# Patient Record
Sex: MALE | Race: WHITE | NOT HISPANIC OR LATINO | Employment: OTHER | URBAN - NONMETROPOLITAN AREA
[De-identification: names, ages, dates, MRNs, and addresses within clinical notes are randomized per-mention and may not be internally consistent; named-entity substitution may affect disease eponyms.]

---

## 2017-09-01 ENCOUNTER — HOSPITAL ENCOUNTER (INPATIENT)
Facility: HOSPITAL | Age: 77
LOS: 4 days | Discharge: NURSING, REHAB OR LONG-TERM CARE | End: 2017-09-06
Attending: EMERGENCY MEDICINE | Admitting: FAMILY MEDICINE

## 2017-09-01 ENCOUNTER — APPOINTMENT (OUTPATIENT)
Dept: CT IMAGING | Facility: HOSPITAL | Age: 77
End: 2017-09-01

## 2017-09-01 ENCOUNTER — APPOINTMENT (OUTPATIENT)
Dept: GENERAL RADIOLOGY | Facility: HOSPITAL | Age: 77
End: 2017-09-01

## 2017-09-01 DIAGNOSIS — Z78.9 DECREASED ACTIVITIES OF DAILY LIVING (ADL): ICD-10-CM

## 2017-09-01 DIAGNOSIS — R13.12 OROPHARYNGEAL DYSPHAGIA: ICD-10-CM

## 2017-09-01 DIAGNOSIS — R41.82 ALTERED MENTAL STATUS, UNSPECIFIED ALTERED MENTAL STATUS TYPE: Primary | ICD-10-CM

## 2017-09-01 DIAGNOSIS — N39.0 ACUTE UTI: ICD-10-CM

## 2017-09-01 DIAGNOSIS — Z74.09 IMPAIRED FUNCTIONAL MOBILITY, BALANCE, GAIT, AND ENDURANCE: ICD-10-CM

## 2017-09-01 DIAGNOSIS — R41.89 IMPAIRED COGNITION: ICD-10-CM

## 2017-09-01 LAB
ALBUMIN SERPL-MCNC: 4 G/DL (ref 3.5–5)
ALBUMIN/GLOB SERPL: 1.4 G/DL (ref 1.1–2.5)
ALP SERPL-CCNC: 95 U/L (ref 24–120)
ALT SERPL W P-5'-P-CCNC: 29 U/L (ref 0–54)
ANION GAP SERPL CALCULATED.3IONS-SCNC: 9 MMOL/L (ref 4–13)
APTT PPP: 31.2 SECONDS (ref 24.1–34.8)
AST SERPL-CCNC: 21 U/L (ref 7–45)
BACTERIA UR QL AUTO: ABNORMAL /HPF
BASOPHILS # BLD AUTO: 0.05 10*3/MM3 (ref 0–0.2)
BASOPHILS NFR BLD AUTO: 0.5 % (ref 0–2)
BILIRUB SERPL-MCNC: 0.5 MG/DL (ref 0.1–1)
BILIRUB UR QL STRIP: NEGATIVE
BUN BLD-MCNC: 21 MG/DL (ref 5–21)
BUN/CREAT SERPL: 18.3 (ref 7–25)
CALCIUM SPEC-SCNC: 9.2 MG/DL (ref 8.4–10.4)
CHLORIDE SERPL-SCNC: 100 MMOL/L (ref 98–110)
CLARITY UR: CLEAR
CO2 SERPL-SCNC: 25 MMOL/L (ref 24–31)
COLOR UR: YELLOW
CREAT BLD-MCNC: 1.15 MG/DL (ref 0.5–1.4)
D-LACTATE SERPL-SCNC: 1.1 MMOL/L (ref 0.5–2)
DEPRECATED RDW RBC AUTO: 45.6 FL (ref 40–54)
EOSINOPHIL # BLD AUTO: 0.15 10*3/MM3 (ref 0–0.7)
EOSINOPHIL NFR BLD AUTO: 1.4 % (ref 0–4)
ERYTHROCYTE [DISTWIDTH] IN BLOOD BY AUTOMATED COUNT: 13.9 % (ref 12–15)
GFR SERPL CREATININE-BSD FRML MDRD: 62 ML/MIN/1.73
GLOBULIN UR ELPH-MCNC: 2.9 GM/DL
GLUCOSE BLD-MCNC: 347 MG/DL (ref 70–100)
GLUCOSE BLDC GLUCOMTR-MCNC: 372 MG/DL (ref 70–130)
GLUCOSE UR STRIP-MCNC: ABNORMAL MG/DL
HCT VFR BLD AUTO: 43.8 % (ref 40–52)
HGB BLD-MCNC: 14.8 G/DL (ref 14–18)
HGB UR QL STRIP.AUTO: NEGATIVE
HYALINE CASTS UR QL AUTO: ABNORMAL /LPF
IMM GRANULOCYTES # BLD: 0.04 10*3/MM3 (ref 0–0.03)
IMM GRANULOCYTES NFR BLD: 0.4 % (ref 0–5)
INR PPP: 0.89 (ref 0.91–1.09)
KETONES UR QL STRIP: NEGATIVE
LEUKOCYTE ESTERASE UR QL STRIP.AUTO: ABNORMAL
LYMPHOCYTES # BLD AUTO: 1.85 10*3/MM3 (ref 0.72–4.86)
LYMPHOCYTES NFR BLD AUTO: 17.5 % (ref 15–45)
MCH RBC QN AUTO: 30.2 PG (ref 28–32)
MCHC RBC AUTO-ENTMCNC: 33.8 G/DL (ref 33–36)
MCV RBC AUTO: 89.4 FL (ref 82–95)
MONOCYTES # BLD AUTO: 1.13 10*3/MM3 (ref 0.19–1.3)
MONOCYTES NFR BLD AUTO: 10.7 % (ref 4–12)
NEUTROPHILS # BLD AUTO: 7.35 10*3/MM3 (ref 1.87–8.4)
NEUTROPHILS NFR BLD AUTO: 69.5 % (ref 39–78)
NITRITE UR QL STRIP: NEGATIVE
PH UR STRIP.AUTO: 6.5 [PH] (ref 5–8)
PLATELET # BLD AUTO: 248 10*3/MM3 (ref 130–400)
PMV BLD AUTO: 11.8 FL (ref 6–12)
POTASSIUM BLD-SCNC: 4.4 MMOL/L (ref 3.5–5.3)
PROT SERPL-MCNC: 6.9 G/DL (ref 6.3–8.7)
PROT UR QL STRIP: ABNORMAL
PROTHROMBIN TIME: 12.3 SECONDS (ref 11.9–14.6)
RBC # BLD AUTO: 4.9 10*6/MM3 (ref 4.8–5.9)
RBC # UR: ABNORMAL /HPF
REF LAB TEST METHOD: ABNORMAL
SODIUM BLD-SCNC: 134 MMOL/L (ref 135–145)
SP GR UR STRIP: 1.02 (ref 1–1.03)
SQUAMOUS #/AREA URNS HPF: ABNORMAL /HPF
UROBILINOGEN UR QL STRIP: ABNORMAL
WBC NRBC COR # BLD: 10.57 10*3/MM3 (ref 4.8–10.8)
WBC UR QL AUTO: ABNORMAL /HPF

## 2017-09-01 PROCEDURE — 63710000001 INSULIN LISPRO (HUMAN) PER 5 UNITS: Performed by: INTERNAL MEDICINE

## 2017-09-01 PROCEDURE — 82962 GLUCOSE BLOOD TEST: CPT

## 2017-09-01 PROCEDURE — 87086 URINE CULTURE/COLONY COUNT: CPT | Performed by: EMERGENCY MEDICINE

## 2017-09-01 PROCEDURE — 85730 THROMBOPLASTIN TIME PARTIAL: CPT | Performed by: EMERGENCY MEDICINE

## 2017-09-01 PROCEDURE — 83036 HEMOGLOBIN GLYCOSYLATED A1C: CPT | Performed by: INTERNAL MEDICINE

## 2017-09-01 PROCEDURE — 87077 CULTURE AEROBIC IDENTIFY: CPT | Performed by: EMERGENCY MEDICINE

## 2017-09-01 PROCEDURE — 71010 HC CHEST PA OR AP: CPT

## 2017-09-01 PROCEDURE — 99285 EMERGENCY DEPT VISIT HI MDM: CPT

## 2017-09-01 PROCEDURE — 25010000002 CEFTRIAXONE: Performed by: INTERNAL MEDICINE

## 2017-09-01 PROCEDURE — 81001 URINALYSIS AUTO W/SCOPE: CPT | Performed by: EMERGENCY MEDICINE

## 2017-09-01 PROCEDURE — 87040 BLOOD CULTURE FOR BACTERIA: CPT | Performed by: EMERGENCY MEDICINE

## 2017-09-01 PROCEDURE — 85610 PROTHROMBIN TIME: CPT | Performed by: EMERGENCY MEDICINE

## 2017-09-01 PROCEDURE — 93005 ELECTROCARDIOGRAM TRACING: CPT | Performed by: EMERGENCY MEDICINE

## 2017-09-01 PROCEDURE — G0378 HOSPITAL OBSERVATION PER HR: HCPCS

## 2017-09-01 PROCEDURE — 83605 ASSAY OF LACTIC ACID: CPT | Performed by: EMERGENCY MEDICINE

## 2017-09-01 PROCEDURE — 87186 SC STD MICRODIL/AGAR DIL: CPT | Performed by: EMERGENCY MEDICINE

## 2017-09-01 PROCEDURE — 85025 COMPLETE CBC W/AUTO DIFF WBC: CPT | Performed by: EMERGENCY MEDICINE

## 2017-09-01 PROCEDURE — 93010 ELECTROCARDIOGRAM REPORT: CPT | Performed by: INTERNAL MEDICINE

## 2017-09-01 PROCEDURE — 70450 CT HEAD/BRAIN W/O DYE: CPT

## 2017-09-01 PROCEDURE — 80053 COMPREHEN METABOLIC PANEL: CPT | Performed by: EMERGENCY MEDICINE

## 2017-09-01 RX ORDER — ONDANSETRON 2 MG/ML
4 INJECTION INTRAMUSCULAR; INTRAVENOUS EVERY 6 HOURS PRN
Status: DISCONTINUED | OUTPATIENT
Start: 2017-09-01 | End: 2017-09-06 | Stop reason: HOSPADM

## 2017-09-01 RX ORDER — SODIUM CHLORIDE 0.9 % (FLUSH) 0.9 %
1-10 SYRINGE (ML) INJECTION AS NEEDED
Status: DISCONTINUED | OUTPATIENT
Start: 2017-09-01 | End: 2017-09-06 | Stop reason: HOSPADM

## 2017-09-01 RX ORDER — OXYCODONE AND ACETAMINOPHEN 7.5; 325 MG/1; MG/1
1 TABLET ORAL EVERY 8 HOURS PRN
Status: DISCONTINUED | OUTPATIENT
Start: 2017-09-01 | End: 2017-09-05

## 2017-09-01 RX ORDER — ZINC GLUCONATE 50 MG
1 TABLET ORAL DAILY
COMMUNITY
End: 2018-01-01 | Stop reason: HOSPADM

## 2017-09-01 RX ORDER — LISINOPRIL 2.5 MG/1
2.5 TABLET ORAL DAILY PRN
Status: DISCONTINUED | OUTPATIENT
Start: 2017-09-01 | End: 2017-09-06 | Stop reason: HOSPADM

## 2017-09-01 RX ORDER — OXYCODONE AND ACETAMINOPHEN 7.5; 325 MG/1; MG/1
1 TABLET ORAL EVERY 8 HOURS PRN
COMMUNITY
End: 2017-09-06 | Stop reason: HOSPADM

## 2017-09-01 RX ORDER — DEXTROSE MONOHYDRATE 25 G/50ML
25 INJECTION, SOLUTION INTRAVENOUS
Status: DISCONTINUED | OUTPATIENT
Start: 2017-09-01 | End: 2017-09-06 | Stop reason: HOSPADM

## 2017-09-01 RX ORDER — FAMOTIDINE 20 MG/1
40 TABLET, FILM COATED ORAL DAILY
Status: DISCONTINUED | OUTPATIENT
Start: 2017-09-02 | End: 2017-09-06 | Stop reason: HOSPADM

## 2017-09-01 RX ORDER — INSULIN GLARGINE 100 [IU]/ML
25 INJECTION, SOLUTION SUBCUTANEOUS DAILY
COMMUNITY
End: 2017-09-06 | Stop reason: HOSPADM

## 2017-09-01 RX ORDER — ACETAMINOPHEN 325 MG/1
650 TABLET ORAL EVERY 4 HOURS PRN
Status: DISCONTINUED | OUTPATIENT
Start: 2017-09-01 | End: 2017-09-06 | Stop reason: HOSPADM

## 2017-09-01 RX ORDER — SODIUM CHLORIDE 0.9 % (FLUSH) 0.9 %
10 SYRINGE (ML) INJECTION AS NEEDED
Status: DISCONTINUED | OUTPATIENT
Start: 2017-09-01 | End: 2017-09-06 | Stop reason: HOSPADM

## 2017-09-01 RX ORDER — ASPIRIN 81 MG/1
81 TABLET ORAL EVERY 12 HOURS
COMMUNITY
End: 2017-09-06 | Stop reason: HOSPADM

## 2017-09-01 RX ORDER — LORAZEPAM 1 MG/1
1 TABLET ORAL EVERY 6 HOURS PRN
Status: DISCONTINUED | OUTPATIENT
Start: 2017-09-01 | End: 2017-09-05

## 2017-09-01 RX ORDER — TEMAZEPAM 30 MG/1
30 CAPSULE ORAL NIGHTLY
COMMUNITY
End: 2018-01-01 | Stop reason: HOSPADM

## 2017-09-01 RX ORDER — LISINOPRIL 2.5 MG/1
1.25 TABLET ORAL DAILY
COMMUNITY
End: 2018-01-01 | Stop reason: HOSPADM

## 2017-09-01 RX ORDER — ASPIRIN 81 MG/1
81 TABLET ORAL EVERY 12 HOURS SCHEDULED
Status: DISCONTINUED | OUTPATIENT
Start: 2017-09-01 | End: 2017-09-02

## 2017-09-01 RX ORDER — SODIUM CHLORIDE 9 MG/ML
100 INJECTION, SOLUTION INTRAVENOUS CONTINUOUS
Status: DISCONTINUED | OUTPATIENT
Start: 2017-09-01 | End: 2017-09-03

## 2017-09-01 RX ORDER — ZINC GLUCONATE 50 MG
50 TABLET ORAL DAILY
Status: DISCONTINUED | OUTPATIENT
Start: 2017-09-02 | End: 2017-09-06 | Stop reason: HOSPADM

## 2017-09-01 RX ORDER — TEMAZEPAM 15 MG/1
30 CAPSULE ORAL NIGHTLY
Status: DISCONTINUED | OUTPATIENT
Start: 2017-09-01 | End: 2017-09-06 | Stop reason: HOSPADM

## 2017-09-01 RX ORDER — NICOTINE POLACRILEX 4 MG
15 LOZENGE BUCCAL
Status: DISCONTINUED | OUTPATIENT
Start: 2017-09-01 | End: 2017-09-06 | Stop reason: HOSPADM

## 2017-09-01 RX ADMIN — INSULIN LISPRO 6 UNITS: 100 INJECTION, SOLUTION INTRAVENOUS; SUBCUTANEOUS at 22:49

## 2017-09-01 RX ADMIN — SODIUM CHLORIDE 100 ML/HR: 9 INJECTION, SOLUTION INTRAVENOUS at 23:28

## 2017-09-01 RX ADMIN — ASPIRIN 81 MG: 81 TABLET ORAL at 22:49

## 2017-09-01 RX ADMIN — TEMAZEPAM 30 MG: 15 CAPSULE ORAL at 22:49

## 2017-09-01 RX ADMIN — CEFTRIAXONE 1 G: 1 INJECTION, POWDER, FOR SOLUTION INTRAMUSCULAR; INTRAVENOUS at 21:48

## 2017-09-02 ENCOUNTER — APPOINTMENT (OUTPATIENT)
Dept: CARDIOLOGY | Facility: HOSPITAL | Age: 77
End: 2017-09-02
Attending: INTERNAL MEDICINE

## 2017-09-02 ENCOUNTER — APPOINTMENT (OUTPATIENT)
Dept: MRI IMAGING | Facility: HOSPITAL | Age: 77
End: 2017-09-02

## 2017-09-02 ENCOUNTER — APPOINTMENT (OUTPATIENT)
Dept: ULTRASOUND IMAGING | Facility: HOSPITAL | Age: 77
End: 2017-09-02

## 2017-09-02 PROBLEM — R41.82 ALTERED MENTAL STATUS: Status: RESOLVED | Noted: 2017-09-01 | Resolved: 2017-09-02

## 2017-09-02 PROBLEM — N30.90 CYSTITIS: Status: ACTIVE | Noted: 2017-09-02

## 2017-09-02 PROBLEM — E11.9 TYPE 2 DIABETES MELLITUS (HCC): Status: ACTIVE | Noted: 2017-09-02

## 2017-09-02 PROBLEM — I10 HYPERTENSION: Status: ACTIVE | Noted: 2017-09-02

## 2017-09-02 PROBLEM — I63.9 STROKE (HCC): Status: ACTIVE | Noted: 2017-09-02

## 2017-09-02 PROBLEM — F41.9 ANXIETY: Status: ACTIVE | Noted: 2017-09-02

## 2017-09-02 LAB
ARTICHOKE IGE QN: 63 MG/DL (ref 0–99)
BH CV ECHO MEAS - AO MAX PG (FULL): 2.7 MMHG
BH CV ECHO MEAS - AO MAX PG: 5.1 MMHG
BH CV ECHO MEAS - AO MEAN PG (FULL): 2 MMHG
BH CV ECHO MEAS - AO MEAN PG: 3 MMHG
BH CV ECHO MEAS - AO ROOT AREA: 10.8 CM^2
BH CV ECHO MEAS - AO ROOT DIAM: 3.7 CM
BH CV ECHO MEAS - AO V2 MAX: 113 CM/SEC
BH CV ECHO MEAS - AO V2 MEAN: 74.6 CM/SEC
BH CV ECHO MEAS - AO V2 VTI: 25.4 CM
BH CV ECHO MEAS - AVA(I,A): 2 CM^2
BH CV ECHO MEAS - AVA(I,D): 2 CM^2
BH CV ECHO MEAS - AVA(V,A): 1.9 CM^2
BH CV ECHO MEAS - AVA(V,D): 1.9 CM^2
BH CV ECHO MEAS - CONTRAST EF 4CH: 59.7 ML/M^2
BH CV ECHO MEAS - EDV(CUBED): 74.1 ML
BH CV ECHO MEAS - EDV(MOD-SP4): 88.3 ML
BH CV ECHO MEAS - EDV(TEICH): 78.6 ML
BH CV ECHO MEAS - EF(CUBED): 55.8 %
BH CV ECHO MEAS - EF(MOD-SP4): 59.7 %
BH CV ECHO MEAS - EF(TEICH): 47.9 %
BH CV ECHO MEAS - ESV(CUBED): 32.8 ML
BH CV ECHO MEAS - ESV(MOD-SP4): 35.6 ML
BH CV ECHO MEAS - ESV(TEICH): 41 ML
BH CV ECHO MEAS - FS: 23.8 %
BH CV ECHO MEAS - IVS/LVPW: 1.2
BH CV ECHO MEAS - IVSD: 1.1 CM
BH CV ECHO MEAS - LA DIMENSION: 3.4 CM
BH CV ECHO MEAS - LA/AO: 0.92
BH CV ECHO MEAS - LAT PEAK E' VEL: 7 CM/SEC
BH CV ECHO MEAS - LV MASS(C)D: 137.2 GRAMS
BH CV ECHO MEAS - LV MAX PG: 2.4 MMHG
BH CV ECHO MEAS - LV MEAN PG: 1 MMHG
BH CV ECHO MEAS - LV V1 MAX: 77.6 CM/SEC
BH CV ECHO MEAS - LV V1 MEAN: 48.4 CM/SEC
BH CV ECHO MEAS - LV V1 VTI: 17.7 CM
BH CV ECHO MEAS - LVIDD: 4.2 CM
BH CV ECHO MEAS - LVIDS: 3.2 CM
BH CV ECHO MEAS - LVLD AP4: 8.2 CM
BH CV ECHO MEAS - LVLS AP4: 7 CM
BH CV ECHO MEAS - LVOT AREA (M): 2.8 CM^2
BH CV ECHO MEAS - LVOT AREA: 2.8 CM^2
BH CV ECHO MEAS - LVOT DIAM: 1.9 CM
BH CV ECHO MEAS - LVPWD: 0.9 CM
BH CV ECHO MEAS - MV A MAX VEL: 63.5 CM/SEC
BH CV ECHO MEAS - MV DEC TIME: 0.14 SEC
BH CV ECHO MEAS - MV E MAX VEL: 52.4 CM/SEC
BH CV ECHO MEAS - MV E/A: 0.82
BH CV ECHO MEAS - SV(AO): 273.1 ML
BH CV ECHO MEAS - SV(CUBED): 41.3 ML
BH CV ECHO MEAS - SV(LVOT): 50.2 ML
BH CV ECHO MEAS - SV(MOD-SP4): 52.7 ML
BH CV ECHO MEAS - SV(TEICH): 37.6 ML
CHOLEST SERPL-MCNC: 112 MG/DL (ref 130–200)
E/E' RATIO: 8.6
GLUCOSE BLDC GLUCOMTR-MCNC: 138 MG/DL (ref 70–130)
GLUCOSE BLDC GLUCOMTR-MCNC: 164 MG/DL (ref 70–130)
GLUCOSE BLDC GLUCOMTR-MCNC: 204 MG/DL (ref 70–130)
GLUCOSE BLDC GLUCOMTR-MCNC: 264 MG/DL (ref 70–130)
HBA1C MFR BLD: 10.1 %
HDLC SERPL-MCNC: 38 MG/DL
LDLC/HDLC SERPL: 1.54 {RATIO}
LEFT ATRIUM VOLUME: 45 CM3
TRIGL SERPL-MCNC: 78 MG/DL (ref 0–149)

## 2017-09-02 PROCEDURE — 93880 EXTRACRANIAL BILAT STUDY: CPT

## 2017-09-02 PROCEDURE — 93306 TTE W/DOPPLER COMPLETE: CPT

## 2017-09-02 PROCEDURE — G8979 MOBILITY GOAL STATUS: HCPCS

## 2017-09-02 PROCEDURE — G8987 SELF CARE CURRENT STATUS: HCPCS

## 2017-09-02 PROCEDURE — 25010000002 ENOXAPARIN PER 10 MG: Performed by: INTERNAL MEDICINE

## 2017-09-02 PROCEDURE — 70551 MRI BRAIN STEM W/O DYE: CPT

## 2017-09-02 PROCEDURE — G8997 SWALLOW GOAL STATUS: HCPCS

## 2017-09-02 PROCEDURE — 97166 OT EVAL MOD COMPLEX 45 MIN: CPT

## 2017-09-02 PROCEDURE — G8996 SWALLOW CURRENT STATUS: HCPCS

## 2017-09-02 PROCEDURE — 25010000002 CEFTRIAXONE: Performed by: INTERNAL MEDICINE

## 2017-09-02 PROCEDURE — 80061 LIPID PANEL: CPT | Performed by: INTERNAL MEDICINE

## 2017-09-02 PROCEDURE — 93306 TTE W/DOPPLER COMPLETE: CPT | Performed by: INTERNAL MEDICINE

## 2017-09-02 PROCEDURE — 93880 EXTRACRANIAL BILAT STUDY: CPT | Performed by: SURGERY

## 2017-09-02 PROCEDURE — 92610 EVALUATE SWALLOWING FUNCTION: CPT

## 2017-09-02 PROCEDURE — G8988 SELF CARE GOAL STATUS: HCPCS

## 2017-09-02 PROCEDURE — G8978 MOBILITY CURRENT STATUS: HCPCS

## 2017-09-02 PROCEDURE — 63710000001 INSULIN LISPRO (HUMAN) PER 5 UNITS: Performed by: INTERNAL MEDICINE

## 2017-09-02 PROCEDURE — 99223 1ST HOSP IP/OBS HIGH 75: CPT | Performed by: PSYCHIATRY & NEUROLOGY

## 2017-09-02 PROCEDURE — 63710000001 INSULIN DETEMIR PER 5 UNITS: Performed by: INTERNAL MEDICINE

## 2017-09-02 PROCEDURE — 82962 GLUCOSE BLOOD TEST: CPT

## 2017-09-02 PROCEDURE — 97162 PT EVAL MOD COMPLEX 30 MIN: CPT

## 2017-09-02 RX ORDER — NYSTATIN 100000 [USP'U]/G
POWDER TOPICAL EVERY 12 HOURS SCHEDULED
Status: DISCONTINUED | OUTPATIENT
Start: 2017-09-02 | End: 2017-09-06 | Stop reason: HOSPADM

## 2017-09-02 RX ORDER — CLOPIDOGREL BISULFATE 75 MG/1
75 TABLET ORAL DAILY
Status: DISCONTINUED | OUTPATIENT
Start: 2017-09-02 | End: 2017-09-06 | Stop reason: HOSPADM

## 2017-09-02 RX ORDER — ASPIRIN 81 MG/1
324 TABLET, CHEWABLE ORAL DAILY
Status: DISCONTINUED | OUTPATIENT
Start: 2017-09-03 | End: 2017-09-02

## 2017-09-02 RX ORDER — ATORVASTATIN CALCIUM 40 MG/1
80 TABLET, FILM COATED ORAL NIGHTLY
Status: DISCONTINUED | OUTPATIENT
Start: 2017-09-02 | End: 2017-09-02

## 2017-09-02 RX ADMIN — INSULIN LISPRO 2 UNITS: 100 INJECTION, SOLUTION INTRAVENOUS; SUBCUTANEOUS at 08:53

## 2017-09-02 RX ADMIN — Medication 50 MG: at 08:55

## 2017-09-02 RX ADMIN — INSULIN LISPRO 4 UNITS: 100 INJECTION, SOLUTION INTRAVENOUS; SUBCUTANEOUS at 21:20

## 2017-09-02 RX ADMIN — CLOPIDOGREL 75 MG: 75 TABLET, FILM COATED ORAL at 13:46

## 2017-09-02 RX ADMIN — CEFTRIAXONE 1 G: 1 INJECTION, POWDER, FOR SOLUTION INTRAMUSCULAR; INTRAVENOUS at 21:17

## 2017-09-02 RX ADMIN — INSULIN DETEMIR 25 UNITS: 100 INJECTION, SOLUTION SUBCUTANEOUS at 09:01

## 2017-09-02 RX ADMIN — ENOXAPARIN SODIUM 40 MG: 40 INJECTION SUBCUTANEOUS at 08:53

## 2017-09-02 RX ADMIN — FAMOTIDINE 40 MG: 20 TABLET, FILM COATED ORAL at 08:54

## 2017-09-02 RX ADMIN — ASPIRIN 81 MG: 81 TABLET ORAL at 08:54

## 2017-09-02 RX ADMIN — NYSTATIN: 100000 POWDER TOPICAL at 21:15

## 2017-09-02 RX ADMIN — INSULIN LISPRO 3 UNITS: 100 INJECTION, SOLUTION INTRAVENOUS; SUBCUTANEOUS at 12:08

## 2017-09-02 RX ADMIN — SODIUM CHLORIDE 100 ML/HR: 9 INJECTION, SOLUTION INTRAVENOUS at 21:21

## 2017-09-02 RX ADMIN — TEMAZEPAM 30 MG: 15 CAPSULE ORAL at 21:15

## 2017-09-02 NOTE — THERAPY EVALUATION
Acute Care - Occupational Therapy Initial Evaluation  [facility]     Patient Name: Casey Berger  : 1940  MRN: [id number]  Today's Date: 2017  Onset of Illness/Injury or Date of Surgery Date: (P) 17  Date of Referral to OT: 17  Referring Physician: (P) Dr. Butt    Admit Date: 2017       ICD-10-CM ICD-9-CM   1. Altered mental status, unspecified altered mental status type R41.82 780.97   2. Acute UTI N39.0 599.0   3. Oropharyngeal dysphagia R13.12 787.22   4. Decreased activities of daily living (ADL) Z78.9 V49.89     Patient Active Problem List   Diagnosis   • Stroke   • Type 2 diabetes mellitus   • Hypertension   • Anxiety   • Cystitis     Past Medical History:   Diagnosis Date   • Hypertension    • Stroke      Past Surgical History:   Procedure Laterality Date   • VENA CAVA FILTER INSERTION            OT ASSESSMENT FLOWSHEET (last 72 hours)      OT Evaluation       17 1301 17 1300 17 1255 17 1051 17 1003    Rehab Evaluation    Document Type (P)  evaluation   see MAR  -MS evaluation   See MAR  -ND  evaluation  -MB --   see MAR  -PB    Subjective Information (P)  agree to therapy;complains of;numbness   N/T in legs d/t neuropathy  -MS complains of;agree to therapy;numbness   NUMBNESS in BLE  -ND  no complaints;agree to therapy  -MB     Evaluation Not Performed     patient unavailable for evaluation  -PB    Evaluation Not Performed, Comment     going for echo  -PB    General Information    Patient Profile Review (P)  yes  -MS yes  -ND   yes  -PB    Onset of Illness/Injury or Date of Surgery Date (P)  17  -MS 17  -ND   17  -PB    Referring Physician (P)  Dr. Butt  -MS Dr. 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  -ND   increased confusion, HA, fall, decreased coordination, vision changes; UTI, CVA; MRI brain: acute R occipital lacunar infarct, 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  -PB    Precautions/Limitations (P)  fall precautions  -MS fall precautions   shoe braces  -ND       Prior Level of Function (P)  independent:;all household mobility;ADL's;home management;dependent:;driving  -MS independent:;all household mobility;community mobility;ADL's  -ND       Equipment Currently Used at Home (P)  cane, straight;walker, rolling;grab bar;commode;shower chair;raised toilet  -MS cane, straight;grab bar;commode;raised toilet  -ND bath bench;cane, straight;walker, standard  -LN      Plans/Goals Discussed With  patient;agreed upon  -ND       Risks Reviewed  patient:;dizziness;increased discomfort;nausea/vomiting;LOB;change in vital signs;increased drainage;lines disloged  -ND       Benefits Reviewed  patient:;improve function;increase independence;increase strength;increase balance;decrease pain;decrease risk of DVT;improve skin integrity;increase knowledge  -ND       Barriers to Rehab  medically complex;previous functional deficit;visual deficit;physical barrier  -ND       Living Environment    Lives With (P)  spouse  -MS spouse  -ND spouse  -LN      Living Arrangements (P)  house  -MS house  -ND       Home Accessibility (P)  stairs to enter home  -MS stairs to enter home  -ND stairs (1 railing present)  -LN      Number of Stairs to Enter Home (P)  1  -MS 1  -ND       Stair Railings at Home (P)  inside, present on right side  -MS inside, present on right side  -ND none  -LN      Type of Financial/Environmental Concern    none  -LN      Transportation Available   car;family or friend will provide  -LN      Clinical Impression    Date of Referral to OT  09/02/17  -ND       OT Diagnosis  decreased adl  -ND       Prognosis  good  -ND       Impairments Found (describe specific impairments)  ergonomics and body mechanics;gait, locomotion, and balance;posture  -ND       Patient/Family Goals Statement  go home  -ND       Criteria for Skilled Therapeutic Interventions Met  yes;treatment indicated  -ND       Rehab Potential  good, to achieve stated therapy goals  -ND       Therapy Frequency  3-5 times/wk  -ND       Predicted Duration of Therapy Intervention (days/wks)  10 days  -ND       Anticipated Discharge Disposition  home with assist;home with home health  -ND       Pain Assessment    Pain Assessment  No/denies pain  -ND       Vision Assessment/Intervention    Visual Impairment  inattention to the left  -ND       Visual Impairment Comment  Pt. ran r/w into wall on several occasions and needed vc to pay attention to surroundings  -ND       Cognitive Assessment/Intervention    Current Cognitive/Communication Assessment  functional  -ND  functional  -MB     Orientation Status  oriented x 4  -ND       Follows Commands/Answers Questions  100% of the time;able to follow multi-step instructions  -ND  100% of the time  -MB     Personal Safety  decreased awareness, need for safety;decreased awareness, need for assist;decreased insight to deficits  -ND       Personal Safety Interventions  gait belt;fall prevention program maintained;nonskid shoes/slippers when out of bed;supervised activity  -ND       ROM (Range of Motion)    General ROM  no range of motion deficits identified  -ND       MMT (Manual Muscle Testing)    General MMT Assessment Detail  Functionally 4+/5, L <R  -ND       Bed Mobility, Assessment/Treatment    Bed Mobility, Assistive Device  head of bed elevated  -ND       Bed Mobility, Roll Right, [place]  supervision required  -ND        Bed Mobility, Scoot/Bridge, [place]  supervision required  -ND       Bed Mob, Supine to Sit, [place]  supervision required  -ND       Bed Mob, Sit to Supine, [place]  supervision required  -ND       Transfer Assessment/Treatment    Transfers, Sit-Stand [place]  verbal cues required;nonverbal cues required (demo/gesture);contact guard assist  -ND       Transfers, Stand-Sit [place]  verbal cues required;nonverbal cues required (demo/gesture);contact guard assist  -ND       Transfers, Sit-Stand-Sit, Assist Device  rolling walker  -ND       Transfer, Impairments  strength decreased;impaired balance;sensation decreased;postural control impaired  -ND       Functional Mobility    Functional Mobility- Ind. Level  verbal cues required;nonverbal cues required (demo/gesture);contact guard assist  -ND       Functional Mobility- Device  rolling walker  -ND       Functional Mobility-Distance (Feet)  --   IN ROOM IN [place]  -ND       Functional Mobility- Safety Issues  sequencing ability decreased;balance decreased during turns;step length decreased  -ND       Lower Body Dressing Assessment/Training    LB Dressing Assess/Train, Clothing Type  doffing:;donning:;socks   simulated  -ND       LB Dressing Assess/Train, Position  edge of bed  -ND       LB Dressing Assess/Train, [place]  supervision required  -ND       Motor Skills/Interventions    Additional Documentation  Balance Skills Training (Group);Fine Motor Coordination Training (Group);Gross Motor Coordination Training (Group)  -ND       Balance Skills Training    Sitting-Level of Assistance  Independent  -ND       Sitting-Balance Support  Right upper extremity supported;Left upper extremity supported;Feet supported  -ND       Standing-Level of Assistance  Contact guard  -ND       Static Standing Balance Support  assistive device  -ND       Gait Balance-Level of Assistance  Contact guard  -ND       Gait Balance Support  assistive device  -ND        Gross Motor Coordination Training    Gross Motor Skill, Impairments Detail  BUE FTN intact  -ND       Fine Motor Coordination Training    Opposition  Left:;Right:;intact  -ND       Orthotics Prosthetics    Additional Documentation  Orthosis Location (Group);Orthosis Management/Training (Group)  -ND       Orthosis Location    Orthosis Location/Type  lower extremity  -ND       Orthosis, Lower Extremity  Left:;Right:   AFO [place]  -ND       Orthosis Management/Training    Orthosis Indications  increase function;mobilize, increase limited range of motion  -ND       Sensory Assessment/Intervention    Sensory Impairment  --   numbness in BLE  -ND       General Therapy Interventions    Planned Therapy Interventions  activity intolerance;ADL retraining;balance training;bed mobility training;energy conservation;home exercise program;strengthening;transfer training  -ND       Positioning and Restraints    Pre-Treatment Position  in bed  -ND       Post Treatment Position  bed  -ND       In Bed  fowlers;call light within reach;encouraged to call for assist;with family/caregiver;side rails up x2  -ND         09/01/17 9197                General Information    Equipment Currently Used at Home walker, standard;commode;bath bench  -TR        Living Environment    Lives With spouse  -TR        Living Arrangements house  -TR        Home Accessibility stairs (1 railing present);no concerns  -TR        Stair Railings at Home inside, present on right side  -TR        Type of Financial/Environmental Concern none  -TR        Transportation Available family or friend will provide  -TR        Functional Level Prior    Ambulation 1-->assistive equipment  -TR        Transferring 1-->assistive equipment  -TR        Toileting 1-->assistive equipment  -TR        Bathing 1-->assistive equipment  -TR        Dressing 0-->independent  -TR        Eating 0-->independent  -TR        Communication 0-->understands/communicates without difficulty   -TR        Swallowing 0-->swallows foods/liquids without difficulty  -TR        Prior Functional Level Comment none  -TR          User Key  (r) = Recorded By, (t) = Taken By, (c) = Cosigned By    Initials Name Effective Dates    HOMER Haskins, [facility]-SLP 08/02/16 -     TR Beth Livingston, RN 08/02/16 -     PB Oleksandr Fountain, PT DPT 08/02/16 -     LN Mili Guajardo, BSW 09/15/16 -     ND Pao Nguyen, OTR/L 10/21/16 -     MS Yulissa Simms, PT Student 07/19/17 -            Occupational Therapy Education     Title: PT OT SLP Therapies (Done)     Topic: Occupational Therapy (Done)     Point: ADL training (Done)    Description: Instruct learner(s) on proper safety adaptation and remediation techniques during self care or transfers.   Instruct in proper use of assistive devices.    Learning Progress Summary    Learner Readiness Method Response Comment Documented by Status   Patient Acceptance E VU,NR Pt. educated on role of OT, safe t/f, benefit of activity, r/w safety, progression with poc ND 09/02/17 1355 Done               Point: Home exercise program (Done)    Description: Instruct learner(s) on appropriate technique for monitoring, assisting and/or progressing therapeutic exercises/activities.    Learning Progress Summary    Learner Readiness Method Response Comment Documented by Status   Patient Acceptance E VU,NR Pt. educated on role of OT, safe t/f, benefit of activity, r/w safety, progression with poc ND 09/02/17 1355 Done               Point: Body mechanics (Done)    Description: Instruct learner(s) on proper positioning and spine alignment during self-care, functional mobility activities and/or exercises.    Learning Progress Summary    Learner Readiness Method Response Comment Documented by Status   Patient Acceptance E VU,NR Pt. educated on role of OT, safe t/f, benefit of activity, r/w safety, progression with poc ND 09/02/17 1355 Done                      User Key     Initials Effective Dates  Name Provider Type Discipline    ND 10/21/16 -  Pao Nguyen, OTR/L Occupational Therapist OT                  OT Recommendation and Plan  Anticipated Discharge Disposition: home with assist, home with home health  Planned Therapy Interventions: activity intolerance, ADL retraining, balance training, bed mobility training, energy conservation, home exercise program, strengthening, transfer training  Therapy Frequency: 3-5 times/wk  Plan of Care Review  Plan Of Care Reviewed With: patient  Progress: progress toward functional goals as expected  Outcome Summary/Follow up Plan: OT keshia completed. Pt. sup for bed mobility. Pt. CGA with r/w to complete sit to stand t/f and fx mobility. Pt. had episodes of LOB and decreased spatial awareness for L side while ambulating. Pt. sup to simulate donning and doffing socks. Pt. cont to benefit from skilled OT d/t decreased balance, decreased strength, decreased safety awareness, decreased ind in ADLs. Anticipated dc isTCU vs home with assist and HH. 9/2/17 1345          OT Goals       09/02/17 1356          Transfer Training OT LTG    Transfer Training OT LTG, Date Established 09/02/17  -ND      Transfer Training OT LTG, Time to Achieve by discharge  -ND      Transfer Training OT LTG, Activity Type all transfers  -ND      Transfer Training OT LTG, [place] Level conditional independence  -ND      Strength OT LTG    Strength Goal OT LTG, Date Established 09/02/17  -ND      Strength Goal OT LTG, Time to Achieve by discharge  -ND      Strength Goal OT LTG, Measure to Achieve Pt. will complete BUE strengthening exercises to increase BUE strength to 5/5 for ADLs.   -ND      Bathing OT LTG    Bathing Goal OT LTG, Date Established 09/02/17  -ND      Bathing Goal OT LTG, Time to Achieve by discharge  -ND      Bathing Goal OT LTG, Activity Type upper body bathing;lower body bathing  -ND      Bathing Goal OT LTG, [place] Level independent  -ND      LB Dressing OT LTG    LB  Dressing Goal OT LTG, Date Established 09/02/17  -ND      LB Dressing Goal OT LTG, Time to Achieve by discharge  -ND      LB Dressing Goal OT LTG, [place] Level independent  -ND        User Key  (r) = Recorded By, (t) = Taken By, (c) = Cosigned By    Initials Name Provider Type    RYLEE Carter/L Occupational Therapist                Outcome Measures       09/02/17 1400          How much help from another is currently needed...    Putting on and taking off regular lower body clothing? 3  -ND      Bathing (including washing, rinsing, and drying) 3  -ND      Toileting (which includes using toilet bed pan or urinal) 3  -ND      Putting on and taking off regular upper body clothing 4  -ND      Taking care of personal grooming (such as brushing teeth) 4  -ND      Eating meals 4  -ND      Score 21  -ND      Functional Assessment    Outcome Measure Options AM-PAC 6 Clicks Daily Activity (OT)  -ND        User Key  (r) = Recorded By, (t) = Taken By, (c) = Cosigned By    Initials Name Provider Type    RYLEE Carter/L Occupational Therapist          Time Calculation:   OT Start Time: 1300  OT Stop Time: 1345  OT Time Calculation (min): 45 min    Therapy Charges for Today     Code Description Service Date Service Provider Modifiers Qty    74999512958 HC OT EVAL MOD COMPLEXITY 3 9/2/2017 Pao Nguyen OTR/L GO 1    90753331921 HC OT SELFCARE CURRENT 9/2/2017 Pao Nguyen OTR/L GO, CJ 1    31713695716 HC OT SELFCARE PROJECTED 9/2/2017 Pao Nguyen OTR/L GO, CI 1          OT G-codes  OT Functional Scales Options: AM-PAC 6 Clicks Daily Activity (OT)  Functional Limitation: Self care  Self Care Current Status (): At least 20 percent but less than 40 percent impaired, limited or restricted  Self Care Goal Status (): At least 1 percent but less than 20 percent impaired, limited or restricted    RYLEE Lee/ALEXANDRA  9/2/2017

## 2017-09-02 NOTE — PROGRESS NOTES
Discharge Planning Assessment  [facility]     Patient Name: Casey Berger  MRN: [id number]  Today's Date: 9/2/2017    Admit Date: 9/1/2017          Discharge Needs Assessment       09/02/17 1255    Living Environment    Lives With spouse    Home Accessibility stairs (1 railing present)    Stair Railings at Home none    Type of Financial/Environmental Concern none    Transportation Available car;family or friend will provide    Living Environment    Provides Primary Care For no one, unable/limited ability to care for self    Primary Care Provided By spouse/significant other    Quality Of Family Relationships supportive;helpful;involved    Able to Return to Prior Living Arrangements yes    Discharge Needs Assessment    Concerns To Be Addressed no discharge needs identified    Readmission Within The Last 30 Days no previous admission in last 30 days    Equipment Currently Used at Home bath bench;cane, straight;walker, standard    Equipment Needed After Discharge none    Discharge Facility/Level Of Care Needs home with home health   Pt would benefit from .            Discharge Plan     None        Discharge Placement     No information found                Demographic Summary     None            Functional Status     None            Psychosocial     None            Abuse/Neglect     None            Legal     None            Substance Abuse     None            Patient Forms     None          RONEL Leyva

## 2017-09-02 NOTE — THERAPY EVALUATION
Acute Care - Physical Therapy Initial Evaluation  [facility]     Patient Name: Casey Berger  : 1940  MRN: [id number]  Today's Date: 2017   Onset of Illness/Injury or Date of Surgery Date: 17  Date of Referral to PT: 17  Referring Physician: Dr. Butt      Admit Date: 2017     Visit Dx:    ICD-10-CM ICD-9-CM   1. Altered mental status, unspecified altered mental status type R41.82 780.97   2. Acute UTI N39.0 599.0   3. Oropharyngeal dysphagia R13.12 787.22   4. Decreased activities of daily living (ADL) Z78.9 V49.89   5. Impaired functional mobility, balance, gait, and endurance Z74.09 V49.89     Patient Active Problem List   Diagnosis   • Stroke   • Type 2 diabetes mellitus   • Hypertension   • Anxiety   • Cystitis     Past Medical History:   Diagnosis Date   • Hypertension    • Stroke      Past Surgical History:   Procedure Laterality Date   • VENA CAVA FILTER INSERTION            PT ASSESSMENT (last 72 hours)      PT Evaluation       17 1301 17 1300    Rehab Evaluation    Document Type evaluation   see MAR  -PB (r) MS (t) PB (c) evaluation   See MAR  -ND    Subjective Information agree to therapy;complains of;numbness   N/T in legs d/t neuropathy  -PB (r) MS (t) PB (c) complains of;agree to therapy;numbness   NUMBNESS in BLE  -ND    General Information    Patient Profile Review yes  -PB (r) MS (t) PB (c) yes  -ND    Onset of Illness/Injury or Date of Surgery Date 17  -PB (r) MS (t) PB (c) 17  -ND    Referring Physician Dr. Butt  -PB (r) MS (t) PB (c) Dr. 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  -PB (r) MS (t) PB (c) increased confusion, HA, fall,  decreased coordination, vision changes; UTI, CVA; MRI brain: acute R occipital lacunar infarct, 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  -ND    Precautions/Limitations fall precautions  -PB (r) MS (t) PB (c) fall precautions   shoe braces  -ND    Prior Level of Function independent:;all household mobility;ADL's;home management;dependent:;driving  -PB (r) MS (t) PB (c) independent:;all household mobility;community mobility;ADL's  -ND    Equipment Currently Used at Home cane, straight;walker, rolling;grab bar;commode;shower chair;raised toilet  -PB (r) MS (t) PB (c) cane, straight;grab bar;commode;raised toilet  -ND    Plans/Goals Discussed With patient and family;agreed upon  -PB (r) MS (t) PB (c) patient;agreed upon  -ND    Risks Reviewed patient and family:;LOB;dizziness;increased discomfort;lines disloged  -PB (r) MS (t) PB (c) patient:;dizziness;increased discomfort;nausea/vomiting;LOB;change in vital signs;increased drainage;lines disloged  -ND    Benefits Reviewed patient and family:;improve function;increase strength;increase balance;increase knowledge  -PB (r) MS (t) PB (c) patient:;improve function;increase independence;increase strength;increase balance;decrease pain;decrease risk of DVT;improve skin integrity;increase knowledge  -ND    Barriers to Rehab medically complex;previous functional deficit  -PB (r) MS (t) PB (c) medically complex;previous functional deficit;visual deficit;physical barrier  -ND    Living Environment    Lives With spouse  -PB (r) MS (t) PB (c) spouse  -ND    Living Arrangements house  -PB (r) MS (t) PB (c) house  -ND    Home Accessibility stairs to enter home  -PB (r) MS (t) PB (c) stairs to enter home  -ND    Number of Stairs to Enter Home 1  -PB (r) MS (t) PB (c) 1  -ND    Stair Railings at Home inside, present on right side  -PB (r) MS (t) PB (c) inside, present on right side  -ND    Living Environment Comment Pt wears B AFOs with ambulation  -PB (r) MS (t)  PB (c)     Clinical Impression    Date of Referral to PT 09/01/17  -PB (r) MS (t) PB (c)     Patient/Family Goals Statement Return home  -PB (r) MS (t) PB (c)     Criteria for Skilled Therapeutic Interventions Met yes;treatment indicated  -PB (r) MS (t) PB (c)     Rehab Potential good, to achieve stated therapy goals  -PB (r) MS (t) PB (c)     Predicted Duration of Therapy Intervention (days/wks) Until D/C  -PB (r) MS (t) PB (c)     Pain Assessment    Pain Assessment No/denies pain  -PB (r) MS (t) PB (c) No/denies pain  -ND    Vision Assessment/Intervention    Visual Impairment  inattention to the left  -ND    Visual Impairment Comment  Pt. ran r/w into wall on several occasions and needed vc to pay attention to surroundings  -ND    Cognitive Assessment/Intervention    Current Cognitive/Communication Assessment functional  -PB (r) MS (t) PB (c) functional  -ND    Orientation Status oriented x 4  -PB (r) MS (t) PB (c) oriented x 4  -ND    Follows Commands/Answers Questions 100% of the time;able to follow multi-step instructions  -PB (r) MS (t) PB (c) 100% of the time;able to follow multi-step instructions  -ND    Personal Safety decreased awareness, need for assist;decreased awareness, need for safety;decreased insight to deficits;impulsive  -PB (r) MS (t) PB (c) decreased awareness, need for safety;decreased awareness, need for assist;decreased insight to deficits  -ND    Personal Safety Interventions fall prevention program maintained;gait belt;nonskid shoes/slippers when out of bed;supervised activity  -PB (r) MS (t) PB (c) gait belt;fall prevention program maintained;nonskid shoes/slippers when out of bed;supervised activity  -ND    ROM (Range of Motion)    General ROM  no range of motion deficits identified  -ND    General ROM Detail Pt unable to PF/DF with AFOs donned, otherwise BLE ROM WFL  -PB (r) MS (t) PB (c)     MMT (Manual Muscle Testing)    General MMT Assessment Detail BLEs functionally 4/5  -PB (r) MS  (t) PB (c) Functionally 4+/5, L <R  -ND    Bed Mobility, Assessment/Treatment    Bed Mobility, Assistive Device bed rails;head of bed elevated  -PB (r) MS (t) PB (c) head of bed elevated  -ND    Bed Mobility, Roll Left, [place] conditional independence  -PB (r) MS (t) PB (c)     Bed Mobility, Roll Right, [place]  supervision required  -ND    Bed Mobility, Scoot/Bridge, [place]  supervision required  -ND    Bed Mob, Supine to Sit, [place]  supervision required  -ND    Bed Mob, Sit to Supine, [place]  supervision required  -ND    Bed Mob, Sidelying to Sit, [place] conditional independence  -PB (r) MS (t) PB (c)     Bed Mob, Sit to Sidelying, [place] --   Left pt sitting EOB  -PB (r) MS (t) PB (c)     Bed Mobility, Safety Issues decreased use of arms for pushing/pulling;decreased use of legs for bridging/pushing  -PB (r) MS (t) PB (c)     Bed Mobility, Impairments sensation decreased;strength decreased  -PB (r) MS (t) PB (c)     Transfer Assessment/Treatment    Transfers, Sit-Stand [place] contact guard assist;verbal cues required;nonverbal cues required (demo/gesture)  -PB (r) MS (t) PB (c) verbal cues required;nonverbal cues required (demo/gesture);contact guard assist  -ND    Transfers, Stand-Sit [place] contact guard assist;verbal cues required;nonverbal cues required (demo/gesture)  -PB (r) MS (t) PB (c) verbal cues required;nonverbal cues required (demo/gesture);contact guard assist  -ND    Transfers, Sit-Stand-Sit, Assist Device rolling walker  -PB (r) MS (t) PB (c) rolling walker  -ND    Transfer, Safety Issues sequencing ability decreased;step length decreased;impulsivity  -PB (r) MS (t) PB (c)     Transfer, Impairments sensation decreased;strength decreased;impaired balance  -PB (r) MS (t) PB (c) strength decreased;impaired balance;sensation decreased;postural control impaired  -ND    Transfer, Comment Pt demonstrated decreased safety and impulsivity with mobility.  Required multiple cues for safety.  -PB (r) MS (t) PB (c)     Gait Assessment/Treatment    Gait, [place] Level contact guard assist;verbal cues required;nonverbal cues required (demo/gesture)  -PB (r) MS (t) PB (c)     Gait, Assistive Device rolling walker  -PB (r) MS (t) PB (c)     Gait, Distance (Feet) 350  -PB (r) MS (t) PB (c)     Gait, Gait Deviations andrea decreased;decreased heel strike;forward flexed posture;limb motion velocity decreased;narrow base;step length decreased;stride length decreased;toe-to-floor clearance decreased  -PB (r) MS (t) PB (c)     Gait, Safety Issues sequencing ability decreased;step length decreased  -PB (r) MS (t) PB (c)     Gait, Impairments strength decreased;impaired balance;coordination impaired  -PB (r) MS (t) PB (c)     Gait, Comment Pt required multiple cues to keep feet further apart due to pt almost tripping over his feet. Pt wears glasses full time but would run into obstacles in hallway with walker. Required cues to stay in middle of the luna.  -PB (r) MS (t) PB (c)     Motor Skills/Interventions    Additional Documentation Balance Skills Training (Group)  -PB (r) MS (t) PB (c) Balance Skills Training (Group);Fine Motor Coordination Training (Group);Gross Motor Coordination Training (Group)  -ND    Balance Skills Training    Sitting-Level of Assistance Independent  -PB (r) MS (t) PB (c) Independent  -ND    Sitting-Balance Support Right upper extremity supported;Left upper extremity supported;Feet supported  -PB (r) MS (t) PB (c) Right upper extremity supported;Left upper extremity supported;Feet supported  -ND    Standing-Level of Assistance Contact guard  -PB (r) MS (t) PB (c) Contact guard  -ND    Static Standing Balance Support assistive device  -PB (r) MS (t) PB (c) assistive device  -ND    Gait Balance-Level of Assistance Contact guard  -PB (r) MS (t) PB (c) Contact guard  -ND    Gait Balance Support assistive device  -PB (r) MS (t) PB (c) assistive device   -ND    Fine Motor Coordination Training    Opposition  Left:;Right:;intact  -ND    Gross Motor Coordination Training    Gross Motor Skill, Impairments Detail  BUE FTN intact  -ND    Orthotics Prosthetics    Additional Documentation  Orthosis Location (Group);Orthosis Management/Training (Group)  -ND    Orthosis Location    Orthosis Location/Type  lower extremity  -ND    Orthosis, Lower Extremity  Left:;Right:   AFO [place]  -ND    Orthosis Management/Training    Orthosis Indications  increase function;mobilize, increase limited range of motion  -ND    Sensory Assessment/Intervention    Sensory Impairment --   severe neuropathy BLEs per pt  -PB (r) MS (t) PB (c) --   numbness in BLE  -ND    Positioning and Restraints    Pre-Treatment Position in bed  -PB (r) MS (t) PB (c) in bed  -ND    Post Treatment Position bed  -PB (r) MS (t) PB (c) bed  -ND    In Bed sitting EOB;call light within reach;encouraged to call for assist;with family/caregiver   B AFOs on  -PB (r) MS (t) PB (c) fowlers;call light within reach;encouraged to call for assist;with family/caregiver;side rails up x2  -ND      09/02/17 1255 09/02/17 1051    Rehab Evaluation    Document Type  evaluation  -MB    Subjective Information  no complaints;agree to therapy  -MB    General Information    Equipment Currently Used at Home bath bench;cane, straight;walker, standard  -LN     Living Environment    Lives With spouse  -LN     Home Accessibility stairs (1 railing present)  -LN     Stair Railings at Home none  -LN     Type of Financial/Environmental Concern none  -LN     Transportation Available car;family or friend will provide  -LN     Cognitive Assessment/Intervention    Current Cognitive/Communication Assessment  functional  -MB    Follows Commands/Answers Questions  100% of the time  -MB      09/02/17 1003 09/01/17 5354    Rehab Evaluation    Document Type --   see MAR  -PB     Evaluation Not Performed patient unavailable for evaluation  -PB      Evaluation Not Performed, Comment going for echo  -PB     General Information    Patient Profile Review yes  -PB     Onset of Illness/Injury or Date of Surgery Date 09/01/17  -PB     Referring Physician Dr Butt  -PB     Pertinent History Of Current Problem increased confusion, HA, fall, decreased coordination, vision changes; UTI, CVA; MRI brain: acute R occipital lacunar infarct, 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  -PB     Equipment Currently Used at Home  walker, standard;commode;bath bench  -TR    Living Environment    Lives With  spouse  -TR    Living Arrangements  house  -TR    Home Accessibility  stairs (1 railing present);no concerns  -TR    Stair Railings at Home  inside, present on right side  -TR    Type of Financial/Environmental Concern  none  -TR    Transportation Available  family or friend will provide  -TR    Clinical Impression    Date of Referral to PT 09/01/17  -PB       User Key  (r) = Recorded By, (t) = Taken By, (c) = Cosigned By    Initials Name Provider Type    HOMER Haskins, CCC-SLP Speech and Language Pathologist    TR Beth Livingston, RN Registered Nurse    PB Oleksandr Fountain, PT DPT Physical Therapist    LN Mili Guajardo, BSW     ND Pao Nguyen, OTR/L Occupational Therapist    MS Yulissa Simms, PT Student PT Student          Physical Therapy Education     Title: PT OT SLP Therapies (Active)     Topic: Physical Therapy (Active)     Point: Mobility training (Done)    Learning Progress Summary    Learner Readiness Method Response Comment Documented by Status   Patient Acceptance E VU,NR Proper use of AD, safety with t/fs and ambulation, benefits of increased activity MS 09/02/17 1411 Done               Point: Precautions (Done)    Learning Progress Summary    Learner Readiness Method Response Comment Documented by Status   Patient Acceptance E VU,NR Proper use of AD, safety with t/fs and ambulation, benefits of increased activity MS  09/02/17 1411 Done                      User Key     Initials Effective Dates Name Provider Type Discipline    MS 07/19/17 -  Yulissa Simms, PT Student PT Student PT                PT Recommendation and Plan  Anticipated Discharge Disposition: home with assist, home with home health  Planned Therapy Interventions: balance training, bed mobility training, gait training, home exercise program, patient/family education, stair training, strengthening, transfer training  PT Frequency: daily, 2 times/day, per priority policy  Plan of Care Review  Outcome Summary/Follow up Plan: PT eval complete. Pt cond I for bed mobility. Pt CGA with cues for safety for sit to stand t/fs with RW. Pt ambulated up to 300' with RW and CGA. Pt required multiple cues for safety with ambulation. Pt demonstrated decreased LUCINA with scissoring gait. Pt would benefit from therapy to address functional mobility, safety with ambulation, balance, strength, and education. Anticipate D/C home with assist/HH.           IP PT Goals       09/02/17 1406          Bed Mobility PT LTG    Bed Mobility PT LTG, Date Established 09/02/17  -PB (r) MS (t) PB (c)      Bed Mobility PT LTG, Time to Achieve by discharge  -PB (r) MS (t) PB (c)      Bed Mobility PT LTG, Activity Type all bed mobility  -PB (r) MS (t) PB (c)      Bed Mobility PT LTG, [place] Level independent  -PB (r) MS (t) PB (c)      Transfer Training PT LTG    Transfer Training PT LTG, Date Established 09/02/17  -PB (r) MS (t) PB (c)      Transfer Training PT LTG, Time to Achieve by discharge  -PB (r) MS (t) PB (c)      Transfer Training PT LTG, Activity Type bed to chair /chair to bed;sit to stand/stand to sit  -PB (r) MS (t) PB (c)      Transfer Training PT LTG, [place] Level conditional independence  -PB (r) MS (t) PB (c)      Transfer Training PT LTG, Assist Device walker, rolling  -PB (r) MS (t) PB (c)      Gait Training PT LTG    Gait Training Goal PT LTG, Date Established 09/02/17   -PB (r) MS (t) PB (c)      Gait Training Goal PT LTG, Time to Achieve by discharge  -PB (r) MS (t) PB (c)      Gait Training Goal PT LTG, [place] Level conditional independence  -PB (r) MS (t) PB (c)      Gait Training Goal PT LTG, Assist Device walker, rolling  -PB (r) MS (t) PB (c)      Gait Training Goal PT LTG, Distance to Achieve 300'  -PB (r) MS (t) PB (c)      Gait Training Goal PT LTG, Additional Goal without tripping over feet/keeping clear of obstacles  -PB (r) MS (t) PB (c)      Stair Training PT LTG    Stair Training Goal PT LTG, Date Established 09/02/17  -PB (r) MS (t) PB (c)      Stair Training Goal PT LTG, Time to Achieve by discharge  -PB (r) MS (t) PB (c)      Stair Training Goal PT LTG, Number of Steps 1  -PB (r) MS (t) PB (c)      Stair Training Goal PT LTG, [place] Level conditional independence  -PB (r) MS (t) PB (c)      Stair Training Goal PT LTG, Assist Device 1 handrail  -PB (r) MS (t) PB (c)        User Key  (r) = Recorded By, (t) = Taken By, (c) = Cosigned By    Initials Name Provider Type    REJI Fountain, PT DPT Physical Therapist    MS Yulissa Simms, PT Student PT Student                Outcome Measures       09/02/17 1400 09/02/17 1301       How much help from another person do you currently need...    Turning from your back to your side while in flat bed without using bedrails?  4  -PB (r) MS (t) PB (c)     Moving from lying on back to sitting on the side of a flat bed without bedrails?  3  -PB (r) MS (t) PB (c)     Moving to and from a bed to a chair (including a wheelchair)?  3  -PB (r) MS (t) PB (c)     Standing up from a chair using your arms (e.g., wheelchair, bedside chair)?  3  -PB (r) MS (t) PB (c)     Climbing 3-5 steps with a railing?  2  -PB (r) MS (t) PB (c)     To walk in hospital room?  3  -PB (r) MS (t) PB (c)     AM-PAC 6 Clicks Score  18  -PB (r) MS (t)     How much help from another is currently needed...    Putting on and taking off regular lower  body clothing? 3  -ND      Bathing (including washing, rinsing, and drying) 3  -ND      Toileting (which includes using toilet bed pan or urinal) 3  -ND      Putting on and taking off regular upper body clothing 4  -ND      Taking care of personal grooming (such as brushing teeth) 4  -ND      Eating meals 4  -ND      Score 21  -ND      Functional Assessment    Outcome Measure Options AM-PAC 6 Clicks Daily Activity (OT)  -ND AM-PAC 6 Clicks Basic Mobility (PT)  -PB (r) MS (t) PB (c)       User Key  (r) = Recorded By, (t) = Taken By, (c) = Cosigned By    Initials Name Provider Type    REJI Fountain, PT DPT Physical Therapist    NISHA Nguyen, OTR/L Occupational Therapist    MS Yulissa Simms, PT Student PT Student           Time Calculation:         PT Charges       09/02/17 1412          Time Calculation    Start Time 1315   Additional time from 5699-5978 and 3038-4091  -PB (r) MS (t) PB (c)      Stop Time 1343  -PB (r) MS (t) PB (c)      Time Calculation (min) 28 min  -PB (r) MS (t)      PT Received On 09/02/17  -PB (r) MS (t) PB (c)      PT Goal Re-Cert Due Date 09/12/17  -PB (r) MS (t) PB (c)        User Key  (r) = Recorded By, (t) = Taken By, (c) = Cosigned By    Initials Name Provider Type    REJI Fountain, PT DPT Physical Therapist    MS Yulissa Simms, PT Student PT Student          Therapy Charges for Today     Code Description Service Date Service Provider Modifiers Qty    [id number]  PT EVAL MOD COMPLEXITY 3 9/2/2017 Yulissa Simms, PT Student GP, KX 1          PT G-Codes  Outcome Measure Options: AM-PAC 6 Clicks Daily Activity (OT)  Score: 18  Functional Limitation: Mobility: Walking and moving around  Mobility: Walking and Moving Around Current Status (): At least 40 percent but less than 60 percent impaired, limited or restricted  Mobility: Walking and Moving Around Goal Status (): At least 20 percent but less than 40 percent impaired, limited or  restricted      Yulissa Simms, PT Student  9/2/2017

## 2017-09-02 NOTE — PLAN OF CARE
Problem: Confusion, Acute (Adult)  Goal: Identify Related Risk Factors and Signs and Symptoms  Outcome: Outcome(s) achieved Date Met:  09/02/17 09/02/17 0353   Confusion, Acute   Related Risk Factors (Acute Confusion) mobility decreased;cognitive impairment   Signs and Symptoms (Acute Confusion) thought process diminished/disorganized       Goal: Cognitive/Functional Impairments Minimized  Outcome: Ongoing (interventions implemented as appropriate)  Goal: Safety  Outcome: Ongoing (interventions implemented as appropriate)    Problem: Fall Risk (Adult)  Goal: Identify Related Risk Factors and Signs and Symptoms  Outcome: Outcome(s) achieved Date Met:  09/02/17  Goal: Absence of Falls  Outcome: Ongoing (interventions implemented as appropriate)

## 2017-09-02 NOTE — H&P
[facility] Medicine Services  HISTORY AND PHYSICAL    Date of Admission: 9/1/2017  Primary Care Physician: Meggan Trivedi DO    Subjective     Chief Complaint: Increasing confusion    History of Present Illness  Patient is a 77-year-old male past medical history of type II diabetes as well as history of CVA.  Apparently over the last 24 hours he has had a change in status occluding increased confusion.  He apparently awoke this morning with a severe headache and fell.  The wife stated he done that before falling.  But apparently he was less coordinated and had problems with vision as well he initially refused to come to the hospital finally decided that he would.  Patient this time states that he feels okay.  Headache is currently resolved.  He also has note of problems with significant bacteria in his urine and probable UTI. In addition I know this patient from a previous episode of treatment a few years ago where he had a very complicated hospitalization after a hip replacement which included bacteremia DVT retroperitoneal hemorrhage and multiple other complications including bacteremia.  He also had a CVA during that episode I believe.  This is not available in the current chart records      Review of Systems   14 point review of systems are negative except for as per HPI  Otherwise complete ROS reviewed and negative except as mentioned in the HPI.      Past Medical History:   Past Medical History:   Diagnosis Date   • Hypertension    • Stroke    Type II diabetes    Past Surgical History:  Past Surgical History:   Procedure Laterality Date   • VENA CAVA FILTER INSERTION         Social History:  reports that he has never smoked. He does not have any smokeless tobacco history on file. He reports that he does not drink alcohol or use illicit drugs.    Family History: family history is not on file.   Positive for diabetes    Allergies:  Allergies   Allergen Reactions   •  "Penicillins      Unknown - childhood allergy       Medications:  Prior to Admission medications    Medication Sig Start Date End Date Taking? Authorizing Provider   aspirin 81 MG EC tablet Take 81 mg by mouth Every 12 (Twelve) Hours.   Yes Historical Provider, MD   insulin glargine (LANTUS) 100 UNIT/ML injection Inject 25 Units under the skin Daily.   Yes Historical Provider, MD   insulin lispro (humaLOG) 100 UNIT/ML injection Inject  under the skin 3 (Three) Times a Day Before Meals. Sliding Scale:  0-149 = 0 units.  150-200 = 5 units.  201-250 = 7 units.  251-300 = 9 units.  301-350 = 11 units.  351-400 = 13 units.  >400 = 15 units, call MD.   Yes Historical Provider, MD   lisinopril (PRINIVIL,ZESTRIL) 2.5 MG tablet Take 2.5 mg by mouth Daily As Needed (SBP >170).   Yes Historical Provider, MD   oxyCODONE-acetaminophen (PERCOCET) 7.5-325 MG per tablet Take 1 tablet by mouth Every 8 (Eight) Hours As Needed for Moderate Pain .   Yes Historical Provider, MD hardwick palmetto 160 MG capsule Take 160 mg by mouth Daily.   Yes Historical Provider, MD WISEMAN KIMBLE WORT PO Take 900 mg by mouth Daily.   Yes Historical Provider, MD   temazepam (RESTORIL) 30 MG capsule Take 30 mg by mouth Every Night.   Yes Historical Provider, MD   Zinc 50 MG tablet Take 1 tablet by mouth Daily.   Yes Historical Provider, MD       Objective     Vital Signs: /70  Pulse 72  Temp 97.5 °F (36.4 °C)  Resp 16  Ht 71\" (180.3 cm)  Wt 156 lb (70.8 kg)  SpO2 96%  BMI 21.76 kg/m2  Physical Exam  Constitutional: He is oriented to person, place, and time. He appears well-developed and well-nourished.   HENT:   Head: Normocephalic and atraumatic.   Eyes: EOM are normal. Pupils are equal, round, and reactive to light.   Neck: Normal range of motion. Neck supple.   Cardiovascular: Normal rate and regular rhythm.    Pulmonary/Chest: Effort normal and breath sounds normal.   Abdominal: Soft. Bowel sounds are normal.   Musculoskeletal: Normal range of " motion.   Neurological: He is alert and oriented to person, place, and time.   Legs weak and in braces chronically.   Skin: Skin is warm and dry.   Psychiatric: He has a normal mood and affect. His behavior is normal.   Nursing note and vitals reviewed.      Results Reviewed:  Lab Results (last 24 hours)     Procedure Component Value Units Date/Time    Blood Culture [[id number]] Collected:  09/01/17 1949    Specimen:  Blood from Arm, Right Updated:  09/01/17 1959    Blood Culture [[id number]] Collected:  09/01/17 1947    Specimen:  Blood from Arm, Left Updated:  09/01/17 1959    CBC & Differential [[id number]] Collected:  09/01/17 1946    Specimen:  Blood Updated:  09/01/17 2001    Narrative:       The following orders were created for panel order CBC & Differential.  Procedure                               Abnormality         Status                     ---------                               -----------         ------                     CBC Auto Differential[[id number]]         Abnormal            Final result                 Please view results for these tests on the individual orders.    CBC Auto Differential [[id number]]  (Abnormal) Collected:  09/01/17 1946    Specimen:  Blood Updated:  09/01/17 2001     WBC 10.57 10*3/mm3      RBC 4.90 10*6/mm3      Hemoglobin 14.8 g/dL      Hematocrit 43.8 %      MCV 89.4 fL      MCH 30.2 pg      MCHC 33.8 g/dL      RDW 13.9 %      RDW-SD 45.6 fl      MPV 11.8 fL      Platelets 248 10*3/mm3      Neutrophil % 69.5 %      Lymphocyte % 17.5 %      Monocyte % 10.7 %      Eosinophil % 1.4 %      Basophil % 0.5 %      Immature Grans % 0.4 %      Neutrophils, Absolute 7.35 10*3/mm3      Lymphocytes, Absolute 1.85 10*3/mm3      Monocytes, Absolute 1.13 10*3/mm3      Eosinophils, Absolute 0.15 10*3/mm3      Basophils, Absolute 0.05 10*3/mm3      Immature Grans, Absolute 0.04 (H) 10*3/mm3     Urine Culture [[id number]] Collected:  09/01/17 1945    Specimen:  Urine from Urine, Clean Catch  Updated:  09/01/17 2002    Comprehensive Metabolic Panel [[id number]]  (Abnormal) Collected:  09/01/17 1946    Specimen:  Blood Updated:  09/01/17 2013     Glucose 347 (H) mg/dL      BUN 21 mg/dL      Creatinine 1.15 mg/dL      Sodium 134 (L) mmol/L      Potassium 4.4 mmol/L      Chloride 100 mmol/L      CO2 25.0 mmol/L      Calcium 9.2 mg/dL      Total Protein 6.9 g/dL      Albumin 4.00 g/dL      ALT (SGPT) 29 U/L      AST (SGOT) 21 U/L      Alkaline Phosphatase 95 U/L      Total Bilirubin 0.5 mg/dL      eGFR Non African Amer 62 mL/min/1.73      Globulin 2.9 gm/dL      A/G Ratio 1.4 g/dL      BUN/Creatinine Ratio 18.3     Anion Gap 9.0 mmol/L     Narrative:       The MDRD GFR formula is only valid for adults with stable renal function between ages 18 and 70.    Lactic Acid, Plasma [[id number]]  (Normal) Collected:  09/01/17 1946    Specimen:  Blood Updated:  09/01/17 2015     Lactate 1.1 mmol/L     Protime-INR [[id number]]  (Abnormal) Collected:  09/01/17 1946    Specimen:  Blood Updated:  09/01/17 2016     Protime 12.3 Seconds      INR 0.89 (L)    aPTT [[id number]]  (Normal) Collected:  09/01/17 1946    Specimen:  Blood Updated:  09/01/17 2016     PTT 31.2 seconds     Urinalysis With / Culture If Indicated [[id number]]  (Abnormal) Collected:  09/01/17 1945    Specimen:  Urine from Urine, Clean Catch Updated:  09/01/17 2025     Color, UA Yellow     Appearance, UA Clear     pH, UA 6.5     Specific Gravity, UA 1.025     Glucose, UA >=1000 mg/dL (3+) (A)     Ketones, UA Negative     Bilirubin, UA Negative     Blood, UA Negative     Protein, UA 30 mg/dL (1+) (A)     Leuk Esterase, UA Small (1+) (A)     Nitrite, UA Negative     Urobilinogen, UA 1.0 E.U./dL    Urinalysis, Microscopic Only [[id number]]  (Abnormal) Collected:  09/01/17 1945    Specimen:  Urine from Urine, Clean Catch Updated:  09/01/17 2025     RBC, UA 6-12 (A) /HPF      WBC, UA 31-50 (A) /HPF      Bacteria, UA 2+ (A) /HPF      Squamous Epithelial Cells, UA 3-6 (A)  /HPF      Hyaline Casts, UA 0-2 /LPF      Methodology Automated Microscopy        Imaging Results (last 24 hours)     Procedure Component Value Units Date/Time    XR Chest 1 View [[id number]] Collected:  09/01/17 1927     Updated:  09/01/17 1932    Narrative:       EXAMINATION: XR CHEST 1 VW- 9/1/2017 7:27 PM CDT     HISTORY: Weakness     COMPARISON: 12/30/2015     FINDINGS:  The lungs appear hyperinflated, with mild interstitial prominence at the  lung bases, which appears chronic. The heart appears normal in size.  Atherosclerotic calcifications are seen within the aorta. The pulmonary  vasculature appears normal. There is no focal consolidation or effusion.  No pneumothorax. No acute bony abnormality is appreciated.       Impression:       Chronic lung changes without appreciable acute abnormality.  This report was finalized on 09/01/2017 19:29 by Dr. Catracho Valladares MD.    CT Head Without Contrast [[id number]] Collected:  09/01/17 1937     Updated:  09/01/17 1944    Narrative:       EXAMINATION: CT HEAD WO CONTRAST- 9/1/2017 7:37 PM CDT     HISTORY: Altered mental status     COMPARISON: CT head without contrast 12/31/2015      DOSE LENGTH PRODUCT: 750 mGy cm     TECHNIQUE: Serial axial tomographic images of the brain were obtained  without the use of intravenous contrast. Sagittal and coronal  reformations were made from the original source data and reviewed.     FINDINGS:   There is diffuse cerebral and cerebellar atrophy. There is no evidence  of acute intracranial hemorrhage, mass, or mass effect. There are  periventricular and subcortical white matter changes, a nonspecific  finding most often seen with chronic microvascular ischemia. This is  more advanced than on the previous exam. There is a focal hypodense area  within the anterior limb of the left internal capsule, likely reflecting  an age-indeterminate lacunar infarct but new when compared to the  previous exam. A similar tiny hypodense areas seen in the  right corona  radiata. There is no evidence of acute territorial infarct. The  posterior fossa appears grossly normal. The ventricles are mildly  enlarged, likely secondary to periventricular volume loss. The basilar  cisterns appear patent. The calvarium is intact. Paranasal sinuses and  mastoid air cells appear clear. Calcifications are seen in the cavernous  carotid arteries.       Impression:       1. Age indeterminate lacunar infarct within the anterior limb of the  left internal capsule as well as in the region of the right corona  radiata.  2. Sequela of chronic microvascular ischemia.  This report was finalized on 09/01/2017 19:41 by Dr. Catracho Valladares MD.          I have personally reviewed and interpreted the radiology studies and ECG obtained at time of admission.     Assessment / Plan     Assessment & Plan  Hospital Problem List     Altered mental status        1.  Altered mental status possibly due to UTI versus new CVA.  Plan is to admit patient to observation will treat empirically with IV antibiotics as well as give IV fluids.  I am going to get a carotid ultrasound and 2-D echo as well as an MRI of his brain to rule out any new acute CVA.  Will monitor neuro checks and go from there as far as further workup goes.  Consider switching to Plavix depending on outcome of workup he is on aspirin daily.  Also will check lipid panel he is not on a statin I believe he probably needs to be.  2.  Type II diabetes questionable control will check hemoglobin A1c and monitor Accu-Cheks continue long-acting insulin and cover with sliding scale insulin as needed.    3.  DVT prophylaxis with Lovenox has had previous DVT and has filter has very complicated PMH.      Code Status: Full     I discussed the patients findings and my recommendations with patient and wife who is his healthcare power surrogate    Estimated length of stay 1-2 days    Micheal Butt MD   09/01/17   9:36 PM

## 2017-09-02 NOTE — ED PROVIDER NOTES
"Subjective   HPI Comments: Patient woke up with severe headache this AM that was bitemporal.  His wife says he fell after getting up but he has done that before.  To her he has been less coordinated than usual and vision worse than usual.  He did vomit once this afternoon and felt chilled but did not take temp.  He refused to come earlier but finally agreed to get checked tonight.  Has h/o CVA and TIA in past.  Patient says he feels fine now \"like a new man\".    Patient is a 77 y.o. male presenting with neurologic complaint.   History provided by:  Patient and spouse   used: No    Neurologic Problem   The patient's primary symptoms include clumsiness and a visual change. This is a new problem. The current episode started today. The neurological problem developed gradually. The last time the patient was known to be well was 8/31/2017 10:00 PM.  The problem has been resolved since onset. There was no focality noted. Associated symptoms include headaches, nausea and vomiting. Pertinent negatives include no abdominal pain, auditory change, aura, back pain, bladder incontinence, bowel incontinence, chest pain, confusion, diaphoresis, dizziness, fever, light-headedness, neck pain, palpitations, shortness of breath or vertigo. Past treatments include nothing. His past medical history is significant for a CVA. There is no history of a bleeding disorder, a clotting disorder, dementia, head trauma, liver disease, mood changes or seizures.       Review of Systems   Constitutional: Negative.  Negative for diaphoresis and fever.   Respiratory: Negative.  Negative for shortness of breath.    Cardiovascular: Negative.  Negative for chest pain and palpitations.   Gastrointestinal: Positive for nausea and vomiting. Negative for abdominal pain and bowel incontinence.   Genitourinary: Negative.  Negative for bladder incontinence.   Musculoskeletal: Negative.  Negative for back pain and neck pain.   Skin: Negative.  "   Neurological: Positive for headaches. Negative for dizziness, vertigo and light-headedness.   Hematological: Negative.    Psychiatric/Behavioral: Negative.  Negative for confusion.   All other systems reviewed and are negative.      Past Medical History:   Diagnosis Date   • Hypertension    • Stroke        Allergies   Allergen Reactions   • Penicillins      Unknown - childhood allergy       Past Surgical History:   Procedure Laterality Date   • VENA CAVA FILTER INSERTION         History reviewed. No pertinent family history.    Social History     Social History   • Marital status:      Spouse name: N/A   • Number of children: N/A   • Years of education: N/A     Social History Main Topics   • Smoking status: Never Smoker   • Smokeless tobacco: None   • Alcohol use No   • Drug use: No   • Sexual activity: Defer     Other Topics Concern   • None     Social History Narrative   • None       Prior to Admission medications    Medication Sig Start Date End Date Taking? Authorizing Provider   insulin glargine (LANTUS) 100 UNIT/ML injection Inject  under the skin Daily.   Yes Historical Provider, MD   insulin lispro (humaLOG) 100 UNIT/ML injection Inject  under the skin 3 (Three) Times a Day Before Meals.   Yes Historical Provider, MD   lisinopril (PRINIVIL,ZESTRIL) 2.5 MG tablet Take 2.5 mg by mouth Daily.   Yes Historical Provider, MD   temazepam (RESTORIL) 30 MG capsule Take 30 mg by mouth At Night As Needed for Sleep.   Yes Historical Provider, MD       Medications   sodium chloride 0.9 % flush 10 mL (not administered)   cefTRIAXone (ROCEPHIN) 1 g/100 mL 0.9% NS (MBP) (not administered)       Vitals:    09/01/17 2031   BP: 145/70   Pulse: 72   Resp: 16   Temp:    SpO2: 96%         Objective   Physical Exam   Constitutional: He is oriented to person, place, and time. He appears well-developed and well-nourished.   HENT:   Head: Normocephalic and atraumatic.   Eyes: EOM are normal. Pupils are equal, round, and  reactive to light.   Neck: Normal range of motion. Neck supple.   Cardiovascular: Normal rate and regular rhythm.    Pulmonary/Chest: Effort normal and breath sounds normal.   Abdominal: Soft. Bowel sounds are normal.   Musculoskeletal: Normal range of motion.   Neurological: He is alert and oriented to person, place, and time.   Legs weak and in braces chronically.   Skin: Skin is warm and dry.   Psychiatric: He has a normal mood and affect. His behavior is normal.   Nursing note and vitals reviewed.      Procedures         Lab Results (last 24 hours)     Procedure Component Value Units Date/Time    Urinalysis With / Culture If Indicated [[id number]]  (Abnormal) Collected:  09/01/17 1945    Specimen:  Urine from Urine, Clean Catch Updated:  09/01/17 2025     Color, UA Yellow     Appearance, UA Clear     pH, UA 6.5     Specific Gravity, UA 1.025     Glucose, UA >=1000 mg/dL (3+) (A)     Ketones, UA Negative     Bilirubin, UA Negative     Blood, UA Negative     Protein, UA 30 mg/dL (1+) (A)     Leuk Esterase, UA Small (1+) (A)     Nitrite, UA Negative     Urobilinogen, UA 1.0 E.U./dL    Urine Culture [[id number]] Collected:  09/01/17 1945    Specimen:  Urine from Urine, Clean Catch Updated:  09/01/17 2002    Urinalysis, Microscopic Only [[id number]]  (Abnormal) Collected:  09/01/17 1945    Specimen:  Urine from Urine, Clean Catch Updated:  09/01/17 2025     RBC, UA 6-12 (A) /HPF      WBC, UA 31-50 (A) /HPF      Bacteria, UA 2+ (A) /HPF      Squamous Epithelial Cells, UA 3-6 (A) /HPF      Hyaline Casts, UA 0-2 /LPF      Methodology Automated Microscopy    CBC & Differential [[id number]] Collected:  09/01/17 1946    Specimen:  Blood Updated:  09/01/17 2001    Narrative:       The following orders were created for panel order CBC & Differential.  Procedure                               Abnormality         Status                     ---------                               -----------         ------                     CBC Auto  Differential[[id number]]         Abnormal            Final result                 Please view results for these tests on the individual orders.    Comprehensive Metabolic Panel [[id number]]  (Abnormal) Collected:  09/01/17 1946    Specimen:  Blood Updated:  09/01/17 2013     Glucose 347 (H) mg/dL      BUN 21 mg/dL      Creatinine 1.15 mg/dL      Sodium 134 (L) mmol/L      Potassium 4.4 mmol/L      Chloride 100 mmol/L      CO2 25.0 mmol/L      Calcium 9.2 mg/dL      Total Protein 6.9 g/dL      Albumin 4.00 g/dL      ALT (SGPT) 29 U/L      AST (SGOT) 21 U/L      Alkaline Phosphatase 95 U/L      Total Bilirubin 0.5 mg/dL      eGFR Non African Amer 62 mL/min/1.73      Globulin 2.9 gm/dL      A/G Ratio 1.4 g/dL      BUN/Creatinine Ratio 18.3     Anion Gap 9.0 mmol/L     Narrative:       The MDRD GFR formula is only valid for adults with stable renal function between ages 18 and 70.    Lactic Acid, Plasma [[id number]]  (Normal) Collected:  09/01/17 1946    Specimen:  Blood Updated:  09/01/17 2015     Lactate 1.1 mmol/L     Protime-INR [[id number]]  (Abnormal) Collected:  09/01/17 1946    Specimen:  Blood Updated:  09/01/17 2016     Protime 12.3 Seconds      INR 0.89 (L)    aPTT [[id number]]  (Normal) Collected:  09/01/17 1946    Specimen:  Blood Updated:  09/01/17 2016     PTT 31.2 seconds     CBC Auto Differential [[id number]]  (Abnormal) Collected:  09/01/17 1946    Specimen:  Blood Updated:  09/01/17 2001     WBC 10.57 10*3/mm3      RBC 4.90 10*6/mm3      Hemoglobin 14.8 g/dL      Hematocrit 43.8 %      MCV 89.4 fL      MCH 30.2 pg      MCHC 33.8 g/dL      RDW 13.9 %      RDW-SD 45.6 fl      MPV 11.8 fL      Platelets 248 10*3/mm3      Neutrophil % 69.5 %      Lymphocyte % 17.5 %      Monocyte % 10.7 %      Eosinophil % 1.4 %      Basophil % 0.5 %      Immature Grans % 0.4 %      Neutrophils, Absolute 7.35 10*3/mm3      Lymphocytes, Absolute 1.85 10*3/mm3      Monocytes, Absolute 1.13 10*3/mm3      Eosinophils, Absolute 0.15  10*3/mm3      Basophils, Absolute 0.05 10*3/mm3      Immature Grans, Absolute 0.04 (H) 10*3/mm3     Blood Culture [[id number]] Collected:  09/01/17 1947    Specimen:  Blood from Arm, Left Updated:  09/01/17 1959    Blood Culture [[id number]] Collected:  09/01/17 1949    Specimen:  Blood from Arm, Right Updated:  09/01/17 1959          CT Head Without Contrast   Final Result   1. Age indeterminate lacunar infarct within the anterior limb of the   left internal capsule as well as in the region of the right corona   radiata.   2. Sequela of chronic microvascular ischemia.   This report was finalized on 09/01/2017 19:41 by Dr. Catracho Valladares MD.      XR Chest 1 View   Final Result   Chronic lung changes without appreciable acute abnormality.   This report was finalized on 09/01/2017 19:29 by Dr. Catracho Valladares MD.          ED Course  ED Course   Comment By Time   Family describes extensive confusion at home today upon further questioning and patient now more confused so will admit. Solo Lynch Jr., MD 09/01 2123          [facility]    Final diagnoses:   Altered mental status, unspecified altered mental status type   Acute UTI          Solo Lynch Jr., MD  09/01/17 2125

## 2017-09-02 NOTE — CONSULTS
Neurology Consult Note    Referring Provider: Dr. Celestine Clemons  Reason for Consultation: stroke      History of present illness:      77 year old male with history of diabetes mellitus and severe peripheral neuropathy.  Yesterday he woke up with a headache and was somewhat confused.   His headache was in the bitemporal distribution and was severe in nature.  His wife noted his confusion.  He had no speech changes, no weakness, and no numbness.  He normally does not have headaches.  He presented to the ER overnight and was found to have a urinary tract infection.  tPA was not given as last seen normal time was not known as the patient woke up that morning with symptoms.     While this is not part of the reason I am being consult that I feel obligated to mention that the patient has diffuse muscle atrophy.  Specifically his first dorsal interossei are completely atrophied and his abductor pollicis brevis is affected greatly as well bilaterally.  In addition to this in his lower extremities he has bilateral foot drop.  While I find this extremely atypical for a diabetic neuropathy his wife endorses this as been present for a decade.  She reports that she noticed his hands getting weak almost 10 years ago.  He had a hip surgery 2 years ago that resulted in a bilateral retroperitoneal hematoma.  Reportedly this was secondary to high-dose Lovenox associated with his hip surgery.  It was at that time that his legs became weak and he had bilateral foot drop.  Since that time he has required bilateral AFOs.  He denies any swallowing difficulties.  He denies any fasciculations.  Him and his wife both seem unconcerned over his global atrophy.  He does admit to some numbness but says that it is just below his knees bilaterally and his wrists and distally.    Past Medical History:   Diagnosis Date   • Hypertension    • Stroke        Allergies   Allergen Reactions   • Penicillins      Unknown - childhood allergy     No current  facility-administered medications on file prior to encounter.      No current outpatient prescriptions on file prior to encounter.       Social History     Social History   • Marital status:      Spouse name: N/A   • Number of children: N/A   • Years of education: N/A     Occupational History   • Not on file.     Social History Main Topics   • Smoking status: Never Smoker   • Smokeless tobacco: Not on file   • Alcohol use No   • Drug use: No   • Sexual activity: Defer     Other Topics Concern   • Not on file     Social History Narrative     History reviewed. No pertinent family history.    Review of Systems  A 14 point review of systems was reviewed and was negative except for Headache and confusion    Vital Signs   Temp:  [97.5 °F (36.4 °C)-98.2 °F (36.8 °C)] 97.5 °F (36.4 °C)  Heart Rate:  [59-85] 59  Resp:  [16-18] 18  BP: (113-156)/(47-88) 113/61    General Exam:  Head:  Normal cephalic, atraumatic  HEENT:  Neck supple  Fundoscopic Exam:  No signs of disc edema  CVS:  Regular rate and rhythm.  No murmurs  Carotid Examination:  No bruits  Lungs:  Clear to auscultation  Abdomen:  Non-tender, Non-distended  Extremities:  No signs of peripheral edema  Skin:  No rashes    Neurologic Exam:    Mental Status:    -Awake, Alert, Oriented X 3  -No word finding difficulties  -No aphasia  -No dysarthria  -Follows simple and complex commands    CN II:  Visual fields full.  Pupils equally reactive to light  CN III, IV, VI:  Extraocular Muscles full with no signs of nystagmus  CN V:  Facial sensory is symmetric with no asymetries.  CN VII:  Facial motor symmetric  CN VIII:  Gross hearing intact bilaterally  CN IX:  Palate elevates symmetrically  CN X:  Palate elevates symmetrically  CN XI:  Shoulder shrug symmetric  CN XII:  Tongue is midline on protrusion    Motor:     Proximally in the upper extremities he has 5 out of 5 strength.  Specifically biceps triceps and brachial radialis are full strength.   strength I  would rate as 3-5.  First dorsal interossei's 2+.  In his lower extremities he has 3-4+ proximally.  Distally dorsiflexion and plantar flexion he has 0 out of 5 strength    DTR:  --Hyporeflexic throughout.  Sensory:  -Gradient sensory loss distal to the knees and wrists bilaterally    Coordination:  -Finger to nose intact  -Heel to shin intact  -No ataxia    Gait  -Ambulates with AFOs bilaterally      Results Review:  Lab Results (last 24 hours)     Procedure Component Value Units Date/Time    CBC & Differential [[id number]] Collected:  09/01/17 1946    Specimen:  Blood Updated:  09/01/17 2001    Narrative:       The following orders were created for panel order CBC & Differential.  Procedure                               Abnormality         Status                     ---------                               -----------         ------                     CBC Auto Differential[[id number]]         Abnormal            Final result                 Please view results for these tests on the individual orders.    CBC Auto Differential [[id number]]  (Abnormal) Collected:  09/01/17 1946    Specimen:  Blood Updated:  09/01/17 2001     WBC 10.57 10*3/mm3      RBC 4.90 10*6/mm3      Hemoglobin 14.8 g/dL      Hematocrit 43.8 %      MCV 89.4 fL      MCH 30.2 pg      MCHC 33.8 g/dL      RDW 13.9 %      RDW-SD 45.6 fl      MPV 11.8 fL      Platelets 248 10*3/mm3      Neutrophil % 69.5 %      Lymphocyte % 17.5 %      Monocyte % 10.7 %      Eosinophil % 1.4 %      Basophil % 0.5 %      Immature Grans % 0.4 %      Neutrophils, Absolute 7.35 10*3/mm3      Lymphocytes, Absolute 1.85 10*3/mm3      Monocytes, Absolute 1.13 10*3/mm3      Eosinophils, Absolute 0.15 10*3/mm3      Basophils, Absolute 0.05 10*3/mm3      Immature Grans, Absolute 0.04 (H) 10*3/mm3     Comprehensive Metabolic Panel [[id number]]  (Abnormal) Collected:  09/01/17 1946    Specimen:  Blood Updated:  09/01/17 2013     Glucose 347 (H) mg/dL      BUN 21 mg/dL      Creatinine  1.15 mg/dL      Sodium 134 (L) mmol/L      Potassium 4.4 mmol/L      Chloride 100 mmol/L      CO2 25.0 mmol/L      Calcium 9.2 mg/dL      Total Protein 6.9 g/dL      Albumin 4.00 g/dL      ALT (SGPT) 29 U/L      AST (SGOT) 21 U/L      Alkaline Phosphatase 95 U/L      Total Bilirubin 0.5 mg/dL      eGFR Non African Amer 62 mL/min/1.73      Globulin 2.9 gm/dL      A/G Ratio 1.4 g/dL      BUN/Creatinine Ratio 18.3     Anion Gap 9.0 mmol/L     Narrative:       The MDRD GFR formula is only valid for adults with stable renal function between ages 18 and 70.    Lactic Acid, Plasma [[id number]]  (Normal) Collected:  09/01/17 1946    Specimen:  Blood Updated:  09/01/17 2015     Lactate 1.1 mmol/L     Protime-INR [[id number]]  (Abnormal) Collected:  09/01/17 1946    Specimen:  Blood Updated:  09/01/17 2016     Protime 12.3 Seconds      INR 0.89 (L)    aPTT [[id number]]  (Normal) Collected:  09/01/17 1946    Specimen:  Blood Updated:  09/01/17 2016     PTT 31.2 seconds     Urinalysis With / Culture If Indicated [[id number]]  (Abnormal) Collected:  09/01/17 1945    Specimen:  Urine from Urine, Clean Catch Updated:  09/01/17 2025     Color, UA Yellow     Appearance, UA Clear     pH, UA 6.5     Specific Gravity, UA 1.025     Glucose, UA >=1000 mg/dL (3+) (A)     Ketones, UA Negative     Bilirubin, UA Negative     Blood, UA Negative     Protein, UA 30 mg/dL (1+) (A)     Leuk Esterase, UA Small (1+) (A)     Nitrite, UA Negative     Urobilinogen, UA 1.0 E.U./dL    Urinalysis, Microscopic Only [[id number]]  (Abnormal) Collected:  09/01/17 1945    Specimen:  Urine from Urine, Clean Catch Updated:  09/01/17 2025     RBC, UA 6-12 (A) /HPF      WBC, UA 31-50 (A) /HPF      Bacteria, UA 2+ (A) /HPF      Squamous Epithelial Cells, UA 3-6 (A) /HPF      Hyaline Casts, UA 0-2 /LPF      Methodology Automated Microscopy    POC Glucose Fingerstick [[id number]]  (Abnormal) Collected:  09/01/17 2244    Specimen:  Blood Updated:  09/01/17 2258     Glucose 372  (H) mg/dL       : [id number] Miki Ribera ID: [id number]       Hemoglobin A1c [[id number]] Collected:  09/01/17 1946    Specimen:  Blood Updated:  09/02/17 0533     Hemoglobin A1C 10.1 %     Narrative:       Less than 6.0           Non-Diabetic Range  6.0-7.0                 ADA Therapeutic Target  Greater than 7.0        Action Suggested    Lipid Panel [[id number]]  (Abnormal) Collected:  09/02/17 0456    Specimen:  Blood Updated:  09/02/17 0541     Total Cholesterol 112 (L) mg/dL      Triglycerides 78 mg/dL      HDL Cholesterol 38 (L) mg/dL      LDL Cholesterol  63 mg/dL      LDL/HDL Ratio 1.54    Urine Culture [[id number]]  (Normal) Collected:  09/01/17 1945    Specimen:  Urine from Urine, Clean Catch Updated:  09/02/17 0603     Urine Culture Growth present, too young to evaluate    Blood Culture [[id number]]  (Normal) Collected:  09/01/17 1947    Specimen:  Blood from Arm, Left Updated:  09/02/17 0801     Blood Culture No growth at less than 24 hours    Blood Culture [[id number]]  (Normal) Collected:  09/01/17 1949    Specimen:  Blood from Arm, Right Updated:  09/02/17 0801     Blood Culture No growth at less than 24 hours    POC Glucose Fingerstick [[id number]]  (Abnormal) Collected:  09/02/17 0847    Specimen:  Blood Updated:  09/02/17 0858     Glucose 164 (H) mg/dL       : [id number] Abhishek SantanaMeter ID: [id number]             .  Imaging Results (last 24 hours)     Procedure Component Value Units Date/Time    XR Chest 1 View [[id number]] Collected:  09/01/17 1927     Updated:  09/01/17 1932    Narrative:       EXAMINATION: XR CHEST 1 VW- 9/1/2017 7:27 PM CDT     HISTORY: Weakness     COMPARISON: 12/30/2015     FINDINGS:  The lungs appear hyperinflated, with mild interstitial prominence at the  lung bases, which appears chronic. The heart appears normal in size.  Atherosclerotic calcifications are seen within the aorta. The pulmonary  vasculature appears normal. There is no focal consolidation or  effusion.  No pneumothorax. No acute bony abnormality is appreciated.       Impression:       Chronic lung changes without appreciable acute abnormality.  This report was finalized on 09/01/2017 19:29 by Dr. Catracho Valladares MD.    CT Head Without Contrast [[id number]] Collected:  09/01/17 1937     Updated:  09/01/17 1944    Narrative:       EXAMINATION: CT HEAD WO CONTRAST- 9/1/2017 7:37 PM CDT     HISTORY: Altered mental status     COMPARISON: CT head without contrast 12/31/2015      DOSE LENGTH PRODUCT: 750 mGy cm     TECHNIQUE: Serial axial tomographic images of the brain were obtained  without the use of intravenous contrast. Sagittal and coronal  reformations were made from the original source data and reviewed.     FINDINGS:   There is diffuse cerebral and cerebellar atrophy. There is no evidence  of acute intracranial hemorrhage, mass, or mass effect. There are  periventricular and subcortical white matter changes, a nonspecific  finding most often seen with chronic microvascular ischemia. This is  more advanced than on the previous exam. There is a focal hypodense area  within the anterior limb of the left internal capsule, likely reflecting  an age-indeterminate lacunar infarct but new when compared to the  previous exam. A similar tiny hypodense areas seen in the right corona  radiata. There is no evidence of acute territorial infarct. The  posterior fossa appears grossly normal. The ventricles are mildly  enlarged, likely secondary to periventricular volume loss. The basilar  cisterns appear patent. The calvarium is intact. Paranasal sinuses and  mastoid air cells appear clear. Calcifications are seen in the cavernous  carotid arteries.       Impression:       1. Age indeterminate lacunar infarct within the anterior limb of the  left internal capsule as well as in the region of the right corona  radiata.  2. Sequela of chronic microvascular ischemia.  This report was finalized on 09/01/2017 19:41 by   Catracho Valladares MD.     Carotid Bilateral [[id number]] Updated:  09/02/17 0838    MRI Brain Without Contrast [[id number]] Collected:  09/02/17 0856     Updated:  09/02/17 0908    Narrative:       EXAM: MR BRAIN WITHOUT IV CONTRAST 09/02/2017.     COMPARISON: MRI brain dated 01/01/2016, head CT dated 09/01/2017.      HISTORY: 77 years-old Male. Altered mental status.      TECHNIQUE:   Routine pulse sequences of the brain were obtained without IV contrast.      REPORT:      There is diffusion restriction within the right cuneus (medial occipital  lobe) with associated T2/FLAIR abnormal signal, consistent with acute  infarct. There is also intermediate signal within the body of the corpus  callosum on the right (image 152, series 204) with corresponding T1  hypointense signal (image 31, series 302), consistent with a subacute  infarct. Moderate chronic microvascular changes in the bilateral  supratentorial white matter. Mild to moderate generalized cerebral  volume loss. No acute hydrocephalus. No suspicious extra-axial fluid  collection. No midline shift. No acute hemorrhage.     Flow-voids of the [place] of [place] are maintained centrally.     Mucus suspicious in the left maxillary sinus.       Impression:       1.  Acute right occipital lacunar infarct with no evidence of  hemorrhagic conversion.  2.  Evolving subacute lacunar infarct in the body of the corpus  callosum.  This report was finalized on 09/02/2017 09:05 by Dr. Yanet Vazquez MD.      MR Brain reviewed by me:  Right occipital acute infarction and second subacute infarct in right corpus callosum  Carotid ultrasound:  No significant stenosis  Cardiac echo pending  Total Cholesterol 130 - 200 mg/dL 112 (L)   Triglycerides 0 - 149 mg/dL 78   HDL Cholesterol >=40 mg/dL 38 (L)   LDL Cholesterol  0 - 99 mg/dL 63   LDL/HDL Ratio  1.54     Hemoglobin A1C % 10.1       Impression    1.  2 acute ischemic strokes likely small vessel in distraction.  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  2.  Diabetes mellitus currently in controlled  3.  Peripheral neuropathy suspected be secondary to diabetic neuropathy however it is unclear whether a neurologist has worked this up in the past  4.  Bilateral foot drop may be secondary to previous retroperitoneal hematomas causing a bilateral lumbar plexopathy although also could be part of his peripheral neuropathy    Discussion: The patient does present with acute ischemic strokes.  His carotid ultrasound shows no signs of significant stenoses in his echo is pending.  He will need an escalation from low-dose aspirin to Plavix every 5 mg daily.  While inpatient the patient does need SCDs or Lovenox for DVT prophylaxis.  His wife expresses extreme concern over this given his previous retroperitoneal hemorrhage during what was likely high dose Lovenox.  Secondary to her concern I did offer her SCDs instead.  She is more worried about this saying that he often gets agitated when things are attached him.  Given his history of an IVC filter I explained that he is at higher risk of DVT secondary to the back pressure from the inferior vena cava filter.  I will mandate that he will have some sort of DVT prophylaxis during this admission.  After weighing the risks and benefits of both of these she and the patient would like to pursue DVT prophylaxis via low-dose Lovenox.  I explained the risks and benefits of this and expressive standing.  We will also follow with the echocardiogram.  Physical, occupational, and speech therapy have been consult.  If they deem him reasonable to go home over the course of week and I think that is reasonable from a neurology standpoint; however, if they would like to follow-up with my office to further investigate the neuropathy I would love to see them in an outpatient setting.    Plan    --Change low-dose aspirin to Plavix monotherapy  --LDL at goal  --increase glycemic control with  HbA1c goal of <7  --Lovenox 40 mg subcutaneous for DVT prophylaxis  --Therapy consultations  --No tPA secondary to presenting outside the three-hour window  --Will consider acute rehabilitation if recommended by therapy team.  --Outpatient neurology follow-up the patient desires.    I discussed the patients findings and my recommendations with patient and family    Cornell Lynne MD  09/02/17  10:40 AM

## 2017-09-02 NOTE — PLAN OF CARE
Problem: Patient Care Overview (Adult)  Goal: Plan of Care Review  Outcome: Ongoing (interventions implemented as appropriate)    09/02/17 1406   Outcome Evaluation   Outcome Summary/Follow up Plan PT eval complete. Pt cond I for bed mobility. Pt CGA with cues for safety for sit to stand t/fs with RW. Pt ambulated up to 300' with RW and CGA. Pt required multiple cues for safety with ambulation. Pt demonstrated decreased LUCINA with scissoring gait. Pt would benefit from therapy to address functional mobility, safety with ambulation, balance, strength, and education. Anticipate D/C home with assist/HH.          Problem: Inpatient Physical Therapy  Goal: Bed Mobility Goal LTG- PT  Outcome: Ongoing (interventions implemented as appropriate)    09/02/17 1406   Bed Mobility PT LTG   Bed Mobility PT LTG, Date Established 09/02/17   Bed Mobility PT LTG, Time to Achieve by discharge   Bed Mobility PT LTG, Activity Type all bed mobility   Bed Mobility PT LTG, [place] Level independent       Goal: Transfer Training Goal 1 LTG- PT  Outcome: Ongoing (interventions implemented as appropriate)    09/02/17 1406   Transfer Training PT LTG   Transfer Training PT LTG, Date Established 09/02/17   Transfer Training PT LTG, Time to Achieve by discharge   Transfer Training PT LTG, Activity Type bed to chair /chair to bed;sit to stand/stand to sit   Transfer Training PT LTG, [place] Level conditional independence   Transfer Training PT LTG, Assist Device walker, rolling       Goal: Gait Training Goal LTG- PT  Outcome: Ongoing (interventions implemented as appropriate)    09/02/17 1406   Gait Training PT LTG   Gait Training Goal PT LTG, Date Established 09/02/17   Gait Training Goal PT LTG, Time to Achieve by discharge   Gait Training Goal PT LTG, [place] Level conditional independence   Gait Training Goal PT LTG, Assist Device walker, rolling   Gait Training Goal PT LTG, Distance to Achieve 300'   Gait Training Goal PT LTG,  Additional Goal without tripping over feet/keeping clear of obstacles       Goal: Stair Training Goal LTG- PT  Outcome: Ongoing (interventions implemented as appropriate)    09/02/17 1406   Stair Training PT LTG   Stair Training Goal PT LTG, Date Established 09/02/17   Stair Training Goal PT LTG, Time to Achieve by discharge   Stair Training Goal PT LTG, Number of Steps 1   Stair Training Goal PT LTG, [place] Level conditional independence   Stair Training Goal PT LTG, Assist Device 1 handrail

## 2017-09-02 NOTE — PLAN OF CARE
Problem: Patient Care Overview (Adult)  Goal: Plan of Care Review  Outcome: Ongoing (interventions implemented as appropriate)    09/02/17 1309   Coping/Psychosocial Response Interventions   Plan Of Care Reviewed With patient;family   Outcome Evaluation   Outcome Summary/Follow up Plan CBSE completed. Full range of consistencies except mech soft solids presented. Pt had questionable vocal quality change 1x after nectar and thin, however was felt to have began voicing before completely swallowing. He had moderate oral residue on the left after the regular solid trial, but was able to independently use a lingual sweep and liquid wash to clear it. Recommend regular solids and thin liquids. Meds with thin liquids. SLP will follow up for diet tolerance.         Problem: Inpatient SLP  Goal: Dysphagia- Patient will safely consume diet as per recommendation with no signs/symptoms of aspiration  Outcome: Ongoing (interventions implemented as appropriate)    09/02/17 1309   Safely Consume Diet   Safely Consume Diet- SLP, Date Established 09/02/17   Safely Consume Diet- SLP, Time to Achieve by discharge   Safely Consume Diet- SLP, Additional Goal Pt will tolerate trials of current diet with no overt s/s of aspiration.   Safely Consume Diet- SLP, Outcome goal ongoing

## 2017-09-02 NOTE — PLAN OF CARE
Problem: Confusion, Acute (Adult)  Goal: Cognitive/Functional Impairments Minimized  Outcome: Ongoing (interventions implemented as appropriate)  Goal: Safety  Outcome: Ongoing (interventions implemented as appropriate)    Problem: Fall Risk (Adult)  Goal: Absence of Falls  Outcome: Ongoing (interventions implemented as appropriate)    Problem: Patient Care Overview (Adult)  Goal: Plan of Care Review  Outcome: Ongoing (interventions implemented as appropriate)    09/02/17 1649   Coping/Psychosocial Response Interventions   Plan Of Care Reviewed With patient;spouse   Patient Care Overview   Progress no change   Outcome Evaluation   Outcome Summary/Follow up Plan apt more alert this afternoon. Pt walked in [place] with PT. Fall protocol in place. Family at bedside..

## 2017-09-02 NOTE — ED NOTES
PT BROUGHT IN VIA EMS. PT'S STATES HE'S FINE AND DOESN'T UNDERSTAND WHY HE WAS BROUGHT HERE TO BEGIN WITH. FAMILY STATES PT IS CONFUSED, WEAK, AND HAS BEEN CO A HEADACHE SINCE 0830AM.      Grisel Osborne RN  09/01/17 3014

## 2017-09-02 NOTE — PLAN OF CARE
Problem: Patient Care Overview (Adult)  Goal: Plan of Care Review  Outcome: Ongoing (interventions implemented as appropriate)    09/02/17 0357   Coping/Psychosocial Response Interventions   Plan Of Care Reviewed With patient   Patient Care Overview   Progress no change   Outcome Evaluation   Outcome Summary/Follow up Plan Patient rested well through the night. no c/o pain. confused at times. VSS, Safety maintained.

## 2017-09-02 NOTE — PROGRESS NOTES
[facility]  INPATIENT PROGRESS NOTE    Length of Stay: 0  Date of Admission: 9/1/2017  Primary Care Physician: Meggan Trivedi DO    Subjective   Chief Complaint: Confusion/balance problems  HPI   Patient has a history of prior stroke, reportedly was here when he had the last stroke.    Wife is concerned about Lovenox because had a bleed into one of his back muscles years ago.    He is reportedly back to baseline at this time.  Does have left lower face drooping, but he and the wife agree that is chronic and related to Bell's Palsy.  No focal weakness.  MRI does show an acute right occipital lacunar infarct as well as an evolving subacute lacunar infarct in the corpus callosum.  Patient already ate breakfast without difficulty this AM.    Doesn't take Statin as outpatient.  Has poorly controlled DM with an A1c of 10.1           Review of Systems   Constitutional: Negative for fatigue and fever.   HENT: Negative for congestion and ear pain.    Eyes: Negative for redness and visual disturbance.   Respiratory: Negative for cough, shortness of breath and wheezing.    Cardiovascular: Negative for chest pain and palpitations.   Gastrointestinal: Negative for abdominal pain, diarrhea, nausea and vomiting.   Endocrine: Negative for cold intolerance and heat intolerance.   Genitourinary: Negative for dysuria and frequency.   Musculoskeletal: Negative for arthralgias and back pain.   Skin: Negative for rash and wound.   Neurological: Positive for dizziness. Negative for headaches.   Psychiatric/Behavioral: Positive for confusion. The patient is not nervous/anxious.       All pertinent negatives and positives are as above. All other systems have been reviewed and are negative unless otherwise stated.     Objective    Temp:  [97.4 °F (36.3 °C)-98.2 °F (36.8 °C)] 97.4 °F (36.3 °C)  Heart Rate:  [59-85] 67  Resp:  [16-18] 18  BP: (113-161)/(47-88) 161/73  Physical Exam    Constitutional: He is oriented to person, place, and time. He appears well-developed and well-nourished.   HENT:   Head: Normocephalic and atraumatic.   Right Ear: External ear normal.   Left Ear: External ear normal.   Nose: Nose normal.   Mouth/Throat: Oropharynx is clear and moist.   Eyes: Conjunctivae and EOM are normal. Pupils are equal, round, and reactive to light. Right eye exhibits no discharge. Left eye exhibits no discharge. No scleral icterus.   Neck: Normal range of motion. Neck supple. No tracheal deviation present. No thyromegaly present.   Cardiovascular: Normal rate, regular rhythm, normal heart sounds and intact distal pulses.  Exam reveals no gallop and no friction rub.    No murmur heard.  Pulmonary/Chest: Effort normal and breath sounds normal. No stridor. No respiratory distress. He has no wheezes. He has no rales. He exhibits no tenderness.   Abdominal: Soft. Bowel sounds are normal. He exhibits no distension and no mass. There is no tenderness. There is no rebound and no guarding. No hernia.   Musculoskeletal: Normal range of motion. He exhibits no edema or deformity.   Lymphadenopathy:     He has no cervical adenopathy.   Neurological: He is alert and oriented to person, place, and time. He has normal reflexes. He displays normal reflexes. A cranial nerve deficit is present. He exhibits normal muscle tone. Coordination normal.   L lower facial droop   Skin: Skin is warm and dry. No rash noted. No erythema. No pallor.   Psychiatric: He has a normal mood and affect. His behavior is normal. Judgment and thought content normal.   Vitals reviewed.          Results Review:  I have reviewed the labs, radiology results, and diagnostic studies.    Laboratory Data:     Results from last 7 days  Lab Units 09/01/17 1946   WBC 10*3/mm3 10.57   HEMOGLOBIN g/dL 14.8   HEMATOCRIT % 43.8   PLATELETS 10*3/mm3 248          Results from last 7 days  Lab Units 09/01/17 1946   SODIUM mmol/L 134*   POTASSIUM  mmol/L 4.4   CHLORIDE mmol/L 100   CO2 mmol/L 25.0   BUN mg/dL 21   CREATININE mg/dL 1.15   CALCIUM mg/dL 9.2   BILIRUBIN mg/dL 0.5   ALK PHOS U/L 95   ALT (SGPT) U/L 29   AST (SGOT) U/L 21   GLUCOSE mg/dL 347*       Culture Data:   Blood Culture   Date Value Ref Range Status   09/01/2017 No growth at less than 24 hours  Preliminary   09/01/2017 No growth at less than 24 hours  Preliminary     Urine Culture   Date Value Ref Range Status   09/01/2017 Growth present, too young to evaluate  Preliminary       Radiology Data:   Imaging Results (last 24 hours)     Procedure Component Value Units Date/Time    XR Chest 1 View [[id number]] Collected:  09/01/17 1927     Updated:  09/01/17 1932    Narrative:       EXAMINATION: XR CHEST 1 VW- 9/1/2017 7:27 PM CDT     HISTORY: Weakness     COMPARISON: 12/30/2015     FINDINGS:  The lungs appear hyperinflated, with mild interstitial prominence at the  lung bases, which appears chronic. The heart appears normal in size.  Atherosclerotic calcifications are seen within the aorta. The pulmonary  vasculature appears normal. There is no focal consolidation or effusion.  No pneumothorax. No acute bony abnormality is appreciated.       Impression:       Chronic lung changes without appreciable acute abnormality.  This report was finalized on 09/01/2017 19:29 by Dr. Catracho Valladares MD.    CT Head Without Contrast [[id number]] Collected:  09/01/17 1937     Updated:  09/01/17 1944    Narrative:       EXAMINATION: CT HEAD WO CONTRAST- 9/1/2017 7:37 PM CDT     HISTORY: Altered mental status     COMPARISON: CT head without contrast 12/31/2015      DOSE LENGTH PRODUCT: 750 mGy cm     TECHNIQUE: Serial axial tomographic images of the brain were obtained  without the use of intravenous contrast. Sagittal and coronal  reformations were made from the original source data and reviewed.     FINDINGS:   There is diffuse cerebral and cerebellar atrophy. There is no evidence  of acute intracranial hemorrhage,  mass, or mass effect. There are  periventricular and subcortical white matter changes, a nonspecific  finding most often seen with chronic microvascular ischemia. This is  more advanced than on the previous exam. There is a focal hypodense area  within the anterior limb of the left internal capsule, likely reflecting  an age-indeterminate lacunar infarct but new when compared to the  previous exam. A similar tiny hypodense areas seen in the right corona  radiata. There is no evidence of acute territorial infarct. The  posterior fossa appears grossly normal. The ventricles are mildly  enlarged, likely secondary to periventricular volume loss. The basilar  cisterns appear patent. The calvarium is intact. Paranasal sinuses and  mastoid air cells appear clear. Calcifications are seen in the cavernous  carotid arteries.       Impression:       1. Age indeterminate lacunar infarct within the anterior limb of the  left internal capsule as well as in the region of the right corona  radiata.  2. Sequela of chronic microvascular ischemia.  This report was finalized on 09/01/2017 19:41 by Dr. Catracho Valladares MD.    US Carotid Bilateral [[id number]] Updated:  09/02/17 0838    MRI Brain Without Contrast [[id number]] Collected:  09/02/17 0856     Updated:  09/02/17 0908    Narrative:       EXAM: MR BRAIN WITHOUT IV CONTRAST 09/02/2017.     COMPARISON: MRI brain dated 01/01/2016, head CT dated 09/01/2017.      HISTORY: 77 years-old Male. Altered mental status.      TECHNIQUE:   Routine pulse sequences of the brain were obtained without IV contrast.      REPORT:      There is diffusion restriction within the right cuneus (medial occipital  lobe) with associated T2/FLAIR abnormal signal, consistent with acute  infarct. There is also intermediate signal within the body of the corpus  callosum on the right (image 152, series 204) with corresponding T1  hypointense signal (image 31, series 302), consistent with a subacute  infarct.  Moderate chronic microvascular changes in the bilateral  supratentorial white matter. Mild to moderate generalized cerebral  volume loss. No acute hydrocephalus. No suspicious extra-axial fluid  collection. No midline shift. No acute hemorrhage.     Flow-voids of the [place] are maintained centrally.     Mucus suspicious in the left maxillary sinus.       Impression:       1.  Acute right occipital lacunar infarct with no evidence of  hemorrhagic conversion.  2.  Evolving subacute lacunar infarct in the body of the corpus  callosum.  This report was finalized on 09/02/2017 09:05 by Dr. Yanet Vazquez MD.          I have reviewed the patient current medications.     Assessment/Plan     Patient Active Problem List    Diagnosis   • Stroke [I63.9]     Overview Note:     ASA  Statin  Neurology consulted  PT/OT/SLP eval  Echo/Carotid scan p     • Type 2 diabetes mellitus [E11.9]     Overview Note:     Home insulin, SSI     • Hypertension [I10]     Overview Note:     Lisinopril     • Anxiety [F41.9]     Overview Note:     Ativan     • Cystitis [N30.90]     Overview Note:     Rocephin               DVT PPX:  Lovenox        Discharge Planning: I expect the patient to be discharged to home in 2-3 days    Paul Clemons MD   09/02/17   11:25 AM

## 2017-09-02 NOTE — PLAN OF CARE
Problem: Patient Care Overview (Adult)  Goal: Plan of Care Review  Outcome: Ongoing (interventions implemented as appropriate)    09/02/17 1356   Coping/Psychosocial Response Interventions   Plan Of Care Reviewed With patient   Patient Care Overview   Progress progress toward functional goals as expected   Outcome Evaluation   Outcome Summary/Follow up Plan OT eval completed. Pt. sup for bed mobility. Pt. CGA with r/w to complete sit to stand t/f and fx mobility. Pt. had episodes of LOB and decreased spatial awareness for L side while ambulating. Pt. sup to simulate donning and doffing socks. Pt. cont to benefit from skilled OT d/t decreased balance, decreased strength, decreased safety awareness, decreased ind in ADLs. Anticipated dc isTCU vs home with assist and HH. 9/2/17 1345         Problem: Inpatient Occupational Therapy  Goal: Transfer Training Goal 1 LTG- OT  Outcome: Ongoing (interventions implemented as appropriate)    09/02/17 1356   Transfer Training OT LTG   Transfer Training OT LTG, Date Established 09/02/17   Transfer Training OT LTG, Time to Achieve by discharge   Transfer Training OT LTG, Activity Type all transfers   Transfer Training OT LTG, [place] Level conditional independence       Goal: Strength Goal LTG- OT  Outcome: Ongoing (interventions implemented as appropriate)    09/02/17 1356   Strength OT LTG   Strength Goal OT LTG, Date Established 09/02/17   Strength Goal OT LTG, Time to Achieve by discharge   Strength Goal OT LTG, Measure to Achieve Pt. will complete BUE strengthening exercises to increase BUE strength to 5/5 for ADLs.        Goal: Bathing Goal LTG- OT  Outcome: Ongoing (interventions implemented as appropriate)    09/02/17 1356   Bathing OT LTG   Bathing Goal OT LTG, Date Established 09/02/17   Bathing Goal OT LTG, Time to Achieve by discharge   Bathing Goal OT LTG, Activity Type upper body bathing;lower body bathing   Bathing Goal OT LTG, [place] Level independent        Goal: LB Dressing Goal LTG- OT  Outcome: Ongoing (interventions implemented as appropriate)    09/02/17 1356   LB Dressing OT LTG   LB Dressing Goal OT LTG, Date Established 09/02/17   LB Dressing Goal OT LTG, Time to Achieve by discharge   LB Dressing Goal OT LTG, [place] Level independent

## 2017-09-02 NOTE — THERAPY EVALUATION
Acute Care - Speech Language Pathology   Swallow Initial Evaluation [facility]     Patient Name: Casey Berger  : 1940  MRN: [id number]  Today's Date: 2017  Onset of Illness/Injury or Date of Surgery Date: (P) 17            Admit Date: 2017    SPEECH-LANGUAGE PATHOLOGY EVALUATION - SWALLOW  Subjective: The patient was seen on this date for a Clinical Swallow evaluation.  Patient was alert and cooperative.    The patient's history is significant for acute right occipital infarct and subacute lacunar infarct in the body of the corpus callosum.   Objective: Textures given included thin liquid, nectar thick liquid, honey thick liquid, puree consistency and regular consistency.  Assessment: Difficulties were noted with thin liquid, nectar thick liquid and regular consistency, characterized by questionable vocal quality change 1x after nectar and thin, however was felt to have began voicing before completely swallowing. He had moderate oral residue on the left after the regular solid trial, but was able to independently use a lingual sweep and liquid wash to clear it.   SLP Findings:  Patient presents with functional swallow, without esophageal component.   Recommendations: Diet Textures: thin liquid, regular consistency food.  Medications should be taken whole with thin liquids.   Recommended Strategies: Upright for PO and small bites and sips. Oral care before breakfast, after all meals and PRN.  Dysphagia therapy is recommended to follow up for diet tolerance.  Zandra Haskins, CCC-SLP 2017 1:13 PM    Visit Dx:     ICD-10-CM ICD-9-CM   1. Altered mental status, unspecified altered mental status type R41.82 780.97   2. Acute UTI N39.0 599.0   3. Oropharyngeal dysphagia R13.12 787.22     Patient Active Problem List   Diagnosis   • Stroke   • Type 2 diabetes mellitus   • Hypertension   • Anxiety   • Cystitis     Past Medical History:   Diagnosis Date   • Hypertension    • Stroke      Past  Surgical History:   Procedure Laterality Date   • VENA CAVA FILTER INSERTION            SWALLOW EVALUATION (last 72 hours)      Swallow Evaluation       09/02/17 1051                Rehab Evaluation    Document Type evaluation  -MB        Subjective Information no complaints;agree to therapy  -MB        General Information    Patient Profile Review yes  -MB        Onset of Illness/Injury 09/01/17  -MB        Pertinent History Of Current Problem Acute right occipital infarct and subacute lacunar infarct in the body of the corpus callosum. Hx of CVA, TIA, HTN, UTI, DM.  -MB        Current Diet Limitations regular solid;thin liquids  -MB        Precautions/Limitations, Vision WFL with corrective lenses  -MB        Precautions/Limitations, Hearing hearing impairment, bilaterally  -MB        Prior Level of Function- Communication functional in all spheres  -MB        Prior Level of Function- Swallowing no diet consistency restrictions  -MB        Plans/Goals Discussed With patient and family  -MB        Barriers to Rehab none identified  -MB        Clinical Impression    Patient's Goals For Discharge patient did not state  -MB        Family Goals For Discharge family did not state  -MB        SLP Swallowing Diagnosis other (see comments)   r/o pharyngeal dysphagia  -MB        Rehab Potential/Prognosis, Swallowing good, to achieve stated therapy goals  -MB        Criteria for Skilled Therapeutic Interventions Met demonstrates skilled criteria for intervention  -MB        Therapy Frequency PRN  -MB        Predicted Duration Therapy Interv (days) 1-2 days  -MB        Expected Duration Therapy Session (min) 15-30 minutes  -MB        SLP Diet Recommendation regular textures;thin liquids  -MB        Recommended Feeding/Eating Techniques maintain upright posture during/after eating for 30 mins  -MB        SLP Rec. for Method of Medication Administration meds whole with thin liquid  -MB        Monitor For Signs Of Aspiration  cough;gurgly voice;throat clearing  -MB        Anticipated Discharge Disposition home  -MB        Cognitive Assessment/Intervention    Current Cognitive/Communication Assessment functional  -MB        Follows Commands/Answers Questions 100% of the time  -MB        Oral Motor Structure and Function    Dentition Assessment upper dentures/partial in place;lower dentures/partial in place  -MB        Secretion Management WNL/WFL  -MB        Mucosal Quality moist, healthy  -MB        Velar Elevation right:;diminished velar elevation  -MB        Volitional Swallow no difficulties initiating volitional swallow  -MB        Volitional Cough no difficulties initiating volitional cough  -MB        Oral Musculature General Assessment oral labial impairment;lingual impairment  -MB        Oral Labial Strength and Mobility left labial droop  -MB        Lingual Strength and Mobility reduced strength left  -MB        General Feeding/Swallowing Observations    Current Feeding Method oral feeding methods  -MB        Clinical Swallow Exam    Mode of Presentation fed by clinician;spoon;straw;self fed  -MB        Oral Residue cohesive solid:;tongue residue;sulci residue  -MB        Oral Phase Results impaired oral phase, signs of dysfunction present  -MB        Pharyngeal Phase Results other (see comments)   questionable vocal change  -MB        Summary of Clinical Exam Full range of consistencies except mech soft solids presented. Pt had questionable vocal quality change 1x after nectar and thin, however was felt to have began voicing before completely swallowing. He had moderate oral residue on the left after the regular solid trial, but was able to independently use a lingual sweep and liquid wash to clear it.   -MB        Swallow Recommendations    Eating Assistance no assistance needed with self eating  -MB        Oral Care oral care with toothbrush and dentifrice BID and PRN  -MB        Modified Eating Strategies upright positioning  90 degrees;alternate food and liquid swallows  -MB        Recommended Diet regular textures;thin liquids  -MB          User Key  (r) = Recorded By, (t) = Taken By, (c) = Cosigned By    Initials Name Effective Dates    HOMER Haskins, [facility]-SLP 08/02/16 -         EDUCATION  The patient has been educated in the following areas:   Dysphagia (Swallowing Impairment).    SLP Recommendation and Plan  SLP Swallowing Diagnosis: other (see comments) (r/o pharyngeal dysphagia)  SLP Diet Recommendation: regular textures, thin liquids  Recommended Feeding/Eating Techniques: maintain upright posture during/after eating for 30 mins  SLP Rec. for Method of Medication Administration: meds whole with thin liquid  Monitor For Signs Of Aspiration: cough, gurgly voice, throat clearing     Criteria for Skilled Therapeutic Interventions Met: demonstrates skilled criteria for intervention  Anticipated Discharge Disposition: home  Rehab Potential/Prognosis, Swallowing: good, to achieve stated therapy goals  Therapy Frequency: PRN             Plan of Care Review  Plan Of Care Reviewed With: patient, family  Outcome Summary/Follow up Plan: CBSE completed. Full range of consistencies except mech soft solids presented. Pt had questionable vocal quality change 1x after nectar and thin, however was felt to have began voicing before completely swallowing. He had moderate oral residue on the left after the regular solid trial, but was able to independently use a lingual sweep and liquid wash to clear it. Recommend regular solids and thin liquids. Meds with thin liquids. SLP will follow up for diet tolerance.          IP SLP Goals       09/02/17 1309          Safely Consume Diet    Safely Consume Diet- SLP, Date Established 09/02/17  -MB      Safely Consume Diet- SLP, Time to Achieve by discharge  -MB      Safely Consume Diet- SLP, Additional Goal Pt will tolerate trials of current diet with no overt s/s of aspiration.  -MB      Safely Consume  Diet- SLP, Outcome goal ongoing  -MB        User Key  (r) = Recorded By, (t) = Taken By, (c) = Cosigned By    Initials Name Provider Type    MURIEL Liu Speech and Language Pathologist             SLP Outcome Measures (last 72 hours)      SLP Outcome Measures       09/02/17 1300          SLP Outcome Measures    Outcome Measure Used? Adult NOMS  -MB      FCM Scores    FCM Chosen Swallowing  -MB      Swallowing FCM Score 6  -MB        User Key  (r) = Recorded By, (t) = Taken By, (c) = Cosigned By    Initials Name Effective Dates    MURIEL Liu 08/02/16 -            Time Calculation:         Time Calculation- SLP       09/02/17 1312          Time Calculation- SLP    SLP Start Time 1051  -MB      SLP Stop Time 1130  -MB      SLP Time Calculation (min) 39 min  -MB      SLP Received On 09/02/17  -MB      SLP Goal Re-Cert Due Date 09/12/17  -MB        User Key  (r) = Recorded By, (t) = Taken By, (c) = Cosigned By    Initials Name Provider Type    MURIEL Liu Speech and Language Pathologist          Therapy Charges for Today     Code Description Service Date Service Provider Modifiers Qty    21376853509 HC ST SWALLOWING CURRENT STATUS 9/2/2017 MURIEL Valladares GN, CI 1    55853165977 HC ST SWALLOWING PROJECTED 9/2/2017 MURIEL Valladares GN, CH 1    55352521802 HC ST EVAL ORAL PHARYNG SWALLOW 3 9/2/2017 MURIEL Valladares GN 1          SLP G-Codes  SLP NOMS Used?: Yes  Functional Limitations: Swallowing  Swallow Current Status (): At least 1 percent but less than 20 percent impaired, limited or restricted  Swallow Goal Status (): 0 percent impaired, limited or restricted    MURIEL Bolaños  9/2/2017

## 2017-09-03 LAB
BASOPHILS # BLD AUTO: 0.07 10*3/MM3 (ref 0–0.2)
BASOPHILS NFR BLD AUTO: 0.8 % (ref 0–2)
DEPRECATED RDW RBC AUTO: 45.7 FL (ref 40–54)
EOSINOPHIL # BLD AUTO: 0.15 10*3/MM3 (ref 0–0.7)
EOSINOPHIL NFR BLD AUTO: 1.7 % (ref 0–4)
ERYTHROCYTE [DISTWIDTH] IN BLOOD BY AUTOMATED COUNT: 14 % (ref 12–15)
GLUCOSE BLDC GLUCOMTR-MCNC: 257 MG/DL (ref 70–130)
GLUCOSE BLDC GLUCOMTR-MCNC: 325 MG/DL (ref 70–130)
GLUCOSE BLDC GLUCOMTR-MCNC: 327 MG/DL (ref 70–130)
GLUCOSE BLDC GLUCOMTR-MCNC: 49 MG/DL (ref 70–130)
GLUCOSE BLDC GLUCOMTR-MCNC: 50 MG/DL (ref 70–130)
GLUCOSE BLDC GLUCOMTR-MCNC: 96 MG/DL (ref 70–130)
HCT VFR BLD AUTO: 42.3 % (ref 40–52)
HGB BLD-MCNC: 14.2 G/DL (ref 14–18)
IMM GRANULOCYTES # BLD: 0.05 10*3/MM3 (ref 0–0.03)
IMM GRANULOCYTES NFR BLD: 0.6 % (ref 0–5)
LYMPHOCYTES # BLD AUTO: 1.45 10*3/MM3 (ref 0.72–4.86)
LYMPHOCYTES NFR BLD AUTO: 16.1 % (ref 15–45)
MCH RBC QN AUTO: 29.9 PG (ref 28–32)
MCHC RBC AUTO-ENTMCNC: 33.6 G/DL (ref 33–36)
MCV RBC AUTO: 89.1 FL (ref 82–95)
MONOCYTES # BLD AUTO: 0.99 10*3/MM3 (ref 0.19–1.3)
MONOCYTES NFR BLD AUTO: 11 % (ref 4–12)
NEUTROPHILS # BLD AUTO: 6.32 10*3/MM3 (ref 1.87–8.4)
NEUTROPHILS NFR BLD AUTO: 69.8 % (ref 39–78)
PLATELET # BLD AUTO: 245 10*3/MM3 (ref 130–400)
PMV BLD AUTO: 11.7 FL (ref 6–12)
RBC # BLD AUTO: 4.75 10*6/MM3 (ref 4.8–5.9)
WBC NRBC COR # BLD: 9.03 10*3/MM3 (ref 4.8–10.8)

## 2017-09-03 PROCEDURE — 92526 ORAL FUNCTION THERAPY: CPT

## 2017-09-03 PROCEDURE — 63710000001 INSULIN DETEMIR PER 5 UNITS: Performed by: NURSE PRACTITIONER

## 2017-09-03 PROCEDURE — 63710000001 INSULIN LISPRO (HUMAN) PER 5 UNITS: Performed by: INTERNAL MEDICINE

## 2017-09-03 PROCEDURE — 82962 GLUCOSE BLOOD TEST: CPT

## 2017-09-03 PROCEDURE — 99232 SBSQ HOSP IP/OBS MODERATE 35: CPT | Performed by: PSYCHIATRY & NEUROLOGY

## 2017-09-03 PROCEDURE — 85025 COMPLETE CBC W/AUTO DIFF WBC: CPT | Performed by: FAMILY MEDICINE

## 2017-09-03 PROCEDURE — 97110 THERAPEUTIC EXERCISES: CPT

## 2017-09-03 PROCEDURE — 97530 THERAPEUTIC ACTIVITIES: CPT

## 2017-09-03 PROCEDURE — 97116 GAIT TRAINING THERAPY: CPT

## 2017-09-03 PROCEDURE — 25010000002 CEFTRIAXONE: Performed by: INTERNAL MEDICINE

## 2017-09-03 RX ADMIN — CLOPIDOGREL 75 MG: 75 TABLET, FILM COATED ORAL at 08:34

## 2017-09-03 RX ADMIN — INSULIN LISPRO 5 UNITS: 100 INJECTION, SOLUTION INTRAVENOUS; SUBCUTANEOUS at 17:01

## 2017-09-03 RX ADMIN — INSULIN LISPRO 1 UNITS: 100 INJECTION, SOLUTION INTRAVENOUS; SUBCUTANEOUS at 21:05

## 2017-09-03 RX ADMIN — INSULIN LISPRO 5 UNITS: 100 INJECTION, SOLUTION INTRAVENOUS; SUBCUTANEOUS at 12:03

## 2017-09-03 RX ADMIN — Medication 50 MG: at 08:35

## 2017-09-03 RX ADMIN — TEMAZEPAM 30 MG: 15 CAPSULE ORAL at 20:59

## 2017-09-03 RX ADMIN — FAMOTIDINE 40 MG: 20 TABLET, FILM COATED ORAL at 08:34

## 2017-09-03 RX ADMIN — NYSTATIN: 100000 POWDER TOPICAL at 20:59

## 2017-09-03 RX ADMIN — NYSTATIN: 100000 POWDER TOPICAL at 08:34

## 2017-09-03 RX ADMIN — INSULIN DETEMIR 20 UNITS: 100 INJECTION, SOLUTION SUBCUTANEOUS at 12:03

## 2017-09-03 RX ADMIN — CEFTRIAXONE 1 G: 1 INJECTION, POWDER, FOR SOLUTION INTRAMUSCULAR; INTRAVENOUS at 20:59

## 2017-09-03 RX ADMIN — SODIUM CHLORIDE 100 ML/HR: 9 INJECTION, SOLUTION INTRAVENOUS at 09:44

## 2017-09-03 NOTE — THERAPY TREATMENT NOTE
Acute Care - Physical Therapy Treatment Note  [facility]     Patient Name: Casey Berger  : 1940  MRN: [id number]  Today's Date: 9/3/2017  Onset of Illness/Injury or Date of Surgery Date: 17  Date of Referral to PT: 17  Referring Physician: Dr. Butt    Admit Date: 2017    Visit Dx:    ICD-10-CM ICD-9-CM   1. Altered mental status, unspecified altered mental status type R41.82 780.97   2. Acute UTI N39.0 599.0   3. Oropharyngeal dysphagia R13.12 787.22   4. Decreased activities of daily living (ADL) Z78.9 V49.89   5. Impaired functional mobility, balance, gait, and endurance Z74.09 V49.89     Patient Active Problem List   Diagnosis   • Stroke   • Type 2 diabetes mellitus   • Hypertension   • Anxiety   • Cystitis               Adult Rehabilitation Note       17 0830 17 1051       Rehab Assessment/Intervention    Discipline physical therapy assistant  -CW      Document Type therapy note (daily note)  -CW      Subjective Information agree to therapy;no complaints  -CW      Patient Effort, Rehab Treatment good  -CW      Precautions/Limitations fall precautions  -CW      Specific Treatment Considerations B AFOs; neuropathy B knees down  -CW      Recorded by [CW] Silvana Garcia PTA      Pain Assessment    Pain Assessment No/denies pain  -CW      Recorded by [CW] Silvana Garcia PTA      Vision Assessment/Intervention    Visual Impairment --   Improved attention to left side - did not run into objects   -CW      Recorded by [CW] Silvana Garcia PTA      Cognitive Assessment/Intervention    Personal Safety Interventions fall prevention program maintained;gait belt;nonskid shoes/slippers when out of bed  -CW      Recorded by [CW] Silvana Garcia PTA      Swallow Assessment/Intervention    Additional Documentation  Dysphagia/Swallow Intervention (Group)  -MB     Recorded by  [MB] Zandra Haskins CCC-SLP     Dysphagia/Swallow Intervention    Dysphagia/Swallow  Therapeutic Feed  Pt will tolerate trials of current diet and upgraded consistencies with no overt s/s of aspiration.  -MB     Recorded by  [MB] Zandra Haskins CCC-SLP     Bed Mobility, Assessment/Treatment    Bed Mob, Supine to Sit, [place] conditional independence  -CW      Recorded by [CW] Silvana Garcia PTA      Transfer Assessment/Treatment    Transfers, Sit-Stand [place] stand by assist;verbal cues required  -CW      Transfers, Stand-Sit [place] stand by assist;verbal cues required  -CW      Transfers, Sit-Stand-Sit, Assist Device rolling walker  -CW      Toilet Transfer, [place] contact guard assist  -CW      Toilet Transfer, Assistive Device rolling walker  -CW      Transfer, Safety Issues impulsivity;step length decreased  -CW      Transfer, Impairments strength decreased;sensation decreased  -CW      Transfer, Comment Cues for hand placement during transfers  -CW      Recorded by [CW] Silvana Garcia PTA      Gait Assessment/Treatment    Gait, [place] Level contact guard assist;verbal cues required  -CW      Gait, Assistive Device rolling walker  -CW      Gait, Distance (Feet) 350  -CW      Gait, Gait Pattern Analysis swing-through gait  -CW      Gait, Gait Deviations andrea decreased;forward flexed posture;narrow base;step length decreased  -CW      Gait, Safety Issues step length decreased  -CW      Gait, Impairments strength decreased;sensation decreased  -CW      Gait, Comment Cues to look up, broaden LUCINA, and stay inside walker.  -CW      Recorded by [CW] Silvana Garcia PTA      Therapy Exercises    Bilateral Lower Extremities AROM:;20 reps;sitting  -CW      Recorded by [ELVIRA] Silvana Garcia PTA      Orthosis Location    Orthosis Location/Type lower extremity  -CW      Orthosis, Lower Extremity Left:;Right:;AFO (ankle foot orthosis)  -CW      Recorded by [ELVIRA] Silvana Garcia PTA      Positioning and Restraints    Pre-Treatment Position in bed  -CW       Post Treatment Position chair  -CW      In Chair sitting;call light within reach;with family/caregiver  -CW      Recorded by [CW] Silvana Garcia, SYLVESTER        User Key  (r) = Recorded By, (t) = Taken By, (c) = Cosigned By    Initials Name Effective Dates    HOMER Haskins, [facility]-SLP 08/02/16 -     CW Silvana Garcia, PTA 06/22/15 -                 IP PT Goals       09/02/17 1406          Bed Mobility PT LTG    Bed Mobility PT LTG, Date Established 09/02/17  -PB (r) MS (t) PB (c)      Bed Mobility PT LTG, Time to Achieve by discharge  -PB (r) MS (t) PB (c)      Bed Mobility PT LTG, Activity Type all bed mobility  -PB (r) MS (t) PB (c)      Bed Mobility PT LTG, [place] Level independent  -PB (r) MS (t) PB (c)      Transfer Training PT LTG    Transfer Training PT LTG, Date Established 09/02/17  -PB (r) MS (t) PB (c)      Transfer Training PT LTG, Time to Achieve by discharge  -PB (r) MS (t) PB (c)      Transfer Training PT LTG, Activity Type bed to chair /chair to bed;sit to stand/stand to sit  -PB (r) MS (t) PB (c)      Transfer Training PT LTG, [place] Level conditional independence  -PB (r) MS (t) PB (c)      Transfer Training PT LTG, Assist Device walker, rolling  -PB (r) MS (t) PB (c)      Gait Training PT LTG    Gait Training Goal PT LTG, Date Established 09/02/17  -PB (r) MS (t) PB (c)      Gait Training Goal PT LTG, Time to Achieve by discharge  -PB (r) MS (t) PB (c)      Gait Training Goal PT LTG, [place] Level conditional independence  -PB (r) MS (t) PB (c)      Gait Training Goal PT LTG, Assist Device walker, rolling  -PB (r) MS (t) PB (c)      Gait Training Goal PT LTG, Distance to Achieve 300'  -PB (r) MS (t) PB (c)      Gait Training Goal PT LTG, Additional Goal without tripping over feet/keeping clear of obstacles  -PB (r) MS (t) PB (c)      Stair Training PT LTG    Stair Training Goal PT LTG, Date Established 09/02/17  -PB (r) MS (t) PB (c)      Stair Training Goal PT LTG,  Time to Achieve by discharge  -PB (r) MS (t) PB (c)      Stair Training Goal PT LTG, Number of Steps 1  -PB (r) MS (t) PB (c)      Stair Training Goal PT LTG, [place] Level conditional independence  -PB (r) MS (t) PB (c)      Stair Training Goal PT LTG, Assist Device 1 handrail  -PB (r) MS (t) PB (c)        User Key  (r) = Recorded By, (t) = Taken By, (c) = Cosigned By    Initials Name Provider Type    REJI Fountain, PT DPT Physical Therapist    MS Yulissa Simms, PT Student PT Student          Physical Therapy Education     Title: PT OT SLP Therapies (Done)     Topic: Physical Therapy (Done)     Point: Mobility training (Done)    Learning Progress Summary    Learner Readiness Method Response Comment Documented by Status   Patient Acceptance E,D VU,NR Transferss, posture, body mechanics, gait, plan of care  09/03/17 0948 Done    Acceptance E VU,NR Proper use of AD, safety with t/fs and ambulation, benefits of increased activity MS 09/02/17 1411 Done               Point: Home exercise program (Done)    Learning Progress Summary    Learner Readiness Method Response Comment Documented by Status   Patient Acceptance E,D VU,NR Transferss, posture, body mechanics, gait, plan of care  09/03/17 0948 Done               Point: Body mechanics (Done)    Learning Progress Summary    Learner Readiness Method Response Comment Documented by Status   Patient Acceptance E,D VU,NR Transferss, posture, body mechanics, gait, plan of care  09/03/17 0948 Done               Point: Precautions (Done)    Learning Progress Summary    Learner Readiness Method Response Comment Documented by Status   Patient Acceptance E,D VU,NR Transferss, posture, body mechanics, gait, plan of care CW 09/03/17 0948 Done    Acceptance E VU,NR Proper use of AD, safety with t/fs and ambulation, benefits of increased activity MS 09/02/17 1411 Done                      User Key     Initials Effective Dates Name Provider Type [facility]  06/22/15 -  Silvana Garcia, PTA Physical Therapy Assistant PT    MS 07/19/17 -  Yulissa Simms, PT Student PT Student PT                    PT Recommendation and Plan  Anticipated Discharge Disposition: home with assist, home with home health  Planned Therapy Interventions: balance training, bed mobility training, gait training, home exercise program, patient/family education, stair training, strengthening, transfer training  PT Frequency: daily, 2 times/day, per priority policy  Plan of Care Review  Plan Of Care Reviewed With: patient  Progress: progress toward functional goals as expected  Outcome Summary/Follow up Plan: Pt is independent with bed mobility, SBA for sit<>stand transfers.  Pt ambulated 350' CGA with rolling walker.  Pt requires frequent verbal cues to look ahead, keep feet apart, and stay closer to walker.  Pt will coontinue to benefit from therapy to increase strength and improve safeety with gait.          Outcome Measures       09/03/17 0900 09/02/17 1400 09/02/17 1301    How much help from another person do you currently need...    Turning from your back to your side while in flat bed without using bedrails? 4  -CW  4  -PB (r) MS (t) PB (c)    Moving from lying on back to sitting on the side of a flat bed without bedrails? 4  -CW  3  -PB (r) MS (t) PB (c)    Moving to and from a bed to a chair (including a wheelchair)? 3  -CW  3  -PB (r) MS (t) PB (c)    Standing up from a chair using your arms (e.g., wheelchair, bedside chair)? 3  -CW  3  -PB (r) MS (t) PB (c)    Climbing 3-5 steps with a railing? 3  -CW  2  -PB (r) MS (t) PB (c)    To walk in hospital room? 3  -CW  3  -PB (r) MS (t) PB (c)    AM-PAC 6 Clicks Score 20  -CW  18  -PB (r) MS (t)    How much help from another is currently needed...    Putting on and taking off regular lower body clothing?  3  -ND     Bathing (including washing, rinsing, and drying)  3  -ND     Toileting (which includes using toilet bed pan or urinal)  3  -ND      Putting on and taking off regular upper body clothing  4  -ND     Taking care of personal grooming (such as brushing teeth)  4  -ND     Eating meals  4  -ND     Score  21  -ND     Functional Assessment    Outcome Measure Options AM-PAC 6 Clicks Basic Mobility (PT)  -CW AM-PAC 6 Clicks Daily Activity (OT)  -ND AM-PAC 6 Clicks Basic Mobility (PT)  -PB (r) MS (t) PB (c)      User Key  (r) = Recorded By, (t) = Taken By, (c) = Cosigned By    Initials Name Provider Type    ELVIRA Garcia PTA Physical Therapy Assistant    PB Oleksandr Fountain, PT DPT Physical Therapist    ND Pao Nguyen, OTR/L Occupational Therapist    MS Yulissa Simms, PT Student PT Student           Time Calculation:         PT Charges       09/03/17 0951          Time Calculation    Start Time 0830  -CW      Stop Time 0912  -CW      Time Calculation (min) 42 min  -CW      PT Received On 09/03/17  -CW      PT Goal Re-Cert Due Date 09/12/17  -CW      Time Calculation- PT    Total Timed Code Minutes- PT 42 minute(s)  -CW        User Key  (r) = Recorded By, (t) = Taken By, (c) = Cosigned By    Initials Name Provider Type    ELVIRA Garcia PTA Physical Therapy Assistant          Therapy Charges for Today     Code Description Service Date Service Provider Modifiers Qty    52798900014 HC PT THERAPEUTIC ACT EA 15 MIN 9/3/2017 Silvana Garcia PTA GP, KX 1    96170691145 HC PT THER PROC EA 15 MIN 9/3/2017 Silvana Garcia PTA GP, KX 1    45072135338 HC GAIT TRAINING EA 15 MIN 9/3/2017 Silvana Garcia PTA GP, KX 1          PT G-Codes  Outcome Measure Options: AM-PAC 6 Clicks Basic Mobility (PT)  Score: 18  Functional Limitation: Mobility: Walking and moving around  Mobility: Walking and Moving Around Current Status (): At least 40 percent but less than 60 percent impaired, limited or restricted  Mobility: Walking and Moving Around Goal Status (): At least 20 percent but less than 40 percent impaired, limited or  restricted    Silvana Garcia, PTA  9/3/2017

## 2017-09-03 NOTE — PLAN OF CARE
Problem: Confusion, Acute (Adult)  Goal: Cognitive/Functional Impairments Minimized  Outcome: Ongoing (interventions implemented as appropriate)    Problem: Fall Risk (Adult)  Goal: Absence of Falls  Outcome: Ongoing (interventions implemented as appropriate)    Problem: Patient Care Overview (Adult)  Goal: Plan of Care Review  Outcome: Ongoing (interventions implemented as appropriate)    09/03/17 1722   Coping/Psychosocial Response Interventions   Plan Of Care Reviewed With patient;family   Patient Care Overview   Progress improving   Outcome Evaluation   Outcome Summary/Follow up Plan Pt denies pain,more alert, walked in room and sat up in chair. Fall protocol in place and family at bedside.

## 2017-09-03 NOTE — PROGRESS NOTES
Neurology Progress Note      Chief Complaint:  stroke    Subjective     Subjective:    No issues overnight.  He is sitting up in the chair this morning.  While he is oriented towards me, his wife tells me several times last night he attempted to get up out of bed without walker and go to the restroom without assistance because he was confused.  She is concerned about him being a fall risk.  Medications:  Current Facility-Administered Medications   Medication Dose Route Frequency Provider Last Rate Last Dose   • acetaminophen (TYLENOL) tablet 650 mg  650 mg Oral Q4H PRN Micheal Butt MD       • cefTRIAXone (ROCEPHIN) 1 g/100 mL 0.9% NS (MBP)  1 g Intravenous Q24H Micheal Butt MD   Stopped at 09/02/17 2230   • clopidogrel (PLAVIX) tablet 75 mg  75 mg Oral Daily Cornell Lynne MD   75 mg at 09/03/17 0834   • dextrose (D50W) solution 25 g  25 g Intravenous Q15 Min PRN Micheal Butt MD       • dextrose (GLUTOSE) oral gel 15 g  15 g Oral Q15 Min PRN Micheal Butt MD       • famotidine (PEPCID) tablet 40 mg  40 mg Oral Daily Micheal Butt MD   40 mg at 09/03/17 0834   • glucagon (human recombinant) (GLUCAGEN DIAGNOSTIC) injection 1 mg  1 mg Subcutaneous Q15 Min PRN Micheal Butt MD       • insulin detemir (LEVEMIR) injection 25 Units  25 Units Subcutaneous Daily Micheal Butt MD   25 Units at 09/02/17 0901   • insulin lispro (humaLOG) injection 2-7 Units  2-7 Units Subcutaneous 4x Daily With Meals & Nightly Micheal Butt MD   4 Units at 09/02/17 2120   • lisinopril (PRINIVIL,ZESTRIL) tablet 2.5 mg  2.5 mg Oral Daily PRN Micheal Butt MD       • LORazepam (ATIVAN) tablet 1 mg  1 mg Oral Q6H PRN Micheal Butt MD       • nystatin (MYCOSTATIN) powder   Topical Q12H Paul Clemons MD       • ondansetron (ZOFRAN) injection 4 mg  4 mg Intravenous Q6H PRN Micheal Butt MD       • oxyCODONE-acetaminophen (PERCOCET) 7.5-325 MG per tablet 1 tablet  1 tablet Oral Q8H PRN Micheal  MIQUEL Butt MD       • sodium chloride 0.9 % flush 1-10 mL  1-10 mL Intravenous PRN Micheal Butt MD       • sodium chloride 0.9 % flush 10 mL  10 mL Intravenous PRN Solo Lynch Jr., MD       • sodium chloride 0.9 % infusion  100 mL/hr Intravenous Continuous Micheal Butt  mL/hr at 09/03/17 0944 100 mL/hr at 09/03/17 0944   • temazepam (RESTORIL) capsule 30 mg  30 mg Oral Nightly Micheal Butt MD   30 mg at 09/02/17 2115   • zinc gluconate tablet 50 mg  50 mg Oral Daily Micheal Butt MD   50 mg at 09/03/17 0835       Review of Systems:   -A 14 point review of systems is completed and is negative except for previous confusion and headache      Objective      Vital Signs  Temp:  [97.3 °F (36.3 °C)-97.9 °F (36.6 °C)] 97.9 °F (36.6 °C)  Heart Rate:  [58-67] 59  Resp:  [18] 18  BP: (110-161)/(54-76) 156/68    Physical Exam:    General Exam:  Head:  Normal cephalic, atraumatic  HEENT:  Neck supple  Fundoscopic Exam:  No signs of disc edema  CVS:  Regular rate and rhythm.  No murmurs  Carotid Examination:  No bruits  Lungs:  Clear to auscultation  Abdomen:  Non-tender, Non-distended  Extremities:  No signs of peripheral edema  Skin:  No rashes     Neurologic Exam:     Mental Status:    -Awake, Alert, Oriented X 3  -No word finding difficulties  -No aphasia  -No dysarthria  -Follows simple and complex commands     CN II:  Visual fields full.  Pupils equally reactive to light  CN III, IV, VI:  Extraocular Muscles full with no signs of nystagmus  CN V:  Facial sensory is symmetric with no asymetries.  CN VII:  Facial motor symmetric  CN VIII:  Gross hearing intact bilaterally  CN IX:  Palate elevates symmetrically  CN X:  Palate elevates symmetrically  CN XI:  Shoulder shrug symmetric  CN XII:  Tongue is midline on protrusion     Motor:      Proximally in the upper extremities he has 5 out of 5 strength.  Specifically biceps triceps and brachial radialis are full strength.   strength I would rate  as 3-5.  First dorsal interossei's 2+.  In his lower extremities he has 3-4+ proximally.  Distally dorsiflexion and plantar flexion he has 0 out of 5 strength     DTR:  --Hyporeflexic throughout.  Sensory:  -Gradient sensory loss distal to the knees and wrists bilaterally     Coordination:  -Finger to nose intact  -Heel to shin intact  -No ataxia     Gait  -Ambulates with AFOs bilaterally     Results Review:    I reviewed the patient's new clinical results.      Results from last 7 days  Lab Units 09/01/17 1946   WBC 10*3/mm3 10.57   HEMOGLOBIN g/dL 14.8   HEMATOCRIT % 43.8   PLATELETS 10*3/mm3 248          Results from last 7 days  Lab Units 09/01/17 1946   SODIUM mmol/L 134*   POTASSIUM mmol/L 4.4   CHLORIDE mmol/L 100   CO2 mmol/L 25.0   BUN mg/dL 21   CREATININE mg/dL 1.15   CALCIUM mg/dL 9.2   BILIRUBIN mg/dL 0.5   ALK PHOS U/L 95   ALT (SGPT) U/L 29   AST (SGOT) U/L 21   GLUCOSE mg/dL 347*        Lab Results   Component Value Date    PROTIME 12.3 09/01/2017    INR 0.89 (L) 09/01/2017     No components found for: POCGLUC  No components found for: A1C  Lab Results   Component Value Date    HDL 38 (L) 09/02/2017     No components found for: B12  No results found for: TSH  MRI Brain Without Contrast [[id number]] Collected:  09/02/17 0856        Updated:  09/02/17 0908     Narrative:        EXAM: MR BRAIN WITHOUT IV CONTRAST 09/02/2017.      COMPARISON: MRI brain dated 01/01/2016, head CT dated 09/01/2017.       HISTORY: 77 years-old Male. Altered mental status.       TECHNIQUE:   Routine pulse sequences of the brain were obtained without IV contrast.       REPORT:       There is diffusion restriction within the right cuneus (medial occipital  lobe) with associated T2/FLAIR abnormal signal, consistent with acute  infarct. There is also intermediate signal within the body of the corpus  callosum on the right (image 152, series 204) with corresponding T1  hypointense signal (image 31, series 302), consistent with a  subacute  infarct. Moderate chronic microvascular changes in the bilateral  supratentorial white matter. Mild to moderate generalized cerebral  volume loss. No acute hydrocephalus. No suspicious extra-axial fluid  collection. No midline shift. No acute hemorrhage.      Flow-voids of the [place] are maintained centrally.      Mucus suspicious in the left maxillary sinus.         Impression:        1.  Acute right occipital lacunar infarct with no evidence of  hemorrhagic conversion.  2.  Evolving subacute lacunar infarct in the body of the corpus  callosum.  This report was finalized on 09/02/2017 09:05 by Dr. Yanet Vazquez MD.      MR Brain reviewed by me:  Right occipital acute infarction and second subacute infarct in right corpus callosum  Carotid ultrasound:  No significant stenosis  Cardiac echo:  · Left ventricular systolic function is normal. Estimated ejection fractio nis 56-60%.  · Normal right ventricular cavity size and systolic function noted.  · There is no evidence of intracardiac mass or thrombus.  · There is no evidence of right to left shunt on bubble study.  Total Cholesterol 130 - 200 mg/dL 112 (L)   Triglycerides 0 - 149 mg/dL 78   HDL Cholesterol >=40 mg/dL 38 (L)   LDL Cholesterol  0 - 99 mg/dL 63   LDL/HDL Ratio   1.54      Hemoglobin A1C % 10.1       Assessment/Plan     Hospital Problem List    Principal Problem:    Stroke  Active Problems:    Type 2 diabetes mellitus    Hypertension    Anxiety    Cystitis    Impression:  1.  2 acute ischemic strokes likely small vessel in distraction.  Risk factors include hyperglycemia from uncontrolled IVs mellitus.  2.  Diabetes mellitus currently in controlled  3.  Peripheral neuropathy suspected be secondary to diabetic neuropathy however it is unclear whether a neurologist has worked this up in the past  4.  Bilateral foot drop may be secondary to previous retroperitoneal hematomas causing a bilateral lumbar plexopathy although also could be  part of his peripheral neuropathy    Plan:  --Change low-dose aspirin to Plavix monotherapy  --LDL at goal  --increase glycemic control with HbA1c goal of <7  --Lovenox 40 mg subcutaneous for DVT prophylaxis  --continue with fall precautions  --D/C planning is TCU vs. Home with home health per therapy notes.     Spoke with family.  They would like SNF with rehab.  I agree as he previously had a fall leading to a hip fracture with multiple complications and they would like to do everything to avoid this happening again.        Cornell Lynne MD  09/03/17  10:05 AM

## 2017-09-03 NOTE — PLAN OF CARE
Problem: Confusion, Acute (Adult)  Goal: Cognitive/Functional Impairments Minimized  Outcome: Ongoing (interventions implemented as appropriate)  Goal: Safety  Outcome: Ongoing (interventions implemented as appropriate)    Problem: Fall Risk (Adult)  Goal: Absence of Falls  Outcome: Ongoing (interventions implemented as appropriate)    Problem: Patient Care Overview (Adult)  Goal: Plan of Care Review  Outcome: Ongoing (interventions implemented as appropriate)    09/03/17 0440   Coping/Psychosocial Response Interventions   Plan Of Care Reviewed With patient   Patient Care Overview   Progress no change   Outcome Evaluation   Outcome Summary/Follow up Plan Patient rested well through the night. IV ABX continues. VSS, Safety maintained.

## 2017-09-03 NOTE — PLAN OF CARE
Problem: Patient Care Overview (Adult)  Goal: Plan of Care Review  Outcome: Ongoing (interventions implemented as appropriate)    09/03/17 0948   Coping/Psychosocial Response Interventions   Plan Of Care Reviewed With patient   Patient Care Overview   Progress progress toward functional goals as expected   Outcome Evaluation   Outcome Summary/Follow up Plan Pt is independent with bed mobility, SBA for sit<>stand transfers. Pt ambulated 350' CGA with rolling walker. Pt requires frequent verbal cues to look ahead, keep feet apart, and stay closer to walker. Pt will continue to benefit from therapy to increase strength and improve safety with mobility.

## 2017-09-03 NOTE — PLAN OF CARE
Problem: Patient Care Overview (Adult)  Goal: Plan of Care Review  Outcome: Ongoing (interventions implemented as appropriate)    09/03/17 1100   Coping/Psychosocial Response Interventions   Plan Of Care Reviewed With patient;family   Outcome Evaluation   Outcome Summary/Follow up Plan Pt completed trials of a regular solid and thin liquids with no overt s/s of aspiration. He had adequate mastication and clearance of the solid. SLP will sign off.         Problem: Inpatient SLP  Goal: Dysphagia- Patient will safely consume diet as per recommendation with no signs/symptoms of aspiration  Outcome: Ongoing (interventions implemented as appropriate)    09/02/17 1309 09/03/17 1100   Safely Consume Diet   Safely Consume Diet- SLP, Date Established 09/02/17 --    Safely Consume Diet- SLP, Time to Achieve by discharge --    Safely Consume Diet- SLP, Additional Goal Pt will tolerate trials of current diet with no overt s/s of aspiration. --    Safely Consume Diet- SLP, Date Goal Reviewed --  09/03/17   Safely Consume Diet- SLP, Outcome --  goal met

## 2017-09-03 NOTE — PLAN OF CARE
Problem: Inpatient SLP  Goal: Dysphagia- Patient will safely consume diet as per recommendation with no signs/symptoms of aspiration  Outcome: Outcome(s) achieved Date Met:  09/03/17 09/02/17 1309 09/03/17 1100   Safely Consume Diet   Safely Consume Diet- SLP, Date Established 09/02/17 --    Safely Consume Diet- SLP, Time to Achieve by discharge --    Safely Consume Diet- SLP, Additional Goal Pt will tolerate trials of current diet with no overt s/s of aspiration. --    Safely Consume Diet- SLP, Date Goal Reviewed --  09/03/17   Safely Consume Diet- SLP, Outcome --  goal met

## 2017-09-03 NOTE — PROGRESS NOTES
[facility] Medicine Services  INPATIENT PROGRESS NOTE    Length of Stay: 1  Date of Admission: 9/1/2017  Primary Care Physician: Meggan Trivedi DO    Subjective   Chief Complaint: follow up stroke  HPI   Multiple visitors at bedside. He states he feels good. Is having a dull frontal headache. States overall, he feels good. Family states he is supposed to go to [facility] for rehab. Bowels are moving. Braces to bilateral lower extremities.     Review of Systems   All pertinent negatives and positives are as above. All other systems have been reviewed and are negative unless otherwise stated.     Objective    Temp:  [97.3 °F (36.3 °C)-97.9 °F (36.6 °C)] 97.9 °F (36.6 °C)  Heart Rate:  [58-62] 59  Resp:  [18] 18  BP: (110-158)/(54-76) 156/68  Physical Exam   Constitutional: He is oriented to person, place, and time. He appears well-developed and well-nourished.   HENT:   Head: Normocephalic and atraumatic.   Eyes: EOM are normal. Pupils are equal, round, and reactive to light. No scleral icterus.   Neck: Normal range of motion. Neck supple. No JVD present. Carotid bruit is not present. No tracheal deviation present. No thyromegaly present.   Cardiovascular: Normal rate and regular rhythm.  Exam reveals no gallop and no friction rub.    No murmur heard.  Pulmonary/Chest: Effort normal and breath sounds normal. No respiratory distress. He has no wheezes. He has no rales. He exhibits no tenderness.   Abdominal: Soft. Bowel sounds are normal. He exhibits no distension. There is no tenderness.   Musculoskeletal: Normal range of motion. He exhibits no edema.   Bilateral foot drop.    Neurological: He is alert and oriented to person, place, and time. No cranial nerve deficit.   Skin: Skin is warm and dry. No rash noted.   Psychiatric: He has a normal mood and affect.     Results Review:  I have reviewed the labs, radiology results, and diagnostic  studies.    Laboratory Data:     Results from last 7 days  Lab Units 09/01/17 1946   WBC 10*3/mm3 10.57   HEMOGLOBIN g/dL 14.8   HEMATOCRIT % 43.8   PLATELETS 10*3/mm3 248       Results from last 7 days  Lab Units 09/01/17 1946   SODIUM mmol/L 134*   POTASSIUM mmol/L 4.4   CHLORIDE mmol/L 100   CO2 mmol/L 25.0   BUN mg/dL 21   CREATININE mg/dL 1.15   CALCIUM mg/dL 9.2   BILIRUBIN mg/dL 0.5   ALK PHOS U/L 95   ALT (SGPT) U/L 29   AST (SGOT) U/L 21   GLUCOSE mg/dL 347*       Culture Data:   Blood Culture   Date Value Ref Range Status   09/01/2017 No growth at 24 hours  Preliminary   09/01/2017 No growth at 24 hours  Preliminary     Urine Culture   Date Value Ref Range Status   09/01/2017 >100,000 CFU/mL Lactose  (A)  Preliminary     Comment:     Identification and susceptibility to follow.   09/01/2017 >100,000 CFU/mL Mixed Gram Positive Paula (A)  Preliminary     Comment:     Probable contaminant       Radiology Data:   Imaging Results (last 24 hours)     Procedure Component Value Units Date/Time    US Carotid Bilateral [[id number]] Collected:  09/02/17 1143     Updated:  09/02/17 1147    Narrative:       History: Carotid occlusive disease       Impression:       Impression:  1. There is less than 50% stenosis of the right internal carotid artery.  2. There is less than 50% stenosis of the left internal carotid artery.  3. Antegrade flow is demonstrated in bilateral vertebral arteries.     Comments: Bilateral carotid vertebral arterial duplex scan was  performed.     Grayscale imaging shows intimal thickening and calcified elements at the  carotid bifurcation. The right internal carotid artery peak systolic  velocity is 78 cm/sec. The end-diastolic velocity is 18.2 cm/sec. The  right ICA/CCA ratio is approximately 0.98 . These findings correlate  with less than 50% stenosis of the right internal carotid artery.     Grayscale imaging shows intimal thickening and calcified elements at the  carotid  bifurcation. The left internal carotid artery peak systolic  velocity is 68.6 cm/sec. The end-diastolic velocity is 13.5 cm/sec. The  left ICA/CCA ratio is approximately 0.99 . These findings correlate with  less than 50% stenosis of the left internal carotid artery.     Antegrade flow is demonstrated in bilateral vertebral arteries.       This report was finalized on 09/02/2017 11:44 by Dr. Navarro Rivas MD.        I have reviewed the patient current medications.     Assessment/Plan     Hospital Problem List     * (Principal)Stroke    Overview Addendum 9/2/2017 11:20 AM by Paul Clemons MD     ASA  Statin  Neurology consulted  PT/OT/SLP eval  Echo/Carotid scan p         Type 2 diabetes mellitus    Overview Addendum 9/3/2017 11:14 AM by CHELSIE Ignacio     Home insulin, SSI-poor control A1C 10.1         Hypertension    Overview Signed 9/2/2017 11:16 AM by Paul Clemons MD     Lisinopril         Anxiety    Overview Signed 9/2/2017 11:17 AM by Paul Clemons MD     Ativan         Cystitis    Overview Signed 9/2/2017 11:25 AM by Paul Clemons MD     Rocephin             Plan:  1. Speech therapy has signed off.   2. Continue with occupational and physical therapy  3. Plavix monotherapy per Dr. Lynne   4. Needs improved blood glucose control. A1C 10.1   5. Continue to follow urine culture.   6. Will decrease insulin to 20 units in the am to hopefully reduce am hypoglycemia.     Discharge Planning: I expect the patient to be discharged to ?in ?days.    CHELSIE Ignacio   09/03/17   11:14 AM   I personally evaluated and examined the patient in conjunction with CHELSIE Hill and agree with the assessment, treatment plan, and disposition of the patient as recorded by her. My history, exam, and further recommendations are:       Continue as above  Paul Clemons MD  09/06/17  4:13 PM

## 2017-09-03 NOTE — THERAPY DISCHARGE NOTE
Acute Care - Speech Language Pathology   Swallow Treatment Note/Discharge   [facility]     Patient Name: Casey Berger  : 1940  MRN: [id number]  Today's Date: 9/3/2017  Onset of Illness/Injury or Date of Surgery Date: 17            Admit Date: 2017  Pt completed trials of a regular solid and thin liquids with no overt s/s of aspiration. He had adequate mastication and clearance of the solid. SLP will sign off.  Zandra Haskins CCC-SLP 9/3/2017 11:03 AM    Visit Dx:      ICD-10-CM ICD-9-CM   1. Altered mental status, unspecified altered mental status type R41.82 780.97   2. Acute UTI N39.0 599.0   3. Oropharyngeal dysphagia R13.12 787.22   4. Decreased activities of daily living (ADL) Z78.9 V49.89   5. Impaired functional mobility, balance, gait, and endurance Z74.09 V49.89     Patient Active Problem List   Diagnosis   • Stroke   • Type 2 diabetes mellitus   • Hypertension   • Anxiety   • Cystitis             Adult Rehabilitation Note       17 1031 17 0830 17 1051    Rehab Assessment/Intervention    Discipline speech language pathologist  -MB physical therapy assistant  -CW     Document Type therapy note (daily note)  -MB therapy note (daily note)  -CW     Subjective Information no complaints;agree to therapy  -MB agree to therapy;no complaints  -CW     Patient Effort, Rehab Treatment good  -MB good  -CW     Precautions/Limitations  fall precautions  -CW     Specific Treatment Considerations  B AFOs; neuropathy B knees down  -CW     Recorded by [MB] Zandra Haskins CCC-SLP [CW] Silvana Garcia PTA     Pain Assessment    Pain Assessment  No/denies pain  -CW     Recorded by  [CW] Silvana Garcia PTA     Vision Assessment/Intervention    Visual Impairment  --   Improved attention to left side - did not run into objects   -CW     Recorded by  [CW] Silvana Garcia PTA     Cognitive Assessment/Intervention    Personal Safety Interventions  fall prevention program  maintained;gait belt;nonskid shoes/slippers when out of bed  -CW     Recorded by  [CW] Silvana Garcia PTA     Swallow Assessment/Intervention    Additional Documentation   Dysphagia/Swallow Intervention (Group)  -MB    Recorded by   [MB] Zandra Haskins CCC-SLP    Dysphagia/Swallow Intervention    Dysphagia/Swallow Therapeutic Feed Pt completed trials of a regular solid and thin liquids with no overt s/s of aspiration. He had adequate mastication and clearance of the solid. Discontinue - goal met  -MB  Pt will tolerate trials of current diet and upgraded consistencies with no overt s/s of aspiration.  -MB    Recorded by [MB] Zandra Haskins CCC-SLP  [MB] Zandra Haskins CCC-SLP    Bed Mobility, Assessment/Treatment    Bed Mob, Supine to Sit, [place]  conditional independence  -CW     Recorded by  [CW] Silvana Garcia PTA     Transfer Assessment/Treatment    Transfers, Sit-Stand [place]  stand by assist;verbal cues required  -CW     Transfers, Stand-Sit [place]  stand by assist;verbal cues required  -CW     Transfers, Sit-Stand-Sit, Assist Device  rolling walker  -CW     Toilet Transfer, [place]  contact guard assist  -CW     Toilet Transfer, Assistive Device  rolling walker  -CW     Transfer, Safety Issues  impulsivity;step length decreased  -CW     Transfer, Impairments  strength decreased;sensation decreased  -CW     Transfer, Comment  Cues for hand placement during transfers  -CW     Recorded by  [CW] Silvana Garcia PTA     Gait Assessment/Treatment    Gait, [place] Level  contact guard assist;verbal cues required  -CW     Gait, Assistive Device  rolling walker  -CW     Gait, Distance (Feet)  350  -CW     Gait, Gait Pattern Analysis  swing-through gait  -CW     Gait, Gait Deviations  andrea decreased;forward flexed posture;narrow base;step length decreased  -CW     Gait, Safety Issues  step length decreased  -CW     Gait, Impairments  strength decreased;sensation  decreased  -CW     Gait, Comment  Cues to look up, broaden LUCINA, and stay inside walker.  -CW     Recorded by  [CW] Silvana Garcia PTA     Therapy Exercises    Bilateral Lower Extremities  AROM:;20 reps;sitting  -CW     Recorded by  [CW] Silvana Garcia PTA     Orthosis Location    Orthosis Location/Type  lower extremity  -CW     Orthosis, Lower Extremity  Left:;Right:;AFO (ankle foot orthosis)  -CW     Recorded by  [CW] Silvana Garcia PTA     Positioning and Restraints    Pre-Treatment Position  in bed  -CW     Post Treatment Position  chair  -CW     In Chair  sitting;call light within reach;with family/caregiver  -CW     Recorded by  [CW] Silvana Garcia PTA       User Key  (r) = Recorded By, (t) = Taken By, (c) = Cosigned By    Initials Name Effective Dates    HOMER Haskins CCC-SLP 08/02/16 -     CW Silvana Garcia PTA 06/22/15 -               IP SLP Goals       09/03/17 1100 09/02/17 1309       Safely Consume Diet    Safely Consume Diet- SLP, Date Established  09/02/17  -MB     Safely Consume Diet- SLP, Time to Achieve  by discharge  -MB     Safely Consume Diet- SLP, Additional Goal  Pt will tolerate trials of current diet with no overt s/s of aspiration.  -MB     Safely Consume Diet- SLP, Date Goal Reviewed 09/03/17  -MB      Safely Consume Diet- SLP, Outcome goal met  -MB goal ongoing  -MB       User Key  (r) = Recorded By, (t) = Taken By, (c) = Cosigned By    Initials Name Provider Type    HOMER Haskins CCC-SLP Speech and Language Pathologist          EDUCATION  The patient has been educated in the following areas:   Dysphagia (Swallowing Impairment).    SLP Recommendation and Plan                       Anticipated Discharge Disposition: home with home health                   Plan of Care Review  Plan Of Care Reviewed With: patient, family  Outcome Summary/Follow up Plan: Pt completed trials of a regular solid and thin liquids with no overt s/s of aspiration. He had  adequate mastication and clearance of the solid. SLP will sign off.       SLP Outcome Measures (last 72 hours)      SLP Outcome Measures       09/03/17 1100 09/02/17 1300       SLP Outcome Measures    Outcome Measure Used? Adult NOMS  -MB Adult NOMS  -MB     FCM Scores    FCM Chosen Swallowing  -MB Swallowing  -MB     Swallowing FCM Score 7  -MB 6  -MB       User Key  (r) = Recorded By, (t) = Taken By, (c) = Cosigned By    Initials Name Effective Dates    MURIEL Liu 08/02/16 -              Time Calculation:         Time Calculation- SLP       09/03/17 1102          Time Calculation- SLP    SLP Start Time 1031  -MB      SLP Stop Time 1040  -MB      SLP Time Calculation (min) 9 min  -MB      SLP Received On 09/03/17  -MB        User Key  (r) = Recorded By, (t) = Taken By, (c) = Cosigned By    Initials Name Provider Type    MURIEL Liu Speech and Language Pathologist          Therapy Charges for Today     Code Description Service Date Service Provider Modifiers Qty    88277171484 HC ST SWALLOWING CURRENT STATUS 9/2/2017 MURIEL Valladares GN, CI 1    30521213857 HC ST SWALLOWING PROJECTED 9/2/2017 MURIEL Valladares GN, CH 1    68148252568 HC ST EVAL ORAL PHARYNG SWALLOW 3 9/2/2017 MURIEL Valladares GN 1    12981476116 HC ST TREATMENT SWALLOW 1 9/3/2017 MURIEL Valladares GN, KX 1          SLP G-Codes  SLP NOMS Used?: Yes  Functional Limitations: Swallowing  Swallow Current Status (): At least 1 percent but less than 20 percent impaired, limited or restricted  Swallow Goal Status (): 0 percent impaired, limited or restricted    SLP Discharge Summary  Anticipated Discharge Disposition: home with home health  Reason for Discharge: All goals achieved  Outcomes Achieved: Able to achieve all goals within established timeline    MURIEL Bolaños  9/3/2017

## 2017-09-04 LAB
BACTERIA SPEC AEROBE CULT: ABNORMAL
BACTERIA SPEC AEROBE CULT: ABNORMAL
GLUCOSE BLDC GLUCOMTR-MCNC: 152 MG/DL (ref 70–130)
GLUCOSE BLDC GLUCOMTR-MCNC: 202 MG/DL (ref 70–130)
GLUCOSE BLDC GLUCOMTR-MCNC: 208 MG/DL (ref 70–130)
GLUCOSE BLDC GLUCOMTR-MCNC: 290 MG/DL (ref 70–130)

## 2017-09-04 PROCEDURE — 63710000001 INSULIN LISPRO (HUMAN) PER 5 UNITS: Performed by: NURSE PRACTITIONER

## 2017-09-04 PROCEDURE — 25010000002 CEFTRIAXONE: Performed by: INTERNAL MEDICINE

## 2017-09-04 PROCEDURE — 63710000001 INSULIN LISPRO (HUMAN) PER 5 UNITS: Performed by: INTERNAL MEDICINE

## 2017-09-04 PROCEDURE — 92523 SPEECH SOUND LANG COMPREHEN: CPT

## 2017-09-04 PROCEDURE — 82962 GLUCOSE BLOOD TEST: CPT

## 2017-09-04 PROCEDURE — G9175 SPEECH LANG GOAL STATUS: HCPCS

## 2017-09-04 PROCEDURE — 99232 SBSQ HOSP IP/OBS MODERATE 35: CPT | Performed by: PSYCHIATRY & NEUROLOGY

## 2017-09-04 PROCEDURE — G9174 SPEECH LANG CURRENT STATUS: HCPCS

## 2017-09-04 PROCEDURE — 63710000001 INSULIN DETEMIR PER 5 UNITS: Performed by: NURSE PRACTITIONER

## 2017-09-04 PROCEDURE — 97116 GAIT TRAINING THERAPY: CPT

## 2017-09-04 PROCEDURE — 97110 THERAPEUTIC EXERCISES: CPT

## 2017-09-04 RX ADMIN — INSULIN LISPRO 4 UNITS: 100 INJECTION, SOLUTION INTRAVENOUS; SUBCUTANEOUS at 17:03

## 2017-09-04 RX ADMIN — CEFTRIAXONE 1 G: 1 INJECTION, POWDER, FOR SOLUTION INTRAMUSCULAR; INTRAVENOUS at 21:18

## 2017-09-04 RX ADMIN — NYSTATIN 1 APPLICATION: 100000 POWDER TOPICAL at 08:42

## 2017-09-04 RX ADMIN — Medication 50 MG: at 08:41

## 2017-09-04 RX ADMIN — FAMOTIDINE 40 MG: 20 TABLET, FILM COATED ORAL at 08:41

## 2017-09-04 RX ADMIN — INSULIN LISPRO 4 UNITS: 100 INJECTION, SOLUTION INTRAVENOUS; SUBCUTANEOUS at 11:22

## 2017-09-04 RX ADMIN — OXYCODONE HYDROCHLORIDE AND ACETAMINOPHEN 1 TABLET: 7.5; 325 TABLET ORAL at 18:31

## 2017-09-04 RX ADMIN — INSULIN LISPRO 3 UNITS: 100 INJECTION, SOLUTION INTRAVENOUS; SUBCUTANEOUS at 08:39

## 2017-09-04 RX ADMIN — NYSTATIN: 100000 POWDER TOPICAL at 21:18

## 2017-09-04 RX ADMIN — INSULIN DETEMIR 20 UNITS: 100 INJECTION, SOLUTION SUBCUTANEOUS at 08:39

## 2017-09-04 RX ADMIN — CLOPIDOGREL 75 MG: 75 TABLET, FILM COATED ORAL at 08:41

## 2017-09-04 RX ADMIN — TEMAZEPAM 30 MG: 15 CAPSULE ORAL at 21:18

## 2017-09-04 NOTE — PLAN OF CARE
Problem: Confusion, Acute (Adult)  Goal: Cognitive/Functional Impairments Minimized  Outcome: Ongoing (interventions implemented as appropriate)  Goal: Safety  Outcome: Ongoing (interventions implemented as appropriate)    Problem: Fall Risk (Adult)  Goal: Absence of Falls  Outcome: Ongoing (interventions implemented as appropriate)    Problem: Patient Care Overview (Adult)  Goal: Plan of Care Review  Outcome: Ongoing (interventions implemented as appropriate)    09/04/17 1540   Coping/Psychosocial Response Interventions   Plan Of Care Reviewed With patient   Patient Care Overview   Progress improving   Outcome Evaluation   Outcome Summary/Follow up Plan Pt. denies pain. Up x 1 with walker to bathroom. Wife stays at bedside and is involved in care. NIH 3. Wears braces for footdrop. Accu checks ACHS as ordered with insulin coverage. Can be forgetful at times. Safety maintained.        Goal: Adult Individualization and Mutuality  Outcome: Ongoing (interventions implemented as appropriate)  Goal: Discharge Needs Assessment  Outcome: Ongoing (interventions implemented as appropriate)    Problem: Stroke (Ischemic) (Adult)  Goal: Signs and Symptoms of Listed Potential Problems Will be Absent or Manageable (Stroke)  Outcome: Ongoing (interventions implemented as appropriate)

## 2017-09-04 NOTE — PROGRESS NOTES
[facility] Medicine Services  INPATIENT PROGRESS NOTE    Length of Stay: 2  Date of Admission: 9/1/2017  Primary Care Physician: Meggan Trivedi DO    Subjective   Chief Complaint: follow up stroke  HPI   Pt sitting up in chair. States he feels good. Walked 400 feet with therapy this am. Wife tells me no bowel movement for a week, but staff states she told them he had a bowel movement yesterday. Pt lifted up his water pitcher and was going to drink from it until I suggested her pour the water into the cup. He accomplished this tasks with verbal cues. Cognitive evaluation is noted and pt would benefit from speech therapy regarding this. Blood sugars continue to be elevated.     Review of Systems   All pertinent negatives and positives are as above. All other systems have been reviewed and are negative unless otherwise stated.     Objective    Temp:  [97.6 °F (36.4 °C)-98 °F (36.7 °C)] 97.6 °F (36.4 °C)  Heart Rate:  [57-62] 57  Resp:  [16-18] 18  BP: (131-172)/(64-72) 144/67  Physical Exam   Constitutional: He is oriented to person, place, and time. He appears well-developed and well-nourished.   HENT:   Head: Normocephalic and atraumatic.   Eyes: EOM are normal. Pupils are equal, round, and reactive to light. No scleral icterus.   Neck: Normal range of motion. Neck supple. No JVD present. Carotid bruit is not present. No tracheal deviation present. No thyromegaly present.   Cardiovascular: Normal rate and regular rhythm.  Exam reveals no gallop and no friction rub.    No murmur heard.  Pulmonary/Chest: Effort normal and breath sounds normal. No respiratory distress. He has no wheezes. He has no rales. He exhibits no tenderness.   Diminished bilaterally   Abdominal: Soft. Bowel sounds are normal. He exhibits no distension. There is no tenderness.   Musculoskeletal: Normal range of motion. He exhibits no edema.   Neurological: He is alert and oriented to person, place, and  time. No cranial nerve deficit.   Skin: Skin is warm and dry. No rash noted.   Psychiatric: He has a normal mood and affect.     Results Review:  I have reviewed the labs, radiology results, and diagnostic studies.    Laboratory Data:     Results from last 7 days  Lab Units 09/03/17  1549 09/01/17  1946   WBC 10*3/mm3 9.03 10.57   HEMOGLOBIN g/dL 14.2 14.8   HEMATOCRIT % 42.3 43.8   PLATELETS 10*3/mm3 245 248       Results from last 7 days  Lab Units 09/01/17  1946   SODIUM mmol/L 134*   POTASSIUM mmol/L 4.4   CHLORIDE mmol/L 100   CO2 mmol/L 25.0   BUN mg/dL 21   CREATININE mg/dL 1.15   CALCIUM mg/dL 9.2   BILIRUBIN mg/dL 0.5   ALK PHOS U/L 95   ALT (SGPT) U/L 29   AST (SGOT) U/L 21   GLUCOSE mg/dL 347*     Culture Data:   Blood Culture   Date Value Ref Range Status   09/01/2017 No growth at 2 days  Preliminary   09/01/2017 No growth at 2 days  Preliminary     Urine Culture   Date Value Ref Range Status   09/01/2017 >100,000 CFU/mL Lactose  (A)  Preliminary     Comment:     Identification and susceptibility to follow.   09/01/2017 >100,000 CFU/mL Mixed Gram Positive Paula (A)  Preliminary     Comment:     Probable contaminant       Radiology Data:   Imaging Results (last 24 hours)     ** No results found for the last 24 hours. **        I have reviewed the patient current medications.     Assessment/Plan     Hospital Problem List     * (Principal)Stroke    Overview Addendum 9/2/2017 11:20 AM by Paul Clemons MD     ASA  Statin  Neurology consulted  PT/OT/SLP eval  Echo/Carotid scan p         Type 2 diabetes mellitus    Overview Addendum 9/3/2017 11:14 AM by CHELSIE Ignacio     Home insulin, SSI-poor control A1C 10.1         Hypertension    Overview Signed 9/2/2017 11:16 AM by Paul Clemons MD     Lisinopril         Anxiety    Overview Signed 9/2/2017 11:17 AM by Paul Clemons MD     Ativan         Cystitis    Overview Signed 9/2/2017 11:25 AM by Paul Clemons MD      Rocephin             Plan:  1. Has received 11 units lispro in the past 24 hours.   2. Will increase insulin back to 25 units levemir in the am.   3. [facility] re[facility] consult vs SNF.   4. Discussed with speech therapy, he has cognitive defects that would benefit from ongoing speech therapy.   5. Rocephin day 4  6. Will increase his sliding scale insulin.     Discharge Planning: I expect the patient to be discharged to  in 1-2 days.    CHELSIE Ignacio   09/04/17   6:08 AM     I personally evaluated and examined the patient in conjunction with CHELSIE Hill and agree with the assessment, treatment plan, and disposition of the patient as recorded by her. My history, exam, and further recommendations are: I have reviewed and agree with the plans. Francine Staton MD  09/04/17  3:55 PM

## 2017-09-04 NOTE — THERAPY TREATMENT NOTE
Acute Care - Physical Therapy Treatment Note  [facility]     Patient Name: Casey Berger  : 1940  MRN: [id number]  Today's Date: 2017  Onset of Illness/Injury or Date of Surgery Date: 17  Date of Referral to PT: 17  Referring Physician: Dr. Butt    Admit Date: 2017    Visit Dx:    ICD-10-CM ICD-9-CM   1. Altered mental status, unspecified altered mental status type R41.82 780.97   2. Acute UTI N39.0 599.0   3. Oropharyngeal dysphagia R13.12 787.22   4. Decreased activities of daily living (ADL) Z78.9 V49.89   5. Impaired functional mobility, balance, gait, and endurance Z74.09 V49.89     Patient Active Problem List   Diagnosis   • Stroke   • Type 2 diabetes mellitus   • Hypertension   • Anxiety   • Cystitis               Adult Rehabilitation Note       17 0745 17 1031 17 0830    Rehab Assessment/Intervention    Discipline physical therapy assistant  -CW speech language pathologist  -MB physical therapy assistant  -    Document Type therapy note (daily note)  -CW therapy note (daily note)  -MB therapy note (daily note)  -CW    Subjective Information no complaints;agree to therapy  -CW no complaints;agree to therapy  -MB agree to therapy;no complaints  -CW    Patient Effort, Rehab Treatment good  -CW good  -MB good  -CW    Precautions/Limitations fall precautions  -CW  fall precautions  -CW    Specific Treatment Considerations B AFOS; Neuropathy B LE knees down  -CW  B AFOs; neuropathy B knees down  -CW    Recorded by [CW] Silvana Garcia PTA [MB] Zandra Haskins CCC-SLP [CW] Silvana Garcia PTA    Pain Assessment    Pain Assessment No/denies pain  -CW  No/denies pain  -CW    Recorded by [CW] Silvana Garcia PTA  [CW] Silvana Garcia PTA    Vision Assessment/Intervention    Visual Impairment   --   Improved attention to left side - did not run into objects   -CW    Recorded by   [CW] Silvana Garcia PTA    Cognitive Assessment/Intervention     Personal Safety Interventions fall prevention program maintained;gait belt;nonskid shoes/slippers when out of bed  -CW  fall prevention program maintained;gait belt;nonskid shoes/slippers when out of bed  -CW    Recorded by [CW] Silvana Garcia PTA  [CW] Silvana Garcia PTA    Dysphagia/Swallow Intervention    Dysphagia/Swallow Therapeutic Feed  Pt completed trials of a regular solid and thin liquids with no overt s/s of aspiration. He had adequate mastication and clearance of the solid. Discontinue - goal met  -MB     Recorded by  [MB] Zandra Haskins CCC-SLP     Bed Mobility, Assessment/Treatment    Bed Mob, Supine to Sit, [place]   conditional independence  -CW    Bed Mobility, Comment up in chair  -CW      Recorded by [CW] Silvana Garcia PTA  [CW] Silvana Garcia PTA    Transfer Assessment/Treatment    Transfers, Sit-Stand [place] stand by assist;verbal cues required  -CW  stand by assist;verbal cues required  -CW    Transfers, Stand-Sit [place] stand by assist;verbal cues required  -CW  stand by assist;verbal cues required  -CW    Transfers, Sit-Stand-Sit, Assist Device rolling walker  -CW  rolling walker  -CW    Toilet Transfer, [place]   contact guard assist  -CW    Toilet Transfer, Assistive Device   rolling walker  -CW    Transfer, Safety Issues impulsivity;step length decreased  -CW  impulsivity;step length decreased  -CW    Transfer, Impairments strength decreased;sensation decreased  -CW  strength decreased;sensation decreased  -CW    Transfer, Comment Cues for hand placement during transfers  -CW  Cues for hand placement during transfers  -CW    Recorded by [CW] Silvana Garcia PTA  [CW] Silvana Garcia PTA    Gait Assessment/Treatment    Gait, [place] Level contact guard assist;verbal cues required  -CW  contact guard assist;verbal cues required  -CW    Gait, Assistive Device rolling walker  -CW  rolling walker  -CW    Gait, Distance (Feet) 100   x 4  with standing rests to correct mechanics  -CW  350  -CW    Gait, Gait Pattern Analysis swing-through gait  -CW  swing-through gait  -CW    Gait, Gait Deviations andrea decreased;forward flexed posture;narrow base;step length decreased  -CW  andrea decreased;forward flexed posture;narrow base;step length decreased  -CW    Gait, Safety Issues step length decreased  -CW  step length decreased  -CW    Gait, Impairments strength decreased;sensation decreased  -CW  strength decreased;sensation decreased  -CW    Gait, Comment Cues to look up, broaden LUCINA and stay inside wallker  -CW  Cues to look up, broaden LUCINA, and stay inside walker.  -CW    Recorded by [CW] Silvana Garcia PTA  [CW] Silvana Garcia PTA    Therapy Exercises    Bilateral Lower Extremities   AROM:;20 reps;sitting  -CW    Recorded by   [CW] Silvana Garcia PTA    Orthosis Location    Orthosis Location/Type lower extremity  -CW  lower extremity  -CW    Orthosis, Lower Extremity Left:;Right:;AFO (ankle foot orthosis)  -CW  Left:;Right:;AFO (ankle foot orthosis)  -CW    Recorded by [CW] Silvana Garcia PTA  [CW] Silvana Garcia PTA    Positioning and Restraints    Pre-Treatment Position sitting in chair/recliner  -CW  in bed  -CW    Post Treatment Position chair  -CW  chair  -CW    In Chair sitting;call light within reach  -CW  sitting;call light within reach;with family/caregiver  -CW    Recorded by [CW] Silvana Garcia PTA  [CW] Silvana Garcia PTA      09/02/17 1051          Swallow Assessment/Intervention    Additional Documentation Dysphagia/Swallow Intervention (Group)  -MB      Recorded by [MB] Zandra Haskins CCC-HARI      Dysphagia/Swallow Intervention    Dysphagia/Swallow Therapeutic Feed Pt will tolerate trials of current diet and upgraded consistencies with no overt s/s of aspiration.  -MB      Recorded by [MB] Zandra Haskins CCC-SLP        User Key  (r) = Recorded By, (t) = Taken By, (c) = Cosigned By     Initials Name Effective Dates    HOMER Haskins, [facility]-SLP 08/02/16 -     CW Silvana Garcia, PTA 06/22/15 -                 IP PT Goals       09/02/17 1406          Bed Mobility PT LTG    Bed Mobility PT LTG, Date Established 09/02/17  -PB (r) MS (t) PB (c)      Bed Mobility PT LTG, Time to Achieve by discharge  -PB (r) MS (t) PB (c)      Bed Mobility PT LTG, Activity Type all bed mobility  -PB (r) MS (t) PB (c)      Bed Mobility PT LTG, [place] Level independent  -PB (r) MS (t) PB (c)      Transfer Training PT LTG    Transfer Training PT LTG, Date Established 09/02/17  -PB (r) MS (t) PB (c)      Transfer Training PT LTG, Time to Achieve by discharge  -PB (r) MS (t) PB (c)      Transfer Training PT LTG, Activity Type bed to chair /chair to bed;sit to stand/stand to sit  -PB (r) MS (t) PB (c)      Transfer Training PT LTG, [place] Level conditional independence  -PB (r) MS (t) PB (c)      Transfer Training PT LTG, Assist Device walker, rolling  -PB (r) MS (t) PB (c)      Gait Training PT LTG    Gait Training Goal PT LTG, Date Established 09/02/17  -PB (r) MS (t) PB (c)      Gait Training Goal PT LTG, Time to Achieve by discharge  -PB (r) MS (t) PB (c)      Gait Training Goal PT LTG, [place] Level conditional independence  -PB (r) MS (t) PB (c)      Gait Training Goal PT LTG, Assist Device walker, rolling  -PB (r) MS (t) PB (c)      Gait Training Goal PT LTG, Distance to Achieve 300'  -PB (r) MS (t) PB (c)      Gait Training Goal PT LTG, Additional Goal without tripping over feet/keeping clear of obstacles  -PB (r) MS (t) PB (c)      Stair Training PT LTG    Stair Training Goal PT LTG, Date Established 09/02/17  -PB (r) MS (t) PB (c)      Stair Training Goal PT LTG, Time to Achieve by discharge  -PB (r) MS (t) PB (c)      Stair Training Goal PT LTG, Number of Steps 1  -PB (r) MS (t) PB (c)      Stair Training Goal PT LTG, [place] Level conditional independence  -PB (r) MS (t) PB (c)       Stair Training Goal PT LTG, Assist Device 1 handrail  -PB (r) MS (t) PB (c)        User Key  (r) = Recorded By, (t) = Taken By, (c) = Cosigned By    Initials Name Provider Type    PB Oleksandr Fountain, PT DPT Physical Therapist    MS Yulissa Simms, PT Student PT Student          Physical Therapy Education     Title: PT OT SLP Therapies (Done)     Topic: Physical Therapy (Done)     Point: Mobility training (Done)    Learning Progress Summary    Learner Readiness Method Response Comment Documented by Status   Patient Acceptance E,D VU,NR Gait, body mechanics  09/04/17 0820 Done    Acceptance E,D VU,NR Transferss, posture, body mechanics, gait, plan of care  09/03/17 0948 Done    Acceptance E VU,NR Proper use of AD, safety with t/fs and ambulation, benefits of increased activity MS 09/02/17 1411 Done               Point: Home exercise program (Done)    Learning Progress Summary    Learner Readiness Method Response Comment Documented by Status   Patient Acceptance E,D VU,NR Transferss, posture, body mechanics, gait, plan of care  09/03/17 0948 Done               Point: Body mechanics (Done)    Learning Progress Summary    Learner Readiness Method Response Comment Documented by Status   Patient Acceptance E,D VU,NR Gait, body mechanics  09/04/17 0820 Done    Acceptance E,D VU,NR Transferss, posture, body mechanics, gait, plan of care  09/03/17 0948 Done               Point: Precautions (Done)    Learning Progress Summary    Learner Readiness Method Response Comment Documented by Status   Patient Acceptance E,D VU,NR Gait, body mechanics  09/04/17 0820 Done    Acceptance E,D VU,NR Transferss, posture, body mechanics, gait, plan of care  09/03/17 0948 Done    Acceptance E VU,NR Proper use of AD, safety with t/fs and ambulation, benefits of increased activity MS 09/02/17 1411 Done                      User Key     Initials Effective Dates Name Provider Type Discipline     06/22/15 -  Silvana Garcia,  PTA Physical Therapy Assistant PT    MS 07/19/17 -  Yulissa Simms, PT Student PT Student PT                    PT Recommendation and Plan  Anticipated Discharge Disposition: home with assist, home with home health  Planned Therapy Interventions: balance training, bed mobility training, gait training, home exercise program, patient/family education, stair training, strengthening, transfer training  PT Frequency: daily, 2 times/day, per priority policy  Plan of Care Review  Plan Of Care Reviewed With: patient  Progress: progress toward functional goals as expected  Outcome Summary/Follow up Plan: Pt sitting in chair upon room.  Requires cues for sit to stand transfers for safety.  Pt ambulated 100' x 4 CGA using rolling walker with standing rests to correct body mechanics for safety during gait.  Pt requires frequent verbal cues to broaden LUCINA.  RLE tends to drift over to LLE.  Pt will continue to benefit from therapy to increase strrength and improve gait.          Outcome Measures       09/04/17 0800 09/03/17 0900 09/02/17 1400    How much help from another person do you currently need...    Turning from your back to your side while in flat bed without using bedrails? 4  -CW 4  -CW     Moving from lying on back to sitting on the side of a flat bed without bedrails? 4  -CW 4  -CW     Moving to and from a bed to a chair (including a wheelchair)? 3  -CW 3  -CW     Standing up from a chair using your arms (e.g., wheelchair, bedside chair)? 3  -CW 3  -CW     Climbing 3-5 steps with a railing? 3  -CW 3  -CW     To walk in hospital room? 3  -CW 3  -CW     AM-PAC 6 Clicks Score 20  -CW 20  -CW     How much help from another is currently needed...    Putting on and taking off regular lower body clothing?   3  -ND    Bathing (including washing, rinsing, and drying)   3  -ND    Toileting (which includes using toilet bed pan or urinal)   3  -ND    Putting on and taking off regular upper body clothing   4  -ND    Taking care  of personal grooming (such as brushing teeth)   4  -ND    Eating meals   4  -ND    Score   21  -ND    Functional Assessment    Outcome Measure Options AM-PAC 6 Clicks Basic Mobility (PT)  -CW AM-PAC 6 Clicks Basic Mobility (PT)  -CW AM-PAC 6 Clicks Daily Activity (OT)  -ND      09/02/17 1301          How much help from another person do you currently need...    Turning from your back to your side while in flat bed without using bedrails? 4  -PB (r) MS (t) PB (c)      Moving from lying on back to sitting on the side of a flat bed without bedrails? 3  -PB (r) MS (t) PB (c)      Moving to and from a bed to a chair (including a wheelchair)? 3  -PB (r) MS (t) PB (c)      Standing up from a chair using your arms (e.g., wheelchair, bedside chair)? 3  -PB (r) MS (t) PB (c)      Climbing 3-5 steps with a railing? 2  -PB (r) MS (t) PB (c)      To walk in hospital room? 3  -PB (r) MS (t) PB (c)      AM-PAC 6 Clicks Score 18  -PB (r) MS (t)      Functional Assessment    Outcome Measure Options AM-PAC 6 Clicks Basic Mobility (PT)  -PB (r) MS (t) PB (c)        User Key  (r) = Recorded By, (t) = Taken By, (c) = Cosigned By    Initials Name Provider Type    ELVIRA Garcia PTA Physical Therapy Assistant    PB Oleksandr Fountain, PT DPT Physical Therapist    NISHA Nguyen, OTR/L Occupational Therapist    MS Yulissa Simms, PT Student PT Student           Time Calculation:         PT Charges       09/04/17 0830          Time Calculation    Start Time 0745  -CW      Stop Time 0812  -CW      Time Calculation (min) 27 min  -CW      PT Received On 09/04/17  -CW      PT Goal Re-Cert Due Date 09/12/17  -CW      Time Calculation- PT    Total Timed Code Minutes- PT 27 minute(s)  -CW        User Key  (r) = Recorded By, (t) = Taken By, (c) = Cosigned By    Initials Name Provider Type    ELVIRA Garcia PTA Physical Therapy Assistant          Therapy Charges for Today     Code Description Service Date Service Provider  Modifiers Qty    66896223358  PT THERAPEUTIC ACT EA 15 MIN 9/3/2017 Silvana Garcia, PTA GP, KX 1    42548704495 HC PT THER PROC EA 15 MIN 9/3/2017 Silvana Garcia, PTA GP, KX 1    00294904402 HC GAIT TRAINING EA 15 MIN 9/3/2017 Silvana Garcia PTA GP, KX 1    [id number] HC GAIT TRAINING EA 15 MIN 9/4/2017 Silvana Garcia, PTA GP, KX 2          PT G-Codes  Outcome Measure Options: AM-PAC 6 Clicks Basic Mobility (PT)  Score: 18  Functional Limitation: Mobility: Walking and moving around  Mobility: Walking and Moving Around Current Status (): At least 40 percent but less than 60 percent impaired, limited or restricted  Mobility: Walking and Moving Around Goal Status (): At least 20 percent but less than 40 percent impaired, limited or restricted    Silvana Garcia PTA  9/4/2017

## 2017-09-04 NOTE — THERAPY EVALUATION
Acute Care - Speech Language Pathology Initial Evaluation  Psychiatric     Patient Name: Casey Berger  : 1940  MRN: [id number]  Today's Date: 2017  Onset of Illness/Injury or Date of Surgery Date: 17  Date of Referral to SLP: 17         Admit Date: 2017    Speech-Language/Cognitive-Linguistic Evaluation  Subjective: The patient was seen on this date for a Cognitive-Linguistic Evaluation.  Patient was alert and cooperative.    The patient's history is significant for DM, CVA, fall, HTN.   Objective: The patient was assessed utilizing informal assessment comprising portions of the [place] Test for Differential Diagnosis of Aphasia, the [place] Naming Test, the Mini Inventory of Right Brain Function, and informal measures due to inability to fully participate in a standardized assessment Findings were as follows:  Assessment: The patient demonstrated functional speech-language skills and impaired cognition. Deficits were noted in cognition.    Recommendations: Cognitive-Linguistic Therapy.  Speech Therapy is recommended.   It is anticipated patient will need continued speech-language pathology services at the next level of care.   Zandra Haskins, CCC-SLP 2017 9:59 AM     Visit Dx:    ICD-10-CM ICD-9-CM   1. Altered mental status, unspecified altered mental status type R41.82 780.97   2. Acute UTI N39.0 599.0   3. Oropharyngeal dysphagia R13.12 787.22   4. Decreased activities of daily living (ADL) Z78.9 V49.89   5. Impaired functional mobility, balance, gait, and endurance Z74.09 V49.89   6. Impaired cognition R41.89 294.9     Patient Active Problem List   Diagnosis   • Stroke   • Type 2 diabetes mellitus   • Hypertension   • Anxiety   • Cystitis     Past Medical History:   Diagnosis Date   • Hypertension    • Stroke      Past Surgical History:   Procedure Laterality Date   • VENA CAVA FILTER INSERTION            SLP EVALUATION (last 72 hours)      SLP Evaluation       17  0901                Rehab Evaluation    Document Type evaluation  -MB        Subjective Information no complaints;agree to therapy  -MB        General Information    Patient Profile Review yes  -MB        Onset of Illness/Injury 09/01/17  -MB        Pertinent History Of Current Problem See first filed value  -MB        Current Diet Limitations regular solid;thin liquids  -MB        Precautions/Limitations, Vision WFL with corrective lenses  -MB        Precautions/Limitations, Hearing hearing impairment, bilaterally  -MB        Prior Level of Function- Communication functional in all spheres  -MB        Prior Level of Function- Swallowing no diet consistency restrictions  -MB        Plans/Goals Discussed With patient and family  -MB        Barriers to Rehab none identified  -MB        Living Environment    Lives With spouse  -MB        Clinical Impression    Date of Referral to SLP 09/03/17  -MB        SLP Diagnosis Impaired cognition  -MB        Patient's Goals For Discharge patient did not state  -MB        Family Goals For Discharge family did not state  -MB        Criteria for Skilled Therapeutic Interventions Met skilled criteria for cognitive linguistic intervention met  -MB        Rehab Potential good, to achieve stated therapy goals  -MB        Therapy Frequency 3-5 times/wk  -MB        Predicted Duration of Therapy Intervention (days/wks) until discharge  -MB        Expected Duration of Therapy Session (min) 30-45 minutes  -MB        Anticipated Discharge Disposition skilled nursing facility  -MB        Cognitive Assessment/Intervention    Current Cognitive/Communication Assessment impaired  -MB        Orientation Status oriented x 4  -MB        Follows Commands/Answers Questions 100% of the time  -MB        Short/Long Term Memory mild impairment, short term memory  -MB        Additional Documentation Cognitive Assessment Intervention (Group)  -MB        Cognitive Assessment Intervention    Behavior/Mood  Observations behavior appropriate to situation, WNL/WFL  -MB        Attention WNL/WFL  -MB        Pragmatics WNL/WFL  -MB        Problem Solving moderate impairment  -MB        Problem Solving Interventions Pt required mod cues 2x to elaborate on answers for daily problems. He had significant difficulty 1x with a word problem involving time and required max cues to answer appropriately.  -MB        Executive Function Skills mild impairment  -MB        Reasoning WNL/WFL  -MB        Organization WNL/WFL  -MB        Communication Treatment Objective and Progress    Cognitive Linguistic Treatment Objectives Improve memory skills;Improve functional problem solving;Improve executive function skills  -MB        Improve memory skills    Improve memory skills through: recalling related word lists with an imposed delay;recalling unrelated word lists with an imposed delay;repeat list in sequential order;use memory strategies;90%;without cues  -MB        Status: Improve memory skills New  -MB        Memory Skills Progress continue to address  -MB        Improve functional problem solving    Improve functional problem solving through: determine solutions to simple ADL/safety problems;Complete word froblems involving time or money;90%;without cues  -MB        Status: Improve functional problem solving New  -MB        Functional Problem Solving Progress continue to address  -MB        Improve executive function skills    Improve executive function skills: exhibit cognitive flexibility;perform self-correction;perform self-evaluation;90%;without cues  -MB        Status: Improve executive function skills New  -MB        Executive Function Skills Progress continue to address  -MB          User Key  (r) = Recorded By, (t) = Taken By, (c) = Cosigned By    Initials Name Effective Dates    HOMER Haskins CCC-SLP 08/02/16 -            EDUCATION  The patient has been educated in the following areas:   Cognitive Impairment.    SLP  Recommendation and Plan  SLP Diagnosis: Impaired cognition     Rehab Potential: good, to achieve stated therapy goals  Criteria for Skilled Therapeutic Interventions Met: skilled criteria for cognitive linguistic intervention met  Anticipated Discharge Disposition: skilled nursing facility     Therapy Frequency: 3-5 times/wk  Predicted Duration of Therapy Intervention (days/wks): until discharge  Expected Duration of Therapy Session (min): 30-45 minutes    Plan of Care Review  Plan Of Care Reviewed With: patient, spouse  Outcome Summary/Follow up Plan: Cognitive-linguistic evaluation completed. Pt required mod cues 2x to elaborate on answers for daily problems. He had significant difficulty 1x with a word problem involving time and required max cues to answer appropriately. He had difficulty with delayed recall of unrelated words.           SLP Goals       09/04/17 0954 09/03/17 1100 09/02/17 1309    Safely Consume Diet    Safely Consume Diet- SLP, Date Established   09/02/17  -MB    Safely Consume Diet- SLP, Time to Achieve   by discharge  -MB    Safely Consume Diet- SLP, Additional Goal   Pt will tolerate trials of current diet with no overt s/s of aspiration.  -MB    Safely Consume Diet- SLP, Date Goal Reviewed  09/03/17  -MB     Safely Consume Diet- SLP, Outcome  goal met  -MB goal ongoing  -MB    Cognitive Linguistic- Optimal Participation in Care    Cognitive Linguistic Optimal Participation in Care- SLP, Date Established 09/04/17  -MB      Cognitive Linguistic Optimal Participation in Care- SLP, Time to Achieve by discharge  -MB      Cognitive Linguistic Optimal Participation in Care- SLP, Activity Level Patient will improve memory skills for increased safety in environment;Patient will improve functional problem solving skills for increased safety in environment;Patient will improve Executive functioning skills for increased safety in environment  -MB      Cognitive Linguistic Optimal Participation in  Care- SLP, Outcome goal ongoing  -MB        User Key  (r) = Recorded By, (t) = Taken By, (c) = Cosigned By    Initials Name Provider Type    HOMER Haskins CCC-SLP Speech and Language Pathologist             SLP Outcome Measures (last 72 hours)      SLP Outcome Measures       09/04/17 0900 09/03/17 1100 09/02/17 1300    SLP Outcome Measures    Outcome Measure Used? Adult NOMS  -MB Adult NOMS  -MB Adult NOMS  -MB    FCM Scores    FCM Chosen Problem Solving  -MB Swallowing  -MB Swallowing  -MB    Swallowing FCM Score  7  -MB 6  -MB    Problem Solving FCM Score 4  -MB        User Key  (r) = Recorded By, (t) = Taken By, (c) = Cosigned By    Initials Name Effective Dates    MURIEL Liu 08/02/16 -           Time Calculation:         Time Calculation- SLP       09/04/17 0958          Time Calculation- SLP    SLP Start Time 0901  -MB      SLP Stop Time 0958  -MB      SLP Time Calculation (min) 57 min  -MB      SLP Received On 09/04/17  -MB      SLP Goal Re-Cert Due Date 09/14/17  -MB        User Key  (r) = Recorded By, (t) = Taken By, (c) = Cosigned By    Initials Name Provider Type    HOMER Haskins CCC-SLP Speech and Language Pathologist          Therapy Charges for Today     Code Description Service Date Service Provider Modifiers Qty    17337508781 HC ST TREATMENT SWALLOW 1 9/3/2017 MURIEL Valladares GN, KX 1    60232491968 HC ST OTHER FUNCT LIMIT CURRENT 9/4/2017 MURIEL Valladares GN, CK 1    45523907543 HC ST OTHER FUNCT LIMIT PROJECTED 9/4/2017 MURIEL Valladares, CJ 1    91035421577 HC ST EVAL SPEECH AND PROD W LANG  4 9/4/2017 MURIEL Valladares GN, KX 1             ADULT NOMS (last 72 hours)      Adult NOMS       09/04/17 0900 09/03/17 1100 09/02/17 1300          FCM Scores    FCM Chosen Problem Solving  -MB Swallowing  -MB Swallowing  -MB      Swallowing FCM Score  7  -MB 6  -MB      Problem Solving FCM Score 4  -MB          User Key  (r) =  Recorded By, (t) = Taken By, (c) = Cosigned By    Initials Name Effective Dates    MURIEL Liu 08/02/16 -         SLP G-Codes  SLP NOMS Used?: Yes  Functional Limitations: Other Speech Language Pathology (Problem solving)  Swallow Current Status (): At least 1 percent but less than 20 percent impaired, limited or restricted  Swallow Goal Status (): 0 percent impaired, limited or restricted  Other Speech-Language Pathology Functional Limitation Current Status (): At least 40 percent but less than 60 percent impaired, limited or restricted  Other Speech-Language Pathology Functional Limitation Goal Status (): At least 20 percent but less than 40 percent impaired, limited or restricted      MURIEL Bolaños  9/4/2017

## 2017-09-04 NOTE — PLAN OF CARE
Problem: Confusion, Acute (Adult)  Goal: Cognitive/Functional Impairments Minimized  Outcome: Ongoing (interventions implemented as appropriate)  Goal: Safety  Outcome: Ongoing (interventions implemented as appropriate)    Problem: Fall Risk (Adult)  Goal: Absence of Falls  Outcome: Ongoing (interventions implemented as appropriate)    Problem: Patient Care Overview (Adult)  Goal: Plan of Care Review  Outcome: Ongoing (interventions implemented as appropriate)    09/04/17 0350   Coping/Psychosocial Response Interventions   Plan Of Care Reviewed With patient   Patient Care Overview   Progress no change   Outcome Evaluation   Outcome Summary/Follow up Plan Patient rested well through the night. No c/o pain, No neuro changes, confused at times. VSS, safety maintained.         Problem: Stroke (Ischemic) (Adult)  Goal: Signs and Symptoms of Listed Potential Problems Will be Absent or Manageable (Stroke)  Outcome: Ongoing (interventions implemented as appropriate)

## 2017-09-04 NOTE — PROGRESS NOTES
Neurology Progress Note      Chief Complaint:  stroke    Subjective     Subjective:    No issues overnight.  Slept well.    Medications:  Current Facility-Administered Medications   Medication Dose Route Frequency Provider Last Rate Last Dose   • acetaminophen (TYLENOL) tablet 650 mg  650 mg Oral Q4H PRN Micheal Butt MD       • cefTRIAXone (ROCEPHIN) 1 g/100 mL 0.9% NS (MBP)  1 g Intravenous Q24H Micheal Butt MD   Stopped at 09/03/17 2158   • clopidogrel (PLAVIX) tablet 75 mg  75 mg Oral Daily Cornell Lynne MD   75 mg at 09/04/17 0841   • dextrose (D50W) solution 25 g  25 g Intravenous Q15 Min PRN Micheal Butt MD       • dextrose (GLUTOSE) oral gel 15 g  15 g Oral Q15 Min PRN Micheal Butt MD       • famotidine (PEPCID) tablet 40 mg  40 mg Oral Daily Micheal Butt MD   40 mg at 09/04/17 0841   • glucagon (human recombinant) (GLUCAGEN DIAGNOSTIC) injection 1 mg  1 mg Subcutaneous Q15 Min PRN Micheal Butt MD       • insulin detemir (LEVEMIR) injection 20 Units  20 Units Subcutaneous Daily CHELSIE Ignacio   20 Units at 09/04/17 0839   • insulin lispro (humaLOG) injection 2-7 Units  2-7 Units Subcutaneous 4x Daily With Meals & Nightly Micheal Butt MD   3 Units at 09/04/17 0839   • lisinopril (PRINIVIL,ZESTRIL) tablet 2.5 mg  2.5 mg Oral Daily PRN Micheal Butt MD       • LORazepam (ATIVAN) tablet 1 mg  1 mg Oral Q6H PRN Micheal Butt MD       • nystatin (MYCOSTATIN) powder   Topical Q12H Paul Clemons MD   1 application at 09/04/17 0842   • ondansetron (ZOFRAN) injection 4 mg  4 mg Intravenous Q6H PRN Micheal Butt MD       • oxyCODONE-acetaminophen (PERCOCET) 7.5-325 MG per tablet 1 tablet  1 tablet Oral Q8H PRN Micheal Butt MD       • sodium chloride 0.9 % flush 1-10 mL  1-10 mL Intravenous PRN Micheal Butt MD       • sodium chloride 0.9 % flush 10 mL  10 mL Intravenous PRN Solo Lynch Jr., MD       • temazepam (RESTORIL) capsule 30  mg  30 mg Oral Nightly Micheal Butt MD   30 mg at 09/03/17 2059   • zinc gluconate tablet 50 mg  50 mg Oral Daily Micheal Butt MD   50 mg at 09/04/17 0841       Review of Systems:   -A 14 point review of systems is completed and is negative except for previous confusion and headache      Objective      Vital Signs  Temp:  [97.4 °F (36.3 °C)-98 °F (36.7 °C)] 97.4 °F (36.3 °C)  Heart Rate:  [57-67] 67  Resp:  [16-20] 20  BP: (131-172)/(64-72) 132/67    Physical Exam:    General Exam:  Head:  Normal cephalic, atraumatic  HEENT:  Neck supple  Fundoscopic Exam:  No signs of disc edema  CVS:  Regular rate and rhythm.  No murmurs  Carotid Examination:  No bruits  Lungs:  Clear to auscultation  Abdomen:  Non-tender, Non-distended  Extremities:  No signs of peripheral edema  Skin:  No rashes     Neurologic Exam:     Mental Status:    -Awake, Alert, Oriented X 3  -No word finding difficulties  -No aphasia  -No dysarthria  -Follows simple and complex commands     CN II:  Visual fields full.  Pupils equally reactive to light  CN III, IV, VI:  Extraocular Muscles full with no signs of nystagmus  CN V:  Facial sensory is symmetric with no asymetries.  CN VII:  Facial motor symmetric  CN VIII:  Gross hearing intact bilaterally  CN IX:  Palate elevates symmetrically  CN X:  Palate elevates symmetrically  CN XI:  Shoulder shrug symmetric  CN XII:  Tongue is midline on protrusion     Motor:      Proximally in the upper extremities he has 5 out of 5 strength.  Specifically biceps triceps and brachial radialis are full strength.   strength I would rate as 3-5.  First dorsal interossei's 2+.  In his lower extremities he has 3-4+ proximally.  Distally dorsiflexion and plantar flexion he has 0 out of 5 strength     DTR:  --Hyporeflexic throughout.  Sensory:  -Gradient sensory loss distal to the knees and wrists bilaterally     Coordination:  -Finger to nose intact  -Heel to shin intact  -No ataxia     Gait  -Ambulates with  AFOs bilaterally     Results Review:    I reviewed the patient's new clinical results.      Results from last 7 days  Lab Units 09/03/17  1549 09/01/17  1946   WBC 10*3/mm3 9.03 10.57   HEMOGLOBIN g/dL 14.2 14.8   HEMATOCRIT % 42.3 43.8   PLATELETS 10*3/mm3 245 248          Results from last 7 days  Lab Units 09/01/17  1946   SODIUM mmol/L 134*   POTASSIUM mmol/L 4.4   CHLORIDE mmol/L 100   CO2 mmol/L 25.0   BUN mg/dL 21   CREATININE mg/dL 1.15   CALCIUM mg/dL 9.2   BILIRUBIN mg/dL 0.5   ALK PHOS U/L 95   ALT (SGPT) U/L 29   AST (SGOT) U/L 21   GLUCOSE mg/dL 347*        Lab Results   Component Value Date    PROTIME 12.3 09/01/2017    INR 0.89 (L) 09/01/2017     No components found for: POCGLUC  No components found for: A1C  Lab Results   Component Value Date    HDL 38 (L) 09/02/2017     No components found for: B12  No results found for: TSH  MRI Brain Without Contrast [[id number]] Collected:  09/02/17 0856        Updated:  09/02/17 0908     Narrative:        EXAM: MR BRAIN WITHOUT IV CONTRAST 09/02/2017.      COMPARISON: MRI brain dated 01/01/2016, head CT dated 09/01/2017.       HISTORY: 77 years-old Male. Altered mental status.       TECHNIQUE:   Routine pulse sequences of the brain were obtained without IV contrast.       REPORT:       There is diffusion restriction within the right cuneus (medial occipital  lobe) with associated T2/FLAIR abnormal signal, consistent with acute  infarct. There is also intermediate signal within the body of the corpus  callosum on the right (image 152, series 204) with corresponding T1  hypointense signal (image 31, series 302), consistent with a subacute  infarct. Moderate chronic microvascular changes in the bilateral  supratentorial white matter. Mild to moderate generalized cerebral  volume loss. No acute hydrocephalus. No suspicious extra-axial fluid  collection. No midline shift. No acute hemorrhage.      Flow-voids of the [place] are maintained centrally.      Mucus  suspicious in the left maxillary sinus.         Impression:        1.  Acute right occipital lacunar infarct with no evidence of  hemorrhagic conversion.  2.  Evolving subacute lacunar infarct in the body of the corpus  callosum.  This report was finalized on 09/02/2017 09:05 by Dr. Yanet Vazquez MD.      MR Brain reviewed by me:  Right occipital acute infarction and second subacute infarct in right corpus callosum  Carotid ultrasound:  No significant stenosis  Cardiac echo:  · Left ventricular systolic function is normal. Estimated ejection fractio nis 56-60%.  · Normal right ventricular cavity size and systolic function noted.  · There is no evidence of intracardiac mass or thrombus.  · There is no evidence of right to left shunt on bubble study.  Total Cholesterol 130 - 200 mg/dL 112 (L)   Triglycerides 0 - 149 mg/dL 78   HDL Cholesterol >=40 mg/dL 38 (L)   LDL Cholesterol  0 - 99 mg/dL 63   LDL/HDL Ratio   1.54      Hemoglobin A1C % 10.1       Assessment/Plan     Hospital Problem List    Principal Problem:    Stroke  Active Problems:    Type 2 diabetes mellitus    Hypertension    Anxiety    Cystitis    Impression:  1.  2 acute ischemic strokes likely small vessel in distraction.  Risk factors include hyperglycemia from uncontrolled IVs mellitus.  2.  Diabetes mellitus currently in controlled  3.  Peripheral neuropathy suspected be secondary to diabetic neuropathy however it is unclear whether a neurologist has worked this up in the past  4.  Bilateral foot drop may be secondary to previous retroperitoneal hematomas causing a bilateral lumbar plexopathy although also could be part of his peripheral neuropathy    Plan:  --Plavix monotherapy  --LDL at goal  --increase glycemic control with HbA1c goal of <7  --Lovenox 40 mg subcutaneous for DVT prophylaxis  --continue with fall precautions  --D/C planning is TCU vs. Home with home health per therapy notes.     Spoke with family.  They would like SNF with rehab.  I  agree as he previously had a fall leading to a hip fracture with multiple complications and they would like to do everything to avoid this happening again.        Cornell Lynne MD  09/04/17  10:57 AM

## 2017-09-04 NOTE — THERAPY TREATMENT NOTE
Acute Care - Physical Therapy Treatment Note  [facility]     Patient Name: Casey Berger  : 1940  MRN: [id number]  Today's Date: 2017  Onset of Illness/Injury or Date of Surgery Date: 17  Date of Referral to PT: 17  Referring Physician: Dr. Butt    Admit Date: 2017    Visit Dx:    ICD-10-CM ICD-9-CM   1. Altered mental status, unspecified altered mental status type R41.82 780.97   2. Acute UTI N39.0 599.0   3. Oropharyngeal dysphagia R13.12 787.22   4. Decreased activities of daily living (ADL) Z78.9 V49.89   5. Impaired functional mobility, balance, gait, and endurance Z74.09 V49.89   6. Impaired cognition R41.89 294.9     Patient Active Problem List   Diagnosis   • Stroke   • Type 2 diabetes mellitus   • Hypertension   • Anxiety   • Cystitis               Adult Rehabilitation Note       17 1351 17 0745 17 1031    Rehab Assessment/Intervention    Discipline physical therapy assistant  -CW physical therapy assistant  -CW speech language pathologist  -MB    Document Type therapy note (daily note)  -CW therapy note (daily note)  -CW therapy note (daily note)  -MB    Subjective Information no complaints;agree to therapy  -CW no complaints;agree to therapy  -CW no complaints;agree to therapy  -MB    Patient Effort, Rehab Treatment good  -CW good  -CW good  -MB    Precautions/Limitations fall precautions  -CW fall precautions  -CW     Specific Treatment Considerations B AFOs; Neuropathy B knees down  -CW B AFOS; Neuropathy B LE knees down  -CW     Recorded by [CW] Silvana Garcia PTA [CW] Silvana Garcia PTA [MB] Zandra Haskins CCC-SLP    Pain Assessment    Pain Assessment No/denies pain  -CW No/denies pain  -CW     Recorded by [CW] Silvana Garcia PTA [CW] Silvana Garcia PTA     Vision Assessment/Intervention    Visual Impairment blurred  -CW      Recorded by [CW] Silvana Garcia PTA      Cognitive Assessment/Intervention    Current  Cognitive/Communication Assessment impaired  -CW      Personal Safety Interventions fall prevention program maintained;gait belt;nonskid shoes/slippers when out of bed  -CW fall prevention program maintained;gait belt;nonskid shoes/slippers when out of bed  -CW     Recorded by [CW] Silvana Garcia PTA [CW] Silvana Garcia PTA     Dysphagia/Swallow Intervention    Dysphagia/Swallow Therapeutic Feed   Pt completed trials of a regular solid and thin liquids with no overt s/s of aspiration. He had adequate mastication and clearance of the solid. Discontinue - goal met  -MB    Recorded by   [MB] Zandra Haskins CCC-SLP    Bed Mobility, Assessment/Treatment    Bed Mobility, Comment up in hair  -CW up in chair  -CW     Recorded by [ELVIRA] Silvana Garcia PTA [CW] Silvana Garcia PTA     Transfer Assessment/Treatment    Transfers, Sit-Stand [place] stand by assist;verbal cues required  -CW stand by assist;verbal cues required  -CW     Transfers, Stand-Sit [place] stand by assist;supervision required  -CW stand by assist;verbal cues required  -CW     Transfers, Sit-Stand-Sit, Assist Device rolling walker  -CW rolling walker  -CW     Transfer, Safety Issues impulsivity;step length decreased  -CW impulsivity;step length decreased  -CW     Transfer, Impairments strength decreased;sensation decreased  -CW strength decreased;sensation decreased  -CW     Transfer, Comment Cues for hand placement  -CW Cues for hand placement during transfers  -CW     Recorded by [CW] Silvana Garcia PTA [CW] Silvana Garcia PTA     Gait Assessment/Treatment    Gait, [place] Level contact guard assist;verbal cues required  -CW contact guard assist;verbal cues required  -CW     Gait, Assistive Device rolling walker  -CW rolling walker  -CW     Gait, Distance (Feet) 125  -   x 4 with standing rests to correct mechanics  -CW     Gait, Gait Pattern Analysis swing-through gait  -CW swing-through gait  -CW      Gait, Gait Deviations andrea decreased;forward flexed posture;narrow base;step length decreased  -CW andrea decreased;forward flexed posture;narrow base;step length decreased  -CW     Gait, Safety Issues step length decreased  -CW step length decreased  -CW     Gait, Impairments sensation decreased;strength decreased;impaired balance;coordination impaired  -CW strength decreased;sensation decreased  -CW     Gait, Comment Cues to look up, broaden LUCINA and stay inside walker  -CW Cues to look up, broaden LUCINA and stay inside wallker  -CW     Recorded by [CW] Silvana Garcia PTA [CW] Silvana Garcia PTA     Motor Skills/Interventions    Additional Documentation Balance Skills Training (Group)  -CW      Recorded by [CW] Silvana Garcia PTA      Balance Skills Training    Gait Balance-Level of Assistance Contact guard  -CW      Gait Balance Support Right upper extremity supported;Left upper extremity supported  -CW      Gait Balance Activities backwards;side-stepping;scanning environment R/L  -CW      Recorded by [CW] Silvana Garcia PTA      Therapy Exercises    Bilateral Lower Extremities AROM:;15 reps;standing;hip abduction/adduction;hip flexion;mini squats  -CW      Recorded by [CW] Silvana Garcia PTA      Orthosis Location    Orthosis Location/Type lower extremity  -CW lower extremity  -CW     Orthosis, Lower Extremity Left:;Right:;AFO (ankle foot orthosis)  -CW Left:;Right:;AFO (ankle foot orthosis)  -CW     Recorded by [CW] Silvana Garcia PTA [CW] Silvana Garcia PTA     Positioning and Restraints    Pre-Treatment Position sitting in chair/recliner  -CW sitting in chair/recliner  -CW     Post Treatment Position chair  -CW chair  -CW     In Chair sitting;call light within reach;with family/caregiver  -CW sitting;call light within reach  -CW     Recorded by [CW] Silvana Garcia PTA [CW] Silvana Garcia PTA       09/03/17 0830 09/02/17 1051       Rehab Assessment/Intervention     Discipline physical therapy assistant  -CW      Document Type therapy note (daily note)  -CW      Subjective Information agree to therapy;no complaints  -CW      Patient Effort, Rehab Treatment good  -CW      Precautions/Limitations fall precautions  -CW      Specific Treatment Considerations B AFOs; neuropathy B knees down  -CW      Recorded by [CW] Silvana Garcia PTA      Pain Assessment    Pain Assessment No/denies pain  -CW      Recorded by [CW] Silvana Garcia PTA      Vision Assessment/Intervention    Visual Impairment --   Improved attention to left side - did not run into objects   -CW      Recorded by [CW] Silvana Garcia PTA      Cognitive Assessment/Intervention    Personal Safety Interventions fall prevention program maintained;gait belt;nonskid shoes/slippers when out of bed  -CW      Recorded by [CW] Silvana Garcia PTA      Swallow Assessment/Intervention    Additional Documentation  Dysphagia/Swallow Intervention (Group)  -MB     Recorded by  [MB] Zandra Haskins CCC-SLP     Dysphagia/Swallow Intervention    Dysphagia/Swallow Therapeutic Feed  Pt will tolerate trials of current diet and upgraded consistencies with no overt s/s of aspiration.  -MB     Recorded by  [MB] Zandra Haskins CCC-SLP     Bed Mobility, Assessment/Treatment    Bed Mob, Supine to Sit, [place] conditional independence  -CW      Recorded by [CW] Silvana Garcia PTA      Transfer Assessment/Treatment    Transfers, Sit-Stand [place] stand by assist;verbal cues required  -CW      Transfers, Stand-Sit [place] stand by assist;verbal cues required  -CW      Transfers, Sit-Stand-Sit, Assist Device rolling walker  -CW      Toilet Transfer, [place] contact guard assist  -CW      Toilet Transfer, Assistive Device rolling walker  -CW      Transfer, Safety Issues impulsivity;step length decreased  -CW      Transfer, Impairments strength decreased;sensation decreased  -CW      Transfer, Comment  Cues for hand placement during transfers  -CW      Recorded by [CW] Silvana Garcia PTA      Gait Assessment/Treatment    Gait, [place] Level contact guard assist;verbal cues required  -CW      Gait, Assistive Device rolling walker  -CW      Gait, Distance (Feet) 350  -CW      Gait, Gait Pattern Analysis swing-through gait  -CW      Gait, Gait Deviations andrea decreased;forward flexed posture;narrow base;step length decreased  -CW      Gait, Safety Issues step length decreased  -CW      Gait, Impairments strength decreased;sensation decreased  -CW      Gait, Comment Cues to look up, broaden LUCINA, and stay inside walker.  -CW      Recorded by [CW] Silvana Garcia PTA      Therapy Exercises    Bilateral Lower Extremities AROM:;20 reps;sitting  -CW      Recorded by [ELVIRA] Silvana Garcia PTA      Orthosis Location    Orthosis Location/Type lower extremity  -CW      Orthosis, Lower Extremity Left:;Right:;AFO (ankle foot orthosis)  -CW      Recorded by [ELVIRA] Silvana Garcia PTA      Positioning and Restraints    Pre-Treatment Position in bed  -CW      Post Treatment Position chair  -CW      In Chair sitting;call light within reach;with family/caregiver  -CW      Recorded by [CW] Silvana Garcia PTA        User Key  (r) = Recorded By, (t) = Taken By, (c) = Cosigned By    Initials Name Effective Dates    HOMER Haskins CCC-SLP 08/02/16 -     CW Silvana Garcia PTA 06/22/15 -                 IP PT Goals       09/02/17 1406          Bed Mobility PT LTG    Bed Mobility PT LTG, Date Established 09/02/17  -PB (r) MS (t) PB (c)      Bed Mobility PT LTG, Time to Achieve by discharge  -PB (r) MS (t) PB (c)      Bed Mobility PT LTG, Activity Type all bed mobility  -PB (r) MS (t) PB (c)      Bed Mobility PT LTG, [place] Level independent  -PB (r) MS (t) PB (c)      Transfer Training PT LTG    Transfer Training PT LTG, Date Established 09/02/17  -PB (r) MS (t) PB (c)      Transfer Training PT  LTG, Time to Achieve by discharge  -PB (r) MS (t) PB (c)      Transfer Training PT LTG, Activity Type bed to chair /chair to bed;sit to stand/stand to sit  -PB (r) MS (t) PB (c)      Transfer Training PT LTG, [place] Level conditional independence  -PB (r) MS (t) PB (c)      Transfer Training PT LTG, Assist Device walker, rolling  -PB (r) MS (t) PB (c)      Gait Training PT LTG    Gait Training Goal PT LTG, Date Established 09/02/17  -PB (r) MS (t) PB (c)      Gait Training Goal PT LTG, Time to Achieve by discharge  -PB (r) MS (t) PB (c)      Gait Training Goal PT LTG, [place] Level conditional independence  -PB (r) MS (t) PB (c)      Gait Training Goal PT LTG, Assist Device walker, rolling  -PB (r) MS (t) PB (c)      Gait Training Goal PT LTG, Distance to Achieve 300'  -PB (r) MS (t) PB (c)      Gait Training Goal PT LTG, Additional Goal without tripping over feet/keeping clear of obstacles  -PB (r) MS (t) PB (c)      Stair Training PT LTG    Stair Training Goal PT LTG, Date Established 09/02/17  -PB (r) MS (t) PB (c)      Stair Training Goal PT LTG, Time to Achieve by discharge  -PB (r) MS (t) PB (c)      Stair Training Goal PT LTG, Number of Steps 1  -PB (r) MS (t) PB (c)      Stair Training Goal PT LTG, [place] Level conditional independence  -PB (r) MS (t) PB (c)      Stair Training Goal PT LTG, Assist Device 1 handrail  -PB (r) MS (t) PB (c)        User Key  (r) = Recorded By, (t) = Taken By, (c) = Cosigned By    Initials Name Provider Type    REJI Fountain, PT DPT Physical Therapist    MS Yulissa Simms, PT Student PT Student          Physical Therapy Education     Title: PT OT SLP Therapies (Done)     Topic: Physical Therapy (Done)     Point: Mobility training (Done)    Learning Progress Summary    Learner Readiness Method Response Comment Documented by Status   Patient Acceptance E,CHER PENA,NR Gait, body mechanics CW 09/04/17 0820 Done    Acceptance E,D BECKY,NR Transferss, posture, body  mechanics, gait, plan of care CW 09/03/17 0948 Done    Acceptance E VU,NR Proper use of AD, safety with t/fs and ambulation, benefits of increased activity MS 09/02/17 1411 Done               Point: Home exercise program (Done)    Learning Progress Summary    Learner Readiness Method Response Comment Documented by Status   Patient Acceptance E,D VU,NR Transferss, posture, body mechanics, gait, plan of care CW 09/03/17 0948 Done               Point: Body mechanics (Done)    Learning Progress Summary    Learner Readiness Method Response Comment Documented by Status   Patient Acceptance E,D VU,NR Gait, body mechanics CW 09/04/17 0820 Done    Acceptance E,D VU,NR Transferss, posture, body mechanics, gait, plan of care CW 09/03/17 0948 Done               Point: Precautions (Done)    Learning Progress Summary    Learner Readiness Method Response Comment Documented by Status   Patient Acceptance E,D VU,NR Gait, body mechanics CW 09/04/17 0820 Done    Acceptance E,D VU,NR Transferss, posture, body mechanics, gait, plan of care CW 09/03/17 0948 Done    Acceptance E VU,NR Proper use of AD, safety with t/fs and ambulation, benefits of increased activity MS 09/02/17 1411 Done                      User Key     Initials Effective Dates Name Provider Type Discipline    CW 06/22/15 -  Silvana Garcia PTA Physical Therapy Assistant PT    MS 07/19/17 -  Yulissa Simms, PT Student PT Student PT                    PT Recommendation and Plan  Anticipated Discharge Disposition: home with assist, home with home health  Planned Therapy Interventions: balance training, bed mobility training, gait training, home exercise program, patient/family education, stair training, strengthening, transfer training  PT Frequency: daily, 2 times/day, per priority policy  Plan of Care Review  Plan Of Care Reviewed With: patient  Progress: progress toward functional goals as expected  Outcome Summary/Follow up Plan: Pt sitting in chair upon room.   Requires cues for sit to stand transfers for safety.  Pt ambulated 100' x 4 CGA using rolling walker with standing rests to correct body mechanics for safety during gait.  Pt requires frequent verbal cues to broaden LUCINA.  RLE tends to drift over to LLE.  Pt will continue to benefit from therapy to increase strrength and improve gait.          Outcome Measures       09/04/17 0800 09/03/17 0900 09/02/17 1400    How much help from another person do you currently need...    Turning from your back to your side while in flat bed without using bedrails? 4  -CW 4  -CW     Moving from lying on back to sitting on the side of a flat bed without bedrails? 4  -CW 4  -CW     Moving to and from a bed to a chair (including a wheelchair)? 3  -CW 3  -CW     Standing up from a chair using your arms (e.g., wheelchair, bedside chair)? 3  -CW 3  -CW     Climbing 3-5 steps with a railing? 3  -CW 3  -CW     To walk in hospital room? 3  -CW 3  -CW     AM-PAC 6 Clicks Score 20  -CW 20  -CW     How much help from another is currently needed...    Putting on and taking off regular lower body clothing?   3  -ND    Bathing (including washing, rinsing, and drying)   3  -ND    Toileting (which includes using toilet bed pan or urinal)   3  -ND    Putting on and taking off regular upper body clothing   4  -ND    Taking care of personal grooming (such as brushing teeth)   4  -ND    Eating meals   4  -ND    Score   21  -ND    Functional Assessment    Outcome Measure Options AM-PAC 6 Clicks Basic Mobility (PT)  -CW AM-PAC 6 Clicks Basic Mobility (PT)  -CW AM-PAC 6 Clicks Daily Activity (OT)  -ND      09/02/17 1301          How much help from another person do you currently need...    Turning from your back to your side while in flat bed without using bedrails? 4  -PB (r) MS (t) PB (c)      Moving from lying on back to sitting on the side of a flat bed without bedrails? 3  -PB (r) MS (t) PB (c)      Moving to and from a bed to a chair (including a  wheelchair)? 3  -PB (r) MS (t) PB (c)      Standing up from a chair using your arms (e.g., wheelchair, bedside chair)? 3  -PB (r) MS (t) PB (c)      Climbing 3-5 steps with a railing? 2  -PB (r) MS (t) PB (c)      To walk in hospital room? 3  -PB (r) MS (t) PB (c)      AM-PAC 6 Clicks Score 18  -PB (r) MS (t)      Functional Assessment    Outcome Measure Options AM-PAC 6 Clicks Basic Mobility (PT)  -PB (r) MS (t) PB (c)        User Key  (r) = Recorded By, (t) = Taken By, (c) = Cosigned By    Initials Name Provider Type    ELVIRA Garcia PTA Physical Therapy Assistant    PB Oleksandr Fountain, PT DPT Physical Therapist    NISHA Nguyen, OTR/L Occupational Therapist    MS Yulissa Simms, PT Student PT Student           Time Calculation:         PT Charges       09/04/17 1413 09/04/17 0830       Time Calculation    Start Time 1351  -CW 0745  -CW     Stop Time 1414  -CW 0812  -CW     Time Calculation (min) 23 min  -CW 27 min  -CW     PT Received On 09/04/17  -CW 09/04/17  -CW     PT Goal Re-Cert Due Date 09/12/17  -CW 09/12/17  -CW     Time Calculation- PT    Total Timed Code Minutes- PT 23 minute(s)  -CW 27 minute(s)  -CW       User Key  (r) = Recorded By, (t) = Taken By, (c) = Cosigned By    Initials Name Provider Type    ELVIRA Garcia PTA Physical Therapy Assistant          Therapy Charges for Today     Code Description Service Date Service Provider Modifiers Qty    67783190236 HC PT THERAPEUTIC ACT EA 15 MIN 9/3/2017 Silvana Garcia PTA GP, KX 1    47122500771 HC PT THER PROC EA 15 MIN 9/3/2017 SYLVESTER Brooks, KX 1    32855697565 HC GAIT TRAINING EA 15 MIN 9/3/2017 Silvana Garcia PTA GP, KX 1    91798815230 HC GAIT TRAINING EA 15 MIN 9/4/2017 Silvana Garcia PTA GP, KX 2    55251605669 HC PT THER PROC EA 15 MIN 9/4/2017 Silvana Garcia PTA GP, KX 1    14769254596 HC GAIT TRAINING EA 15 MIN 9/4/2017 Silvana Garcia PTA GP, KX 1          PT G-Codes  Outcome  Measure Options: AM-PAC 6 Clicks Basic Mobility (PT)  Score: 18  Functional Limitation: Mobility: Walking and moving around  Mobility: Walking and Moving Around Current Status (): At least 40 percent but less than 60 percent impaired, limited or restricted  Mobility: Walking and Moving Around Goal Status (): At least 20 percent but less than 40 percent impaired, limited or restricted    Silvana Garcia, PTA  9/4/2017

## 2017-09-04 NOTE — PLAN OF CARE
Problem: Patient Care Overview (Adult)  Goal: Plan of Care Review  Outcome: Ongoing (interventions implemented as appropriate)    09/04/17 0954   Coping/Psychosocial Response Interventions   Plan Of Care Reviewed With patient;spouse   Outcome Evaluation   Outcome Summary/Follow up Plan Cognitive-linguistic evaluation completed. Pt required mod cues 2x to elaborate on answers for daily problems. He had significant difficulty 1x with a word problem involving time and required max cues to answer appropriately. He had difficulty with delayed recall of unrelated words.         Problem: Inpatient SLP  Goal: Cognitive-linguistic-Patient will improve Cognitive-linguistic skills to allow optimal participation in care  Outcome: Ongoing (interventions implemented as appropriate)    09/04/17 0954   Cognitive Linguistic- Optimal Participation in Care   Cognitive Linguistic Optimal Participation in Care- SLP, Date Established 09/04/17   Cognitive Linguistic Optimal Participation in Care- SLP, Time to Achieve by discharge   Cognitive Linguistic Optimal Participation in Care- SLP, Activity Level Patient will improve memory skills for increased safety in environment;Patient will improve functional problem solving skills for increased safety in environment;Patient will improve Executive functioning skills for increased safety in environment   Cognitive Linguistic Optimal Participation in Care- SLP, Outcome goal ongoing

## 2017-09-04 NOTE — PLAN OF CARE
Problem: Patient Care Overview (Adult)  Goal: Plan of Care Review  Outcome: Ongoing (interventions implemented as appropriate)    09/04/17 0822   Coping/Psychosocial Response Interventions   Plan Of Care Reviewed With patient   Patient Care Overview   Progress progress toward functional goals as expected   Outcome Evaluation   Outcome Summary/Follow up Plan Pt sitting in chair upon room. Requires cues for sit to stand transfers for safety. Pt ambulated 100' x 4 CGA using rolling walker with standing rests to correct body mechanics for safety during gait. Pt requires frequent verbal cues to broaden LUCINA. RLE tends to drift over to LLE. Pt will continue to benefit from therapy to increase strength and improve gait.

## 2017-09-05 LAB
ANION GAP SERPL CALCULATED.3IONS-SCNC: 8 MMOL/L (ref 4–13)
BACTERIA UR QL AUTO: ABNORMAL /HPF
BILIRUB UR QL STRIP: NEGATIVE
BUN BLD-MCNC: 19 MG/DL (ref 5–21)
BUN/CREAT SERPL: 18.8 (ref 7–25)
CALCIUM SPEC-SCNC: 9 MG/DL (ref 8.4–10.4)
CHLORIDE SERPL-SCNC: 106 MMOL/L (ref 98–110)
CLARITY UR: CLEAR
CO2 SERPL-SCNC: 25 MMOL/L (ref 24–31)
COLOR UR: YELLOW
CREAT BLD-MCNC: 1.01 MG/DL (ref 0.5–1.4)
DEPRECATED RDW RBC AUTO: 44.9 FL (ref 40–54)
ERYTHROCYTE [DISTWIDTH] IN BLOOD BY AUTOMATED COUNT: 13.9 % (ref 12–15)
GFR SERPL CREATININE-BSD FRML MDRD: 72 ML/MIN/1.73
GLUCOSE BLD-MCNC: 208 MG/DL (ref 70–100)
GLUCOSE BLDC GLUCOMTR-MCNC: 173 MG/DL (ref 70–130)
GLUCOSE BLDC GLUCOMTR-MCNC: 173 MG/DL (ref 70–130)
GLUCOSE BLDC GLUCOMTR-MCNC: 275 MG/DL (ref 70–130)
GLUCOSE BLDC GLUCOMTR-MCNC: 308 MG/DL (ref 70–130)
GLUCOSE UR STRIP-MCNC: ABNORMAL MG/DL
HCT VFR BLD AUTO: 42.3 % (ref 40–52)
HGB BLD-MCNC: 14 G/DL (ref 14–18)
HGB UR QL STRIP.AUTO: NEGATIVE
HYALINE CASTS UR QL AUTO: ABNORMAL /LPF
KETONES UR QL STRIP: NEGATIVE
LEUKOCYTE ESTERASE UR QL STRIP.AUTO: NEGATIVE
MCH RBC QN AUTO: 29.3 PG (ref 28–32)
MCHC RBC AUTO-ENTMCNC: 33.1 G/DL (ref 33–36)
MCV RBC AUTO: 88.5 FL (ref 82–95)
NITRITE UR QL STRIP: NEGATIVE
PH UR STRIP.AUTO: 5.5 [PH] (ref 5–8)
PLATELET # BLD AUTO: 239 10*3/MM3 (ref 130–400)
PMV BLD AUTO: 11.9 FL (ref 6–12)
POTASSIUM BLD-SCNC: 4.1 MMOL/L (ref 3.5–5.3)
PROT UR QL STRIP: ABNORMAL
RBC # BLD AUTO: 4.78 10*6/MM3 (ref 4.8–5.9)
RBC # UR: ABNORMAL /HPF
REF LAB TEST METHOD: ABNORMAL
SODIUM BLD-SCNC: 139 MMOL/L (ref 135–145)
SP GR UR STRIP: 1.02 (ref 1–1.03)
SQUAMOUS #/AREA URNS HPF: ABNORMAL /HPF
UROBILINOGEN UR QL STRIP: ABNORMAL
WBC NRBC COR # BLD: 9.26 10*3/MM3 (ref 4.8–10.8)
WBC UR QL AUTO: ABNORMAL /HPF

## 2017-09-05 PROCEDURE — 63710000001 INSULIN DETEMIR PER 5 UNITS: Performed by: NURSE PRACTITIONER

## 2017-09-05 PROCEDURE — 81001 URINALYSIS AUTO W/SCOPE: CPT | Performed by: NURSE PRACTITIONER

## 2017-09-05 PROCEDURE — 97116 GAIT TRAINING THERAPY: CPT

## 2017-09-05 PROCEDURE — 97110 THERAPEUTIC EXERCISES: CPT

## 2017-09-05 PROCEDURE — 92507 TX SP LANG VOICE COMM INDIV: CPT

## 2017-09-05 PROCEDURE — 97535 SELF CARE MNGMENT TRAINING: CPT | Performed by: OCCUPATIONAL THERAPIST

## 2017-09-05 PROCEDURE — 82962 GLUCOSE BLOOD TEST: CPT

## 2017-09-05 PROCEDURE — 87086 URINE CULTURE/COLONY COUNT: CPT | Performed by: NURSE PRACTITIONER

## 2017-09-05 PROCEDURE — 85027 COMPLETE CBC AUTOMATED: CPT | Performed by: NURSE PRACTITIONER

## 2017-09-05 PROCEDURE — 25010000002 CEFTRIAXONE: Performed by: INTERNAL MEDICINE

## 2017-09-05 PROCEDURE — 63710000001 INSULIN LISPRO (HUMAN) PER 5 UNITS: Performed by: NURSE PRACTITIONER

## 2017-09-05 PROCEDURE — 80048 BASIC METABOLIC PNL TOTAL CA: CPT | Performed by: NURSE PRACTITIONER

## 2017-09-05 RX ORDER — OXYCODONE AND ACETAMINOPHEN 7.5; 325 MG/1; MG/1
1 TABLET ORAL EVERY 6 HOURS PRN
Status: DISCONTINUED | OUTPATIENT
Start: 2017-09-05 | End: 2017-09-06 | Stop reason: HOSPADM

## 2017-09-05 RX ADMIN — OXYCODONE HYDROCHLORIDE AND ACETAMINOPHEN 1 TABLET: 7.5; 325 TABLET ORAL at 18:15

## 2017-09-05 RX ADMIN — CEFTRIAXONE 1 G: 1 INJECTION, POWDER, FOR SOLUTION INTRAMUSCULAR; INTRAVENOUS at 21:30

## 2017-09-05 RX ADMIN — OXYCODONE HYDROCHLORIDE AND ACETAMINOPHEN 1 TABLET: 7.5; 325 TABLET ORAL at 09:04

## 2017-09-05 RX ADMIN — FAMOTIDINE 40 MG: 20 TABLET, FILM COATED ORAL at 08:59

## 2017-09-05 RX ADMIN — OXYCODONE HYDROCHLORIDE AND ACETAMINOPHEN 1 TABLET: 7.5; 325 TABLET ORAL at 01:20

## 2017-09-05 RX ADMIN — INSULIN DETEMIR 25 UNITS: 100 INJECTION, SOLUTION SUBCUTANEOUS at 08:58

## 2017-09-05 RX ADMIN — NYSTATIN: 100000 POWDER TOPICAL at 21:30

## 2017-09-05 RX ADMIN — INSULIN LISPRO 4 UNITS: 100 INJECTION, SOLUTION INTRAVENOUS; SUBCUTANEOUS at 12:18

## 2017-09-05 RX ADMIN — CLOPIDOGREL 75 MG: 75 TABLET, FILM COATED ORAL at 08:59

## 2017-09-05 RX ADMIN — INSULIN LISPRO 2 UNITS: 100 INJECTION, SOLUTION INTRAVENOUS; SUBCUTANEOUS at 21:30

## 2017-09-05 RX ADMIN — LORAZEPAM 1 MG: 1 TABLET ORAL at 00:08

## 2017-09-05 RX ADMIN — INSULIN LISPRO 2 UNITS: 100 INJECTION, SOLUTION INTRAVENOUS; SUBCUTANEOUS at 17:03

## 2017-09-05 RX ADMIN — NYSTATIN: 100000 POWDER TOPICAL at 08:58

## 2017-09-05 RX ADMIN — Medication 50 MG: at 08:59

## 2017-09-05 RX ADMIN — TEMAZEPAM 30 MG: 15 CAPSULE ORAL at 21:31

## 2017-09-05 RX ADMIN — INSULIN LISPRO 7 UNITS: 100 INJECTION, SOLUTION INTRAVENOUS; SUBCUTANEOUS at 08:58

## 2017-09-05 NOTE — PROGRESS NOTES
Continued Stay Note   [place]     Patient Name: Casey Berger  MRN: [id number]  Today's Date: 9/5/2017    Admit Date: 9/1/2017          Discharge Plan       09/05/17 1350    Case Management/Social Work Plan    Plan [facility] Referral    Additional Comments APRN advised that patient's wife now wants referral to [facility] as well. SW called Adri in admissions at [facility] and notified of referral. Will follow for bed offer. [facility] referral also pending.              Discharge Codes     None            IESHA OswaldW

## 2017-09-05 NOTE — PROGRESS NOTES
Continued Stay Note   [place]     Patient Name: Casey Berger  MRN: [id number]  Today's Date: 9/5/2017    Admit Date: 9/1/2017          Discharge Plan       09/05/17 0839    Case Management/Social Work Plan    Plan [facility] Referral    Additional Comments Patient's family has requested referral to [facility]. RIRI called and spoke to Erin in admissions at [facility] to notify of referral. Erin confirmed she will evaluate. Will follow for decision.              Discharge Codes     None            RONEL Oswald

## 2017-09-05 NOTE — PLAN OF CARE
Problem: Confusion, Acute (Adult)  Goal: Cognitive/Functional Impairments Minimized  Outcome: Ongoing (interventions implemented as appropriate)  Goal: Safety  Outcome: Ongoing (interventions implemented as appropriate)    Problem: Fall Risk (Adult)  Goal: Identify Related Risk Factors and Signs and Symptoms  Outcome: Outcome(s) achieved Date Met:  09/05/17  Goal: Absence of Falls  Outcome: Ongoing (interventions implemented as appropriate)    Problem: Patient Care Overview (Adult)  Goal: Plan of Care Review  Outcome: Ongoing (interventions implemented as appropriate)    09/05/17 0308   Coping/Psychosocial Response Interventions   Plan Of Care Reviewed With patient;spouse   Patient Care Overview   Progress no change   Outcome Evaluation   Outcome Summary/Follow up Plan pt has been alert and oriented but after administration of pain medication and falling asleep pt did wake up confused as to where he was, chair and bed alarms have been utilized, pt assisted with turns, pt did have a prolonged episode of cramps to his right leg that were very painful for him, after about 0100 he was finally able to get some relief. Wife has been at bedside, VSS, will cont to monitor         Problem: Stroke (Ischemic) (Adult)  Goal: Signs and Symptoms of Listed Potential Problems Will be Absent or Manageable (Stroke)  Outcome: Ongoing (interventions implemented as appropriate)

## 2017-09-05 NOTE — PROGRESS NOTES
[facility]  INPATIENT PROGRESS NOTE    Length of Stay: 3  Date of Admission: 9/1/2017  Primary Care Physician: Meggan Trivedi DO    Subjective   Chief Complaint: follow up stroke  HPI   Pt's wife states he did not sleep and had severe right leg pain last night. This is a recurrent problem for him. Wife was concerned about his mentation but he has been given percocet twice, restoril, and ativan 1 mg. Has not been out of bed this am. She does not want him transferred today given the medications he was given. Blood glucoses remain elevated.     Review of Systems   All pertinent negatives and positives are as above. All other systems have been reviewed and are negative unless otherwise stated.     Objective    Temp:  [97.5 °F (36.4 °C)-97.9 °F (36.6 °C)] 97.9 °F (36.6 °C)  Heart Rate:  [60-69] 69  Resp:  [18-20] 20  BP: (137-165)/(55-73) 165/64  Physical Exam   Constitutional: He appears well-developed and well-nourished.   HENT:   Head: Normocephalic and atraumatic.   Eyes: EOM are normal. Pupils are equal, round, and reactive to light. No scleral icterus.   Neck: Normal range of motion. Neck supple. No JVD present. Carotid bruit is not present. No tracheal deviation present. No thyromegaly present.   Cardiovascular: Normal rate and regular rhythm.  Exam reveals no gallop and no friction rub.    No murmur heard.  Pulmonary/Chest: Effort normal and breath sounds normal. No respiratory distress. He has no wheezes. He has no rales. He exhibits no tenderness.   Abdominal: Soft. Bowel sounds are normal. He exhibits no distension. There is no tenderness.   Musculoskeletal: Normal range of motion. He exhibits tenderness (plantar surface of right heel is tender to touch. ). He exhibits no edema.   Neurological: He is alert. No cranial nerve deficit.   Drowsy. But awakens and speaks clearly.    Skin: Skin is warm and dry. No rash noted.   Psychiatric: He has a  normal mood and affect.     Results Review:  I have reviewed the labs, radiology results, and diagnostic studies.    Laboratory Data:     Results from last 7 days  Lab Units 09/05/17  0457 09/03/17  1549 09/01/17  1946   WBC 10*3/mm3 9.26 9.03 10.57   HEMOGLOBIN g/dL 14.0 14.2 14.8   HEMATOCRIT % 42.3 42.3 43.8   PLATELETS 10*3/mm3 239 245 248       Results from last 7 days  Lab Units 09/05/17  0457 09/01/17  1946   SODIUM mmol/L 139 134*   POTASSIUM mmol/L 4.1 4.4   CHLORIDE mmol/L 106 100   CO2 mmol/L 25.0 25.0   BUN mg/dL 19 21   CREATININE mg/dL 1.01 1.15   CALCIUM mg/dL 9.0 9.2   BILIRUBIN mg/dL  --  0.5   ALK PHOS U/L  --  95   ALT (SGPT) U/L  --  29   AST (SGOT) U/L  --  21   GLUCOSE mg/dL 208* 347*     Culture Data:   Blood Culture   Date Value Ref Range Status   09/01/2017 No growth at 3 days  Preliminary   09/01/2017 No growth at 3 days  Preliminary     Urine Culture   Date Value Ref Range Status   09/01/2017 >100,000 CFU/mL Enterobacter aerogenes (A)  Final   09/01/2017 >100,000 CFU/mL Mixed Gram Positive Paula (A)  Final     Comment:     Probable contaminant     Radiology Data:   Imaging Results (last 24 hours)     ** No results found for the last 24 hours. **        I have reviewed the patient current medications.     Assessment/Plan     Hospital Problem List     * (Principal)Stroke    Overview Addendum 9/2/2017 11:20 AM by Paul Clemons MD     ASA  Statin  Neurology consulted  PT/OT/SLP eval  Echo/Carotid scan p         Type 2 diabetes mellitus    Overview Addendum 9/3/2017 11:14 AM by CHELSIE Ignacio     Home insulin, SSI-poor control A1C 10.1         Hypertension    Overview Signed 9/2/2017 11:16 AM by Paul Clemons MD     Lisinopril         Anxiety    Overview Signed 9/2/2017 11:17 AM by Paul Clemons MD     Ativan         Cystitis    Overview Addendum 9/4/2017 11:24 AM by CHELSIE Ignacio     100,000 CFU/mL Enterobacter aerogenes  Rocephin              Plan:  1. Rocephin day 5.   2. Will repeat urine culture now.   3. Will increase percocet to every 4 hours as needed.   4. Will discontinue the ativan.   5. Repeat labs in am  6. Wife now wants to see if [facility] will accept the patient as [facility] does not have bed alarms.     Discharge Planning: I expect the patient to be discharged to SNF in ?days.    CHELSIE Ignacio   09/05/17   12:58 PM     I personally evaluated and examined the patient in conjunction with CHELSIE Hill and agree with the assessment, treatment plan, and disposition of the patient as recorded by her. My history, exam, and further recommendations are: I have reviewed and agree with the plan. Francine Staton MD  09/05/17  6:27 PM

## 2017-09-05 NOTE — THERAPY TREATMENT NOTE
Acute Care - Speech Language Pathology Treatment Note  [facility]     Patient Name: Casey Berger  : 1940  MRN: [id number]  Today's Date: 2017         Admit Date: 2017  Pt's wife reports pt had increased confusion today, but that he received pain medication multiples times last night due to ankle pain that was difficult to relieve. Pt was oriented to person, place, and time. He required mod to max cues with memory/mental manipulation tasks. He required mod cues for daily problem solving.  Zandra Haskins CCC-SLP 2017 3:01 PM    Visit Dx:      ICD-10-CM ICD-9-CM   1. Altered mental status, unspecified altered mental status type R41.82 780.97   2. Acute UTI N39.0 599.0   3. Oropharyngeal dysphagia R13.12 787.22   4. Decreased activities of daily living (ADL) Z78.9 V49.89   5. Impaired functional mobility, balance, gait, and endurance Z74.09 V49.89   6. Impaired cognition R41.89 294.9     Patient Active Problem List   Diagnosis   • Stroke   • Type 2 diabetes mellitus   • Hypertension   • Anxiety   • Cystitis              Adult Rehabilitation Note       17 1400 17 1351 17 0745    Rehab Assessment/Intervention    Discipline speech language pathologist  -MB physical therapy assistant  -CW physical therapy assistant  -CW    Document Type therapy note (daily note)  -MB therapy note (daily note)  -CW therapy note (daily note)  -CW    Subjective Information agree to therapy;complains of;fatigue  -MB no complaints;agree to therapy  -CW no complaints;agree to therapy  -CW    Patient Effort, Rehab Treatment good  -MB good  -CW good  -CW    Precautions/Limitations  fall precautions  -CW fall precautions  -CW    Specific Treatment Considerations  B AFOs; Neuropathy B knees down  -CW B AFOS; Neuropathy B LE knees down  -CW    Recorded by [MB] Zandra Hasikns CCC-SLP [CW] Silvana Garcia PTA [CW] Silvana Garcia PTA    Pain Assessment    Pain Assessment No/denies pain  -MB  No/denies pain  -CW No/denies pain  -CW    Recorded by [MB] MURIEL Valladares [CW] Silvana Garcia PTA [CW] Silvana Garcia PTA    Vision Assessment/Intervention    Visual Impairment  blurred  -CW     Recorded by  [CW] Silvana Garcia PTA     Cognitive Assessment/Intervention    Current Cognitive/Communication Assessment  impaired  -CW     Personal Safety Interventions  fall prevention program maintained;gait belt;nonskid shoes/slippers when out of bed  -CW fall prevention program maintained;gait belt;nonskid shoes/slippers when out of bed  -CW    Recorded by  [CW] Silvana Garcia PTA [CW] Silvana Garcia PTA    Improve memory skills    Memory Skills Progress 30%;without cues  -MB      Comments: Improve memory skills Pt required mod to max cues to reorganize words into a sequential order.   -MB      Recorded by [MB] MURIEL Valladares      Improve functional problem solving    Functional Problem Solving Progress 40%;without cues  -MB      Comments: Improve functional problem solving Pt required mod cues at times to answer daily problem solving questions appropriately.  -MB      Recorded by [MB] MURIEL aVlladares      Bed Mobility, Assessment/Treatment    Bed Mobility, Comment  up in hair  -CW up in chair  -CW    Recorded by  [CW] Silvana Garcia PTA [CW] Silvana Garcia PTA    Transfer Assessment/Treatment    Transfers, Sit-Stand [place]  stand by assist;verbal cues required  -CW stand by assist;verbal cues required  -CW    Transfers, Stand-Sit [place]  stand by assist;supervision required  -CW stand by assist;verbal cues required  -CW    Transfers, Sit-Stand-Sit, Assist Device  rolling walker  -CW rolling walker  -CW    Transfer, Safety Issues  impulsivity;step length decreased  -CW impulsivity;step length decreased  -CW    Transfer, Impairments  strength decreased;sensation decreased  -CW strength decreased;sensation decreased  -CW    Transfer,  Comment  Cues for hand placement  -CW Cues for hand placement during transfers  -CW    Recorded by  [CW] Silvana Garcia PTA [CW] Silvana Garcia PTA    Gait Assessment/Treatment    Gait, [place] Level  contact guard assist;verbal cues required  -CW contact guard assist;verbal cues required  -CW    Gait, Assistive Device  rolling walker  -CW rolling walker  -CW    Gait, Distance (Feet)  125  -   x 4 with standing rests to correct mechanics  -CW    Gait, Gait Pattern Analysis  swing-through gait  -CW swing-through gait  -CW    Gait, Gait Deviations  andrea decreased;forward flexed posture;narrow base;step length decreased  -CW andrea decreased;forward flexed posture;narrow base;step length decreased  -CW    Gait, Safety Issues  step length decreased  -CW step length decreased  -CW    Gait, Impairments  sensation decreased;strength decreased;impaired balance;coordination impaired  -CW strength decreased;sensation decreased  -CW    Gait, Comment  Cues to look up, broaden LUCINA and stay inside walker  -CW Cues to look up, broaden LUCINA and stay inside wallker  -CW    Recorded by  [CW] Silvana Garcia PTA [CW] Silvana Garcia PTA    Motor Skills/Interventions    Additional Documentation  Balance Skills Training (Group)  -CW     Recorded by  [CW] Silvana Garcia PTA     Balance Skills Training    Gait Balance-Level of Assistance  Contact guard  -CW     Gait Balance Support  Right upper extremity supported;Left upper extremity supported  -CW     Gait Balance Activities  backwards;side-stepping;scanning environment R/L  -CW     Recorded by  [CW] Silvana Garcia PTA     Therapy Exercises    Bilateral Lower Extremities  AROM:;15 reps;standing;hip abduction/adduction;hip flexion;mini squats  -CW     Recorded by  [CW] Silvana Gacria PTA     Orthosis Location    Orthosis Location/Type  lower extremity  -CW lower extremity  -CW    Orthosis, Lower Extremity  Left:;Right:;AFO (ankle foot  orthosis)  -CW Left:;Right:;AFO (ankle foot orthosis)  -CW    Recorded by  [CW] Silvana Garcia PTA [CW] Silvana Garcia PTA    Positioning and Restraints    Pre-Treatment Position  sitting in chair/recliner  -CW sitting in chair/recliner  -CW    Post Treatment Position  chair  -CW chair  -CW    In Chair  sitting;call light within reach;with family/caregiver  -CW sitting;call light within reach  -CW    Recorded by  [CW] Silvana Garcia PTA [CW] Silvana Garcia PTA      09/03/17 1031 09/03/17 0830       Rehab Assessment/Intervention    Discipline speech language pathologist  -MB physical therapy assistant  -CW     Document Type therapy note (daily note)  -MB therapy note (daily note)  -CW     Subjective Information no complaints;agree to therapy  -MB agree to therapy;no complaints  -CW     Patient Effort, Rehab Treatment good  -MB good  -CW     Precautions/Limitations  fall precautions  -CW     Specific Treatment Considerations  B AFOs; neuropathy B knees down  -CW     Recorded by [MB] Zandra Haskins CCC-SLP [CW] Silvana Garcia PTA     Pain Assessment    Pain Assessment  No/denies pain  -CW     Recorded by  [CW] Silvana Garcia PTA     Vision Assessment/Intervention    Visual Impairment  --   Improved attention to left side - did not run into objects   -CW     Recorded by  [CW] Silvana Garcia PTA     Cognitive Assessment/Intervention    Personal Safety Interventions  fall prevention program maintained;gait belt;nonskid shoes/slippers when out of bed  -CW     Recorded by  [CW] Silvana Garcia PTA     Dysphagia/Swallow Intervention    Dysphagia/Swallow Therapeutic Feed Pt completed trials of a regular solid and thin liquids with no overt s/s of aspiration. He had adequate mastication and clearance of the solid. Discontinue - goal met  -MB      Recorded by [MB] Zandra Haskins CCC-SLP      Bed Mobility, Assessment/Treatment    Bed Mob, Supine to Sit, [place]  conditional  independence  -CW     Recorded by  [CW] Silvana Garcia PTA     Transfer Assessment/Treatment    Transfers, Sit-Stand [place]  stand by assist;verbal cues required  -CW     Transfers, Stand-Sit [place]  stand by assist;verbal cues required  -CW     Transfers, Sit-Stand-Sit, Assist Device  rolling walker  -CW     Toilet Transfer, [place]  contact guard assist  -CW     Toilet Transfer, Assistive Device  rolling walker  -CW     Transfer, Safety Issues  impulsivity;step length decreased  -CW     Transfer, Impairments  strength decreased;sensation decreased  -CW     Transfer, Comment  Cues for hand placement during transfers  -CW     Recorded by  [CW] Silvana Garcia PTA     Gait Assessment/Treatment    Gait, [place] Level  contact guard assist;verbal cues required  -CW     Gait, Assistive Device  rolling walker  -CW     Gait, Distance (Feet)  350  -CW     Gait, Gait Pattern Analysis  swing-through gait  -CW     Gait, Gait Deviations  andrea decreased;forward flexed posture;narrow base;step length decreased  -CW     Gait, Safety Issues  step length decreased  -CW     Gait, Impairments  strength decreased;sensation decreased  -CW     Gait, Comment  Cues to look up, broaden LUCINA, and stay inside walker.  -CW     Recorded by  [ELVIRA] Silvana Garcia PTA     Therapy Exercises    Bilateral Lower Extremities  AROM:;20 reps;sitting  -CW     Recorded by  [ELVIRA] Silvana Garcia PTA     Orthosis Location    Orthosis Location/Type  lower extremity  -CW     Orthosis, Lower Extremity  Left:;Right:;AFO (ankle foot orthosis)  -CW     Recorded by  [ELVIRA] Silvana Garcia PTA     Positioning and Restraints    Pre-Treatment Position  in bed  -CW     Post Treatment Position  chair  -CW     In Chair  sitting;call light within reach;with family/caregiver  -CW     Recorded by  [ELVIRA] Silvana Garcia PTA       User Key  (r) = Recorded By, (t) = Taken By, (c) = Cosigned By    Initials Name Effective Dates    MB  Zandra Haskins, CCC-SLP 08/02/16 -     CW Silvana Garcia, PTA 06/22/15 -               IP SLP Goals       09/05/17 1458 09/04/17 0954 09/03/17 1100    Safely Consume Diet    Safely Consume Diet- SLP, Date Goal Reviewed   09/03/17  -MB    Safely Consume Diet- SLP, Outcome   goal met  -MB    Cognitive Linguistic- Optimal Participation in Care    Cognitive Linguistic Optimal Participation in Care- SLP, Date Established  09/04/17  -MB     Cognitive Linguistic Optimal Participation in Care- SLP, Time to Achieve  by discharge  -MB     Cognitive Linguistic Optimal Participation in Care- SLP, Activity Level  Patient will improve memory skills for increased safety in environment;Patient will improve functional problem solving skills for increased safety in environment;Patient will improve Executive functioning skills for increased safety in environment  -MB     Cognitive Linguistic Optimal Participation in Care- SLP, Date Goal Reviewed 09/05/17  -MB      Cognitive Linguistic Optimal Participation in Care- SLP, Outcome  goal ongoing  -MB       09/02/17 1309          Safely Consume Diet    Safely Consume Diet- SLP, Date Established 09/02/17  -MB      Safely Consume Diet- SLP, Time to Achieve by discharge  -MB      Safely Consume Diet- SLP, Additional Goal Pt will tolerate trials of current diet with no overt s/s of aspiration.  -MB      Safely Consume Diet- SLP, Outcome goal ongoing  -MB        User Key  (r) = Recorded By, (t) = Taken By, (c) = Cosigned By    Initials Name Provider Type    HOMER Haskins, CCC-SLP Speech and Language Pathologist          EDUCATION  The patient has been educated in the following areas:   Cognitive Impairment.    SLP Recommendation and Plan  SLP Diagnosis: Impaired cognition     Rehab Potential: good, to achieve stated therapy goals  Criteria for Skilled Therapeutic Interventions Met: skilled criteria for cognitive linguistic intervention met  Anticipated Discharge Disposition:  skilled nursing facility     Therapy Frequency: 3-5 times/wk  Predicted Duration of Therapy Intervention (days/wks): until discharge  Expected Duration of Therapy Session (min): 30-45 minutes    Plan of Care Review  Plan Of Care Reviewed With: patient, spouse, family  Progress: declining  Outcome Summary/Follow up Plan: Pt's wife reports pt had increased confusion today, but that he received pain medication multiples times last night due to ankle pain that was difficult to relieve. Pt was oriented to person, place, and time. He required mod to max cues with memory/mental manipulation tasks. He required mod cues for daily problem solving.         SLP Outcome Measures (last 72 hours)      SLP Outcome Measures       09/04/17 0900 09/03/17 1100       SLP Outcome Measures    Outcome Measure Used? Adult NOMS  -MB Adult NOMS  -MB     FCM Scores    FCM Chosen Problem Solving  -MB Swallowing  -MB     Swallowing FCM Score  7  -MB     Problem Solving FCM Score 4  -MB        User Key  (r) = Recorded By, (t) = Taken By, (c) = Cosigned By    Initials Name Effective Dates    MURIEL Liu 08/02/16 -           Time Calculation:         Time Calculation- SLP       09/05/17 1459          Time Calculation- SLP    SLP Start Time 1423  -MB      SLP Stop Time 1449  -MB      SLP Time Calculation (min) 26 min  -MB      SLP Received On 09/05/17  -MB        User Key  (r) = Recorded By, (t) = Taken By, (c) = Cosigned By    Initials Name Provider Type    MURIEL Liu Speech and Language Pathologist          Therapy Charges for Today     Code Description Service Date Service Provider Modifiers Qty    70824403193 HC ST OTHER FUNCT LIMIT CURRENT 9/4/2017 MURIEL Valladares CK 1    27714537222 HC ST OTHER FUNCT LIMIT PROJECTED 9/4/2017 MURIEL Valladares CJ 1    80416060910 HC ST EVAL SPEECH AND PROD W LANG  4 9/4/2017 MURIEL Valladares KX 1    80113857373 HC ST TREATMENT SPEECH  2 9/5/2017 Zandra Haskins CCC-SLP GN, KX 1          SLP G-Codes  SLP NOMS Used?: Yes  Functional Limitations: Other Speech Language Pathology (Problem solving)  Swallow Current Status (): At least 1 percent but less than 20 percent impaired, limited or restricted  Swallow Goal Status (): 0 percent impaired, limited or restricted  Other Speech-Language Pathology Functional Limitation Current Status (): At least 40 percent but less than 60 percent impaired, limited or restricted  Other Speech-Language Pathology Functional Limitation Goal Status (): At least 20 percent but less than 40 percent impaired, limited or restricted    MURIEL Bolaños  9/5/2017

## 2017-09-05 NOTE — PLAN OF CARE
Problem: Patient Care Overview (Adult)  Goal: Plan of Care Review  Outcome: Ongoing (interventions implemented as appropriate)    09/05/17 6387   Coping/Psychosocial Response Interventions   Plan Of Care Reviewed With patient   Patient Care Overview   Progress progress toward functional goals as expected   Outcome Evaluation   Outcome Summary/Follow up Plan OT tx completed. Educated spouse on OT role, POC, and discussed pt's needs/concerns while PT was working with pt. Pt was min A with RW for transfers and CGA with RW for functional mobility from bed <>BR. Pt was supervision to CGA for full body bathing with cues for sequencing and safety. Pt was mod A to doff gown and maninder button up with assist needed for buttoning. Pt was min A to maninder/doff AFOs, socks and shoes. Pt was CGA with set up for shaving his face. Pt required verbal cues throughout tx for safety. Cont OT POC. Pt would continue to benfit from skilled therapy to increase safety and ADL independence.

## 2017-09-05 NOTE — PLAN OF CARE
Problem: Patient Care Overview (Adult)  Goal: Plan of Care Review  Outcome: Ongoing (interventions implemented as appropriate)    09/05/17 1458   Coping/Psychosocial Response Interventions   Plan Of Care Reviewed With patient;spouse;family   Patient Care Overview   Progress declining   Outcome Evaluation   Outcome Summary/Follow up Plan Pt's wife reports pt had increased confusion today, but that he received pain medication multiples times last night due to ankle pain that was difficult to relieve. Pt was oriented to person, place, and time. He required mod to max cues with memory/mental manipulation tasks. He required mod cues for daily problem solving.         Problem: Inpatient SLP  Goal: Cognitive-linguistic-Patient will improve Cognitive-linguistic skills to allow optimal participation in care  Outcome: Ongoing (interventions implemented as appropriate)    09/04/17 0954 09/05/17 1458   Cognitive Linguistic- Optimal Participation in Care   Cognitive Linguistic Optimal Participation in Care- SLP, Date Established 09/04/17 --    Cognitive Linguistic Optimal Participation in Care- SLP, Time to Achieve by discharge --    Cognitive Linguistic Optimal Participation in Care- SLP, Activity Level Patient will improve memory skills for increased safety in environment;Patient will improve functional problem solving skills for increased safety in environment;Patient will improve Executive functioning skills for increased safety in environment --    Cognitive Linguistic Optimal Participation in Care- SLP, Date Goal Reviewed --  09/05/17   Cognitive Linguistic Optimal Participation in Care- SLP, Outcome goal ongoing --

## 2017-09-05 NOTE — PLAN OF CARE
Problem: Fall Risk (Adult)  Goal: Absence of Falls  Outcome: Ongoing (interventions implemented as appropriate)    Problem: Patient Care Overview (Adult)  Goal: Plan of Care Review  Outcome: Ongoing (interventions implemented as appropriate)    09/05/17 1748   Coping/Psychosocial Response Interventions   Plan Of Care Reviewed With patient   Patient Care Overview   Progress no change   Outcome Evaluation   Outcome Summary/Follow up Plan A&O X4. No neuro changes. Safety maintained. PRN pain medication given upon request. Up X1 with a walker. Incotinent at times. Pt has bilateral foot drop, braces worn bilaterally when out of bed.        Goal: Adult Individualization and Mutuality  Outcome: Ongoing (interventions implemented as appropriate)  Goal: Discharge Needs Assessment  Outcome: Ongoing (interventions implemented as appropriate)    Problem: Stroke (Ischemic) (Adult)  Goal: Signs and Symptoms of Listed Potential Problems Will be Absent or Manageable (Stroke)  Outcome: Ongoing (interventions implemented as appropriate)    Problem: Pressure Ulcer Risk (Rafa Scale) (Adult,Obstetrics,Pediatric)  Goal: Identify Related Risk Factors and Signs and Symptoms  Outcome: Ongoing (interventions implemented as appropriate)  Goal: Skin Integrity  Outcome: Ongoing (interventions implemented as appropriate)

## 2017-09-05 NOTE — PLAN OF CARE
Problem: Patient Care Overview (Adult)  Goal: Plan of Care Review  Outcome: Ongoing (interventions implemented as appropriate)    09/05/17 1407   Coping/Psychosocial Response Interventions   Plan Of Care Reviewed With patient   Patient Care Overview   Progress progress toward functional goals as expected   Outcome Evaluation   Outcome Summary/Follow up Plan Pt required more assist for sit to stand today. Pt is supervision for bed mobility and min assist for sit to stand transfers. Pt ambulated 100' in hallway with rolling walker requires frequent verbal cues to look up, broaden LUCINA and stay inside walker. Pt has tendancy to let RLE drift over and kick LLE as he walks. Left pt sitting in bathroom shower chair with OT for shower. Will continue to benefit from therapy to increase strength and improve gait safety..

## 2017-09-05 NOTE — THERAPY TREATMENT NOTE
Acute Care - Physical Therapy Treatment Note  [facility]     Patient Name: Casey Berger  : 1940  MRN: [id number]  Today's Date: 2017  Onset of Illness/Injury or Date of Surgery Date: 17  Date of Referral to PT: 17  Referring Physician: Dr. Butt    Admit Date: 2017    Visit Dx:    ICD-10-CM ICD-9-CM   1. Altered mental status, unspecified altered mental status type R41.82 780.97   2. Acute UTI N39.0 599.0   3. Oropharyngeal dysphagia R13.12 787.22   4. Decreased activities of daily living (ADL) Z78.9 V49.89   5. Impaired functional mobility, balance, gait, and endurance Z74.09 V49.89   6. Impaired cognition R41.89 294.9     Patient Active Problem List   Diagnosis   • Stroke   • Type 2 diabetes mellitus   • Hypertension   • Anxiety   • Cystitis               Adult Rehabilitation Note       17 1527 17 1517 17 1400    Rehab Assessment/Intervention    Discipline occupational therapist  -MM physical therapy assistant  -CW speech language pathologist  -MB    Document Type therapy note (daily note)  -MM therapy note (daily note)  -CW therapy note (daily note)  -MB    Subjective Information  agree to therapy;no complaints  -CW agree to therapy;complains of;fatigue  -MB    Patient Effort, Rehab Treatment  good  -CW good  -MB    Precautions/Limitations  fall precautions  -CW     Specific Treatment Considerations  B AFOs; Neuropathy B knees down  -CW     Recorded by [MM] Иван Brady, OTR/L [CW] Silvana Garcia PTA [MB] Zandra Haskins CCC-SLP    Pain Assessment    Pain Assessment  No/denies pain  -CW No/denies pain  -MB    Recorded by  [CW] Silvana Garcia PTA [MB] Zandra Haskins CCC-SLP    Cognitive Assessment/Intervention    Current Cognitive/Communication Assessment  impaired  -CW     Personal Safety Interventions  fall prevention program maintained;gait belt;nonskid shoes/slippers when out of bed  -CW     Recorded by  [CW] Silvana Garcia PTA      Improve memory skills    Memory Skills Progress   30%;without cues  -MB    Comments: Improve memory skills   Pt required mod to max cues to reorganize words into a sequential order.   -MB    Recorded by   [MB] Zandra Haskins CCC-SLP    Improve functional problem solving    Functional Problem Solving Progress   40%;without cues  -MB    Comments: Improve functional problem solving   Pt required mod cues at times to answer daily problem solving questions appropriately.  -MB    Recorded by   [MB] DMITRY ValladaresSLP    Bed Mobility, Assessment/Treatment    Bed Mob, Supine to Sit, [place]  supervision required  -CW     Bed Mobility, Safety Issues  decreased use of legs for bridging/pushing  -CW     Bed Mobility, Impairments  strength decreased;impaired balance;coordination impaired  -CW     Recorded by  [CW] Silvana Garcia PTA     Transfer Assessment/Treatment    Transfers, Sit-Stand [place]  minimum assist (75% patient effort);verbal cues required  -CW     Transfers, Stand-Sit [place]  minimum assist (75% patient effort);verbal cues required  -CW     Transfers, Sit-Stand-Sit, Assist Device  rolling walker  -CW     Transfer, Safety Issues  impulsivity  -CW     Transfer, Impairments  strength decreased;sensation decreased;impaired balance  -CW     Transfer, Comment  Cues for hand placement  -CW     Recorded by  [CW] Silvana Garcia PTA     Gait Assessment/Treatment    Gait, [place] Level  contact guard assist;verbal cues required  -CW     Gait, Assistive Device  rolling walker  -CW     Gait, Distance (Feet)  100  -CW     Gait, Gait Pattern Analysis  swing-through gait  -CW     Gait, Gait Deviations  andrea decreased;forward flexed posture;step length decreased;narrow base  -CW     Gait, Safety Issues  step length decreased  -CW     Gait, Impairments  strength decreased;sensation decreased;impaired balance  -CW     Gait, Comment  Cus to look up, broaden LUCINA, and stay inside walker   -CW     Recorded by  [CW] Silvana Garcia PTA     Therapy Exercises    Bilateral Lower Extremities  AROM:;20 reps;sitting  -CW     Recorded by  [ELVIRA] Silvana Garcia PTA     Orthosis Location    Orthosis Location/Type  lower extremity  -CW     Orthosis, Lower Extremity  Left:;Right:;AFO (ankle foot orthosis)  -CW     Recorded by  [CW] Silvana Garcia PTA     Positioning and Restraints    Pre-Treatment Position  in bed  -CW     Post Treatment Position  bathroom  -CW     Bathroom  sitting;call light within reach;with OT  -CW     Recorded by  [ELVIRA] Silvana Garcia PTA       09/04/17 1351 09/04/17 0745 09/03/17 1031    Rehab Assessment/Intervention    Discipline physical therapy assistant  -ELVIRA physical therapy assistant  -ELVIRA speech language pathologist  -MB    Document Type therapy note (daily note)  -CW therapy note (daily note)  -CW therapy note (daily note)  -MB    Subjective Information no complaints;agree to therapy  -CW no complaints;agree to therapy  -CW no complaints;agree to therapy  -MB    Patient Effort, Rehab Treatment good  -CW good  -CW good  -MB    Precautions/Limitations fall precautions  -CW fall precautions  -CW     Specific Treatment Considerations B AFOs; Neuropathy B knees down  -CW B AFOS; Neuropathy B LE knees down  -CW     Recorded by [CW] Silvana Garcia PTA [CW] Silvana Garcia PTA [MB] Zandra Haskins CCC-SLP    Pain Assessment    Pain Assessment No/denies pain  -CW No/denies pain  -CW     Recorded by [CW] Silvana Garcia PTA [CW] Silvana Garcia PTA     Vision Assessment/Intervention    Visual Impairment blurred  -CW      Recorded by [CW] Silvana Garcia PTA      Cognitive Assessment/Intervention    Current Cognitive/Communication Assessment impaired  -CW      Personal Safety Interventions fall prevention program maintained;gait belt;nonskid shoes/slippers when out of bed  -CW fall prevention program maintained;gait belt;nonskid shoes/slippers when out of  bed  -CW     Recorded by [CW] Silvana Garcia PTA [CW] Silvana Garcia PTA     Dysphagia/Swallow Intervention    Dysphagia/Swallow Therapeutic Feed   Pt completed trials of a regular solid and thin liquids with no overt s/s of aspiration. He had adequate mastication and clearance of the solid. Discontinue - goal met  -MB    Recorded by   [MB] Zandra Haskins CCC-SLP    Bed Mobility, Assessment/Treatment    Bed Mobility, Comment up in chair  -CW up in chair  -CW     Recorded by [CW] Silvana Garcia PTA [CW] Silvana Garcia PTA     Transfer Assessment/Treatment    Transfers, Sit-Stand [place] stand by assist;verbal cues required  -CW stand by assist;verbal cues required  -CW     Transfers, Stand-Sit [place] stand by assist;supervision required  -CW stand by assist;verbal cues required  -CW     Transfers, Sit-Stand-Sit, Assist Device rolling walker  -CW rolling walker  -CW     Transfer, Safety Issues impulsivity;step length decreased  -CW impulsivity;step length decreased  -CW     Transfer, Impairments strength decreased;sensation decreased  -CW strength decreased;sensation decreased  -CW     Transfer, Comment Cues for hand placement  -CW Cues for hand placement during transfers  -CW     Recorded by [CW] Silvana Garcia PTA [CW] Silvana Garcia PTA     Gait Assessment/Treatment    Gait, [place] Level contact guard assist;verbal cues required  -CW contact guard assist;verbal cues required  -CW     Gait, Assistive Device rolling walker  -CW rolling walker  -CW     Gait, Distance (Feet) 125  -   x 4 with standing rests to correct mechanics  -CW     Gait, Gait Pattern Analysis swing-through gait  -CW swing-through gait  -CW     Gait, Gait Deviations andrea decreased;forward flexed posture;narrow base;step length decreased  -CW andrea decreased;forward flexed posture;narrow base;step length decreased  -CW     Gait, Safety Issues step length decreased  -CW step length  decreased  -CW     Gait, Impairments sensation decreased;strength decreased;impaired balance;coordination impaired  -CW strength decreased;sensation decreased  -CW     Gait, Comment Cues to look up, broaden LUCINA and stay inside walker  -CW Cues to look up, broaden LUCINA and stay inside wallker  -CW     Recorded by [CW] Silvana Garcia PTA [CW] Silvana Garcia PTA     Motor Skills/Interventions    Additional Documentation Balance Skills Training (Group)  -CW      Recorded by [CW] Silvana Garcia PTA      Balance Skills Training    Gait Balance-Level of Assistance Contact guard  -CW      Gait Balance Support Right upper extremity supported;Left upper extremity supported  -CW      Gait Balance Activities backwards;side-stepping;scanning environment R/L  -CW      Recorded by [CW] Silvana Garcia PTA      Therapy Exercises    Bilateral Lower Extremities AROM:;15 reps;standing;hip abduction/adduction;hip flexion;mini squats  -CW      Recorded by [CW] Silvana Garcia PTA      Orthosis Location    Orthosis Location/Type lower extremity  -CW lower extremity  -CW     Orthosis, Lower Extremity Left:;Right:;AFO (ankle foot orthosis)  -CW Left:;Right:;AFO (ankle foot orthosis)  -CW     Recorded by [CW] Silvana Garcia PTA [CW] Silvana Garcia PTA     Positioning and Restraints    Pre-Treatment Position sitting in chair/recliner  -CW sitting in chair/recliner  -CW     Post Treatment Position chair  -CW chair  -CW     In Chair sitting;call light within reach;with family/caregiver  -CW sitting;call light within reach  -CW     Recorded by [CW] Silvana Garcia PTA [CW] Silvana Garcia PTA       09/03/17 0830          Rehab Assessment/Intervention    Discipline physical therapy assistant  -CW      Document Type therapy note (daily note)  -CW      Subjective Information agree to therapy;no complaints  -CW      Patient Effort, Rehab Treatment good  -CW      Precautions/Limitations fall precautions  -CW       Specific Treatment Considerations B AFOs; neuropathy B knees down  -CW      Recorded by [CW] Silvana Garcia PTA      Pain Assessment    Pain Assessment No/denies pain  -CW      Recorded by [CW] Silvana Garcia PTA      Vision Assessment/Intervention    Visual Impairment --   Improved attention to left side - did not run into objects   -CW      Recorded by [CW] Silvana Garcia PTA      Cognitive Assessment/Intervention    Personal Safety Interventions fall prevention program maintained;gait belt;nonskid shoes/slippers when out of bed  -CW      Recorded by [CW] Silvana Garcia PTA      Bed Mobility, Assessment/Treatment    Bed Mob, Supine to Sit, [place] conditional independence  -CW      Recorded by [CW] Silvana Garcia PTA      Transfer Assessment/Treatment    Transfers, Sit-Stand [place] stand by assist;verbal cues required  -CW      Transfers, Stand-Sit [place] stand by assist;verbal cues required  -CW      Transfers, Sit-Stand-Sit, Assist Device rolling walker  -CW      Toilet Transfer, [place] contact guard assist  -CW      Toilet Transfer, Assistive Device rolling walker  -CW      Transfer, Safety Issues impulsivity;step length decreased  -CW      Transfer, Impairments strength decreased;sensation decreased  -CW      Transfer, Comment Cues for hand placement during transfers  -CW      Recorded by [CW] Silvana Garcia PTA      Gait Assessment/Treatment    Gait, [place] Level contact guard assist;verbal cues required  -CW      Gait, Assistive Device rolling walker  -CW      Gait, Distance (Feet) 350  -CW      Gait, Gait Pattern Analysis swing-through gait  -CW      Gait, Gait Deviations andrea decreased;forward flexed posture;narrow base;step length decreased  -CW      Gait, Safety Issues step length decreased  -CW      Gait, Impairments strength decreased;sensation decreased  -CW      Gait, Comment Cues to look up, broaden LUCINA, and stay inside walker.  -CW       Recorded by [CW] Silvana Garcia PTA      Therapy Exercises    Bilateral Lower Extremities AROM:;20 reps;sitting  -CW      Recorded by [CW] Silvana Garcia PTA      Orthosis Location    Orthosis Location/Type lower extremity  -CW      Orthosis, Lower Extremity Left:;Right:;AFO (ankle foot orthosis)  -CW      Recorded by [CW] Silvana Garcia PTA      Positioning and Restraints    Pre-Treatment Position in bed  -CW      Post Treatment Position chair  -CW      In Chair sitting;call light within reach;with family/caregiver  -CW      Recorded by [CW] Silvana Garcia PTA        User Key  (r) = Recorded By, (t) = Taken By, (c) = Cosigned By    Initials Name Effective Dates    HOMER Haskins, FLORIN-SLP 08/02/16 -     CW Silvana Garcia PTA 06/22/15 -     MM Иван Brady, OTR/L 10/21/16 -                 IP PT Goals       09/02/17 1406          Bed Mobility PT LTG    Bed Mobility PT LTG, Date Established 09/02/17  -PB (r) MS (t) PB (c)      Bed Mobility PT LTG, Time to Achieve by discharge  -PB (r) MS (t) PB (c)      Bed Mobility PT LTG, Activity Type all bed mobility  -PB (r) MS (t) PB (c)      Bed Mobility PT LTG, [place] Level independent  -PB (r) MS (t) PB (c)      Transfer Training PT LTG    Transfer Training PT LTG, Date Established 09/02/17  -PB (r) MS (t) PB (c)      Transfer Training PT LTG, Time to Achieve by discharge  -PB (r) MS (t) PB (c)      Transfer Training PT LTG, Activity Type bed to chair /chair to bed;sit to stand/stand to sit  -PB (r) MS (t) PB (c)      Transfer Training PT LTG, [place] Level conditional independence  -PB (r) MS (t) PB (c)      Transfer Training PT LTG, Assist Device walker, rolling  -PB (r) MS (t) PB (c)      Gait Training PT LTG    Gait Training Goal PT LTG, Date Established 09/02/17  -PB (r) MS (t) PB (c)      Gait Training Goal PT LTG, Time to Achieve by discharge  -PB (r) MS (t) PB (c)      Gait Training Goal PT LTG, [place] Level  conditional independence  -PB (r) MS (t) PB (c)      Gait Training Goal PT LTG, Assist Device walker, rolling  -PB (r) MS (t) PB (c)      Gait Training Goal PT LTG, Distance to Achieve 300'  -PB (r) MS (t) PB (c)      Gait Training Goal PT LTG, Additional Goal without tripping over feet/keeping clear of obstacles  -PB (r) MS (t) PB (c)      Stair Training PT LTG    Stair Training Goal PT LTG, Date Established 09/02/17  -PB (r) MS (t) PB (c)      Stair Training Goal PT LTG, Time to Achieve by discharge  -PB (r) MS (t) PB (c)      Stair Training Goal PT LTG, Number of Steps 1  -PB (r) MS (t) PB (c)      Stair Training Goal PT LTG, [place] Level conditional independence  -PB (r) MS (t) PB (c)      Stair Training Goal PT LTG, Assist Device 1 handrail  -PB (r) MS (t) PB (c)        User Key  (r) = Recorded By, (t) = Taken By, (c) = Cosigned By    Initials Name Provider Type    REJI Fountain, PT DPT Physical Therapist    MS Yulissa Simms, PT Student PT Student          Physical Therapy Education     Title: PT OT SLP Therapies (Active)     Topic: Physical Therapy (Active)     Point: Mobility training (Active)    Learning Progress Summary    Learner Readiness Method Response Comment Documented by Status   Patient Acceptance E,D NR Gait, body mechanics  09/05/17 1626 Active    Acceptance E,D VU,NR Gait, body mechanics  09/04/17 0820 Done    Acceptance E,D VU,NR Transferss, posture, body mechanics, gait, plan of care  09/03/17 0948 Done    Acceptance E VU,NR Proper use of AD, safety with t/fs and ambulation, benefits of increased activity MS 09/02/17 1411 Done               Point: Home exercise program (Active)    Learning Progress Summary    Learner Readiness Method Response Comment Documented by Status   Patient Acceptance E,D NR Gait, body mechanics CW 09/05/17 1626 Active    Acceptance E,D VU,NR Transferss, posture, body mechanics, gait, plan of care CW 09/03/17 0948 Done               Point: Body  mechanics (Active)    Learning Progress Summary    Learner Readiness Method Response Comment Documented by Status   Patient Acceptance E,D NR Gait, body mechanics  09/05/17 1626 Active    Acceptance E,D VU,NR Gait, body mechanics  09/04/17 0820 Done    Acceptance E,D VU,NR Transferss, posture, body mechanics, gait, plan of care  09/03/17 0948 Done               Point: Precautions (Active)    Learning Progress Summary    Learner Readiness Method Response Comment Documented by Status   Patient Acceptance E,D NR Gait, body mechanics  09/05/17 1626 Active    Acceptance E,D VU,NR Gait, body mechanics  09/04/17 0820 Done    Acceptance E,D VU,NR Transferss, posture, body mechanics, gait, plan of care  09/03/17 0948 Done    Acceptance E VU,NR Proper use of AD, safety with t/fs and ambulation, benefits of increased activity MS 09/02/17 1411 Done                      User Key     Initials Effective Dates Name Provider Type Discipline     06/22/15 -  Silvana Garcia, PTA Physical Therapy Assistant PT    MS 07/19/17 -  Yulissa Simms, PT Student PT Student PT                    PT Recommendation and Plan  Anticipated Discharge Disposition: home with assist, home with home health  Planned Therapy Interventions: balance training, bed mobility training, gait training, home exercise program, patient/family education, stair training, strengthening, transfer training  PT Frequency: daily, 2 times/day, per priority policy  Plan of Care Review  Plan Of Care Reviewed With: patient  Progress: progress toward functional goals as expected  Outcome Summary/Follow up Plan: Pt required more assist for sit to stand today.  Pt is supervisiion for bed mobility and min assist for sit to stand transfers.  Pt ambulated 100' in hallway with rolling walker requires frequent verbal cues to look up, broaden LUCINA and stay inside walker.  Pt has tendncy to let RLE drift over and kick LLE as he walks.  Left pt sitting in bathroom shower  chair with OT for shower.  Will continue to benefit from therapy to increase strength and improve gait safety..          Outcome Measures       09/05/17 1600 09/04/17 0800 09/03/17 0900    How much help from another person do you currently need...    Turning from your back to your side while in flat bed without using bedrails? 4  -CW 4  -CW 4  -CW    Moving from lying on back to sitting on the side of a flat bed without bedrails? 4  -CW 4  -CW 4  -CW    Moving to and from a bed to a chair (including a wheelchair)? 3  -CW 3  -CW 3  -CW    Standing up from a chair using your arms (e.g., wheelchair, bedside chair)? 3  -CW 3  -CW 3  -CW    Climbing 3-5 steps with a railing? 3  -CW 3  -CW 3  -CW    To walk in hospital room? 3  -CW 3  -CW 3  -CW    AM-PAC 6 Clicks Score 20  -CW 20  -CW 20  -CW    Functional Assessment    Outcome Measure Options AM-PAC 6 Clicks Basic Mobility (PT)  -CW AM-PAC 6 Clicks Basic Mobility (PT)  -CW AM-PAC 6 Clicks Basic Mobility (PT)  -CW      User Key  (r) = Recorded By, (t) = Taken By, (c) = Cosigned By    Initials Name Provider Type    ELVIRA Garcia PTA Physical Therapy Assistant           Time Calculation:         PT Charges       09/05/17 1632          Time Calculation    Start Time 1517  -CW      Stop Time 1540  -CW      Time Calculation (min) 23 min  -CW      PT Received On 09/05/17  -CW      PT Goal Re-Cert Due Date 09/12/17  -CW      Time Calculation- PT    Total Timed Code Minutes- PT 23 minute(s)  -CW        User Key  (r) = Recorded By, (t) = Taken By, (c) = Cosigned By    Initials Name Provider Type    ELVIRA Garcia PTA Physical Therapy Assistant          Therapy Charges for Today     Code Description Service Date Service Provider Modifiers Qty    47880083235 HC GAIT TRAINING EA 15 MIN 9/4/2017 Silvana Garcia PTA GP, KX 2    39326276953 HC PT THER PROC EA 15 MIN 9/4/2017 Silvana Garcia PTA GP, KX 1    50762907430 HC GAIT TRAINING EA 15 MIN 9/4/2017  Silvana Garcia PTA GP, KX 1    77724906033 HC PT THER PROC EA 15 MIN 9/5/2017 Silvana Garcia PTA GP, KX 1    99530793252 HC GAIT TRAINING EA 15 MIN 9/5/2017 Silvana Garcia PTA GP, KX 1          PT G-Codes  Outcome Measure Options: AM-PAC 6 Clicks Basic Mobility (PT)  Score: 18  Functional Limitation: Mobility: Walking and moving around  Mobility: Walking and Moving Around Current Status (): At least 40 percent but less than 60 percent impaired, limited or restricted  Mobility: Walking and Moving Around Goal Status (): At least 20 percent but less than 40 percent impaired, limited or restricted    Silvana Garcia PTA  9/5/2017

## 2017-09-05 NOTE — THERAPY TREATMENT NOTE
Acute Care - Occupational Therapy Treatment Note  [facility]     Patient Name: Casey Berger  : 1940  MRN: [id number]  Today's Date: 2017  Onset of Illness/Injury or Date of Surgery Date: 17  Date of Referral to OT: 17  Referring Physician: Dr. Butt      Admit Date: 2017    Visit Dx:     ICD-10-CM ICD-9-CM   1. Altered mental status, unspecified altered mental status type R41.82 780.97   2. Acute UTI N39.0 599.0   3. Oropharyngeal dysphagia R13.12 787.22   4. Decreased activities of daily living (ADL) Z78.9 V49.89   5. Impaired functional mobility, balance, gait, and endurance Z74.09 V49.89   6. Impaired cognition R41.89 294.9     Patient Active Problem List   Diagnosis   • Stroke   • Type 2 diabetes mellitus   • Hypertension   • Anxiety   • Cystitis             Adult Rehabilitation Note       17 1540 17 1527 17 1517    Rehab Assessment/Intervention    Discipline  occupational therapist  -MM physical therapy assistant  -CW    Document Type   therapy note (daily note)  -CW    Subjective Information   agree to therapy;no complaints  -CW    Patient Effort, Rehab Treatment   good  -CW    Precautions/Limitations fall precautions  -MM  fall precautions  -CW    Specific Treatment Considerations B AFOs; Neuropathy B knees down  -MM  B AFOs; Neuropathy B knees down  -CW    Recorded by [MM] Иван Brady OTR/L [MM] Иван Brady OTR/L [CW] Silvana Garcia PTA    Pain Assessment    Pain Assessment No/denies pain  -MM  No/denies pain  -CW    Recorded by [MM] Иван Brady OTR/L  [CW] Silvana Garcia PTA    Vision Assessment/Intervention    Visual Impairment blurred  -MM      Recorded by [MM] Иван Brady OTR/L      Cognitive Assessment/Intervention    Current Cognitive/Communication Assessment impaired  -MM  impaired  -CW    Orientation Status oriented x 4  -MM      Follows Commands/Answers Questions 100% of the time;able to follow single-step  instructions;needs increased time;needs cueing  -MM      Personal Safety decreased awareness, need for assist;decreased awareness, need for safety;moderate impairment;impulsive  -MM      Personal Safety Interventions fall prevention program maintained;gait belt;muscle strengthening facilitated;nonskid shoes/slippers when out of bed;supervised activity  -MM  fall prevention program maintained;gait belt;nonskid shoes/slippers when out of bed  -CW    Recorded by [MM] Иван Brady, OTR/L  [CW] Silvana Garcia PTA    Bed Mobility, Assessment/Treatment    Bed Mob, Supine to Sit, [place]   supervision required  -CW    Bed Mobility, Safety Issues   decreased use of legs for bridging/pushing  -CW    Bed Mobility, Impairments   strength decreased;impaired balance;coordination impaired  -CW    Recorded by   [CW] Silvana Garcia PTA    Transfer Assessment/Treatment    Transfers, Sit-Stand [place] minimum assist (75% patient effort);verbal cues required  -MM  minimum assist (75% patient effort);verbal cues required  -CW    Transfers, Stand-Sit [place] minimum assist (75% patient effort);verbal cues required  -MM  minimum assist (75% patient effort);verbal cues required  -CW    Transfers, Sit-Stand-Sit, Assist Device rolling walker  -MM  rolling walker  -CW    Walk-In Shower Transfer, [place] minimum assist (75% patient effort);verbal cues required  -MM      Walk-In Shower Transfer, Assist Device rolling walker  -MM      Transfer, Safety Issues impulsivity  -MM  impulsivity  -CW    Transfer, Impairments strength decreased;sensation decreased;impaired balance  -MM  strength decreased;sensation decreased;impaired balance  -CW    Transfer, Comment cues for hand placement  -MM  Cues for hand placement  -CW    Recorded by [MM] Иван Brady, OTR/L  [CW] Silvana Garcia PTA    Gait Assessment/Treatment    Gait, [place] Level   contact guard assist;verbal cues required  -CW    Gait, Assistive  Device   rolling walker  -CW    Gait, Distance (Feet)   100  -CW    Gait, Gait Pattern Analysis   swing-through gait  -CW    Gait, Gait Deviations   andrea decreased;forward flexed posture;step length decreased;narrow base  -CW    Gait, Safety Issues   step length decreased  -CW    Gait, Impairments   strength decreased;sensation decreased;impaired balance  -CW    Gait, Comment   Cus to look up, broaden LUCINA, and stay inside walker  -CW    Recorded by   [CW] Silvana Garcia PTA    Functional Mobility    Functional Mobility- Ind. Level contact guard assist;verbal cues required;nonverbal cues required (demo/gesture)  -MM      Functional Mobility- Device rolling walker  -MM      Functional Mobility- Comment bed <> BR  -MM      Recorded by [MM] RYLEE De La Vega/L      Upper Body Bathing Assessment/Training    UB Bathing Assess/Train Assistive Device grab bars;hand-held shower head;shower chair with back  -MM      UB Bathing Assess/Train, Position sitting  -MM      UB Bathing Assess/Train, [place] Level contact guard assist;verbal cues required;supervision required  -MM      UB Bathing Assess/Train, Impairments impaired balance   impulsive  -MM      UB Bathing Assess/Train, Comment cues for sequencing  -MM      Recorded by [MM] Иван Brady OTR/L      Lower Body Bathing Assessment/Training    LB Bathing Assess/Train Assistive Device grab bars;hand-held shower head;shower chair with back  -MM      LB Bathing Assess/Train, Position sitting;standing  -MM      LB Bathing Assess/Train, [place] Level contact guard assist;verbal cues required;supervision required  -MM      LB Bathing Assess/Train, Impairments impaired balance   impulsive  -MM      LB Bathing Assess/Train, Comment cues for sequencing  -MM      Recorded by [MM] RYLEE De La Vega/L      Upper Body Dressing Assessment/Training    UB Dressing Assess/Train, Clothing Type doffing:;hospital gown;donning:;button up  -MM      UB Dressing  Assess/Train, Position sitting  -MM      UB Dressing Assess/Train, [place] moderate assist (50% patient effort);verbal cues required  -MM      UB Dressing Assess/Train, Impairments impaired balance;impaired vision  -MM      UB Dressing Assess/Train, Comment assist required for buttons and arm placement  -MM      Recorded by [MM] RYLEE De La Vega/L      Lower Body Dressing Assessment/Training    LB Dressing Assess/Train, Clothing Type doffing:;donning:;slipper socks;shoes   AFOS  -MM      LB Dressing Assess/Train, Position sitting  -MM      LB Dressing Assess/Train, [place] minimum assist (75% patient effort);contact guard assist;verbal cues required  -MM      Recorded by [MM] DWAYNE De La Vega      Grooming Assessment/Training    Grooming Assess/Train, Position standing  -MM      Grooming Assess/Train, [place] Level contact guard assist;supervision required;set up required  -MM      Grooming Assess/Train, Impairments impaired balance  -MM      Grooming Assess/Train, Comment shaved face  -MM      Recorded by [MM] RYLEE De La Vega/L      Therapy Exercises    Bilateral Lower Extremities   AROM:;20 reps;sitting  -CW    Recorded by   [CW] Silvana Garcia PTA    Orthosis Location    Orthosis Location/Type lower extremity  -MM  lower extremity  -CW    Orthosis, Lower Extremity Left:;Right:;AFO (ankle foot orthosis)  -MM  Left:;Right:;AFO (ankle foot orthosis)  -CW    Recorded by [MM] RYLEE De La Vega/ALEXANDRA  [CW] Silvana Garcia PTA    Positioning and Restraints    Pre-Treatment Position in bed  -MM  in bed  -CW    Post Treatment Position chair  -MM  bathroom  -CW    In Chair notified nsg;sitting;call light within reach;encouraged to call for assist  -MM      Bathroom   sitting;call light within reach;with OT  -CW    Recorded by [MM] RYLEE De La Vega/ALEXANDRA  [CW] Silvana Garcia PTA      09/05/17 1400 09/04/17 1351 09/04/17 0745    Rehab Assessment/Intervention    Discipline speech  language pathologist  -MB physical therapy assistant  -CW physical therapy assistant  -CW    Document Type therapy note (daily note)  -MB therapy note (daily note)  -CW therapy note (daily note)  -CW    Subjective Information agree to therapy;complains of;fatigue  -MB no complaints;agree to therapy  -CW no complaints;agree to therapy  -CW    Patient Effort, Rehab Treatment good  -MB good  -CW good  -CW    Precautions/Limitations  fall precautions  -CW fall precautions  -CW    Specific Treatment Considerations  B AFOs; Neuropathy B knees down  -CW B AFOS; Neuropathy B LE knees down  -CW    Recorded by [MB] DMITRY ValladaresSLP [CW] Silvana Garcia PTA [CW] Silvana Garcia PTA    Pain Assessment    Pain Assessment No/denies pain  -MB No/denies pain  -CW No/denies pain  -CW    Recorded by [MB] MURIEL Valladares [CW] Silvana Garcia PTA [CW] Silvana Garcia PTA    Vision Assessment/Intervention    Visual Impairment  blurred  -CW     Recorded by  [CW] Silvana Garcia PTA     Cognitive Assessment/Intervention    Current Cognitive/Communication Assessment  impaired  -CW     Personal Safety Interventions  fall prevention program maintained;gait belt;nonskid shoes/slippers when out of bed  -CW fall prevention program maintained;gait belt;nonskid shoes/slippers when out of bed  -CW    Recorded by  [CW] Silvana Garcia PTA [CW] Silvana Garcia PTA    Improve memory skills    Memory Skills Progress 30%;without cues  -MB      Comments: Improve memory skills Pt required mod to max cues to reorganize words into a sequential order.   -MB      Recorded by [MB] MURIEL Valladares      Improve functional problem solving    Functional Problem Solving Progress 40%;without cues  -MB      Comments: Improve functional problem solving Pt required mod cues at times to answer daily problem solving questions appropriately.  -MB      Recorded by [MB] MURIEL Valladares      Bed  Mobility, Assessment/Treatment    Bed Mobility, Comment  up in chair  -CW up in chair  -CW    Recorded by  [CW] Silvana Garcia PTA [CW] Silvana Garcia PTA    Transfer Assessment/Treatment    Transfers, Sit-Stand [place]  stand by assist;verbal cues required  -CW stand by assist;verbal cues required  -CW    Transfers, Stand-Sit [place]  stand by assist;supervision required  -CW stand by assist;verbal cues required  -CW    Transfers, Sit-Stand-Sit, Assist Device  rolling walker  -CW rolling walker  -CW    Transfer, Safety Issues  impulsivity;step length decreased  -CW impulsivity;step length decreased  -CW    Transfer, Impairments  strength decreased;sensation decreased  -CW strength decreased;sensation decreased  -CW    Transfer, Comment  Cues for hand placement  -CW Cues for hand placement during transfers  -CW    Recorded by  [CW] Silvana Garcia PTA [CW] Silvana Garcia PTA    Gait Assessment/Treatment    Gait, [place] Level  contact guard assist;verbal cues required  -CW contact guard assist;verbal cues required  -CW    Gait, Assistive Device  rolling walker  -CW rolling walker  -CW    Gait, Distance (Feet)  125  -   x 4 with standing rests to correct mechanics  -CW    Gait, Gait Pattern Analysis  swing-through gait  -CW swing-through gait  -CW    Gait, Gait Deviations  andrea decreased;forward flexed posture;narrow base;step length decreased  -CW andrea decreased;forward flexed posture;narrow base;step length decreased  -CW    Gait, Safety Issues  step length decreased  -CW step length decreased  -CW    Gait, Impairments  sensation decreased;strength decreased;impaired balance;coordination impaired  -CW strength decreased;sensation decreased  -CW    Gait, Comment  Cues to look up, broaden LUCINA and stay inside walker  -CW Cues to look up, broaden LUCINA and stay inside wallker  -CW    Recorded by  [CW] Silvana Garcia PTA [CW] Silvana Garcia PTA    Motor  Skills/Interventions    Additional Documentation  Balance Skills Training (Group)  -CW     Recorded by  [CW] Silvana Garcia PTA     Balance Skills Training    Gait Balance-Level of Assistance  Contact guard  -CW     Gait Balance Support  Right upper extremity supported;Left upper extremity supported  -CW     Gait Balance Activities  backwards;side-stepping;scanning environment R/L  -CW     Recorded by  [CW] Silvana Garcia PTA     Therapy Exercises    Bilateral Lower Extremities  AROM:;15 reps;standing;hip abduction/adduction;hip flexion;mini squats  -CW     Recorded by  [CW] Silvana Garcia PTA     Orthosis Location    Orthosis Location/Type  lower extremity  -CW lower extremity  -CW    Orthosis, Lower Extremity  Left:;Right:;AFO (ankle foot orthosis)  -CW Left:;Right:;AFO (ankle foot orthosis)  -CW    Recorded by  [CW] Silvana Garcia PTA [CW] Silvana Garcia PTA    Positioning and Restraints    Pre-Treatment Position  sitting in chair/recliner  -CW sitting in chair/recliner  -CW    Post Treatment Position  chair  -CW chair  -CW    In Chair  sitting;call light within reach;with family/caregiver  -CW sitting;call light within reach  -CW    Recorded by  [CW] Silvana Garcia PTA [CW] Silvana Garcia PTA      09/03/17 1031 09/03/17 0830       Rehab Assessment/Intervention    Discipline speech language pathologist  -MB physical therapy assistant  -CW     Document Type therapy note (daily note)  -MB therapy note (daily note)  -CW     Subjective Information no complaints;agree to therapy  -MB agree to therapy;no complaints  -CW     Patient Effort, Rehab Treatment good  -MB good  -CW     Precautions/Limitations  fall precautions  -CW     Specific Treatment Considerations  B AFOs; neuropathy B knees down  -CW     Recorded by [MB] Zandra Haskins CCC-SLP [CW] Silvana Garcia PTA     Pain Assessment    Pain Assessment  No/denies pain  -CW     Recorded by  [CW] Silvana Garcia PTA      Vision Assessment/Intervention    Visual Impairment  --   Improved attention to left side - did not run into objects   -CW     Recorded by  [CW] Silvana Garica PTA     Cognitive Assessment/Intervention    Personal Safety Interventions  fall prevention program maintained;gait belt;nonskid shoes/slippers when out of bed  -CW     Recorded by  [CW] Silvana Garcia PTA     Dysphagia/Swallow Intervention    Dysphagia/Swallow Therapeutic Feed Pt completed trials of a regular solid and thin liquids with no overt s/s of aspiration. He had adequate mastication and clearance of the solid. Discontinue - goal met  -MB      Recorded by [MB] Zandra Haskins CCC-SLP      Bed Mobility, Assessment/Treatment    Bed Mob, Supine to Sit, [place]  conditional independence  -CW     Recorded by  [CW] Silvana Garcia PTA     Transfer Assessment/Treatment    Transfers, Sit-Stand [place]  stand by assist;verbal cues required  -CW     Transfers, Stand-Sit [place]  stand by assist;verbal cues required  -CW     Transfers, Sit-Stand-Sit, Assist Device  rolling walker  -CW     Toilet Transfer, [place]  contact guard assist  -CW     Toilet Transfer, Assistive Device  rolling walker  -CW     Transfer, Safety Issues  impulsivity;step length decreased  -CW     Transfer, Impairments  strength decreased;sensation decreased  -CW     Transfer, Comment  Cues for hand placement during transfers  -CW     Recorded by  [CW] Silvana Garcia PTA     Gait Assessment/Treatment    Gait, [place] Level  contact guard assist;verbal cues required  -CW     Gait, Assistive Device  rolling walker  -CW     Gait, Distance (Feet)  350  -CW     Gait, Gait Pattern Analysis  swing-through gait  -CW     Gait, Gait Deviations  andrea decreased;forward flexed posture;narrow base;step length decreased  -CW     Gait, Safety Issues  step length decreased  -CW     Gait, Impairments  strength decreased;sensation decreased  -CW     Gait,  Comment  Cues to look up, broaden LUCINA, and stay inside walker.  -CW     Recorded by  [CW] Silvana Garcia PTA     Therapy Exercises    Bilateral Lower Extremities  AROM:;20 reps;sitting  -CW     Recorded by  [CW] Silvana Garcia PTA     Orthosis Location    Orthosis Location/Type  lower extremity  -CW     Orthosis, Lower Extremity  Left:;Right:;AFO (ankle foot orthosis)  -CW     Recorded by  [CW] Silvana Garcia PTA     Positioning and Restraints    Pre-Treatment Position  in bed  -CW     Post Treatment Position  chair  -CW     In Chair  sitting;call light within reach;with family/caregiver  -CW     Recorded by  [CW] Silvana Garcia PTA       User Key  (r) = Recorded By, (t) = Taken By, (c) = Cosigned By    Initials Name Effective Dates    HOMER Haskins, CCC-SLP 08/02/16 -     CW Silvana Garcia PTA 06/22/15 -     MM Иван Brady, OTR/L 10/21/16 -                 OT Goals       09/02/17 1356          Transfer Training OT LTG    Transfer Training OT LTG, Date Established 09/02/17  -ND      Transfer Training OT LTG, Time to Achieve by discharge  -ND      Transfer Training OT LTG, Activity Type all transfers  -ND      Transfer Training OT LTG, [place] Level conditional independence  -ND      Strength OT LTG    Strength Goal OT LTG, Date Established 09/02/17  -ND      Strength Goal OT LTG, Time to Achieve by discharge  -ND      Strength Goal OT LTG, Measure to Achieve Pt. will complete BUE strengthening exercises to increase BUE strength to 5/5 for ADLs.   -ND      Bathing OT LTG    Bathing Goal OT LTG, Date Established 09/02/17  -ND      Bathing Goal OT LTG, Time to Achieve by discharge  -ND      Bathing Goal OT LTG, Activity Type upper body bathing;lower body bathing  -ND      Bathing Goal OT LTG, [place] Level independent  -ND      LB Dressing OT LTG    LB Dressing Goal OT LTG, Date Established 09/02/17  -ND      LB Dressing Goal OT LTG, Time to Achieve by discharge  -ND       LB Dressing Goal OT LTG, [place] Level independent  -ND        User Key  (r) = Recorded By, (t) = Taken By, (c) = Cosigned By    Initials Name Provider Type    ND Pao Nguyen OTR/L Occupational Therapist          Occupational Therapy Education     Title: PT OT SLP Therapies (Active)     Topic: Occupational Therapy (Active)     Point: ADL training (Active)    Description: Instruct learner(s) on proper safety adaptation and remediation techniques during self care or transfers.   Instruct in proper use of assistive devices.    Learning Progress Summary    Learner Readiness Method Response Comment Documented by Status   Patient Acceptance E NR pt educated on safety with adls  09/05/17 1644 Active    Acceptance E VU,NR Pt. educated on role of OT, safe t/f, benefit of activity, r/w safety, progression with poc ND 09/02/17 1355 Done               Point: Home exercise program (Done)    Description: Instruct learner(s) on appropriate technique for monitoring, assisting and/or progressing therapeutic exercises/activities.    Learning Progress Summary    Learner Readiness Method Response Comment Documented by Status   Patient Acceptance E VU,NR Pt. educated on role of OT, safe t/f, benefit of activity, r/w safety, progression with poc ND 09/02/17 1355 Done               Point: Body mechanics (Done)    Description: Instruct learner(s) on proper positioning and spine alignment during self-care, functional mobility activities and/or exercises.    Learning Progress Summary    Learner Readiness Method Response Comment Documented by Status   Patient Acceptance E VU,NR Pt. educated on role of OT, safe t/f, benefit of activity, r/w safety, progression with poc ND 09/02/17 1355 Done                      User Key     Initials Effective Dates Name Provider Type Discipline     10/21/16 -  Иван Brady OTR/L Occupational Therapist OT    ND 10/21/16 -  Pao Nguyen OTR/L Occupational Therapist OT                  OT  Recommendation and Plan  Anticipated Discharge Disposition: home with assist, home with home health  Planned Therapy Interventions: activity intolerance, ADL retraining, balance training, bed mobility training, energy conservation, home exercise program, strengthening, transfer training  Therapy Frequency: 3-5 times/wk  Plan of Care Review  Plan Of Care Reviewed With: patient  Progress: progress toward functional goals as expected  Outcome Summary/Follow up Plan: OT tx completed. Educated spouse on OT role, POC, and discussed pt's needs/concerns while PT was working with pt. Pt was min A with RW for transfers and CGA with RW for functional mobility from bed <>BR. Pt was supervision to CGA for full body bathing with cues for sequencing and safety. Pt was mod A to doff gown and maninder button up with assist needed for buttoning. Pt was min A to maninder/doff AFOs, socks and shoes. Pt was CGA with set up for shaving his face. Pt required verbal cues throughout tx for safety. Cont OT POC.  Pt would continue to benfit from skilled therapy to increase safety and ADL independence.        Outcome Measures       09/05/17 1643 09/05/17 1600 09/04/17 0800    How much help from another person do you currently need...    Turning from your back to your side while in flat bed without using bedrails?  4  -CW 4  -CW    Moving from lying on back to sitting on the side of a flat bed without bedrails?  4  -CW 4  -CW    Moving to and from a bed to a chair (including a wheelchair)?  3  -CW 3  -CW    Standing up from a chair using your arms (e.g., wheelchair, bedside chair)?  3  -CW 3  -CW    Climbing 3-5 steps with a railing?  3  -CW 3  -CW    To walk in hospital room?  3  -CW 3  -CW    AM-PAC 6 Clicks Score  20  -CW 20  -CW    How much help from another is currently needed...    Putting on and taking off regular lower body clothing? 3  -MM      Bathing (including washing, rinsing, and drying) 2  -MM      Toileting (which includes using toilet  bed pan or urinal) 3  -MM      Putting on and taking off regular upper body clothing 4  -MM      Taking care of personal grooming (such as brushing teeth) 4  -MM      Eating meals 4  -MM      Score 20  -MM      Functional Assessment    Outcome Measure Options AM-PAC 6 Clicks Daily Activity (OT)  -MM AM-PAC 6 Clicks Basic Mobility (PT)  -CW AM-PAC 6 Clicks Basic Mobility (PT)  -CW      09/03/17 0900          How much help from another person do you currently need...    Turning from your back to your side while in flat bed without using bedrails? 4  -CW      Moving from lying on back to sitting on the side of a flat bed without bedrails? 4  -CW      Moving to and from a bed to a chair (including a wheelchair)? 3  -CW      Standing up from a chair using your arms (e.g., wheelchair, bedside chair)? 3  -CW      Climbing 3-5 steps with a railing? 3  -CW      To walk in hospital room? 3  -CW      AM-PAC 6 Clicks Score 20  -CW      Functional Assessment    Outcome Measure Options AM-PAC 6 Clicks Basic Mobility (PT)  -CW        User Key  (r) = Recorded By, (t) = Taken By, (c) = Cosigned By    Initials Name Provider Type    CW Silvana Garcia PTA Physical Therapy Assistant    KIA Brady, OTR/L Occupational Therapist           Time Calculation:         Time Calculation- OT       09/05/17 1647          Time Calculation- OT    OT Start Time 1527  -MM      OT Stop Time 1634  -MM      OT Time Calculation (min) 67 min  -MM      Total Timed Code Minutes- OT 54 minute(s)  -MM      OT Non-Billable Time (min) 13 min   educated pt's spouse while PT was working with pt  -MM      OT Received On 09/05/17  -MM        User Key  (r) = Recorded By, (t) = Taken By, (c) = Cosigned By    Initials Name Provider Type    KIA Brady, OTR/L Occupational Therapist           Therapy Charges for Today     Code Description Service Date Service Provider Modifiers Qty    [id number]  OT SELF CARE/MGMT/TRAIN EA 15 MIN 9/5/2017 Иван  ANABELL Brady OTR/L GO, KX 4    [id number]  OT THER SUPP EA 15 MIN 9/5/2017 Иван Brady OTR/L GO 1          OT G-codes  OT Functional Scales Options: AM-PAC 6 Clicks Daily Activity (OT)  Functional Limitation: Self care  Self Care Current Status (): At least 20 percent but less than 40 percent impaired, limited or restricted  Self Care Goal Status (): At least 1 percent but less than 20 percent impaired, limited or restricted    RYLEE Torrez/ALEXANDRA  9/5/2017

## 2017-09-06 VITALS
TEMPERATURE: 98.6 F | SYSTOLIC BLOOD PRESSURE: 133 MMHG | HEART RATE: 64 BPM | BODY MASS INDEX: 22.4 KG/M2 | OXYGEN SATURATION: 97 % | RESPIRATION RATE: 16 BRPM | DIASTOLIC BLOOD PRESSURE: 66 MMHG | HEIGHT: 71 IN | WEIGHT: 160 LBS

## 2017-09-06 LAB
ANION GAP SERPL CALCULATED.3IONS-SCNC: 8 MMOL/L (ref 4–13)
BACTERIA SPEC AEROBE CULT: NORMAL
BACTERIA SPEC AEROBE CULT: NORMAL
BUN BLD-MCNC: 22 MG/DL (ref 5–21)
BUN/CREAT SERPL: 20 (ref 7–25)
CALCIUM SPEC-SCNC: 8.7 MG/DL (ref 8.4–10.4)
CHLORIDE SERPL-SCNC: 106 MMOL/L (ref 98–110)
CO2 SERPL-SCNC: 25 MMOL/L (ref 24–31)
CREAT BLD-MCNC: 1.1 MG/DL (ref 0.5–1.4)
DEPRECATED RDW RBC AUTO: 45.7 FL (ref 40–54)
ERYTHROCYTE [DISTWIDTH] IN BLOOD BY AUTOMATED COUNT: 14.2 % (ref 12–15)
GFR SERPL CREATININE-BSD FRML MDRD: 65 ML/MIN/1.73
GLUCOSE BLD-MCNC: 213 MG/DL (ref 70–100)
GLUCOSE BLDC GLUCOMTR-MCNC: 140 MG/DL (ref 70–130)
GLUCOSE BLDC GLUCOMTR-MCNC: 263 MG/DL (ref 70–130)
GLUCOSE BLDC GLUCOMTR-MCNC: 380 MG/DL (ref 70–130)
HCT VFR BLD AUTO: 41.4 % (ref 40–52)
HGB BLD-MCNC: 13.7 G/DL (ref 14–18)
MCH RBC QN AUTO: 29.3 PG (ref 28–32)
MCHC RBC AUTO-ENTMCNC: 33.1 G/DL (ref 33–36)
MCV RBC AUTO: 88.5 FL (ref 82–95)
PLATELET # BLD AUTO: 243 10*3/MM3 (ref 130–400)
PMV BLD AUTO: 12.1 FL (ref 6–12)
POTASSIUM BLD-SCNC: 4.1 MMOL/L (ref 3.5–5.3)
RBC # BLD AUTO: 4.68 10*6/MM3 (ref 4.8–5.9)
SODIUM BLD-SCNC: 139 MMOL/L (ref 135–145)
WBC NRBC COR # BLD: 9.95 10*3/MM3 (ref 4.8–10.8)

## 2017-09-06 PROCEDURE — 97116 GAIT TRAINING THERAPY: CPT

## 2017-09-06 PROCEDURE — 85027 COMPLETE CBC AUTOMATED: CPT | Performed by: NURSE PRACTITIONER

## 2017-09-06 PROCEDURE — 92507 TX SP LANG VOICE COMM INDIV: CPT

## 2017-09-06 PROCEDURE — 97530 THERAPEUTIC ACTIVITIES: CPT

## 2017-09-06 PROCEDURE — 80048 BASIC METABOLIC PNL TOTAL CA: CPT | Performed by: NURSE PRACTITIONER

## 2017-09-06 PROCEDURE — 97110 THERAPEUTIC EXERCISES: CPT

## 2017-09-06 PROCEDURE — 99232 SBSQ HOSP IP/OBS MODERATE 35: CPT | Performed by: PSYCHIATRY & NEUROLOGY

## 2017-09-06 PROCEDURE — 63710000001 INSULIN LISPRO (HUMAN) PER 5 UNITS: Performed by: NURSE PRACTITIONER

## 2017-09-06 PROCEDURE — 82962 GLUCOSE BLOOD TEST: CPT

## 2017-09-06 PROCEDURE — 63710000001 INSULIN DETEMIR PER 5 UNITS: Performed by: NURSE PRACTITIONER

## 2017-09-06 RX ORDER — NYSTATIN 100000 [USP'U]/G
POWDER TOPICAL EVERY 12 HOURS SCHEDULED
Start: 2017-09-06 | End: 2018-01-01

## 2017-09-06 RX ORDER — OXYCODONE AND ACETAMINOPHEN 7.5; 325 MG/1; MG/1
1 TABLET ORAL EVERY 6 HOURS PRN
Qty: 12 TABLET | Refills: 0 | Status: SHIPPED | OUTPATIENT
Start: 2017-09-06 | End: 2018-01-01 | Stop reason: HOSPADM

## 2017-09-06 RX ORDER — CLOPIDOGREL BISULFATE 75 MG/1
75 TABLET ORAL DAILY
Qty: 30 TABLET
Start: 2017-09-06 | End: 2018-01-01 | Stop reason: HOSPADM

## 2017-09-06 RX ORDER — ACETAMINOPHEN 325 MG/1
650 TABLET ORAL EVERY 4 HOURS PRN
Start: 2017-09-06

## 2017-09-06 RX ADMIN — NYSTATIN: 100000 POWDER TOPICAL at 08:28

## 2017-09-06 RX ADMIN — INSULIN LISPRO 8 UNITS: 100 INJECTION, SOLUTION INTRAVENOUS; SUBCUTANEOUS at 12:40

## 2017-09-06 RX ADMIN — Medication 50 MG: at 08:28

## 2017-09-06 RX ADMIN — INSULIN LISPRO 6 UNITS: 100 INJECTION, SOLUTION INTRAVENOUS; SUBCUTANEOUS at 08:34

## 2017-09-06 RX ADMIN — INSULIN DETEMIR 30 UNITS: 100 INJECTION, SOLUTION SUBCUTANEOUS at 08:33

## 2017-09-06 RX ADMIN — FAMOTIDINE 40 MG: 20 TABLET, FILM COATED ORAL at 08:28

## 2017-09-06 RX ADMIN — CLOPIDOGREL 75 MG: 75 TABLET, FILM COATED ORAL at 08:28

## 2017-09-06 NOTE — PLAN OF CARE
Problem: Patient Care Overview (Adult)  Goal: Plan of Care Review  Outcome: Ongoing (interventions implemented as appropriate)    09/06/17 1314   Coping/Psychosocial Response Interventions   Plan Of Care Reviewed With patient;spouse   Patient Care Overview   Progress progress toward functional goals as expected   Outcome Evaluation   Outcome Summary/Follow up Plan Pt completed memory and problem solving tasks with moderate difficulty requiring inconsistent cues.          Problem: Inpatient SLP  Goal: Cognitive-linguistic-Patient will improve Cognitive-linguistic skills to allow optimal participation in care  Outcome: Ongoing (interventions implemented as appropriate)    09/04/17 0954 09/06/17 1314   Cognitive Linguistic- Optimal Participation in Care   Cognitive Linguistic Optimal Participation in Care- SLP, Date Established 09/04/17 --    Cognitive Linguistic Optimal Participation in Care- SLP, Time to Achieve by discharge --    Cognitive Linguistic Optimal Participation in Care- SLP, Activity Level Patient will improve memory skills for increased safety in environment;Patient will improve functional problem solving skills for increased safety in environment;Patient will improve Executive functioning skills for increased safety in environment --    Cognitive Linguistic Optimal Participation in Care- SLP, Date Goal Reviewed --  09/06/17   Cognitive Linguistic Optimal Participation in Care- SLP, Outcome goal ongoing --

## 2017-09-06 NOTE — DISCHARGE SUMMARY
[facility] Medicine Services  DISCHARGE SUMMARY       Date of Admission: 9/1/2017  Date of Discharge:  9/6/2017  Primary Care Physician: Meggan Trivedi DO    Presenting Problem/History of Present Illness:  Altered mental status, unspecified altered mental status type [R41.82]  Altered mental status [R41.82]  Altered mental status, unspecified altered mental status type [R41.82]     Final Discharge Diagnoses:  Hospital Problem List     * (Principal)Stroke    Overview Addendum 9/2/2017 11:20 AM by Paul Clemons MD     ASA  Statin  Neurology consulted  PT/OT/SLP eval  Echo/Carotid scan p         Type 2 diabetes mellitus    Overview Addendum 9/3/2017 11:14 AM by CHELSIE Ignacio     Home insulin, SSI-poor control A1C 10.1         Hypertension    Overview Signed 9/2/2017 11:16 AM by Paul Clemons MD     Lisinopril         Anxiety    Overview Signed 9/2/2017 11:17 AM by Paul Clemons MD     Ativan         Cystitis    Overview Addendum 9/4/2017 11:24 AM by CHELSIE Ignacio     100,000 CFU/mL Enterobacter aerogenes  Rocephin               Consults:   1. Dr. Cornell Lynne-neurology    Procedures Performed: none    Pertinent Test Results:   Imaging Results (last 7 days)     Procedure Component Value Units Date/Time    XR Chest 1 View [[id number]] Collected:  09/01/17 1927     Updated:  09/01/17 1932    Narrative:       EXAMINATION: XR CHEST 1 VW- 9/1/2017 7:27 PM CDT     HISTORY: Weakness     COMPARISON: 12/30/2015     FINDINGS:  The lungs appear hyperinflated, with mild interstitial prominence at the  lung bases, which appears chronic. The heart appears normal in size.  Atherosclerotic calcifications are seen within the aorta. The pulmonary  vasculature appears normal. There is no focal consolidation or effusion.  No pneumothorax. No acute bony abnormality is appreciated.       Impression:       Chronic lung changes without  appreciable acute abnormality.  This report was finalized on 09/01/2017 19:29 by Dr. Catracho Valladares MD.    CT Head Without Contrast [[id number]] Collected:  09/01/17 1937     Updated:  09/01/17 1944    Narrative:       EXAMINATION: CT HEAD WO CONTRAST- 9/1/2017 7:37 PM CDT     HISTORY: Altered mental status     COMPARISON: CT head without contrast 12/31/2015      DOSE LENGTH PRODUCT: 750 mGy cm     TECHNIQUE: Serial axial tomographic images of the brain were obtained  without the use of intravenous contrast. Sagittal and coronal  reformations were made from the original source data and reviewed.     FINDINGS:   There is diffuse cerebral and cerebellar atrophy. There is no evidence  of acute intracranial hemorrhage, mass, or mass effect. There are  periventricular and subcortical white matter changes, a nonspecific  finding most often seen with chronic microvascular ischemia. This is  more advanced than on the previous exam. There is a focal hypodense area  within the anterior limb of the left internal capsule, likely reflecting  an age-indeterminate lacunar infarct but new when compared to the  previous exam. A similar tiny hypodense areas seen in the right corona  radiata. There is no evidence of acute territorial infarct. The  posterior fossa appears grossly normal. The ventricles are mildly  enlarged, likely secondary to periventricular volume loss. The basilar  cisterns appear patent. The calvarium is intact. Paranasal sinuses and  mastoid air cells appear clear. Calcifications are seen in the cavernous  carotid arteries.       Impression:       1. Age indeterminate lacunar infarct within the anterior limb of the  left internal capsule as well as in the region of the right corona  radiata.  2. Sequela of chronic microvascular ischemia.  This report was finalized on 09/01/2017 19:41 by Dr. Catracho Valladares MD.    MRI Brain Without Contrast [[id number]] Collected:  09/02/17 0856     Updated:  09/02/17 0908     Narrative:       EXAM: MR BRAIN WITHOUT IV CONTRAST 09/02/2017.     COMPARISON: MRI brain dated 01/01/2016, head CT dated 09/01/2017.      HISTORY: 77 years-old Male. Altered mental status.      TECHNIQUE:   Routine pulse sequences of the brain were obtained without IV contrast.      REPORT:      There is diffusion restriction within the right cuneus (medial occipital  lobe) with associated T2/FLAIR abnormal signal, consistent with acute  infarct. There is also intermediate signal within the body of the corpus  callosum on the right (image 152, series 204) with corresponding T1  hypointense signal (image 31, series 302), consistent with a subacute  infarct. Moderate chronic microvascular changes in the bilateral  supratentorial white matter. Mild to moderate generalized cerebral  volume loss. No acute hydrocephalus. No suspicious extra-axial fluid  collection. No midline shift. No acute hemorrhage.     Flow-voids of the [place] of [place] are maintained centrally.     Mucus suspicious in the left maxillary sinus.       Impression:       1.  Acute right occipital lacunar infarct with no evidence of  hemorrhagic conversion.  2.  Evolving subacute lacunar infarct in the body of the corpus  callosum.  This report was finalized on 09/02/2017 09:05 by Dr. Yanet Vazquez MD.    US Carotid Bilateral [[id number]] Collected:  09/02/17 1143     Updated:  09/02/17 1147    Narrative:       History: Carotid occlusive disease       Impression:       Impression:  1. There is less than 50% stenosis of the right internal carotid artery.  2. There is less than 50% stenosis of the left internal carotid artery.  3. Antegrade flow is demonstrated in bilateral vertebral arteries.     Comments: Bilateral carotid vertebral arterial duplex scan was  performed.     Grayscale imaging shows intimal thickening and calcified elements at the  carotid bifurcation. The right internal carotid artery peak systolic  velocity is 78 cm/sec. The  end-diastolic velocity is 18.2 cm/sec. The  right ICA/CCA ratio is approximately 0.98 . These findings correlate  with less than 50% stenosis of the right internal carotid artery.     Grayscale imaging shows intimal thickening and calcified elements at the  carotid bifurcation. The left internal carotid artery peak systolic  velocity is 68.6 cm/sec. The end-diastolic velocity is 13.5 cm/sec. The  left ICA/CCA ratio is approximately 0.99 . These findings correlate with  less than 50% stenosis of the left internal carotid artery.     Antegrade flow is demonstrated in bilateral vertebral arteries.       This report was finalized on 2017 11:44 by Dr. Navarro Rivas MD.        Casey Berger   Acquired echocardiogram 2D complete   Order# [id number]    Reading physician: Rodger Palacios MD   Ordering physician: Micheal Butt MD   Study date: 17         Patient Information      Patient Name MRN Sex  (Age)     Casey Berger [id number] Male 1940 (77 y.o.)       Admission Information      Admission Date/Time Discharge Date/Time Room/Bed     17  1846  346/1       Sedation Narrator Report      Sedation Narrator Report       Interpretation Summary      · Left ventricular systolic function is normal. Estimated ejection fractio nis 56-60%.  · Normal right ventricular cavity size and systolic function noted.  · There is no evidence of intracardiac mass or thrombus.  · There is no evidence of right to left shunt on bubble study.     Lab Results (last 7 days)     Procedure Component Value Units Date/Time    CBC & Differential [[id number]] Collected:  17    Specimen:  Blood Updated:  17    Narrative:       The following orders were created for panel order CBC & Differential.  Procedure                               Abnormality         Status                     ---------                               -----------         ------                     CBC Auto Differential[[id number]]          Abnormal            Final result                 Please view results for these tests on the individual orders.    CBC Auto Differential [[id number]]  (Abnormal) Collected:  09/01/17 1946    Specimen:  Blood Updated:  09/01/17 2001     WBC 10.57 10*3/mm3      RBC 4.90 10*6/mm3      Hemoglobin 14.8 g/dL      Hematocrit 43.8 %      MCV 89.4 fL      MCH 30.2 pg      MCHC 33.8 g/dL      RDW 13.9 %      RDW-SD 45.6 fl      MPV 11.8 fL      Platelets 248 10*3/mm3      Neutrophil % 69.5 %      Lymphocyte % 17.5 %      Monocyte % 10.7 %      Eosinophil % 1.4 %      Basophil % 0.5 %      Immature Grans % 0.4 %      Neutrophils, Absolute 7.35 10*3/mm3      Lymphocytes, Absolute 1.85 10*3/mm3      Monocytes, Absolute 1.13 10*3/mm3      Eosinophils, Absolute 0.15 10*3/mm3      Basophils, Absolute 0.05 10*3/mm3      Immature Grans, Absolute 0.04 (H) 10*3/mm3     Comprehensive Metabolic Panel [[id number]]  (Abnormal) Collected:  09/01/17 1946    Specimen:  Blood Updated:  09/01/17 2013     Glucose 347 (H) mg/dL      BUN 21 mg/dL      Creatinine 1.15 mg/dL      Sodium 134 (L) mmol/L      Potassium 4.4 mmol/L      Chloride 100 mmol/L      CO2 25.0 mmol/L      Calcium 9.2 mg/dL      Total Protein 6.9 g/dL      Albumin 4.00 g/dL      ALT (SGPT) 29 U/L      AST (SGOT) 21 U/L      Alkaline Phosphatase 95 U/L      Total Bilirubin 0.5 mg/dL      eGFR Non African Amer 62 mL/min/1.73      Globulin 2.9 gm/dL      A/G Ratio 1.4 g/dL      BUN/Creatinine Ratio 18.3     Anion Gap 9.0 mmol/L     Narrative:       The MDRD GFR formula is only valid for adults with stable renal function between ages 18 and 70.    Lactic Acid, Plasma [[id number]]  (Normal) Collected:  09/01/17 1946    Specimen:  Blood Updated:  09/01/17 2015     Lactate 1.1 mmol/L     Protime-INR [[id number]]  (Abnormal) Collected:  09/01/17 1946    Specimen:  Blood Updated:  09/01/17 2016     Protime 12.3 Seconds      INR 0.89 (L)    aPTT [[id number]]  (Normal) Collected:  09/01/17 1946     Specimen:  Blood Updated:  09/01/17 2016     PTT 31.2 seconds     Urinalysis With / Culture If Indicated [[id number]]  (Abnormal) Collected:  09/01/17 1945    Specimen:  Urine from Urine, Clean Catch Updated:  09/01/17 2025     Color, UA Yellow     Appearance, UA Clear     pH, UA 6.5     Specific Gravity, UA 1.025     Glucose, UA >=1000 mg/dL (3+) (A)     Ketones, UA Negative     Bilirubin, UA Negative     Blood, UA Negative     Protein, UA 30 mg/dL (1+) (A)     Leuk Esterase, UA Small (1+) (A)     Nitrite, UA Negative     Urobilinogen, UA 1.0 E.U./dL    Urinalysis, Microscopic Only [[id number]]  (Abnormal) Collected:  09/01/17 1945    Specimen:  Urine from Urine, Clean Catch Updated:  09/01/17 2025     RBC, UA 6-12 (A) /HPF      WBC, UA 31-50 (A) /HPF      Bacteria, UA 2+ (A) /HPF      Squamous Epithelial Cells, UA 3-6 (A) /HPF      Hyaline Casts, UA 0-2 /LPF      Methodology Automated Microscopy    POC Glucose Fingerstick [[id number]]  (Abnormal) Collected:  09/01/17 2244    Specimen:  Blood Updated:  09/01/17 2258     Glucose 372 (H) mg/dL       : [id number] Miki Ribera ID: [id number]       Hemoglobin A1c [[id number]] Collected:  09/01/17 1946    Specimen:  Blood Updated:  09/02/17 0533     Hemoglobin A1C 10.1 %     Narrative:       Less than 6.0           Non-Diabetic Range  6.0-7.0                 ADA Therapeutic Target  Greater than 7.0        Action Suggested    Lipid Panel [[id number]]  (Abnormal) Collected:  09/02/17 0456    Specimen:  Blood Updated:  09/02/17 0541     Total Cholesterol 112 (L) mg/dL      Triglycerides 78 mg/dL      HDL Cholesterol 38 (L) mg/dL      LDL Cholesterol  63 mg/dL      LDL/HDL Ratio 1.54    POC Glucose Fingerstick [[id number]]  (Abnormal) Collected:  09/02/17 0847    Specimen:  Blood Updated:  09/02/17 0858     Glucose 164 (H) mg/dL       : [id number] Abhishek Webber ID: [id number]       POC Glucose Fingerstick [[id number]]  (Abnormal) Collected:  09/02/17 1153     Specimen:  Blood Updated:  09/02/17 1210     Glucose 204 (H) mg/dL       : [id number] Abhishek KaranMeter ID: [id number]       POC Glucose Fingerstick [[id number]]  (Abnormal) Collected:  09/02/17 1643    Specimen:  Blood Updated:  09/02/17 1654     Glucose 138 (H) mg/dL       : [id number] Abhishek KaranMeter ID: [id number]       POC Glucose Fingerstick [[id number]]  (Abnormal) Collected:  09/02/17 2114    Specimen:  Blood Updated:  09/02/17 2135     Glucose 264 (H) mg/dL       : [id number] Miki Beth LMeter ID: [id number]       POC Glucose Fingerstick [[id number]]  (Abnormal) Collected:  09/03/17 0509    Specimen:  Blood Updated:  09/03/17 0535     Glucose 49 (C) mg/dL       : [id number] Miki Campos LMeter ID: [id number]       POC Glucose Fingerstick [[id number]]  (Abnormal) Collected:  09/03/17 0516    Specimen:  Blood Updated:  09/03/17 0536     Glucose 50 (L) mg/dL       : [id number] Miki Beth LMeter ID: [id number]       POC Glucose Fingerstick [[id number]]  (Normal) Collected:  09/03/17 0539    Specimen:  Blood Updated:  09/03/17 0552     Glucose 96 mg/dL       : [id number] Miki Campos LMeter ID: [id number]       POC Glucose Fingerstick [[id number]]  (Abnormal) Collected:  09/03/17 1148    Specimen:  Blood Updated:  09/03/17 1210     Glucose 325 (H) mg/dL       : [id number] Abhishek KaranMeter ID: [id number]       CBC & Differential [[id number]] Collected:  09/03/17 1549    Specimen:  Blood Updated:  09/03/17 1609    Narrative:       The following orders were created for panel order CBC & Differential.  Procedure                               Abnormality         Status                     ---------                               -----------         ------                     CBC Auto Differential[[id number]]        Abnormal            Final result                 Please view results for these tests on the individual orders.    CBC Auto Differential [[id number]]  (Abnormal) Collected:   09/03/17 1549    Specimen:  Blood Updated:  09/03/17 1609     WBC 9.03 10*3/mm3      RBC 4.75 (L) 10*6/mm3      Hemoglobin 14.2 g/dL      Hematocrit 42.3 %      MCV 89.1 fL      MCH 29.9 pg      MCHC 33.6 g/dL      RDW 14.0 %      RDW-SD 45.7 fl      MPV 11.7 fL      Platelets 245 10*3/mm3      Neutrophil % 69.8 %      Lymphocyte % 16.1 %      Monocyte % 11.0 %      Eosinophil % 1.7 %      Basophil % 0.8 %      Immature Grans % 0.6 %      Neutrophils, Absolute 6.32 10*3/mm3      Lymphocytes, Absolute 1.45 10*3/mm3      Monocytes, Absolute 0.99 10*3/mm3      Eosinophils, Absolute 0.15 10*3/mm3      Basophils, Absolute 0.07 10*3/mm3      Immature Grans, Absolute 0.05 (H) 10*3/mm3     POC Glucose Fingerstick [[id number]]  (Abnormal) Collected:  09/03/17 1640    Specimen:  Blood Updated:  09/03/17 1653     Glucose 327 (H) mg/dL       : [id number] Abhishek Webber ID: [id number]       POC Glucose Fingerstick [[id number]]  (Abnormal) Collected:  09/03/17 2058    Specimen:  Blood Updated:  09/03/17 2118     Glucose 257 (H) mg/dL       : [id number] Miki Campos Mounika ID: [id number]       Urine Culture [[id number]]  (Abnormal)  (Susceptibility) Collected:  09/01/17 1945    Specimen:  Urine from Urine, Clean Catch Updated:  09/04/17 0655     Urine Culture >100,000 CFU/mL Enterobacter aerogenes (A)      >100,000 CFU/mL Mixed Gram Positive Paula (A)      Probable contaminant       Susceptibility      Enterobacter aerogenes     JOHN     Cefazolin <=4 ug/ml Resistant     Cefepime <=1 ug/ml Susceptible     Ceftriaxone <=1 ug/ml Susceptible     Ertapenem <=0.5 ug/ml Susceptible     Gentamicin <=1 ug/ml Susceptible     Levofloxacin <=0.12 ug/ml Susceptible     Meropenem <=0.25 ug/ml Susceptible     Nitrofurantoin 64 ug/ml Intermediate     Trimethoprim + Sulfamethoxazole <=20 ug/ml Susceptible                    POC Glucose Fingerstick [[id number]]  (Abnormal) Collected:  09/04/17 0826    Specimen:  Blood Updated:  09/04/17  0837     Glucose 202 (H) mg/dL       : [id number] [facility] KelseyMeter ID: [id number]       POC Glucose Fingerstick [[id number]]  (Abnormal) Collected:  09/04/17 1104    Specimen:  Blood Updated:  09/04/17 1116     Glucose 290 (H) mg/dL       : [id number] [facility] KelseyMeter ID: [id number]       POC Glucose Fingerstick [[id number]]  (Abnormal) Collected:  09/04/17 1653    Specimen:  Blood Updated:  09/04/17 1704     Glucose 208 (H) mg/dL       : [id number] [facility] KelseyMeter ID: [id number]       POC Glucose Fingerstick [[id number]]  (Abnormal) Collected:  09/04/17 2114    Specimen:  Blood Updated:  09/04/17 2125     Glucose 152 (H) mg/dL       : [id number] [facility] [place] ID: [id number]       CBC (No Diff) [[id number]]  (Abnormal) Collected:  09/05/17 0457    Specimen:  Blood Updated:  09/05/17 0529     WBC 9.26 10*3/mm3      RBC 4.78 (L) 10*6/mm3      Hemoglobin 14.0 g/dL      Hematocrit 42.3 %      MCV 88.5 fL      MCH 29.3 pg      MCHC 33.1 g/dL      RDW 13.9 %      RDW-SD 44.9 fl      MPV 11.9 fL      Platelets 239 10*3/mm3     Basic Metabolic Panel [[id number]]  (Abnormal) Collected:  09/05/17 0457    Specimen:  Blood Updated:  09/05/17 0537     Glucose 208 (H) mg/dL      BUN 19 mg/dL      Creatinine 1.01 mg/dL      Sodium 139 mmol/L      Potassium 4.1 mmol/L      Chloride 106 mmol/L      CO2 25.0 mmol/L      Calcium 9.0 mg/dL      eGFR Non African Amer 72 mL/min/1.73      BUN/Creatinine Ratio 18.8     Anion Gap 8.0 mmol/L     Narrative:       The MDRD GFR formula is only valid for adults with stable renal function between ages 18 and 70.    POC Glucose Fingerstick [[id number]]  (Abnormal) Collected:  09/05/17 0848    Specimen:  Blood Updated:  09/05/17 0859     Glucose 308 (H) mg/dL       : [id number] Liz Galdamez [facility] ID: [id number]       POC Glucose Fingerstick [[id number]]  (Abnormal) Collected:  09/05/17 1213    Specimen:  Blood Updated:  09/05/17 1224     Glucose 275  (H) mg/dL       : [id number] Liz Galdamez [employer] ID: [id number]       POC Glucose Fingerstick [[id number]]  (Abnormal) Collected:  09/05/17 1700    Specimen:  Blood Updated:  09/05/17 1712     Glucose 173 (H) mg/dL       : [id number] Liz Galdamez [employer] ID: [id number]       Urinalysis With / Microscopic If Indicated [[id number]]  (Abnormal) Collected:  09/05/17 1740    Specimen:  Urine from Urine, Clean Catch Updated:  09/05/17 1819     Color, UA Yellow     Appearance, UA Clear     pH, UA 5.5     Specific Gravity, UA 1.019     Glucose, UA >=1000 mg/dL (3+) (A)     Ketones, UA Negative     Bilirubin, UA Negative     Blood, UA Negative     Protein, UA 30 mg/dL (1+) (A)     Leuk Esterase, UA Negative     Nitrite, UA Negative     Urobilinogen, UA 0.2 E.U./dL    Urinalysis, Microscopic Only [[id number]]  (Abnormal) Collected:  09/05/17 1740    Specimen:  Urine from Urine, Clean Catch Updated:  09/05/17 1819     RBC, UA 0-2 (A) /HPF      WBC, UA None Seen /HPF      Bacteria, UA None Seen /HPF      Squamous Epithelial Cells, UA 0-2 /HPF      Hyaline Casts, UA None Seen /LPF      Methodology Manual Light Microscopy    Blood Culture [[id number]]  (Normal) Collected:  09/01/17 1947    Specimen:  Blood from Arm, Left Updated:  09/05/17 2001     Blood Culture No growth at 4 days    Blood Culture [[id number]]  (Normal) Collected:  09/01/17 1949    Specimen:  Blood from Arm, Right Updated:  09/05/17 2001     Blood Culture No growth at 4 days    POC Glucose Fingerstick [[id number]]  (Abnormal) Collected:  09/05/17 2124    Specimen:  Blood Updated:  09/05/17 2136     Glucose 173 (H) mg/dL       : [id number] [facility]an[place]uc[facility]eter ID: [id number]       POC Glucose Fingerstick [[id number]]  (Abnormal) Collected:  09/06/17 0144    Specimen:  Blood Updated:  09/06/17 0155     Glucose 140 (H) mg/dL       : [id number] Moore ThucMeter ID: [id number]       CBC (No Diff) [[id number]]  (Abnormal) Collected:  09/06/17 0618     Specimen:  Blood Updated:  09/06/17 0628     WBC 9.95 10*3/mm3      RBC 4.68 (L) 10*6/mm3      Hemoglobin 13.7 (L) g/dL      Hematocrit 41.4 %      MCV 88.5 fL      MCH 29.3 pg      MCHC 33.1 g/dL      RDW 14.2 %      RDW-SD 45.7 fl      MPV 12.1 (H) fL      Platelets 243 10*3/mm3     Basic Metabolic Panel [[id number]]  (Abnormal) Collected:  09/06/17 0618    Specimen:  Blood Updated:  09/06/17 0635     Glucose 213 (H) mg/dL      BUN 22 (H) mg/dL      Creatinine 1.10 mg/dL      Sodium 139 mmol/L      Potassium 4.1 mmol/L      Chloride 106 mmol/L      CO2 25.0 mmol/L      Calcium 8.7 mg/dL      eGFR Non African Amer 65 mL/min/1.73      BUN/Creatinine Ratio 20.0     Anion Gap 8.0 mmol/L     Narrative:       The MDRD GFR formula is only valid for adults with stable renal function between ages 18 and 70.    Urine Culture [[id number]]  (Normal) Collected:  09/05/17 1740    Specimen:  Urine from Urine, Clean Catch Updated:  09/06/17 0635     Urine Culture No growth at 24 hours    POC Glucose Fingerstick [[id number]]  (Abnormal) Collected:  09/06/17 0826    Specimen:  Blood Updated:  09/06/17 0837     Glucose 263 (H) mg/dL       : [id number] Ly MeganMeter ID: [id number]           Chief Complaint on Day of Discharge: constipation    History of Present Illness on Day of Discharge: Mr. Berger is doing well. Is is stable for discharge to [facility]. He states he is without pain this am. His right heel is feeling better. No bowel movement for 3-4 days. Enema and disimpaction given.      Hospital Course:  The patient is a 77 y.o. male who follows with Dr. Meggan Trivedi for his primary care. He has a past medical history significant for stroke, uncontrolled diabetes mellitus, and a fractured hip with multiple complications as well as peripheral neuropathy. He presented to the emergency department on 9/1 with increasing confusion, headache and less coordination. In the emergency department, he was found to have  evidence of an urinary tract infection and he was admitted to the hospitalist service and consultation was sought from Dr. Cornell Lynne. No tPa was administered as patient awaken with symptoms. MRI of the brain revealed right occipital lacunar infarct with no evidence of hemorrhagic conversion. Hgb A1C reveals poor control of his diabetes with 10.1%. His LDL is at goal thus he does not require statin therapy. He was escalated to plavix monotherapy by Dr. Lynne.  Carotid dopplers and 2d echo are noted above. He has remained in normal sinus rhythm throughout his hospitalization. He was seen by speech therapy and was cleared to have a regular consistency diet with thin liquids. He was seen by physical and occupational therapy as well. He has does well, however, family states his personality change has been significant. 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 He will require speech, occupational, and physical therapies at [facility]. Pt's family states wife cannot care for him at home in his present situation and referral was made to [facility]. They have accepted him and he will be transferred today.     Urine culture upon admission returned as Enterobacter aerogenes and he received rocephin therapy. Repeat urine culture is no growth. He has complete antibiotics and will not require additional post discharge.     His insulin has been titrated to 30 units daily with sliding scale. He will require continuous monitoring and adjustments to his insulin by the nursing home physician. The Wife informed me today that he is a smoker and he has not required any tobacco replacement, thus he will not use it post discharge. He has chronic constipation and was disimpacted this am followed by soap suds enema. His bowel function will need to be monitored closely. He is stable for discharge today.     Condition on Discharge:  Stable.     Physical Exam on Discharge:  /67 (BP  "Location: Left arm, Patient Position: Lying)  Pulse 64  Temp 98.6 °F (37 °C) (Oral)   Resp 16  Ht 71\" (180.3 cm)  Wt 160 lb (72.6 kg)  SpO2 97%  BMI 22.32 kg/m2  Physical Exam   Constitutional: He is oriented to person, place, and time. He appears well-developed and well-nourished.   HENT:   Head: Normocephalic and atraumatic.   Eyes: EOM are normal. Pupils are equal, round, and reactive to light. No scleral icterus.   Neck: Normal range of motion. Neck supple. No JVD present. Carotid bruit is not present. No tracheal deviation present. No thyromegaly present.   Cardiovascular: Normal rate and regular rhythm.  Exam reveals no gallop and no friction rub.    No murmur heard.  Sinus 68-71 today.    Pulmonary/Chest: Effort normal and breath sounds normal. No respiratory distress. He has no wheezes. He has no rales. He exhibits no tenderness.   Abdominal: Soft. Bowel sounds are normal. He exhibits no distension. There is no tenderness.   Musculoskeletal: Normal range of motion. He exhibits deformity (bilateral foot drop (chronic)). He exhibits no edema.   Neurological: He is alert and oriented to person, place, and time. No cranial nerve deficit.   Skin: Skin is warm and dry. No rash noted.   Psychiatric: He has a normal mood and affect.     Discharge Disposition:  Skilled Nursing Facility (DC - External)    Discharge Medications:   Casey Berger   Home Medication Instructions PATY:[id number]    Printed on:09/06/17 1223   Medication Information                      acetaminophen (TYLENOL) 325 MG tablet  Take 2 tablets by mouth Every 4 (Four) Hours As Needed for Mild Pain .             clopidogrel (PLAVIX) 75 MG tablet  Take 1 tablet by mouth Daily.             insulin detemir (LEVEMIR) 100 UNIT/ML injection  Inject 30 Units under the skin Daily.             insulin lispro (humaLOG) 100 UNIT/ML injection  Inject  under the skin 3 (Three) Times a Day Before Meals. Sliding Scale:  0-149 = 0 units.  150-200 = 5 " units.  201-250 = 7 units.  251-300 = 9 units.  301-350 = 11 units.  351-400 = 13 units.  >400 = 15 units, call MD.             lisinopril (PRINIVIL,ZESTRIL) 2.5 MG tablet  Take 2.5 mg by mouth Daily As Needed (SBP >170).             nystatin (MYCOSTATIN) 710020 UNIT/GM powder  Apply  topically Every 12 (Twelve) Hours.             oxyCODONE-acetaminophen (PERCOCET) 7.5-325 MG per tablet  Take 1 tablet by mouth Every 6 (Six) Hours As Needed for Moderate Pain  (hold for sedation).             temazepam (RESTORIL) 30 MG capsule  Take 30 mg by mouth Every Night.             Zinc 50 MG tablet  Take 1 tablet by mouth Daily.               Discharge Diet:   Diet Instructions     Diet: Consistent Carbohydrate, Cardiac; Thin       Discharge Diet:   Consistent Carbohydrate  Cardiac      Fluid Consistency:  Thin               Activity at Discharge:   Activity Instructions     Activity as Tolerated                   Discharge Care Plan/Instructions: Pt will be discharged to [facility] [place] today via private vehicle.     Follow-up Appointments:   1. 6 weeks with neurology  2.  Nursing home physician within a week.     Test Results Pending at Discharge: none    CHELSIE Ignacio  09/06/17  12:23 PM    Time: 45 min.     I personally evaluated and examined the patient in conjunction with CHELSIE Hill and agree with the assessment, treatment plan, and disposition of the patient as recorded by her. My history, exam, and further recommendations are: I have reviewed and agree with the plans. Francine Staton MD  09/06/17  1:56 PM

## 2017-09-06 NOTE — THERAPY DISCHARGE NOTE
Acute Care - Physical Therapy Discharge Summary  [facility]       Patient Name: Casey Berger  : 1940  MRN: [id number]    Today's Date: 2017  Onset of Illness/Injury or Date of Surgery Date: 17    Date of Referral to PT: 17  Referring Physician: Dr. Butt      Admit Date: 2017      PT Recommendation and Plan    Visit Dx:    ICD-10-CM ICD-9-CM   1. Altered mental status, unspecified altered mental status type R41.82 780.97   2. Acute UTI N39.0 599.0   3. Oropharyngeal dysphagia R13.12 787.22   4. Decreased activities of daily living (ADL) Z78.9 V49.89   5. Impaired functional mobility, balance, gait, and endurance Z74.09 V49.89   6. Impaired cognition R41.89 294.9               IP PT Goals       17 1546 17 1406       Bed Mobility PT LTG    Bed Mobility PT LTG, Date Established  17  -PB (r) MS (t) PB (c)     Bed Mobility PT LTG, Time to Achieve  by discharge  -PB (r) MS (t) PB (c)     Bed Mobility PT LTG, Activity Type  all bed mobility  -PB (r) MS (t) PB (c)     Bed Mobility PT LTG, [place] Level  independent  -PB (r) MS (t) PB (c)     Bed Mobility PT LTG, Date Goal Reviewed 17  -AB      Bed Mobility PT LTG, Outcome goal not met  -AB      Bed Mobility PT LTG, Reason Goal Not Met discharged from facility  -AB      Transfer Training PT LTG    Transfer Training PT LTG, Date Established  17  -PB (r) MS (t) PB (c)     Transfer Training PT LTG, Time to Achieve  by discharge  -PB (r) MS (t) PB (c)     Transfer Training PT LTG, Activity Type  bed to chair /chair to bed;sit to stand/stand to sit  -PB (r) MS (t) PB (c)     Transfer Training PT LTG, [place] Level  conditional independence  -PB (r) MS (t) PB (c)     Transfer Training PT LTG, Assist Device  walker, rolling  -PB (r) MS (t) PB (c)     Transfer Training PT  LTG, Date Goal Reviewed 17  -AB      Transfer Training PT LTG, Outcome goal not met  -AB      Transfer Training PT LTG, Reason Goal Not  Met discharged from facility  -AB      Gait Training PT LTG    Gait Training Goal PT LTG, Date Established  09/02/17  -PB (r) MS (t) PB (c)     Gait Training Goal PT LTG, Time to Achieve  by discharge  -PB (r) MS (t) PB (c)     Gait Training Goal PT LTG, [place] Level  conditional independence  -PB (r) MS (t) PB (c)     Gait Training Goal PT LTG, Assist Device  walker, rolling  -PB (r) MS (t) PB (c)     Gait Training Goal PT LTG, Distance to Achieve  300'  -PB (r) MS (t) PB (c)     Gait Training Goal PT LTG, Additional Goal  without tripping over feet/keeping clear of obstacles  -PB (r) MS (t) PB (c)     Gait Training Goal PT LTG, Date Goal Reviewed 09/06/17  -AB      Gait Training Goal PT LTG, Outcome goal not met  -AB      Gait Training Goal PT LTG, Reason Goal Not Met discharged from facility  -AB      Stair Training PT LTG    Stair Training Goal PT LTG, Date Established  09/02/17  -PB (r) MS (t) PB (c)     Stair Training Goal PT LTG, Time to Achieve  by discharge  -PB (r) MS (t) PB (c)     Stair Training Goal PT LTG, Number of Steps  1  -PB (r) MS (t) PB (c)     Stair Training Goal PT LTG, [place] Level  conditional independence  -PB (r) MS (t) PB (c)     Stair Training Goal PT LTG, Assist Device  1 handrail  -PB (r) MS (t) PB (c)     Stair Training Goal PT LTG, Date Goal Reviewed 09/06/17  -AB      Stair Training Goal PT LTG, Outcome goal not met  -AB      Stair Training Goal PT LTG, Reason Goal Not Met discharged from facility  -AB        User Key  (r) = Recorded By, (t) = Taken By, (c) = Cosigned By    Initials Name Provider Type    AB Sweta Arora, PTA Physical Therapy Assistant    REJI Fountain, PT DPT Physical Therapist    MS Yulissa Simms, PT Student PT Student              PT Discharge Summary  Anticipated Discharge Disposition: skilled nursing facility  Reason for Discharge: Discharge from facility  Outcomes Achieved: Refer to plan of care for updates on goals achieved  Discharge  Destination:       Sweta Arora, PTA   9/6/2017

## 2017-09-06 NOTE — THERAPY TREATMENT NOTE
Acute Care - Speech Language Pathology Treatment Note  [facility]     Patient Name: Casey Berger  : 1940  MRN: [id number]  Today's Date: 2017         Admit Date: 2017    Visit Dx:      ICD-10-CM ICD-9-CM   1. Altered mental status, unspecified altered mental status type R41.82 780.97   2. Acute UTI N39.0 599.0   3. Oropharyngeal dysphagia R13.12 787.22   4. Decreased activities of daily living (ADL) Z78.9 V49.89   5. Impaired functional mobility, balance, gait, and endurance Z74.09 V49.89   6. Impaired cognition R41.89 294.9     Patient Active Problem List   Diagnosis   • Stroke   • Type 2 diabetes mellitus   • Hypertension   • Anxiety   • Cystitis              Adult Rehabilitation Note       17 1029 17 0900 17 1540    Rehab Assessment/Intervention    Discipline (P)  speech language pathologist  -SS physical therapy assistant  -KJ     Document Type (P)  therapy note (daily note)  -SS      Subjective Information (P)  agree to therapy  -SS      Patient Effort, Rehab Treatment (P)  good  -SS good  -KJ     Precautions/Limitations  fall precautions  -KJ fall precautions  -MM    Specific Treatment Considerations  Bilateral AFO's/neuropathy knees down  -KJ B AFOs; Neuropathy B knees down  -MM    Recorded by [SS] Nargis Adkins, Speech Therapy Student [KJ] Clara Terry, PTA [MM] Иван Brady, OTR/L    Pain Assessment    Pain Assessment  No/denies pain  -KJ No/denies pain  -MM    Recorded by  [KJ] Clara Terry PTA [MM] Иван Brady, OTR/L    Vision Assessment/Intervention    Visual Impairment   blurred  -MM    Recorded by   [MM] Иван Brady, OTR/L    Cognitive Assessment/Intervention    Current Cognitive/Communication Assessment   impaired  -MM    Orientation Status   oriented x 4  -MM    Follows Commands/Answers Questions   100% of the time;able to follow single-step instructions;needs increased time;needs cueing  -MM    Personal Safety   decreased awareness, need  for assist;decreased awareness, need for safety;moderate impairment;impulsive  -MM    Personal Safety Interventions   fall prevention program maintained;gait belt;muscle strengthening facilitated;nonskid shoes/slippers when out of bed;supervised activity  -MM    Recorded by   [MM] Иван Brady, OTR/L    Improve memory skills    Memory Skills Progress (P)  40%  -SS      Comments: Improve memory skills (P)  Pt had difficulty recalling all four words in sequence during exercise. It sounds as if he has some phonemic issues at times, unsure if this is due to hearing or remembering the correct word.   -SS      Recorded by [SS] Nargis Adkins, Speech Therapy Student      Improve functional problem solving    Functional Problem Solving Progress (P)  70%  -SS      Comments: Improve functional problem solving (P)  Pt appeared to have difficulty understanding task instructions at times.   -SS      Recorded by [SS] Nargis Adkins, Speech Therapy Student      Bed Mobility, Assessment/Treatment    Bed Mobility, Assistive Device  bed rails  -KJ     Bed Mobility, Scoot/Bridge, [place]  independent  -KJ     Bed Mob, Supine to Sit, [place]  supervision required  -KJ     Bed Mob, Sit to Supine, [place]  supervision required  -KJ     Bed Mobility, Safety Issues  decreased use of legs for bridging/pushing  -KJ     Bed Mobility, Impairments  strength decreased;impaired balance;sensation decreased  -KJ     Recorded by  [KJ] Clara Terry, PTA     Transfer Assessment/Treatment    Transfers, Sit-Stand [place]  verbal cues required;minimum assist (75% patient effort)  -KJ minimum assist (75% patient effort);verbal cues required  -MM    Transfers, Stand-Sit [place]  verbal cues required;contact guard assist  -KJ minimum assist (75% patient effort);verbal cues required  -MM    Transfers, Sit-Stand-Sit, Assist Device  rolling walker  -KJ rolling walker  -MM    Walk-In Shower Transfer, [place]   minimum  assist (75% patient effort);verbal cues required  -MM    Walk-In Shower Transfer, Assist Device   rolling walker  -MM    Transfer, Safety Issues  impulsivity  -KJ impulsivity  -MM    Transfer, Impairments  strength decreased;impaired balance;sensation decreased   trips over feet due to not clearing them thru toe off   -KJ strength decreased;sensation decreased;impaired balance  -MM    Transfer, Comment  cueing for hand placement  -KJ cues for hand placement  -MM    Recorded by  [KJ] Clara Terry PTA [MM] RYLEE De La Vega/L    Gait Assessment/Treatment    Gait, [place] Level  verbal cues required;minimum assist (75% patient effort)  -KJ     Gait, Assistive Device  rolling walker  -KJ     Gait, Distance (Feet)  40   40',40',40' stop and corrected posture with ec 40'  -KJ     Gait, Gait Pattern Analysis  swing-through gait  -KJ     Gait, Gait Deviations  bilateral:;andrea decreased;decreased heel strike;forward flexed posture;step length decreased;toe-to-floor clearance decreased;weight-shifting ability decreased  -KJ     Gait, Safety Issues  step length decreased;weight-shifting ability decreased;balance decreased during turns  -KJ     Gait, Impairments  strength decreased;impaired balance  -KJ     Gait, Comment  cues to increase LUCINA, and increasing hip/knee flex to clear feet. Several times feet didnt clear floor and he got tangled up in his feet  -KJ     Recorded by  [KJ] Clara Terry PTA     Functional Mobility    Functional Mobility- Ind. Level   contact guard assist;verbal cues required;nonverbal cues required (demo/gesture)  -MM    Functional Mobility- Device   rolling walker  -MM    Functional Mobility- Comment   bed <> BR  -MM    Recorded by   [MM] Иван Brady OTR/L    Upper Body Bathing Assessment/Training    UB Bathing Assess/Train Assistive Device   grab bars;hand-held shower head;shower chair with back  -MM    UB Bathing Assess/Train, Position   sitting  -MM    UB Bathing  Assess/Train, [place] Level   contact guard assist;verbal cues required;supervision required  -MM    UB Bathing Assess/Train, Impairments   impaired balance   impulsive  -MM    UB Bathing Assess/Train, Comment   cues for sequencing  -MM    Recorded by   [MM] RYLEE De La Vega/L    Lower Body Bathing Assessment/Training    LB Bathing Assess/Train Assistive Device   grab bars;hand-held shower head;shower chair with back  -MM    LB Bathing Assess/Train, Position   sitting;standing  -MM    LB Bathing Assess/Train, [place] Level   contact guard assist;verbal cues required;supervision required  -MM    LB Bathing Assess/Train, Impairments   impaired balance   impulsive  -MM    LB Bathing Assess/Train, Comment   cues for sequencing  -MM    Recorded by   [MM] RYLEE De La Vega/L    Upper Body Dressing Assessment/Training    UB Dressing Assess/Train, Clothing Type   doffing:;hospital gown;donning:;button up  -MM    UB Dressing Assess/Train, Position   sitting  -MM    UB Dressing Assess/Train, [place]   moderate assist (50% patient effort);verbal cues required  -MM    UB Dressing Assess/Train, Impairments   impaired balance;impaired vision  -MM    UB Dressing Assess/Train, Comment   assist required for buttons and arm placement  -MM    Recorded by   [MM] RYLEE De La Vega/L    Lower Body Dressing Assessment/Training    LB Dressing Assess/Train, Clothing Type   doffing:;donning:;slipper socks;shoes   AFOS  -MM    LB Dressing Assess/Train, Position   sitting  -MM    LB Dressing Assess/Train, [place]   minimum assist (75% patient effort);contact guard assist;verbal cues required  -MM    Recorded by   [MM] RYLEE De La Vega/L    Grooming Assessment/Training    Grooming Assess/Train, Position   standing  -MM    Grooming Assess/Train, [place]ep[place] Level   contact guard assist;supervision required;set up required  -MM    Grooming Assess/Train, Impairments   impaired balance  -MM    Grooming  Assess/Train, Comment   shaved face  -MM    Recorded by   [MM] Иван Brady OTR/L    Balance Skills Training    Sitting-Level of Assistance  Independent  -KJ     Sitting-Balance Support  Feet supported  -KJ     Standing-Level of Assistance  Minimum assistance;Moderate assistance   unable to stand staticly without loosing balance backwards  -KJ     Static Standing Balance Support  assistive device;romel bar  -KJ     Standing-Balance Activities  Weight Shift A-P;Weight Shift R-L;Forward lean;Reaching for objects;Reaching across midline  -KJ     Standing Balance # of Minutes  10  -KJ     Gait Balance-Level of Assistance  Minimum assistance  -KJ     Gait Balance Support  assistive device  -KJ     Gait Balance Activities  side-stepping;stepping over object  -KJ     Recorded by  [KJ] Clara Terry PTA     Therapy Exercises    Bilateral Lower Extremities  AROM:;15 reps;supine;standing  -KJ     Recorded by  [KJ] Clara Terry PTA     Orthosis Location    Orthosis Location/Type  lower extremity  -KJ lower extremity  -MM    Orthosis, Lower Extremity  AFO (ankle foot orthosis)  -KJ Left:;Right:;AFO (ankle foot orthosis)  -MM    Recorded by  [KJ] Clara Terry PTA [MM] Иван Brady OTR/L    Positioning and Restraints    Pre-Treatment Position  in bed  -KJ in bed  -MM    Post Treatment Position  bed  -KJ chair  -MM    In Chair   notified nsg;sitting;call light within reach;encouraged to call for assist  -MM    Recorded by  [KJ] Clara Terry PTA [MM] RYLEE De La Vega/L      09/05/17 1527 09/05/17 1517 09/05/17 1400    Rehab Assessment/Intervention    Discipline occupational therapist  -MM physical therapy assistant  -CW speech language pathologist  -MB    Document Type  therapy note (daily note)  -CW therapy note (daily note)  -MB    Subjective Information  agree to therapy;no complaints  -CW agree to therapy;complains of;fatigue  -MB    Patient Effort, Rehab Treatment  good  -CW good  -MB     Precautions/Limitations  fall precautions  -CW     Specific Treatment Considerations  B AFOs; Neuropathy B knees down  -CW     Recorded by [MM] Иван Brady, OTR/L [CW] Silvana Garcia PTA [MB] MURIEL Valladares    Pain Assessment    Pain Assessment  No/denies pain  -CW No/denies pain  -MB    Recorded by  [CW] Silvana Garcia PTA [MB] MURIEL Valladares    Cognitive Assessment/Intervention    Current Cognitive/Communication Assessment  impaired  -CW     Personal Safety Interventions  fall prevention program maintained;gait belt;nonskid shoes/slippers when out of bed  -CW     Recorded by  [CW] Silvana Garcia PTA     Improve memory skills    Memory Skills Progress   30%;without cues  -MB    Comments: Improve memory skills   Pt required mod to max cues to reorganize words into a sequential order.   -MB    Recorded by   [MB] MURIEL Valladares    Improve functional problem solving    Functional Problem Solving Progress   40%;without cues  -MB    Comments: Improve functional problem solving   Pt required mod cues at times to answer daily problem solving questions appropriately.  -MB    Recorded by   [MB] MURIEL Valladares    Bed Mobility, Assessment/Treatment    Bed Mob, Supine to Sit, [place]  supervision required  -CW     Bed Mobility, Safety Issues  decreased use of legs for bridging/pushing  -CW     Bed Mobility, Impairments  strength decreased;impaired balance;coordination impaired  -CW     Recorded by  [CW] Silvana Garcia PTA     Transfer Assessment/Treatment    Transfers, Sit-Stand [place]  minimum assist (75% patient effort);verbal cues required  -CW     Transfers, Stand-Sit [place]  minimum assist (75% patient effort);verbal cues required  -CW     Transfers, Sit-Stand-Sit, Assist Device  rolling walker  -CW     Transfer, Safety Issues  impulsivity  -CW     Transfer, Impairments  strength decreased;sensation decreased;impaired balance  -CW      Transfer, Comment  Cues for hand placement  -CW     Recorded by  [CW] Silvana Garcia PTA     Gait Assessment/Treatment    Gait, [place] Level  contact guard assist;verbal cues required  -CW     Gait, Assistive Device  rolling walker  -CW     Gait, Distance (Feet)  100  -CW     Gait, Gait Pattern Analysis  swing-through gait  -CW     Gait, Gait Deviations  andrea decreased;forward flexed posture;step length decreased;narrow base  -CW     Gait, Safety Issues  step length decreased  -CW     Gait, Impairments  strength decreased;sensation decreased;impaired balance  -CW     Gait, Comment  Cus to look up, broaden LUCINA, and stay inside walker  -CW     Recorded by  [CW] Silvana Garcia PTA     Therapy Exercises    Bilateral Lower Extremities  AROM:;20 reps;sitting  -CW     Recorded by  [CW] Silvana Garcia PTA     Orthosis Location    Orthosis Location/Type  lower extremity  -CW     Orthosis, Lower Extremity  Left:;Right:;AFO (ankle foot orthosis)  -CW     Recorded by  [CW] Silvana Garcia PTA     Positioning and Restraints    Pre-Treatment Position  in bed  -CW     Post Treatment Position  bathroom  -CW     Bathroom  sitting;call light within reach;with OT  -CW     Recorded by  [CW] Silvana Garcia PTA       09/04/17 1351 09/04/17 0745       Rehab Assessment/Intervention    Discipline physical therapy assistant  -CW physical therapy assistant  -CW     Document Type therapy note (daily note)  -CW therapy note (daily note)  -CW     Subjective Information no complaints;agree to therapy  -CW no complaints;agree to therapy  -CW     Patient Effort, Rehab Treatment good  -CW good  -CW     Precautions/Limitations fall precautions  -CW fall precautions  -CW     Specific Treatment Considerations B AFOs; Neuropathy B knees down  -CW B AFOS; Neuropathy B LE knees down  -CW     Recorded by [CW] Silvana Garcia PTA [CW] Silvana Garcia PTA     Pain Assessment    Pain Assessment No/denies pain  -CW  No/denies pain  -CW     Recorded by [CW] Silvana Garcia PTA [CW] Silvana Garcia PTA     Vision Assessment/Intervention    Visual Impairment blurred  -CW      Recorded by [CW] Silvana Garcia PTA      Cognitive Assessment/Intervention    Current Cognitive/Communication Assessment impaired  -CW      Personal Safety Interventions fall prevention program maintained;gait belt;nonskid shoes/slippers when out of bed  -CW fall prevention program maintained;gait belt;nonskid shoes/slippers when out of bed  -CW     Recorded by [CW] Silvana Garcia PTA [CW] Silvana Garcia PTA     Bed Mobility, Assessment/Treatment    Bed Mobility, Comment up in chair  -CW up in chair  -CW     Recorded by [CW] Silvana Garcia PTA [CW] Silvana Garcia PTA     Transfer Assessment/Treatment    Transfers, Sit-Stand [place] stand by assist;verbal cues required  -CW stand by assist;verbal cues required  -CW     Transfers, Stand-Sit [place] stand by assist;supervision required  -CW stand by assist;verbal cues required  -CW     Transfers, Sit-Stand-Sit, Assist Device rolling walker  -CW rolling walker  -CW     Transfer, Safety Issues impulsivity;step length decreased  -CW impulsivity;step length decreased  -CW     Transfer, Impairments strength decreased;sensation decreased  -CW strength decreased;sensation decreased  -CW     Transfer, Comment Cues for hand placement  -CW Cues for hand placement during transfers  -CW     Recorded by [CW] Silvana Garcia PTA [CW] Silvana Garcia PTA     Gait Assessment/Treatment    Gait, [place] Level contact guard assist;verbal cues required  -CW contact guard assist;verbal cues required  -CW     Gait, Assistive Device rolling walker  -CW rolling walker  -CW     Gait, Distance (Feet) 125  -   x 4 with standing rests to correct mechanics  -CW     Gait, Gait Pattern Analysis swing-through gait  -CW swing-through gait  -CW     Gait, Gait Deviations andrea  decreased;forward flexed posture;narrow base;step length decreased  -CW andrea decreased;forward flexed posture;narrow base;step length decreased  -CW     Gait, Safety Issues step length decreased  -CW step length decreased  -CW     Gait, Impairments sensation decreased;strength decreased;impaired balance;coordination impaired  -CW strength decreased;sensation decreased  -CW     Gait, Comment Cues to look up, broaden LUCINA and stay inside walker  -CW Cues to look up, broaden LUCINA and stay inside wallker  -CW     Recorded by [CW] Silvana Garcia PTA [CW] Silvana Garcia PTA     Motor Skills/Interventions    Additional Documentation Balance Skills Training (Group)  -CW      Recorded by [ELVIRA] Silvana Garcia PTA      Balance Skills Training    Gait Balance-Level of Assistance Contact guard  -CW      Gait Balance Support Right upper extremity supported;Left upper extremity supported  -CW      Gait Balance Activities backwards;side-stepping;scanning environment R/L  -CW      Recorded by [CW] Silvana Garcia PTA      Therapy Exercises    Bilateral Lower Extremities AROM:;15 reps;standing;hip abduction/adduction;hip flexion;mini squats  -CW      Recorded by [CW] Silvana Garcia PTA      Orthosis Location    Orthosis Location/Type lower extremity  -CW lower extremity  -CW     Orthosis, Lower Extremity Left:;Right:;AFO (ankle foot orthosis)  -CW Left:;Right:;AFO (ankle foot orthosis)  -CW     Recorded by [CW] Silvana Garcia PTA [CW] Silvana Garcia PTA     Positioning and Restraints    Pre-Treatment Position sitting in chair/recliner  -CW sitting in chair/recliner  -CW     Post Treatment Position chair  -CW chair  -CW     In Chair sitting;call light within reach;with family/caregiver  -CW sitting;call light within reach  -CW     Recorded by [CW] Silvana Garcia PTA [CW] Silvana Garcia PTA       User Key  (r) = Recorded By, (t) = Taken By, (c) = Cosigned By    Initials Name Effective Dates     HOMER Haskins, [facility]-SLP 08/02/16 -     KJ Clara Terry, PTA 08/02/16 -     CW Silvana Garcia, PTA 06/22/15 -     MM Иван Brady, OTR/L 10/21/16 -     SS Nargis Adkins, Speech Therapy Student 08/08/17 -               IP SLP Goals       09/06/17 1314 09/05/17 1458 09/04/17 0954    Cognitive Linguistic- Optimal Participation in Care    Cognitive Linguistic Optimal Participation in Care- SLP, Date Established   09/04/17  -MB    Cognitive Linguistic Optimal Participation in Care- SLP, Time to Achieve   by discharge  -MB    Cognitive Linguistic Optimal Participation in Care- SLP, Activity Level   Patient will improve memory skills for increased safety in environment;Patient will improve functional problem solving skills for increased safety in environment;Patient will improve Executive functioning skills for increased safety in environment  -MB    Cognitive Linguistic Optimal Participation in Care- SLP, Date Goal Reviewed (P)  09/06/17  -SS 09/05/17  -MB     Cognitive Linguistic Optimal Participation in Care- SLP, Outcome   goal ongoing  -MB      09/03/17 1100 09/02/17 1309       Safely Consume Diet    Safely Consume Diet- SLP, Date Established  09/02/17  -MB     Safely Consume Diet- SLP, Time to Achieve  by discharge  -MB     Safely Consume Diet- SLP, Additional Goal  Pt will tolerate trials of current diet with no overt s/s of aspiration.  -MB     Safely Consume Diet- SLP, Date Goal Reviewed 09/03/17  -MB      Safely Consume Diet- SLP, Outcome goal met  -MB goal ongoing  -MB       User Key  (r) = Recorded By, (t) = Taken By, (c) = Cosigned By    Initials Name Provider Type    HOMER Haskins, CCC-SLP Speech and Language Pathologist     Nargis Adkins, Speech Therapy Student Speech Therapy Student          EDUCATION  The patient has been educated in the following areas:   Cognitive Impairment.    SLP Recommendation and Plan                               Plan of Care Review  Plan Of  Care Reviewed With: (P) patient, spouse  Progress: (P) progress toward functional goals as expected  Outcome Summary/Follow up Plan: (P) Pt completed memory and problem solving tasks with moderate difficulty requiring inconsistent cues.          SLP Outcome Measures (last 72 hours)      SLP Outcome Measures       09/04/17 0900          SLP Outcome Measures    Outcome Measure Used? Adult NOMS  -MB      FCM Scores    FCM Chosen Problem Solving  -MB      Problem Solving FCM Score 4  -MB        User Key  (r) = Recorded By, (t) = Taken By, (c) = Cosigned By    Initials Name Effective Dates    HOMER Haskins CCC-SLP 08/02/16 -           Time Calculation:         Time Calculation- SLP       09/06/17 1316          Time Calculation- SLP    SLP Start Time (P)  1029  -SS      SLP Stop Time (P)  1042  -SS      SLP Time Calculation (min) (P)  13 min  -SS      SLP Received On (P)  09/06/17  -        User Key  (r) = Recorded By, (t) = Taken By, (c) = Cosigned By    Initials Name Provider Type     Nargis Adkins, Speech Therapy Student Speech Therapy Student          Therapy Charges for Today     Code Description Service Date Service Provider Modifiers Qty    [id number]  ST TREATMENT SPEECH 1 9/6/2017 Nargis Adkins, Speech Therapy Student GN, KX 1          SLP G-Codes  SLP NOMS Used?: Yes  Functional Limitations: Other Speech Language Pathology (Problem solving)  Swallow Current Status (): At least 1 percent but less than 20 percent impaired, limited or restricted  Swallow Goal Status (): 0 percent impaired, limited or restricted  Other Speech-Language Pathology Functional Limitation Current Status (): At least 40 percent but less than 60 percent impaired, limited or restricted  Other Speech-Language Pathology Functional Limitation Goal Status (): At least 20 percent but less than 40 percent impaired, limited or restricted    Nargis Adkins Speech Therapy Student  9/6/2017

## 2017-09-06 NOTE — THERAPY TREATMENT NOTE
Acute Care - Physical Therapy Treatment Note  [facility]     Patient Name: Casey Berger  : 1940  MRN: [id number]  Today's Date: 2017  Onset of Illness/Injury or Date of Surgery Date: 17  Date of Referral to PT: 17  Referring Physician: Dr. Butt    Admit Date: 2017    Visit Dx:    ICD-10-CM ICD-9-CM   1. Altered mental status, unspecified altered mental status type R41.82 780.97   2. Acute UTI N39.0 599.0   3. Oropharyngeal dysphagia R13.12 787.22   4. Decreased activities of daily living (ADL) Z78.9 V49.89   5. Impaired functional mobility, balance, gait, and endurance Z74.09 V49.89   6. Impaired cognition R41.89 294.9     Patient Active Problem List   Diagnosis   • Stroke   • Type 2 diabetes mellitus   • Hypertension   • Anxiety   • Cystitis               Adult Rehabilitation Note       17 0900 17 1540 17 1527    Rehab Assessment/Intervention    Discipline physical therapy assistant  -KJ  occupational therapist  -MM    Patient Effort, Rehab Treatment good  -KJ      Precautions/Limitations fall precautions  -KJ fall precautions  -MM     Specific Treatment Considerations Bilateral AFO's/neuropathy knees down  -KJ B AFOs; Neuropathy B knees down  -MM     Recorded by [KJ] Clara Terry, PTA [MM] RYLEE De La Vega/ALEXANDRA [MM] Иван Brady OTR/L    Pain Assessment    Pain Assessment No/denies pain  -KJ No/denies pain  -MM     Recorded by [KJ] Clara Terry PTA [MM] RYLEE De La [place]ega/L     Vision Assessment/Intervention    Visual Impairment  blurred  -MM     Recorded by  [MM] Иван Brady OTR/L     Cognitive Assessment/Intervention    Current Cognitive/Communication Assessment  impaired  -MM     Orientation Status  oriented x 4  -MM     Follows Commands/Answers Questions  100% of the time;able to follow single-step instructions;needs increased time;needs cueing  -MM     Personal Safety  decreased awareness, need for assist;decreased awareness, need for  safety;moderate impairment;impulsive  -MM     Personal Safety Interventions  fall prevention program maintained;gait belt;muscle strengthening facilitated;nonskid shoes/slippers when out of bed;supervised activity  -MM     Recorded by  [MM] RYLEE De La Vega/L     Bed Mobility, Assessment/Treatment    Bed Mobility, Assistive Device bed rails  -KJ      Bed Mobility, Scoot/Bridge, [place] independent  -KJ      Bed Mob, Supine to Sit, [place] supervision required  -KJ      Bed Mob, Sit to Supine, [place] supervision required  -KJ      Bed Mobility, Safety Issues decreased use of legs for bridging/pushing  -KJ      Bed Mobility, Impairments strength decreased;impaired balance;sensation decreased  -KJ      Recorded by [KJ] Clara Terry PTA      Transfer Assessment/Treatment    Transfers, Sit-Stand [place] verbal cues required;minimum assist (75% patient effort)  -KJ minimum assist (75% patient effort);verbal cues required  -MM     Transfers, Stand-Sit [place] verbal cues required;contact guard assist  -KJ minimum assist (75% patient effort);verbal cues required  -MM     Transfers, Sit-Stand-Sit, Assist Device rolling walker  -KJ rolling walker  -MM     Walk-In Shower Transfer, [place]  minimum assist (75% patient effort);verbal cues required  -MM     Walk-In Shower Transfer, Assist Device  rolling walker  -MM     Transfer, Safety Issues impulsivity  -KJ impulsivity  -MM     Transfer, Impairments strength decreased;impaired balance;sensation decreased   trips over feet due to not clearing them thru toe off   -KJ strength decreased;sensation decreased;impaired balance  -MM     Transfer, Comment cueing for hand placement  -KJ cues for hand placement  -MM     Recorded by [KJ] Clara Terry PTA [MM] RYLEE De La Vega/L     Gait Assessment/Treatment    Gait, [place] Level verbal cues required;minimum assist (75% patient effort)  -KJ      Gait, Assistive Device rolling walker  -KJ       Gait, Distance (Feet) 40   40',40',40' stop and corrected posture with ec 40'  -KJ      Gait, Gait Pattern Analysis swing-through gait  -KJ      Gait, Gait Deviations bilateral:;andrea decreased;decreased heel strike;forward flexed posture;step length decreased;toe-to-floor clearance decreased;weight-shifting ability decreased  -KJ      Gait, Safety Issues step length decreased;weight-shifting ability decreased;balance decreased during turns  -KJ      Gait, Impairments strength decreased;impaired balance  -KJ      Gait, Comment cues to increase LUCINA, and increasing hip/knee flex to clear feet. Several times feet didnt clear floor and he got tangled up in his feet  -KJ      Recorded by [KJ] Clara Terry, PTA      Functional Mobility    Functional Mobility- Ind. Level  contact guard assist;verbal cues required;nonverbal cues required (demo/gesture)  -MM     Functional Mobility- Device  rolling walker  -MM     Functional Mobility- Comment  bed <> BR  -MM     Recorded by  [MM] Иван Brady, OTR/L     Upper Body Bathing Assessment/Training    UB Bathing Assess/Train Assistive Device  grab bars;hand-held shower head;shower chair with back  -MM     UB Bathing Assess/Train, Position  sitting  -MM     UB Bathing Assess/Train, [place] Level  contact guard assist;verbal cues required;supervision required  -MM     UB Bathing Assess/Train, Impairments  impaired balance   impulsive  -MM     UB Bathing Assess/Train, Comment  cues for sequencing  -MM     Recorded by  [MM] Иван Brady, OTR/L     Lower Body Bathing Assessment/Training    LB Bathing Assess/Train Assistive Device  grab bars;hand-held shower head;shower chair with back  -MM     LB Bathing Assess/Train, Position  sitting;standing  -MM     LB Bathing Assess/Train, [place] Level  contact guard assist;verbal cues required;supervision required  -MM     LB Bathing Assess/Train, Impairments  impaired balance   impulsive  -MM     LB Bathing  Assess/Train, Comment  cues for sequencing  -MM     Recorded by  [MM] RYLEE De La Vega/L     Upper Body Dressing Assessment/Training    UB Dressing Assess/Train, Clothing Type  doffing:;hospital gown;donning:;button up  -MM     UB Dressing Assess/Train, Position  sitting  -MM     UB Dressing Assess/Train, [place]  moderate assist (50% patient effort);verbal cues required  -MM     UB Dressing Assess/Train, Impairments  impaired balance;impaired vision  -MM     UB Dressing Assess/Train, Comment  assist required for buttons and arm placement  -MM     Recorded by  [MM] DWAYNE De La Vega     Lower Body Dressing Assessment/Training    LB Dressing Assess/Train, Clothing Type  doffing:;donning:;slipper socks;shoes   AFOS  -MM     LB Dressing Assess/Train, Position  sitting  -MM     LB Dressing Assess/Train, [place]  minimum assist (75% patient effort);contact guard assist;verbal cues required  -MM     Recorded by  [MM] DWAYNE De La Vega     Grooming Assessment/Training    Grooming Assess/Train, Position  standing  -MM     Grooming Assess/Train, Indepen Level  contact guard assist;supervision required;set up required  -MM     Grooming Assess/Train, Impairments  impaired balance  -MM     Grooming Assess/Train, Comment  shaved face  -MM     Recorded by  [MM] RYLEE De La Vega/L     Balance Skills Training    Sitting-Level of Assistance Independent  -KJ      Sitting-Balance Support Feet supported  -KJ      Standing-Level of Assistance Minimum assistance;Moderate assistance   unable to stand staticly without loosing balance backwards  -KJ      Static Standing Balance Support assistive device;romel bar  -KJ      Standing-Balance Activities Weight Shift A-P;Weight Shift R-L;Forward lean;Reaching for objects;Reaching across midline  -KJ      Standing Balance # of Minutes 10  -KJ      Gait Balance-Level of Assistance Minimum assistance  -KJ      Gait Balance Support assistive device  -KJ      Gait  Balance Activities side-stepping;stepping over object  -KJ      Recorded by [KJ] Clara Terry PTA      Therapy Exercises    Bilateral Lower Extremities AROM:;15 reps;supine;standing  -KJ      Recorded by [KJ] Clara Terry PTA      Orthosis Location    Orthosis Location/Type lower extremity  -KJ lower extremity  -MM     Orthosis, Lower Extremity AFO (ankle foot orthosis)  -KJ Left:;Right:;AFO (ankle foot orthosis)  -MM     Recorded by [KJ] Clara Terry PTA [MM] Иван Brady OTR/L     Positioning and Restraints    Pre-Treatment Position in bed  -KJ in bed  -MM     Post Treatment Position bed  -KJ chair  -MM     In Chair  notified nsg;sitting;call light within reach;encouraged to call for assist  -MM     Recorded by [KJ] Clara Terry PTA [MM] RYLEE De La Vega/ALEXANDRA       09/05/17 1517 09/05/17 1400 09/04/17 1351    Rehab Assessment/Intervention    Discipline physical therapy assistant  -CW speech language pathologist  -MB physical therapy assistant  -CW    Document Type therapy note (daily note)  -CW therapy note (daily note)  -MB therapy note (daily note)  -CW    Subjective Information agree to therapy;no complaints  -CW agree to therapy;complains of;fatigue  -MB no complaints;agree to therapy  -CW    Patient Effort, Rehab Treatment good  -CW good  -MB good  -CW    Precautions/Limitations fall precautions  -CW  fall precautions  -CW    Specific Treatment Considerations B AFOs; Neuropathy B knees down  -CW  B AFOs; Neuropathy B knees down  -CW    Recorded by [CW] Silvana Garcia PTA [MB] Zandra Haskins CCC-SLP [CW] Silvana Garcia PTA    Pain Assessment    Pain Assessment No/denies pain  -CW No/denies pain  -MB No/denies pain  -CW    Recorded by [CW] Silvana Garcia PTA [MB] Zandra Haskins CCC-SLP [CW] Silvana Garcia PTA    Vision Assessment/Intervention    Visual Impairment   blurred  -CW    Recorded by   [CW] Silvana Garcia PTA    Cognitive Assessment/Intervention     Current Cognitive/Communication Assessment impaired  -CW  impaired  -CW    Personal Safety Interventions fall prevention program maintained;gait belt;nonskid shoes/slippers when out of bed  -CW  fall prevention program maintained;gait belt;nonskid shoes/slippers when out of bed  -CW    Recorded by [CW] Silvana Garcia PTA  [CW] Silvana Garcia PTA    Improve memory skills    Memory Skills Progress  30%;without cues  -MB     Comments: Improve memory skills  Pt required mod to max cues to reorganize words into a sequential order.   -MB     Recorded by  [MB] Zandra Haskins CCC-SLP     Improve functional problem solving    Functional Problem Solving Progress  40%;without cues  -MB     Comments: Improve functional problem solving  Pt required mod cues at times to answer daily problem solving questions appropriately.  -MB     Recorded by  [MB] DMITRY ValladaresSLP     Bed Mobility, Assessment/Treatment    Bed Mob, Supine to Sit, [place] supervision required  -CW      Bed Mobility, Safety Issues decreased use of legs for bridging/pushing  -CW      Bed Mobility, Impairments strength decreased;impaired balance;coordination impaired  -CW      Bed Mobility, Comment   up in chair  -CW    Recorded by [CW] Silvana Garcia PTA  [CW] Silvana Garcia PTA    Transfer Assessment/Treatment    Transfers, Sit-Stand [place] minimum assist (75% patient effort);verbal cues required  -CW  stand by assist;verbal cues required  -CW    Transfers, Stand-Sit [place] minimum assist (75% patient effort);verbal cues required  -CW  stand by assist;supervision required  -CW    Transfers, Sit-Stand-Sit, Assist Device rolling walker  -CW  rolling walker  -CW    Transfer, Safety Issues impulsivity  -CW  impulsivity;step length decreased  -CW    Transfer, Impairments strength decreased;sensation decreased;impaired balance  -CW  strength decreased;sensation decreased  -CW    Transfer, Comment Cues for hand placement   -CW  Cues for hand placement  -CW    Recorded by [CW] Silvana Garcia PTA  [CW] Silvana Garcia PTA    Gait Assessment/Treatment    Gait, [place] Level contact guard assist;verbal cues required  -CW  contact guard assist;verbal cues required  -CW    Gait, Assistive Device rolling walker  -CW  rolling walker  -CW    Gait, Distance (Feet) 100  -CW  125  -CW    Gait, Gait Pattern Analysis swing-through gait  -CW  swing-through gait  -CW    Gait, Gait Deviations andrea decreased;forward flexed posture;step length decreased;narrow base  -CW  andrea decreased;forward flexed posture;narrow base;step length decreased  -CW    Gait, Safety Issues step length decreased  -CW  step length decreased  -CW    Gait, Impairments strength decreased;sensation decreased;impaired balance  -CW  sensation decreased;strength decreased;impaired balance;coordination impaired  -CW    Gait, Comment Cus to look up, broaden LUCINA, and stay inside walker  -CW  Cues to look up, broaden LUCINA and stay inside walker  -CW    Recorded by [CW] Silvana Garcia PTA  [CW] Silvana Garcia PTA    Motor Skills/Interventions    Additional Documentation   Balance Skills Training (Group)  -CW    Recorded by   [CW] Silvana Garcia PTA    Balance Skills Training    Gait Balance-Level of Assistance   Contact guard  -CW    Gait Balance Support   Right upper extremity supported;Left upper extremity supported  -CW    Gait Balance Activities   backwards;side-stepping;scanning environment R/L  -CW    Recorded by   [CW] Silvana Garcia PTA    Therapy Exercises    Bilateral Lower Extremities AROM:;20 reps;sitting  -CW  AROM:;15 reps;standing;hip abduction/adduction;hip flexion;mini squats  -CW    Recorded by [CW] Silvana Garcia PTA  [CW] Silvana Garcia PTA    Orthosis Location    Orthosis Location/Type lower extremity  -CW  lower extremity  -CW    Orthosis, Lower Extremity Left:;Right:;AFO (ankle foot orthosis)  -CW  Left:;Right:;AFO  (ankle foot orthosis)  -CW    Recorded by [CW] Silvana Garcia PTA  [CW] Silvana Garcia PTA    Positioning and Restraints    Pre-Treatment Position in bed  -CW  sitting in chair/recliner  -CW    Post Treatment Position bathroom  -CW  chair  -CW    In Chair   sitting;call light within reach;with family/caregiver  -CW    Bathroom sitting;call light within reach;with OT  -CW      Recorded by [CW] Silvana Garcia PTA  [CW] Silvana Garcia PTA      09/04/17 0745 09/03/17 1031       Rehab Assessment/Intervention    Discipline physical therapy assistant  -CW speech language pathologist  -MB     Document Type therapy note (daily note)  -CW therapy note (daily note)  -MB     Subjective Information no complaints;agree to therapy  -CW no complaints;agree to therapy  -MB     Patient Effort, Rehab Treatment good  -CW good  -MB     Precautions/Limitations fall precautions  -CW      Specific Treatment Considerations B AFOS; Neuropathy B LE knees down  -CW      Recorded by [CW] Silvana Garcia PTA [MB] Zandra Haskins CCC-SLP     Pain Assessment    Pain Assessment No/denies pain  -CW      Recorded by [CW] Silvana Garcia PTA      Cognitive Assessment/Intervention    Personal Safety Interventions fall prevention program maintained;gait belt;nonskid shoes/slippers when out of bed  -CW      Recorded by [CW] Silvana Garcia PTA      Dysphagia/Swallow Intervention    Dysphagia/Swallow Therapeutic Feed  Pt completed trials of a regular solid and thin liquids with no overt s/s of aspiration. He had adequate mastication and clearance of the solid. Discontinue - goal met  -MB     Recorded by  [MB] Zandra Haskins CCC-SLP     Bed Mobility, Assessment/Treatment    Bed Mobility, Comment up in chair  -CW      Recorded by [CW] Silvana Garcia PTA      Transfer Assessment/Treatment    Transfers, Sit-Stand [place] stand by assist;verbal cues required  -CW      Transfers, Stand-Sit [place] stand by  assist;verbal cues required  -CW      Transfers, Sit-Stand-Sit, Assist Device rolling walker  -CW      Transfer, Safety Issues impulsivity;step length decreased  -CW      Transfer, Impairments strength decreased;sensation decreased  -CW      Transfer, Comment Cues for hand placement during transfers  -CW      Recorded by [CW] Silvana Garcia PTA      Gait Assessment/Treatment    Gait, [place] Level contact guard assist;verbal cues required  -CW      Gait, Assistive Device rolling walker  -CW      Gait, Distance (Feet) 100   x 4 with standing rests to correct mechanics  -CW      Gait, Gait Pattern Analysis swing-through gait  -CW      Gait, Gait Deviations andrea decreased;forward flexed posture;narrow base;step length decreased  -CW      Gait, Safety Issues step length decreased  -CW      Gait, Impairments strength decreased;sensation decreased  -CW      Gait, Comment Cues to look up, broaden LUCINA and stay inside wallker  -CW      Recorded by [CW] Silvana Garcia PTA      Orthosis Location    Orthosis Location/Type lower extremity  -CW      Orthosis, Lower Extremity Left:;Right:;AFO (ankle foot orthosis)  -CW      Recorded by [CW] Silvana Garcia PTA      Positioning and Restraints    Pre-Treatment Position sitting in chair/recliner  -CW      Post Treatment Position chair  -CW      In Chair sitting;call light within reach  -CW      Recorded by [CW] Silvana Garcia PTA        User Key  (r) = Recorded By, (t) = Taken By, (c) = Cosigned By    Initials Name Effective Dates    HOMER Haskins, FLORIN-SLP 08/02/16 -     ESTEFANI Terry, PTA 08/02/16 -     CW Silvana Garcia PTA 06/22/15 -     MM Иван Brady, OTR/L 10/21/16 -                 IP PT Goals       09/02/17 1406          Bed Mobility PT LTG    Bed Mobility PT LTG, Date Established 09/02/17  -PB (r) MS (t) PB (c)      Bed Mobility PT LTG, Time to Achieve by discharge  -PB (r) MS (t) PB (c)      Bed Mobility PT LTG, Activity Type  all bed mobility  -PB (r) MS (t) PB (c)      Bed Mobility PT LTG, [place] Level independent  -PB (r) MS (t) PB (c)      Transfer Training PT LTG    Transfer Training PT LTG, Date Established 09/02/17  -PB (r) MS (t) PB (c)      Transfer Training PT LTG, Time to Achieve by discharge  -PB (r) MS (t) PB (c)      Transfer Training PT LTG, Activity Type bed to chair /chair to bed;sit to stand/stand to sit  -PB (r) MS (t) PB (c)      Transfer Training PT LTG, [place] Level conditional independence  -PB (r) MS (t) PB (c)      Transfer Training PT LTG, Assist Device walker, rolling  -PB (r) MS (t) PB (c)      Gait Training PT LTG    Gait Training Goal PT LTG, Date Established 09/02/17  -PB (r) MS (t) PB (c)      Gait Training Goal PT LTG, Time to Achieve by discharge  -PB (r) MS (t) PB (c)      Gait Training Goal PT LTG, [place] Level conditional independence  -PB (r) MS (t) PB (c)      Gait Training Goal PT LTG, Assist Device walker, rolling  -PB (r) MS (t) PB (c)      Gait Training Goal PT LTG, Distance to Achieve 300'  -PB (r) MS (t) PB (c)      Gait Training Goal PT LTG, Additional Goal without tripping over feet/keeping clear of obstacles  -PB (r) MS (t) PB (c)      Stair Training PT LTG    Stair Training Goal PT LTG, Date Established 09/02/17  -PB (r) MS (t) PB (c)      Stair Training Goal PT LTG, Time to Achieve by discharge  -PB (r) MS (t) PB (c)      Stair Training Goal PT LTG, Number of Steps 1  -PB (r) MS (t) PB (c)      Stair Training Goal PT LTG, [place] Level conditional independence  -PB (r) MS (t) PB (c)      Stair Training Goal PT LTG, Assist Device 1 handrail  -PB (r) MS (t) PB (c)        User Key  (r) = Recorded By, (t) = Taken By, (c) = Cosigned By    Initials Name Provider Type    REJI Trianar, PT DPT Physical Therapist    MS Yulissa Simms, PT Student PT Student          Physical Therapy Education     Title: PT OT SLP Therapies (Active)     Topic: Physical Therapy (Done)      Point: Mobility training (Done)    Learning Progress Summary    Learner Readiness Method Response Comment Documented by Status   Patient Acceptance E DU bed mobility, hand placement with transfers, gait mechanics, postural correction KJ 09/06/17 0953 Done    Acceptance E,D NR Gait, body mechanics CW 09/05/17 1626 Active    Acceptance E,D VU,NR Gait, body mechanics CW 09/04/17 0820 Done    Acceptance E,D VU,NR Transferss, posture, body mechanics, gait, plan of care CW 09/03/17 0948 Done    Acceptance E VU,NR Proper use of AD, safety with t/fs and ambulation, benefits of increased activity MS 09/02/17 1411 Done   Family Acceptance E DU bed mobility, hand placement with transfers, gait mechanics, postural correction KJ 09/06/17 0953 Done               Point: Home exercise program (Done)    Learning Progress Summary    Learner Readiness Method Response Comment Documented by Status   Patient Acceptance E DU bed mobility, hand placement with transfers, gait mechanics, postural correction KJ 09/06/17 0953 Done    Acceptance E,D NR Gait, body mechanics CW 09/05/17 1626 Active    Acceptance E,D VU,NR Transferss, posture, body mechanics, gait, plan of care CW 09/03/17 0948 Done   Family Acceptance E DU bed mobility, hand placement with transfers, gait mechanics, postural correction KJ 09/06/17 0953 Done               Point: Body mechanics (Done)    Learning Progress Summary    Learner Readiness Method Response Comment Documented by Status   Patient Acceptance E DU bed mobility, hand placement with transfers, gait mechanics, postural correction KJ 09/06/17 0953 Done    Acceptance E,D NR Gait, body mechanics CW 09/05/17 1626 Active    Acceptance E,D VU,NR Gait, body mechanics CW 09/04/17 0820 Done    Acceptance E,D VU,NR Transferss, posture, body mechanics, gait, plan of care CW 09/03/17 0948 Done   Family Acceptance E DU bed mobility, hand placement with transfers, gait mechanics, postural correction KJ 09/06/17 0953 Done                Point: Precautions (Done)    Learning Progress Summary    Learner Readiness Method Response Comment Documented by Status   Patient Acceptance E DU bed mobility, hand placement with transfers, gait mechanics, postural correction KJ 09/06/17 0953 Done    Acceptance E,D NR Gait, body mechanics  09/05/17 1626 Active    Acceptance E,D VU,NR Gait, body mechanics CW 09/04/17 0820 Done    Acceptance E,D VU,NR Transferss, posture, body mechanics, gait, plan of care  09/03/17 0948 Done    Acceptance E VU,NR Proper use of AD, safety with t/fs and ambulation, benefits of increased activity MS 09/02/17 1411 Done   Family Acceptance E DU bed mobility, hand placement with transfers, gait mechanics, postural correction  09/06/17 0953 Done                      User Key     Initials Effective Dates Name Provider Type Discipline     08/02/16 -  Clara Terry, PTA Physical Therapy Assistant PT    CW 06/22/15 -  Silvana Garcia, PTA Physical Therapy Assistant PT    MS 07/19/17 -  Yulissa Simms, PT Student PT Student PT                    PT Recommendation and Plan  Anticipated Discharge Disposition: home with assist, home with home health  Planned Therapy Interventions: balance training, bed mobility training, gait training, home exercise program, patient/family education, stair training, strengthening, transfer training  PT Frequency: daily, 2 times/day, per priority policy  Plan of Care Review  Plan Of Care Reviewed With: patient, spouse  Progress: progress toward functional goals as expected  Outcome Summary/Follow up Plan: PT tx completed. Pt supine in bed, with no c/o. Hopefully discharged to [facility] for rehab per spouse.I bed mobility, Min A transfers and cueing for hand placment. Min A with r wx 40',40',40',. Lots of cueing for postural correction and foot placement due to forward flexed posture. He also tends to not clear his feet and trips over them or not clear them thru toe off . Benefit  from cont'd progressive rehab for balance and strengthening program.          Outcome Measures       09/05/17 1643 09/05/17 1600 09/04/17 0800    How much help from another person do you currently need...    Turning from your back to your side while in flat bed without using bedrails?  4  -CW 4  -CW    Moving from lying on back to sitting on the side of a flat bed without bedrails?  4  -CW 4  -CW    Moving to and from a bed to a chair (including a wheelchair)?  3  -CW 3  -CW    Standing up from a chair using your arms (e.g., wheelchair, bedside chair)?  3  -CW 3  -CW    Climbing 3-5 steps with a railing?  3  -CW 3  -CW    To walk in hospital room?  3  -CW 3  -CW    AM-PAC 6 Clicks Score  20  -CW 20  -CW    How much help from another is currently needed...    Putting on and taking off regular lower body clothing? 3  -MM      Bathing (including washing, rinsing, and drying) 2  -MM      Toileting (which includes using toilet bed pan or urinal) 3  -MM      Putting on and taking off regular upper body clothing 4  -MM      Taking care of personal grooming (such as brushing teeth) 4  -MM      Eating meals 4  -MM      Score 20  -MM      Functional Assessment    Outcome Measure Options AM-PAC 6 Clicks Daily Activity (OT)  -MM AM-PAC 6 Clicks Basic Mobility (PT)  -CW AM-PAC 6 Clicks Basic Mobility (PT)  -CW      User Key  (r) = Recorded By, (t) = Taken By, (c) = Cosigned By    Initials Name Provider Type    ELVIRA Garcia PTA Physical Therapy Assistant    KIA Brady, OTR/L Occupational Therapist           Time Calculation:           PT G-Codes  Outcome Measure Options: AM-PAC 6 Clicks Daily Activity (OT)  Score: 18  Functional Limitation: Mobility: Walking and moving around  Mobility: Walking and Moving Around Current Status (): At least 40 percent but less than 60 percent impaired, limited or restricted  Mobility: Walking and Moving Around Goal Status (): At least 20 percent but less than 40  percent impaired, limited or restricted    Clara Terry, PTA  9/6/2017

## 2017-09-06 NOTE — PLAN OF CARE
Problem: Patient Care Overview (Adult)  Goal: Plan of Care Review  Outcome: Ongoing (interventions implemented as appropriate)    09/06/17 0900   Coping/Psychosocial Response Interventions   Plan Of Care Reviewed With patient;spouse   Patient Care Overview   Progress progress toward functional goals as expected   Outcome Evaluation   Outcome Summary/Follow up Plan PT tx completed. Pt supine in bed, with no c/o. Hopefully discharged to [facility] today for rehab per spouse.I bed mobility, Min A transfers and cueing for hand placment. Min A with r wx 40',40',40',. Lots of cueing for postural correction and foot placement due to forward flexed posture. He also tends to not clear his feet and trips over them or not clear them thru toe off . Benefit from cont'd progressive rehab for balance and strengthening program.

## 2017-09-06 NOTE — PROGRESS NOTES
Continued Stay Note   [place]     Patient Name: Casey Berger  MRN: [id number]  Today's Date: 9/6/2017    Admit Date: 9/1/2017          Discharge Plan       09/06/17 0814    Case Management/Social Work Plan    Plan [facility]    Additional Comments [facility] has accepted patient. Discharge plan will be [facility]; Erin at [facility] advised  that [facility] will likely not accept.  did call and leave a message for patient's wife.  will follow to speak to wife.               Discharge Codes     None            RONEL Oswald

## 2017-09-06 NOTE — PLAN OF CARE
Problem: Inpatient Physical Therapy  Goal: Bed Mobility Goal LTG- PT  Outcome: Unable to achieve outcome(s) by discharge Date Met:  09/06/17 09/02/17 1406 09/06/17 1546   Bed Mobility PT LTG   Bed Mobility PT LTG, Date Established 09/02/17 --    Bed Mobility PT LTG, Time to Achieve by discharge --    Bed Mobility PT LTG, Activity Type all bed mobility --    Bed Mobility PT LTG, [place] Level independent --    Bed Mobility PT LTG, Date Goal Reviewed --  09/06/17   Bed Mobility PT LTG, Outcome --  goal not met   Bed Mobility PT LTG, Reason Goal Not Met --  discharged from facility       Goal: Transfer Training Goal 1 LTG- PT  Outcome: Unable to achieve outcome(s) by discharge Date Met:  09/06/17 09/02/17 1406 09/06/17 1546   Transfer Training PT LTG   Transfer Training PT LTG, Date Established 09/02/17 --    Transfer Training PT LTG, Time to Achieve by discharge --    Transfer Training PT LTG, Activity Type bed to chair /chair to bed;sit to stand/stand to sit --    Transfer Training PT LTG, [place] Level conditional independence --    Transfer Training PT LTG, Assist Device walker, rolling --    Transfer Training PT LTG, Date Goal Reviewed --  09/06/17   Transfer Training PT LTG, Outcome --  goal not met   Transfer Training PT LTG, Reason Goal Not Met --  discharged from facility       Goal: Gait Training Goal LTG- PT  Outcome: Unable to achieve outcome(s) by discharge Date Met:  09/06/17  Goal: Stair Training Goal LTG- PT  Outcome: Unable to achieve outcome(s) by discharge Date Met:  09/06/17 09/02/17 1406 09/06/17 1546   Stair Training PT LTG   Stair Training Goal PT LTG, Date Established 09/02/17 --    Stair Training Goal PT LTG, Time to Achieve by discharge --    Stair Training Goal PT LTG, Number of Steps 1 --    Stair Training Goal PT LTG, [place] Level conditional independence --    Stair Training Goal PT LTG, Assist Device 1 handrail --    Stair Training Goal PT LTG, Date Goal Reviewed  --  09/06/17   Stair Training Goal PT LTG, Outcome --  goal not met   Stair Training Goal PT LTG, Reason Goal Not Met --  discharged from facility

## 2017-09-06 NOTE — PROGRESS NOTES
Neurology Progress Note      Chief Complaint:  stroke    Subjective     Subjective:    Patient is at baseline today.  He is doing well.  His wife is at bedside.  He still often attempts to get up without the rolling walker.  Despite initial reports that he was not a smoker his wife does finally give out today that he is a smoker and smokes almost a pack a day if not more.  He has had no cravings since he has been here.  Medications:  Current Facility-Administered Medications   Medication Dose Route Frequency Provider Last Rate Last Dose   • acetaminophen (TYLENOL) tablet 650 mg  650 mg Oral Q4H PRN Micheal Butt MD       • cefTRIAXone (ROCEPHIN) 1 g/100 mL 0.9% NS (MBP)  1 g Intravenous Q24H Micheal Butt MD 0 mL/hr at 09/03/17 2158 1 g at 09/05/17 2130   • clopidogrel (PLAVIX) tablet 75 mg  75 mg Oral Daily Cornell Lynne MD   75 mg at 09/06/17 0828   • dextrose (D50W) solution 25 g  25 g Intravenous Q15 Min PRN Micheal Butt MD       • dextrose (GLUTOSE) oral gel 15 g  15 g Oral Q15 Min PRN Micheal Butt MD       • famotidine (PEPCID) tablet 40 mg  40 mg Oral Daily Micheal Butt MD   40 mg at 09/06/17 0828   • glucagon (human recombinant) (GLUCAGEN DIAGNOSTIC) injection 1 mg  1 mg Subcutaneous Q15 Min PRN Micheal Butt MD       • insulin detemir (LEVEMIR) injection 30 Units  30 Units Subcutaneous Daily CHELSIE Ignacio   30 Units at 09/06/17 0833   • insulin lispro (humaLOG) injection 2-9 Units  2-9 Units Subcutaneous 4x Daily With Meals & Nightly CHELSIE Ignacio   6 Units at 09/06/17 0834   • lisinopril (PRINIVIL,ZESTRIL) tablet 2.5 mg  2.5 mg Oral Daily PRN Micheal Butt MD       • nystatin (MYCOSTATIN) powder   Topical Q12H Paul Clemons MD       • ondansetron (ZOFRAN) injection 4 mg  4 mg Intravenous Q6H PRN Micheal Butt MD       • oxyCODONE-acetaminophen (PERCOCET) 7.5-325 MG per tablet 1 tablet  1 tablet Oral Q6H PRN Zaheer Gutiérrez MD   1  tablet at 09/05/17 1815   • sodium chloride 0.9 % flush 1-10 mL  1-10 mL Intravenous PRN Micheal Butt MD       • sodium chloride 0.9 % flush 10 mL  10 mL Intravenous PRN Solo Lynch Jr., MD       • temazepam (RESTORIL) capsule 30 mg  30 mg Oral Nightly Micheal Butt MD   30 mg at 09/05/17 2131   • zinc gluconate tablet 50 mg  50 mg Oral Daily Micheal Butt MD   50 mg at 09/06/17 0828       Review of Systems:   -A 14 point review of systems is completed and is negative except for previous confusion and headache      Objective      Vital Signs  Temp:  [97.4 °F (36.3 °C)-98.6 °F (37 °C)] 98.6 °F (37 °C)  Heart Rate:  [60-68] 64  Resp:  [16-20] 16  BP: (105-172)/(52-67) 172/67    Physical Exam:    General Exam:  Head:  Normal cephalic, atraumatic  HEENT:  Neck supple  Fundoscopic Exam:  No signs of disc edema  CVS:  Regular rate and rhythm.  No murmurs  Carotid Examination:  No bruits  Lungs:  Clear to auscultation  Abdomen:  Non-tender, Non-distended  Extremities:  No signs of peripheral edema  Skin:  No rashes     Neurologic Exam:     Mental Status:    -Awake, Alert, Oriented X 3  -No word finding difficulties  -No aphasia  -No dysarthria  -Follows simple and complex commands     CN II:  Visual fields full.  Pupils equally reactive to light  CN III, IV, VI:  Extraocular Muscles full with no signs of nystagmus  CN V:  Facial sensory is symmetric with no asymetries.  CN VII:  Facial motor symmetric  CN VIII:  Gross hearing intact bilaterally  CN IX:  Palate elevates symmetrically  CN X:  Palate elevates symmetrically  CN XI:  Shoulder shrug symmetric  CN XII:  Tongue is midline on protrusion     Motor:      Proximally in the upper extremities he has 5 out of 5 strength.  Specifically biceps triceps and brachial radialis are full strength.   strength I would rate as 3-5.  First dorsal interossei's 2+.  In his lower extremities he has 3-4+ proximally.  Distally dorsiflexion and plantar flexion he has  0 out of 5 strength     DTR:  --Hyporeflexic throughout.  Sensory:  -Gradient sensory loss distal to the knees and wrists bilaterally     Coordination:  -Finger to nose intact  -Heel to shin intact  -No ataxia     Gait  -Ambulates with AFOs bilaterally And rolling walker     Results Review:    I reviewed the patient's new clinical results.      Results from last 7 days  Lab Units 09/06/17 0618 09/05/17 0457 09/03/17  1549   WBC 10*3/mm3 9.95 9.26 9.03   HEMOGLOBIN g/dL 13.7* 14.0 14.2   HEMATOCRIT % 41.4 42.3 42.3   PLATELETS 10*3/mm3 243 239 245          Results from last 7 days  Lab Units 09/06/17 0618 09/05/17 0457 09/01/17  1946   SODIUM mmol/L 139 139 134*   POTASSIUM mmol/L 4.1 4.1 4.4   CHLORIDE mmol/L 106 106 100   CO2 mmol/L 25.0 25.0 25.0   BUN mg/dL 22* 19 21   CREATININE mg/dL 1.10 1.01 1.15   CALCIUM mg/dL 8.7 9.0 9.2   BILIRUBIN mg/dL  --   --  0.5   ALK PHOS U/L  --   --  95   ALT (SGPT) U/L  --   --  29   AST (SGOT) U/L  --   --  21   GLUCOSE mg/dL 213* 208* 347*        Lab Results   Component Value Date    PROTIME 12.3 09/01/2017    INR 0.89 (L) 09/01/2017     No components found for: POCGLUC  No components found for: A1C  Lab Results   Component Value Date    HDL 38 (L) 09/02/2017     No components found for: B12  No results found for: TSH  MRI Brain Without Contrast [[id number]] Collected:  09/02/17 0856        Updated:  09/02/17 0908     Narrative:        EXAM: MR BRAIN WITHOUT IV CONTRAST 09/02/2017.      COMPARISON: MRI brain dated 01/01/2016, head CT dated 09/01/2017.       HISTORY: 77 years-old Male. Altered mental status.       TECHNIQUE:   Routine pulse sequences of the brain were obtained without IV contrast.       REPORT:       There is diffusion restriction within the right cuneus (medial occipital  lobe) with associated T2/FLAIR abnormal signal, consistent with acute  infarct. There is also intermediate signal within the body of the corpus  callosum on the right (image 152, series  204) with corresponding T1  hypointense signal (image 31, series 302), consistent with a subacute  infarct. Moderate chronic microvascular changes in the bilateral  supratentorial white matter. Mild to moderate generalized cerebral  volume loss. No acute hydrocephalus. No suspicious extra-axial fluid  collection. No midline shift. No acute hemorrhage.      Flow-voids of the [place] are maintained centrally.      Mucus suspicious in the left maxillary sinus.         Impression:        1.  Acute right occipital lacunar infarct with no evidence of  hemorrhagic conversion.  2.  Evolving subacute lacunar infarct in the body of the corpus  callosum.  This report was finalized on 09/02/2017 09:05 by Dr. Yanet Vazquez MD.      MR Brain reviewed by me:  Right occipital acute infarction and second subacute infarct in right corpus callosum  Carotid ultrasound:  No significant stenosis  Cardiac echo:  · Left ventricular systolic function is normal. Estimated ejection fractio nis 56-60%.  · Normal right ventricular cavity size and systolic function noted.  · There is no evidence of intracardiac mass or thrombus.  · There is no evidence of right to left shunt on bubble study.  Total Cholesterol 130 - 200 mg/dL 112 (L)   Triglycerides 0 - 149 mg/dL 78   HDL Cholesterol >=40 mg/dL 38 (L)   LDL Cholesterol  0 - 99 mg/dL 63   LDL/HDL Ratio   1.54      Hemoglobin A1C % 10.1       Assessment/Plan     Hospital Problem List    Principal Problem:    Stroke  Active Problems:    Type 2 diabetes mellitus    Hypertension    Anxiety    Cystitis    Impression:  1.  2 acute ischemic strokes likely small vessel in distraction.  Risk factors include hyperglycemia from uncontrolled IVs mellitus.  2.  Diabetes mellitus currently in controlled  3.  Peripheral neuropathy suspected be secondary to diabetic neuropathy however it is unclear whether a neurologist has worked this up in the past  4.  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  Tobacco abuse    Plan:  --Plavix monotherapy  --LDL at goal  --increase glycemic control with HbA1c goal of <7  --Lovenox 40 mg subcutaneous for DVT prophylaxis  --continue with fall precautions  --tobacco cessation counseling done with patient and wife  -OK to [facility] today.  Follow up with neurology in 4-6 weeks.        Cornell Lynne MD  09/06/17  10:54 AM

## 2017-09-06 NOTE — PLAN OF CARE
Problem: Confusion, Acute (Adult)  Goal: Cognitive/Functional Impairments Minimized  Outcome: Outcome(s) achieved Date Met:  09/06/17  Patient to be discharged to [facility] today.  Wife to transport.    09/06/17 4592   Confusion, Acute (Adult)   Cognitive/Functional Impairments Minimized achieves outcome       Goal: Safety  Outcome: Outcome(s) achieved Date Met:  09/06/17    Problem: Fall Risk (Adult)  Goal: Absence of Falls  Outcome: Outcome(s) achieved Date Met:  09/06/17    Problem: Patient Care Overview (Adult)  Goal: Adult Individualization and Mutuality  Outcome: Outcome(s) achieved Date Met:  09/06/17  Goal: Discharge Needs Assessment  Outcome: Outcome(s) achieved Date Met:  09/06/17    Problem: Stroke (Ischemic) (Adult)  Goal: Signs and Symptoms of Listed Potential Problems Will be Absent or Manageable (Stroke)  Outcome: Outcome(s) achieved Date Met:  09/06/17    Problem: Pressure Ulcer Risk (Rafa Scale) (Adult,Obstetrics,Pediatric)  Goal: Identify Related Risk Factors and Signs and Symptoms  Outcome: Outcome(s) achieved Date Met:  09/06/17  Goal: Skin Integrity  Outcome: Outcome(s) achieved Date Met:  09/06/17

## 2017-09-06 NOTE — PLAN OF CARE
Problem: Patient Care Overview (Adult)  Goal: Plan of Care Review  Outcome: Ongoing (interventions implemented as appropriate)    09/06/17 0415   Coping/Psychosocial Response Interventions   Plan Of Care Reviewed With patient   Patient Care Overview   Progress no change   Outcome Evaluation   Outcome Summary/Follow up Plan vss; disoriented to place; no c/o pain; up x 1 with walker; family at bedside; bilateral foot drop; safety maintained       Goal: Adult Individualization and Mutuality  Outcome: Ongoing (interventions implemented as appropriate)  Goal: Discharge Needs Assessment  Outcome: Ongoing (interventions implemented as appropriate)    Problem: Stroke (Ischemic) (Adult)  Goal: Signs and Symptoms of Listed Potential Problems Will be Absent or Manageable (Stroke)  Outcome: Ongoing (interventions implemented as appropriate)    Problem: Pressure Ulcer Risk (Rafa Scale) (Adult,Obstetrics,Pediatric)  Goal: Identify Related Risk Factors and Signs and Symptoms  Outcome: Ongoing (interventions implemented as appropriate)  Goal: Skin Integrity  Outcome: Ongoing (interventions implemented as appropriate)

## 2017-09-07 ENCOUNTER — LAB REQUISITION (OUTPATIENT)
Dept: LAB | Facility: HOSPITAL | Age: 77
End: 2017-09-07

## 2017-09-07 DIAGNOSIS — Z00.00 ENCOUNTER FOR GENERAL ADULT MEDICAL EXAMINATION WITHOUT ABNORMAL FINDINGS: ICD-10-CM

## 2017-09-07 LAB
ALBUMIN SERPL-MCNC: 3.4 G/DL (ref 3.5–5)
ALP SERPL-CCNC: 82 U/L (ref 24–120)
ALT SERPL W P-5'-P-CCNC: 28 U/L (ref 0–54)
ANION GAP SERPL CALCULATED.3IONS-SCNC: 10 MMOL/L (ref 4–13)
ARTICHOKE IGE QN: 84 MG/DL (ref 0–99)
AST SERPL-CCNC: 17 U/L (ref 7–45)
BACTERIA SPEC AEROBE CULT: NORMAL
BASOPHILS # BLD AUTO: 0.06 10*3/MM3 (ref 0–0.2)
BASOPHILS NFR BLD AUTO: 0.6 % (ref 0–2)
BILIRUB CONJ SERPL-MCNC: 0 MG/DL (ref 0–0.3)
BILIRUB INDIRECT SERPL-MCNC: 0 MG/DL (ref 0–1.1)
BILIRUB SERPL-MCNC: 0.5 MG/DL (ref 0.1–1)
BUN BLD-MCNC: 22 MG/DL (ref 5–21)
BUN/CREAT SERPL: 21.4 (ref 7–25)
CALCIUM SPEC-SCNC: 9.2 MG/DL (ref 8.4–10.4)
CHLORIDE SERPL-SCNC: 105 MMOL/L (ref 98–110)
CHOLEST SERPL-MCNC: 147 MG/DL (ref 130–200)
CO2 SERPL-SCNC: 24 MMOL/L (ref 24–31)
CREAT BLD-MCNC: 1.03 MG/DL (ref 0.5–1.4)
DEPRECATED RDW RBC AUTO: 46.1 FL (ref 40–54)
EOSINOPHIL # BLD AUTO: 0.27 10*3/MM3 (ref 0–0.7)
EOSINOPHIL NFR BLD AUTO: 2.7 % (ref 0–4)
ERYTHROCYTE [DISTWIDTH] IN BLOOD BY AUTOMATED COUNT: 14.1 % (ref 12–15)
GFR SERPL CREATININE-BSD FRML MDRD: 70 ML/MIN/1.73
GLUCOSE BLD-MCNC: 259 MG/DL (ref 70–100)
HBA1C MFR BLD: 9.8 %
HCT VFR BLD AUTO: 43.3 % (ref 40–52)
HDLC SERPL-MCNC: 47 MG/DL
HGB BLD-MCNC: 14.3 G/DL (ref 14–18)
IMM GRANULOCYTES # BLD: 0.05 10*3/MM3 (ref 0–0.03)
IMM GRANULOCYTES NFR BLD: 0.5 % (ref 0–5)
LDLC/HDLC SERPL: 1.63 {RATIO}
LYMPHOCYTES # BLD AUTO: 1.59 10*3/MM3 (ref 0.72–4.86)
LYMPHOCYTES NFR BLD AUTO: 15.8 % (ref 15–45)
MCH RBC QN AUTO: 29.5 PG (ref 28–32)
MCHC RBC AUTO-ENTMCNC: 33 G/DL (ref 33–36)
MCV RBC AUTO: 89.5 FL (ref 82–95)
MONOCYTES # BLD AUTO: 1.29 10*3/MM3 (ref 0.19–1.3)
MONOCYTES NFR BLD AUTO: 12.8 % (ref 4–12)
NEUTROPHILS # BLD AUTO: 6.82 10*3/MM3 (ref 1.87–8.4)
NEUTROPHILS NFR BLD AUTO: 67.6 % (ref 39–78)
PLATELET # BLD AUTO: 249 10*3/MM3 (ref 130–400)
PMV BLD AUTO: 12.4 FL (ref 6–12)
POTASSIUM BLD-SCNC: 4.7 MMOL/L (ref 3.5–5.3)
PREALB SERPL-MCNC: 13.7 MG/DL (ref 18–36)
PROT SERPL-MCNC: 5.8 G/DL (ref 6.3–8.7)
RBC # BLD AUTO: 4.84 10*6/MM3 (ref 4.8–5.9)
SODIUM BLD-SCNC: 139 MMOL/L (ref 135–145)
TRIGL SERPL-MCNC: 117 MG/DL (ref 0–149)
TSH SERPL DL<=0.05 MIU/L-ACNC: 0.64 MIU/ML (ref 0.47–4.68)
VIT B12 BLD-MCNC: 678 PG/ML (ref 239–931)
WBC NRBC COR # BLD: 10.08 10*3/MM3 (ref 4.8–10.8)

## 2017-09-07 PROCEDURE — 36415 COLL VENOUS BLD VENIPUNCTURE: CPT | Performed by: NURSE PRACTITIONER

## 2017-09-07 PROCEDURE — 82607 VITAMIN B-12: CPT | Performed by: NURSE PRACTITIONER

## 2017-09-07 PROCEDURE — 80061 LIPID PANEL: CPT | Performed by: NURSE PRACTITIONER

## 2017-09-07 PROCEDURE — 84443 ASSAY THYROID STIM HORMONE: CPT | Performed by: NURSE PRACTITIONER

## 2017-09-07 PROCEDURE — 80076 HEPATIC FUNCTION PANEL: CPT | Performed by: NURSE PRACTITIONER

## 2017-09-07 PROCEDURE — 80048 BASIC METABOLIC PNL TOTAL CA: CPT | Performed by: NURSE PRACTITIONER

## 2017-09-07 PROCEDURE — 84134 ASSAY OF PREALBUMIN: CPT | Performed by: NURSE PRACTITIONER

## 2017-09-07 PROCEDURE — 83036 HEMOGLOBIN GLYCOSYLATED A1C: CPT | Performed by: NURSE PRACTITIONER

## 2017-09-07 PROCEDURE — 85025 COMPLETE CBC W/AUTO DIFF WBC: CPT | Performed by: NURSE PRACTITIONER

## 2017-09-07 NOTE — THERAPY DISCHARGE NOTE
Acute Care - Occupational Therapy Discharge Summary  [facility]     Patient Name: Casey Berger  : 1940  MRN: [id number]    Today's Date: 2017  Onset of Illness/Injury or Date of Surgery Date: 17    Date of Referral to OT: 17  Referring Physician: Dr. Butt      Admit Date: 2017        OT Recommendation and Plan    Visit Dx:    ICD-10-CM ICD-9-CM   1. Altered mental status, unspecified altered mental status type R41.82 780.97   2. Acute UTI N39.0 599.0   3. Oropharyngeal dysphagia R13.12 787.22   4. Decreased activities of daily living (ADL) Z78.9 V49.89   5. Impaired functional mobility, balance, gait, and endurance Z74.09 V49.89   6. Impaired cognition R41.89 294.9                     OT Goals       17 0706 17 1356       Transfer Training OT LTG    Transfer Training OT LTG, Date Established  17  -ND     Transfer Training OT LTG, Time to Achieve  by discharge  -ND     Transfer Training OT LTG, Activity Type  all transfers  -ND     Transfer Training OT LTG, [place] Level  conditional independence  -ND     Transfer Training OT LTG, Date Goal Reviewed 17  -TS      Transfer Training OT LTG, Outcome goal not met  -TS      Transfer Training OT LTG, Reason Goal Not Met discharged from facility  -TS      Strength OT LTG    Strength Goal OT LTG, Date Established  17  -ND     Strength Goal OT LTG, Time to Achieve  by discharge  -ND     Strength Goal OT LTG, Measure to Achieve  Pt. will complete BUE strengthening exercises to increase BUE strength to 5/5 for ADLs.   -ND     Strength Goal OT LTG, Date Goal Reviewed 17  -TS      Strength Goal OT LTG, Outcome goal not met  -TS      Strength Goal OT LTG, Reason Goal Not Met discharged from facility  -TS      Bathing OT LTG    Bathing Goal OT LTG, Date Established  17  -ND     Bathing Goal OT LTG, Time to Achieve  by discharge  -ND     Bathing Goal OT LTG, Activity Type  upper body bathing;lower body  bathing  -ND     Bathing Goal OT LTG, [place] Level  independent  -ND     Bathing Goal OT LTG, Date Goal Reviewed 09/07/17  -TS      Bathing Goal OT LTG, Outcome goal not met  -TS      Bathing Goal OT LTG, Reason Goal Not Met discharged from facility  -TS      LB Dressing OT LTG    LB Dressing Goal OT LTG, Date Established  09/02/17  -ND     LB Dressing Goal OT LTG, Time to Achieve  by discharge  -ND     LB Dressing Goal OT LTG, [place] Level  independent  -ND     LB Dressing Goal OT LTG, Date Goal Reviewed 09/07/17  -TS      LB Dressing Goal OT LTG, Outcome goal not met  -TS      LB Dressing Goal OT LTG, Reason Goal Not Met discharged from facility  -TS        User Key  (r) = Recorded By, (t) = Taken By, (c) = Cosigned By    Initials Name Provider Type    SANDEEP Mondragon, MIRAMONTES/L Occupational Therapy Assistant    NISHA Nguyen, OTR/L Occupational Therapist                Outcome Measures       09/05/17 1643 09/05/17 1600 09/04/17 0800    How much help from another person do you currently need...    Turning from your back to your side while in flat bed without using bedrails?  4  -CW 4  -CW    Moving from lying on back to sitting on the side of a flat bed without bedrails?  4  -CW 4  -CW    Moving to and from a bed to a chair (including a wheelchair)?  3  -CW 3  -CW    Standing up from a chair using your arms (e.g., wheelchair, bedside chair)?  3  -CW 3  -CW    Climbing 3-5 steps with a railing?  3  -CW 3  -CW    To walk in hospital room?  3  -CW 3  -CW    AM-PAC 6 Clicks Score  20  -CW 20  -CW    How much help from another is currently needed...    Putting on and taking off regular lower body clothing? 3  -MM      Bathing (including washing, rinsing, and drying) 2  -MM      Toileting (which includes using toilet bed pan or urinal) 3  -MM      Putting on and taking off regular upper body clothing 4  -MM      Taking care of personal grooming (such as brushing teeth) 4  -MM      Eating meals 4   -MM      Score 20  -MM      Functional Assessment    Outcome Measure Options AM-PAC 6 Clicks Daily Activity (OT)  -MM AM-PAC 6 Clicks Basic Mobility (PT)  -CW AM-PAC 6 Clicks Basic Mobility (PT)  -CW      User Key  (r) = Recorded By, (t) = Taken By, (c) = Cosigned By    Initials Name Provider Type    CW Silvana Garcia, PTA Physical Therapy Assistant    MM Иван Brady, OTR/L Occupational Therapist              OT Discharge Summary  Reason for Discharge: Discharge from facility  Outcomes Achieved: Refer to plan of care for updates on goals achieved  Discharge Destination: [facility]      YESI Chery  9/7/2017

## 2017-09-07 NOTE — PLAN OF CARE
Problem: Inpatient Occupational Therapy  Goal: Transfer Training Goal 1 LTG- OT  Outcome: Unable to achieve outcome(s) by discharge Date Met:  09/07/17 09/02/17 1356 09/07/17 0706   Transfer Training OT LTG   Transfer Training OT LTG, Date Established 09/02/17 --    Transfer Training OT LTG, Time to Achieve by discharge --    Transfer Training OT LTG, Activity Type all transfers --    Transfer Training OT LTG, [place] Level conditional independence --    Transfer Training OT LTG, Date Goal Reviewed --  09/07/17   Transfer Training OT LTG, Outcome --  goal not met   Transfer Training OT LTG, Reason Goal Not Met --  discharged from facility       Goal: Strength Goal LTG- OT  Outcome: Unable to achieve outcome(s) by discharge Date Met:  09/07/17 09/02/17 1356 09/07/17 0706   Strength OT LTG   Strength Goal OT LTG, Date Established 09/02/17 --    Strength Goal OT LTG, Time to Achieve by discharge --    Strength Goal OT LTG, Measure to Achieve Pt. will complete BUE strengthening exercises to increase BUE strength to 5/5 for ADLs.  --    Strength Goal OT LTG, Date Goal Reviewed --  09/07/17   Strength Goal OT LTG, Outcome --  goal not met   Strength Goal OT LTG, Reason Goal Not Met --  discharged from facility       Goal: Bathing Goal LTG- OT  Outcome: Unable to achieve outcome(s) by discharge Date Met:  09/07/17 09/02/17 1356 09/07/17 0706   Bathing OT LTG   Bathing Goal OT LTG, Date Established 09/02/17 --    Bathing Goal OT LTG, Time to Achieve by discharge --    Bathing Goal OT LTG, Activity Type upper body bathing;lower body bathing --    Bathing Goal OT LTG, [place] Level independent --    Bathing Goal OT LTG, Date Goal Reviewed --  09/07/17   Bathing Goal OT LTG, Outcome --  goal not met   Bathing Goal OT LTG, Reason Goal Not Met --  discharged from facility       Goal: LB Dressing Goal LTG- OT  Outcome: Unable to achieve outcome(s) by discharge Date Met:  09/07/17 09/02/17 1356 09/07/17 0706    LB Dressing OT LTG   LB Dressing Goal OT LTG, Date Established 09/02/17 --    LB Dressing Goal OT LTG, Time to Achieve by discharge --    LB Dressing Goal OT LTG, [place] Level independent --    LB Dressing Goal OT LTG, Date Goal Reviewed --  09/07/17   LB Dressing Goal OT LTG, Outcome --  goal not met   LB Dressing Goal OT LTG, Reason Goal Not Met --  discharged from facility

## 2017-10-21 ENCOUNTER — HOSPITAL ENCOUNTER (EMERGENCY)
Facility: HOSPITAL | Age: 77
Discharge: ANOTHER HOSPITAL | End: 2017-10-21
Admitting: EMERGENCY MEDICINE

## 2017-10-21 ENCOUNTER — HOSPITAL ENCOUNTER (INPATIENT)
Age: 77
LOS: 12 days | Discharge: NURSING, REHAB OR LONG-TERM CARE | DRG: 481 | End: 2017-11-02
Attending: HOSPITALIST | Admitting: INTERNAL MEDICINE
Payer: MEDICARE

## 2017-10-21 ENCOUNTER — APPOINTMENT (OUTPATIENT)
Dept: CT IMAGING | Facility: HOSPITAL | Age: 77
End: 2017-10-21

## 2017-10-21 ENCOUNTER — APPOINTMENT (OUTPATIENT)
Dept: GENERAL RADIOLOGY | Facility: HOSPITAL | Age: 77
End: 2017-10-21

## 2017-10-21 VITALS
SYSTOLIC BLOOD PRESSURE: 132 MMHG | TEMPERATURE: 99.1 F | BODY MASS INDEX: 22.35 KG/M2 | OXYGEN SATURATION: 94 % | HEART RATE: 102 BPM | DIASTOLIC BLOOD PRESSURE: 58 MMHG | WEIGHT: 165 LBS | HEIGHT: 72 IN | RESPIRATION RATE: 19 BRPM

## 2017-10-21 DIAGNOSIS — D63.8 ANEMIA, CHRONIC DISEASE: Primary | Chronic | ICD-10-CM

## 2017-10-21 DIAGNOSIS — N18.30 STAGE 3 CHRONIC KIDNEY DISEASE (HCC): Chronic | ICD-10-CM

## 2017-10-21 DIAGNOSIS — Z96.649 PERIPROSTHETIC FRACTURE OF FEMUR FOLLOWING TOTAL REPLACEMENT OF HIP, INITIAL ENCOUNTER: Primary | ICD-10-CM

## 2017-10-21 DIAGNOSIS — M97.8XXA PERIPROSTHETIC FRACTURE OF FEMUR FOLLOWING TOTAL REPLACEMENT OF HIP, INITIAL ENCOUNTER: Primary | ICD-10-CM

## 2017-10-21 PROBLEM — S72.90XA CLOSED FRACTURE OF FEMUR (HCC): Status: ACTIVE | Noted: 2017-10-21

## 2017-10-21 LAB
ALBUMIN SERPL-MCNC: 3.7 G/DL (ref 3.5–5)
ALBUMIN/GLOB SERPL: 1.3 G/DL (ref 1.1–2.5)
ALP SERPL-CCNC: 119 U/L (ref 24–120)
ALT SERPL W P-5'-P-CCNC: 46 U/L (ref 0–54)
ANION GAP SERPL CALCULATED.3IONS-SCNC: 11 MMOL/L (ref 4–13)
APTT PPP: 29.5 SECONDS (ref 24.1–34.8)
AST SERPL-CCNC: 37 U/L (ref 7–45)
BASOPHILS # BLD AUTO: 0.03 10*3/MM3 (ref 0–0.2)
BASOPHILS NFR BLD AUTO: 0.2 % (ref 0–2)
BILIRUB SERPL-MCNC: 0.4 MG/DL (ref 0.1–1)
BUN BLD-MCNC: 21 MG/DL (ref 5–21)
BUN/CREAT SERPL: 22.8 (ref 7–25)
CALCIUM SPEC-SCNC: 9.2 MG/DL (ref 8.4–10.4)
CHLORIDE SERPL-SCNC: 102 MMOL/L (ref 98–110)
CO2 SERPL-SCNC: 27 MMOL/L (ref 24–31)
CREAT BLD-MCNC: 0.92 MG/DL (ref 0.5–1.4)
DEPRECATED RDW RBC AUTO: 44.8 FL (ref 40–54)
EOSINOPHIL # BLD AUTO: 0.05 10*3/MM3 (ref 0–0.7)
EOSINOPHIL NFR BLD AUTO: 0.3 % (ref 0–4)
ERYTHROCYTE [DISTWIDTH] IN BLOOD BY AUTOMATED COUNT: 13.9 % (ref 12–15)
GFR SERPL CREATININE-BSD FRML MDRD: 80 ML/MIN/1.73
GLOBULIN UR ELPH-MCNC: 2.9 GM/DL
GLUCOSE BLD-MCNC: 284 MG/DL (ref 70–100)
GLUCOSE BLD-MCNC: 350 MG/DL (ref 70–99)
GLUCOSE BLDC GLUCOMTR-MCNC: 249 MG/DL (ref 70–130)
HCT VFR BLD AUTO: 41.7 % (ref 40–52)
HGB BLD-MCNC: 13.7 G/DL (ref 14–18)
IMM GRANULOCYTES # BLD: 0.11 10*3/MM3 (ref 0–0.03)
IMM GRANULOCYTES NFR BLD: 0.7 % (ref 0–5)
INR PPP: 0.96 (ref 0.91–1.09)
LYMPHOCYTES # BLD AUTO: 0.61 10*3/MM3 (ref 0.72–4.86)
LYMPHOCYTES NFR BLD AUTO: 3.6 % (ref 15–45)
MCH RBC QN AUTO: 29.3 PG (ref 28–32)
MCHC RBC AUTO-ENTMCNC: 32.9 G/DL (ref 33–36)
MCV RBC AUTO: 89.1 FL (ref 82–95)
MONOCYTES # BLD AUTO: 1.02 10*3/MM3 (ref 0.19–1.3)
MONOCYTES NFR BLD AUTO: 6.1 % (ref 4–12)
NEUTROPHILS # BLD AUTO: 15.03 10*3/MM3 (ref 1.87–8.4)
NEUTROPHILS NFR BLD AUTO: 89.1 % (ref 39–78)
PERFORMED ON: ABNORMAL
PLATELET # BLD AUTO: 246 10*3/MM3 (ref 130–400)
PMV BLD AUTO: 11.9 FL (ref 6–12)
POTASSIUM BLD-SCNC: 4.6 MMOL/L (ref 3.5–5.3)
PROT SERPL-MCNC: 6.6 G/DL (ref 6.3–8.7)
PROTHROMBIN TIME: 13.1 SECONDS (ref 11.9–14.6)
RBC # BLD AUTO: 4.68 10*6/MM3 (ref 4.8–5.9)
SODIUM BLD-SCNC: 140 MMOL/L (ref 135–145)
WBC NRBC COR # BLD: 16.85 10*3/MM3 (ref 4.8–10.8)

## 2017-10-21 PROCEDURE — 63710000001 INSULIN REGULAR HUMAN PER 5 UNITS: Performed by: PHYSICIAN ASSISTANT

## 2017-10-21 PROCEDURE — 70450 CT HEAD/BRAIN W/O DYE: CPT

## 2017-10-21 PROCEDURE — 80053 COMPREHEN METABOLIC PANEL: CPT | Performed by: PHYSICIAN ASSISTANT

## 2017-10-21 PROCEDURE — 2580000003 HC RX 258: Performed by: INTERNAL MEDICINE

## 2017-10-21 PROCEDURE — 85025 COMPLETE CBC W/AUTO DIFF WBC: CPT | Performed by: PHYSICIAN ASSISTANT

## 2017-10-21 PROCEDURE — 6370000000 HC RX 637 (ALT 250 FOR IP): Performed by: INTERNAL MEDICINE

## 2017-10-21 PROCEDURE — 85610 PROTHROMBIN TIME: CPT | Performed by: PHYSICIAN ASSISTANT

## 2017-10-21 PROCEDURE — 82948 REAGENT STRIP/BLOOD GLUCOSE: CPT

## 2017-10-21 PROCEDURE — 73562 X-RAY EXAM OF KNEE 3: CPT

## 2017-10-21 PROCEDURE — 72192 CT PELVIS W/O DYE: CPT

## 2017-10-21 PROCEDURE — 99222 1ST HOSP IP/OBS MODERATE 55: CPT | Performed by: INTERNAL MEDICINE

## 2017-10-21 PROCEDURE — 85730 THROMBOPLASTIN TIME PARTIAL: CPT | Performed by: PHYSICIAN ASSISTANT

## 2017-10-21 PROCEDURE — 99284 EMERGENCY DEPT VISIT MOD MDM: CPT

## 2017-10-21 PROCEDURE — 1210000000 HC MED SURG R&B

## 2017-10-21 PROCEDURE — 82962 GLUCOSE BLOOD TEST: CPT

## 2017-10-21 RX ORDER — SODIUM CHLORIDE 9 MG/ML
INJECTION, SOLUTION INTRAVENOUS CONTINUOUS
Status: DISCONTINUED | OUTPATIENT
Start: 2017-10-21 | End: 2017-10-27

## 2017-10-21 RX ORDER — INSULIN GLARGINE 100 [IU]/ML
30 INJECTION, SOLUTION SUBCUTANEOUS EVERY MORNING
Status: DISCONTINUED | OUTPATIENT
Start: 2017-10-22 | End: 2017-10-27

## 2017-10-21 RX ORDER — NICOTINE POLACRILEX 4 MG
15 LOZENGE BUCCAL PRN
Status: DISCONTINUED | OUTPATIENT
Start: 2017-10-21 | End: 2017-11-02 | Stop reason: HOSPADM

## 2017-10-21 RX ORDER — ACETAMINOPHEN 325 MG/1
650 TABLET ORAL EVERY 4 HOURS PRN
Status: DISCONTINUED | OUTPATIENT
Start: 2017-10-21 | End: 2017-11-02 | Stop reason: HOSPADM

## 2017-10-21 RX ORDER — TEMAZEPAM 30 MG/1
30 CAPSULE ORAL NIGHTLY PRN
Status: DISCONTINUED | OUTPATIENT
Start: 2017-10-21 | End: 2017-10-21

## 2017-10-21 RX ORDER — DOCUSATE SODIUM 100 MG/1
100 CAPSULE, LIQUID FILLED ORAL 2 TIMES DAILY
Status: DISCONTINUED | OUTPATIENT
Start: 2017-10-21 | End: 2017-11-02 | Stop reason: HOSPADM

## 2017-10-21 RX ORDER — VENLAFAXINE 37.5 MG/1
37.5 TABLET ORAL DAILY
COMMUNITY
End: 2017-12-06 | Stop reason: ALTCHOICE

## 2017-10-21 RX ORDER — HYDROCODONE BITARTRATE AND ACETAMINOPHEN 5; 325 MG/1; MG/1
1 TABLET ORAL EVERY 4 HOURS PRN
Status: DISCONTINUED | OUTPATIENT
Start: 2017-10-21 | End: 2017-10-28

## 2017-10-21 RX ORDER — CLOPIDOGREL BISULFATE 75 MG/1
75 TABLET ORAL DAILY
COMMUNITY

## 2017-10-21 RX ORDER — DEXTROSE MONOHYDRATE 50 MG/ML
100 INJECTION, SOLUTION INTRAVENOUS PRN
Status: DISCONTINUED | OUTPATIENT
Start: 2017-10-21 | End: 2017-11-02 | Stop reason: HOSPADM

## 2017-10-21 RX ORDER — TEMAZEPAM 15 MG/1
30 CAPSULE ORAL NIGHTLY PRN
Status: DISCONTINUED | OUTPATIENT
Start: 2017-10-21 | End: 2017-10-22

## 2017-10-21 RX ORDER — DEXTROSE MONOHYDRATE 25 G/50ML
12.5 INJECTION, SOLUTION INTRAVENOUS PRN
Status: DISCONTINUED | OUTPATIENT
Start: 2017-10-21 | End: 2017-11-02 | Stop reason: HOSPADM

## 2017-10-21 RX ORDER — VENLAFAXINE 37.5 MG/1
37.5 TABLET ORAL DAILY
COMMUNITY
End: 2018-01-01

## 2017-10-21 RX ORDER — VENLAFAXINE 37.5 MG/1
37.5 TABLET ORAL 3 TIMES DAILY
Status: DISCONTINUED | OUTPATIENT
Start: 2017-10-21 | End: 2017-10-24

## 2017-10-21 RX ORDER — ONDANSETRON 2 MG/ML
4 INJECTION INTRAMUSCULAR; INTRAVENOUS EVERY 6 HOURS PRN
Status: DISCONTINUED | OUTPATIENT
Start: 2017-10-21 | End: 2017-11-02 | Stop reason: HOSPADM

## 2017-10-21 RX ORDER — BISACODYL 10 MG
10 SUPPOSITORY, RECTAL RECTAL DAILY PRN
Status: DISCONTINUED | OUTPATIENT
Start: 2017-10-21 | End: 2017-11-02 | Stop reason: HOSPADM

## 2017-10-21 RX ADMIN — SODIUM CHLORIDE: 9 INJECTION, SOLUTION INTRAVENOUS at 22:24

## 2017-10-21 RX ADMIN — INSULIN HUMAN 5 UNITS: 100 INJECTION, SOLUTION PARENTERAL at 16:21

## 2017-10-21 RX ADMIN — INSULIN LISPRO 5 UNITS: 100 INJECTION, SOLUTION INTRAVENOUS; SUBCUTANEOUS at 22:38

## 2017-10-21 ASSESSMENT — ENCOUNTER SYMPTOMS
BLURRED VISION: 0
ABDOMINAL PAIN: 0
SPUTUM PRODUCTION: 0
HEARTBURN: 0
EYE REDNESS: 0
ORTHOPNEA: 0
COUGH: 0
HEMOPTYSIS: 0
VOMITING: 0
DOUBLE VISION: 0
NAUSEA: 0

## 2017-10-21 NOTE — ED PROVIDER NOTES
Subjective   History of Present Illness  77-year-old male presents following a fall.  Patient has suffered 2 strokes in the past and is not a reliable historian.  He is present with family members who note that he was transferring from his chair to go to the bathroom when he had fallen on the ground.  Patient reports he had hit his right knee and suffered a skin tear is also having pain that radiated to his right hip and groin.  Patient is currently in no acute distress and denies pain at rest but does have pain with movement.  Review of Systems   All other systems reviewed and are negative.      Past Medical History:   Diagnosis Date   • Arthritis    • Diabetes mellitus    • Hypertension    • Injury of back    • Stroke        Allergies   Allergen Reactions   • Penicillins      Unknown - childhood allergy       Past Surgical History:   Procedure Laterality Date   • VENA CAVA FILTER INSERTION         History reviewed. No pertinent family history.    Social History     Social History   • Marital status:      Spouse name: N/A   • Number of children: N/A   • Years of education: N/A     Social History Main Topics   • Smoking status: Former Smoker     Types: Cigarettes   • Smokeless tobacco: Former User     Quit date: 9/1/2017   • Alcohol use No   • Drug use: No   • Sexual activity: Defer     Other Topics Concern   • None     Social History Narrative   • None           Objective   Physical Exam   Constitutional: He is oriented to person, place, and time. He appears well-developed and well-nourished.   HENT:   Head: Normocephalic.   Eyes: Pupils are equal, round, and reactive to light.   Neck: Normal range of motion.   Cardiovascular: Normal rate and regular rhythm.    Pulmonary/Chest: Effort normal.   Abdominal: Soft.   Musculoskeletal: He exhibits tenderness.   Patient is flexing right knee for comfort, unable to raise right leg without pain   Neurological: He is alert and oriented to person, place, and time.    Skin: Skin is warm.   Psychiatric: He has a normal mood and affect. His behavior is normal.   Nursing note and vitals reviewed.      Procedures         ED Course  ED Course                  MDM  Number of Diagnoses or Management Options  Periprosthetic fracture of femur following total replacement of hip, initial encounter: new and requires workup  Diagnosis management comments: Patient is suffering from a femoral fracture surrounding his prosthesis.  Patient had total hip replacement in 2013 performed by Dr. Ivey the orthopedic group.  Unfortunately, the orthopedist on call here recognizes this patient needs to be followed up with a hip specialist that is a part of the same group but does not practice at this hospital.  We will transfer this patient to [facility] and have them admitted by the hospitalist there and evaluated by either Dr. Morales or [place].       Amount and/or Complexity of Data Reviewed  Clinical lab tests: reviewed and ordered  Tests in the radiology section of CPT®: reviewed and ordered  Tests in the medicine section of CPT®: ordered and reviewed    Risk of Complications, Morbidity, and/or Mortality  Presenting problems: moderate  Diagnostic procedures: moderate  Management options: moderate    Patient Progress  Patient progress: stable      Final diagnoses:   Periprosthetic fracture of femur following total replacement of hip, initial encounter            Carlito Wallis PA-C  10/21/17 9703

## 2017-10-22 ENCOUNTER — APPOINTMENT (OUTPATIENT)
Dept: GENERAL RADIOLOGY | Age: 77
DRG: 481 | End: 2017-10-22
Attending: HOSPITALIST
Payer: MEDICARE

## 2017-10-22 PROBLEM — I10 ESSENTIAL HYPERTENSION: Status: ACTIVE | Noted: 2017-10-22

## 2017-10-22 PROBLEM — E11.9 DIABETES (HCC): Status: ACTIVE | Noted: 2017-10-22

## 2017-10-22 PROBLEM — D64.9 ANEMIA: Status: ACTIVE | Noted: 2017-10-22

## 2017-10-22 PROBLEM — M19.90 OSTEOARTHRITIS: Status: ACTIVE | Noted: 2017-10-22

## 2017-10-22 LAB
ANION GAP SERPL CALCULATED.3IONS-SCNC: 15 MMOL/L (ref 7–19)
BASOPHILS ABSOLUTE: 0.1 K/UL (ref 0–0.2)
BASOPHILS RELATIVE PERCENT: 0.5 % (ref 0–1)
BILIRUBIN URINE: NEGATIVE
BLOOD, URINE: NEGATIVE
BUN BLDV-MCNC: 22 MG/DL (ref 8–23)
CALCIUM SERPL-MCNC: 8.5 MG/DL (ref 8.8–10.2)
CHLORIDE BLD-SCNC: 100 MMOL/L (ref 98–111)
CLARITY: CLEAR
CO2: 23 MMOL/L (ref 22–29)
COLOR: YELLOW
CREAT SERPL-MCNC: 1.1 MG/DL (ref 0.5–1.2)
EOSINOPHILS ABSOLUTE: 0 K/UL (ref 0–0.6)
EOSINOPHILS RELATIVE PERCENT: 0.3 % (ref 0–5)
GFR NON-AFRICAN AMERICAN: >60
GLUCOSE BLD-MCNC: 180 MG/DL (ref 70–99)
GLUCOSE BLD-MCNC: 232 MG/DL (ref 70–99)
GLUCOSE BLD-MCNC: 305 MG/DL (ref 70–99)
GLUCOSE BLD-MCNC: 308 MG/DL (ref 70–99)
GLUCOSE BLD-MCNC: 317 MG/DL (ref 74–109)
GLUCOSE BLD-MCNC: 327 MG/DL (ref 70–99)
GLUCOSE BLD-MCNC: 344 MG/DL (ref 70–99)
GLUCOSE URINE: >=1000 MG/DL
HBA1C MFR BLD: 10 %
HCT VFR BLD CALC: 36.9 % (ref 42–52)
HEMOGLOBIN: 12.1 G/DL (ref 14–18)
KETONES, URINE: ABNORMAL MG/DL
LEUKOCYTE ESTERASE, URINE: NEGATIVE
LYMPHOCYTES ABSOLUTE: 1.1 K/UL (ref 1.1–4.5)
LYMPHOCYTES RELATIVE PERCENT: 10 % (ref 20–40)
MCH RBC QN AUTO: 29.7 PG (ref 27–31)
MCHC RBC AUTO-ENTMCNC: 32.8 G/DL (ref 33–37)
MCV RBC AUTO: 90.4 FL (ref 80–94)
MONOCYTES ABSOLUTE: 1.2 K/UL (ref 0–0.9)
MONOCYTES RELATIVE PERCENT: 10.8 % (ref 0–10)
NEUTROPHILS ABSOLUTE: 8.9 K/UL (ref 1.5–7.5)
NEUTROPHILS RELATIVE PERCENT: 77.8 % (ref 50–65)
NITRITE, URINE: NEGATIVE
PDW BLD-RTO: 13.6 % (ref 11.5–14.5)
PERFORMED ON: ABNORMAL
PH UA: 5.5
PLATELET # BLD: 218 K/UL (ref 130–400)
PMV BLD AUTO: 11.6 FL (ref 9.4–12.4)
POTASSIUM SERPL-SCNC: 4.1 MMOL/L (ref 3.5–5)
PROTEIN UA: NEGATIVE MG/DL
RBC # BLD: 4.08 M/UL (ref 4.7–6.1)
SODIUM BLD-SCNC: 138 MMOL/L (ref 136–145)
SPECIFIC GRAVITY UA: 1.02
UROBILINOGEN, URINE: 0.2 E.U./DL
WBC # BLD: 11.4 K/UL (ref 4.8–10.8)

## 2017-10-22 PROCEDURE — 83036 HEMOGLOBIN GLYCOSYLATED A1C: CPT

## 2017-10-22 PROCEDURE — 6370000000 HC RX 637 (ALT 250 FOR IP): Performed by: HOSPITALIST

## 2017-10-22 PROCEDURE — 36415 COLL VENOUS BLD VENIPUNCTURE: CPT

## 2017-10-22 PROCEDURE — 80048 BASIC METABOLIC PNL TOTAL CA: CPT

## 2017-10-22 PROCEDURE — 73502 X-RAY EXAM HIP UNI 2-3 VIEWS: CPT

## 2017-10-22 PROCEDURE — 6370000000 HC RX 637 (ALT 250 FOR IP): Performed by: INTERNAL MEDICINE

## 2017-10-22 PROCEDURE — 82948 REAGENT STRIP/BLOOD GLUCOSE: CPT

## 2017-10-22 PROCEDURE — 99233 SBSQ HOSP IP/OBS HIGH 50: CPT | Performed by: HOSPITALIST

## 2017-10-22 PROCEDURE — 81003 URINALYSIS AUTO W/O SCOPE: CPT

## 2017-10-22 PROCEDURE — 6360000002 HC RX W HCPCS: Performed by: INTERNAL MEDICINE

## 2017-10-22 PROCEDURE — 93005 ELECTROCARDIOGRAM TRACING: CPT

## 2017-10-22 PROCEDURE — 85025 COMPLETE CBC W/AUTO DIFF WBC: CPT

## 2017-10-22 PROCEDURE — 1210000000 HC MED SURG R&B

## 2017-10-22 PROCEDURE — 72170 X-RAY EXAM OF PELVIS: CPT

## 2017-10-22 RX ORDER — CLOPIDOGREL BISULFATE 75 MG/1
75 TABLET ORAL DAILY
Status: DISCONTINUED | OUTPATIENT
Start: 2017-10-22 | End: 2017-11-02 | Stop reason: HOSPADM

## 2017-10-22 RX ORDER — PANTOPRAZOLE SODIUM 40 MG/1
40 TABLET, DELAYED RELEASE ORAL
Status: DISCONTINUED | OUTPATIENT
Start: 2017-10-22 | End: 2017-11-02 | Stop reason: HOSPADM

## 2017-10-22 RX ORDER — CLOPIDOGREL BISULFATE 75 MG/1
75 TABLET ORAL DAILY
Status: DISCONTINUED | OUTPATIENT
Start: 2017-10-22 | End: 2017-10-22

## 2017-10-22 RX ORDER — ZOLPIDEM TARTRATE 5 MG/1
5 TABLET ORAL NIGHTLY PRN
Status: DISCONTINUED | OUTPATIENT
Start: 2017-10-22 | End: 2017-10-23

## 2017-10-22 RX ADMIN — INSULIN GLARGINE 30 UNITS: 100 INJECTION, SOLUTION SUBCUTANEOUS at 09:52

## 2017-10-22 RX ADMIN — ACETAMINOPHEN 650 MG: 325 TABLET, FILM COATED ORAL at 12:32

## 2017-10-22 RX ADMIN — VENLAFAXINE 37.5 MG: 37.5 TABLET ORAL at 09:54

## 2017-10-22 RX ADMIN — ACETAMINOPHEN 650 MG: 325 TABLET, FILM COATED ORAL at 16:41

## 2017-10-22 RX ADMIN — HYDROCODONE BITARTRATE AND ACETAMINOPHEN 1 TABLET: 5; 325 TABLET ORAL at 03:46

## 2017-10-22 RX ADMIN — CLOPIDOGREL BISULFATE 75 MG: 75 TABLET ORAL at 16:36

## 2017-10-22 RX ADMIN — DOCUSATE SODIUM 100 MG: 100 CAPSULE, LIQUID FILLED ORAL at 19:54

## 2017-10-22 RX ADMIN — INSULIN LISPRO 4 UNITS: 100 INJECTION, SOLUTION INTRAVENOUS; SUBCUTANEOUS at 17:57

## 2017-10-22 RX ADMIN — INSULIN LISPRO 10 UNITS: 100 INJECTION, SOLUTION INTRAVENOUS; SUBCUTANEOUS at 14:04

## 2017-10-22 RX ADMIN — ENOXAPARIN SODIUM 40 MG: 40 INJECTION SUBCUTANEOUS at 09:54

## 2017-10-22 RX ADMIN — PANTOPRAZOLE SODIUM 40 MG: 40 TABLET, DELAYED RELEASE ORAL at 16:36

## 2017-10-22 RX ADMIN — VENLAFAXINE 37.5 MG: 37.5 TABLET ORAL at 15:49

## 2017-10-22 RX ADMIN — INSULIN LISPRO 4 UNITS: 100 INJECTION, SOLUTION INTRAVENOUS; SUBCUTANEOUS at 09:52

## 2017-10-22 RX ADMIN — INSULIN LISPRO 6 UNITS: 100 INJECTION, SOLUTION INTRAVENOUS; SUBCUTANEOUS at 20:30

## 2017-10-22 RX ADMIN — DOCUSATE SODIUM 100 MG: 100 CAPSULE, LIQUID FILLED ORAL at 09:54

## 2017-10-22 ASSESSMENT — PAIN SCALES - GENERAL
PAINLEVEL_OUTOF10: 6
PAINLEVEL_OUTOF10: 6
PAINLEVEL_OUTOF10: 8
PAINLEVEL_OUTOF10: 5

## 2017-10-22 NOTE — H&P
appearance. He does not appear ill. No distress. HENT:   Head: Normocephalic and atraumatic. Eyes: Lids are normal. Pupils are equal, round, and reactive to light. Neck: Trachea normal. Neck supple. No JVD present. Carotid bruit is not present. Cardiovascular: Normal rate, regular rhythm and normal heart sounds. Exam reveals no S3. Pulses:       Carotid pulses are 2+ on the right side, and 2+ on the left side. Radial pulses are 2+ on the right side, and 2+ on the left side. Pulmonary/Chest: He has no decreased breath sounds. He has no wheezes. He has no rhonchi. He has no rales. Abdominal: Soft. Normal appearance and bowel sounds are normal. There is no hepatosplenomegaly. There is no tenderness. Musculoskeletal:        Right hip: He exhibits decreased range of motion, tenderness and swelling. Right knee: He exhibits laceration. Right lower leg: He exhibits no swelling and no edema. Neurological: He is alert. GCS eye subscore is 4. GCS verbal subscore is 5. GCS motor subscore is 6. Skin: Skin is warm, dry and intact. DATA:  EKG:  I have reviewed EKG with the following interpretation:  Imaging:    No orders to display            Labs Reviewed - No data to display       IMPRESSION:  1. Right femoral fracture due to mechanical fall  2. Fall  3. Diabetes type II  4. History of CVA    PLAN:     1. Admit the patient to the floor  2. Fall precautions  3. Analgesia  4. Hold Plavix  5. Or to consult  6. PTOT  7. DVT prophylaxis  8. Review home medications  9. Nothing by mouth after midnight in case they decide to do surgery on him.   Lexis Rivera MD    Internal Medicine Hospitalist

## 2017-10-22 NOTE — PROGRESS NOTES
PRDYLON Day MD        dextrose 50 % solution 12.5 g  12.5 g Intravenous PRN Natalie Day MD        glucagon (rDNA) injection 1 mg  1 mg Intramuscular PRN Natalie Day MD        dextrose 5 % solution  100 mL/hr Intravenous PRN Natalie Day MD        insulin lispro (HUMALOG) injection vial 0-6 Units  0-6 Units Subcutaneous Q4H Natalie Day MD   5 Units at 10/21/17 2238    venlafaxine (EFFEXOR) tablet 37.5 mg  37.5 mg Oral TID Natalie Day MD        insulin glargine (LANTUS) injection vial 30 Units  30 Units Subcutaneous QAM Rod Obrien MD        temazepam (RESTORIL) capsule 30 mg  30 mg Oral Nightly PRN Natalie Day MD        enoxaparin (LOVENOX) injection 40 mg  40 mg Subcutaneous Daily Rod Obrien MD            Labs:     Recent Labs      10/22/17   0221   WBC  11.4*   RBC  4.08*   HGB  12.1*   HCT  36.9*   MCV  90.4   MCH  29.7   MCHC  32.8*   PLT  218     Recent Labs      10/22/17   0222   NA  138   K  4.1   ANIONGAP  15   CL  100   CO2  23   BUN  22   CREATININE  1.1   GLUCOSE  317*   CALCIUM  8.5*     HgBA1c: 10    FLP:  Lab Results   Component Value Date    TRIG 113 04/19/2013    HDL 56 04/19/2013    LDLDIRECT 96 04/19/2013     TSH:    Lab Results   Component Value Date    TSH 1.21 05/07/2013       [facility] Records:    MRI brain 9/2/17  IMPRESSION:  1. Acute right occipital lacunar infarct with no evidence of hemorrhagic conversion. 2. Evolving subacute lacunar infarct in the body of the corpus callosum. Carotid US 9/2/17  Impression:  1. There is less than 50% stenosis of the right internal carotid artery. 2. There is less than 50% stenosis of the left internal carotid artery. 3. Antegrade flow is demonstrated in bilateral vertebral arteries. Echo 9/2/17  Left ventricular systolic function is normal. Estimated ejection fraction is 56-60%.     · Normal right ventricular cavity size and systolic function noted.    · There is no evidence of intracardiac mass or thrombus.    · There is no evidence of right to left shunt on bubble study.          Objective:   Vitals: /65   Pulse 81   Temp 98 °F (36.7 °C) (Temporal)   Resp 16   Wt 159 lb 12.8 oz (72.5 kg)   SpO2 90%   BMI 22.93 kg/m²   24HR INTAKE/OUTPUT:    Intake/Output Summary (Last 24 hours) at 10/22/17 0741  Last data filed at 10/22/17 0549   Gross per 24 hour   Intake           914.72 ml   Output              400 ml   Net           514.72 ml     General appearance: alert and cooperative with exam  HEENT: atraumatic, eyes with clear conjunctiva and normal lids, pupils and irises normal, external ears and nose are normal, lips normal. Neck without masses, lympadenopathy, bruit, thyroid normal  Lungs: no increased work of breathing, \"clear to auscultation bilaterally\" without rales, rhonchi or wheezes  Heart: regular rate and rhythm, S1, S2 normal, no murmur, click, rub or gallop  Abdomen: soft, non-tender; bowel sounds normal; no masses,  no organomegaly  Extremities: extremities normal, atraumatic, no cyanosis or edema, did not move RLE due to fracture  Neurologic: No focal neurologic deficits, normal sensation, alert and oriented, affect and mood appropriate. Skin: no rashes, nodules. Assessment and Plan:   Principal Problem:    Closed fracture of femur - periprosthetic, Ortho consulted - d/w Dr Lcoo Merchant 10/22, plans non-operative treatment initially  Active Problems:    Diabetes - POC, Lispro    Anemia - monitor, transfuse PRN    HTN - monitor BP, medical management as appropriate    Osteoarthritis - medical management, uses NSAIDS at home    Hx Depression  - monitor    Advance Directive: Full Code    DVT prophylaxis: Lovenox at present    Discharge planning: TBD in collaboration with Orthopedics    MDM: he has a periprosthetic fracture, will have prolonged immobility, lovenox added to TEDs for DVT prophylaxis.  He remains on Plavix for CVA 9/2017, in fact was in [facility] 9/1-6 /17 for ongoing rehabilitation and has a hx CVA 12/14 as well. He needs both, also has IVC filter. Will monitor closely for bleeding, guiacs, PPI, use TEDs. Will check P2Y12; if he is a non-responder no point in continuing. Discussed management with Dr. Gómez Hitchcock today.     Chelle Magallanes MD  [facility] System[facility]

## 2017-10-22 NOTE — CONSULTS
[facility]EL [facility] [address], [address] [facility]                                 CONSULTATION    PATIENT NAME: Megan Marie                     :             1940  MED REC NO:   [id number]                               ROOM:            [facility]  ACCOUNT NO:   [de-identified]                            ADMISSION DATE:  10/21/2017  PROVIDER:     Moreno Mccall MD          CONSULT DATE:  10/22/2017    TIME:  12 p.m. REASON FOR REFERRAL:  Right hip periprosthetic fracture. HISTORY OF PRESENT ILLNESS:  This is a 69year old gentleman, who is  well known to me. He unfortunately presented to the hospital  yesterday after he had fallen after trying to go into the bathroom. I  think he was in a rehab facility by his wife's report. Of note, he  suffered a stroke on 2017, and has since been put on Plavix,  this is actually his second stroke. PAST MEDICAL HISTORY:  CVA with last CVA occurring on 2017. First one in 2014. He has neuropathy, chronic kidney disease,  diabetes, hypertension, and depression. PAST SURGICAL HISTORY:  Right hip bipolar placement done on  2013, mandible surgery, and toe amputation. MEDICATIONS:  Include Plavix, Effexor, Restoril, insulin, zinc, and  Prinivil. ALLERGIC:  He is allergic to PREDNISONE.    SOCIAL HISTORY:  Quit smoking, but has smoked in the past.  Is here  with his wife today. PHYSICAL EXAMINATION:  On exam, this is a 69year old gentleman. He  is alert and oriented x3. He is able to answer my questions. In the  bed, he does have some moderate swelling of his thigh. He has a scab  over his right knee. He has limited dorsiflexion and plantar flexion of the  right and left feet. According to his wife, he is very weak from a  neurologic standpoint from neuropathy, and and in the room, he  ambulates with assistance and using a walker.     His x rays from today 10/22/2017, show a right hip bipolar prosthesis  of the Corail stem. There is a fracture, which close obliquely from  the greater trochanter into the medial metaphysis. There is no major  subsidence. IMPRESSION:  1. Right hip periprosthetic fracture Subtrochanteric with involvement of the greater trochanter and extension to the femoral shaft. 2.  History of CVA occurring on 09/01/2017. PLAN:  At this point, it is a very difficult situation. I think our  two options are to treat it nonoperatively versus putting a cable  plate on it. I think putting a cable plate on also has some risk  since he has had a recent CVA on 09/01/2017. I think we would like to  give this a chance of nonoperative treatment. We will get a repeat x  ray in 2 or 3 weeks. He should be basically touchdown weightbearing  for 12 weeks to see if we can let this heal.  If he gets worse in the  interim, we can always talk about proceeding with surgical treatment. Of note, I talked to Dr. Akbar Rivera about his anticoagulation. I think  he should be on Plavix for his CVA that he recently had and we will  talk about doing maybe low dose DVT prophylaxis with some Lovenox. He  does have a complex history and according to the wife, he has had a  previous filter placed as well. I will leave the anticoagulation up  to the attending physician, Dr. Akbar Rivera.         Phillip Moore MD    D: 10/22/2017 13:43:59       T: 10/22/2017 16:58:06     [email or web address]  Job#: [id number]     Doc#: [id number]

## 2017-10-22 NOTE — PROGRESS NOTES
Physical Therapy    Orders received, noted that pt to have non-op tx and is TTWB RLE. Nsg asks to wait on mobility this pm due to pain and confusion.  Will follow in am.    Electronically signed by Petrina Koyanagi, PT on 10/22/2017 at 3:01 PM

## 2017-10-22 NOTE — PROGRESS NOTES
Spoke to Atul Royal, on call for ortho, about new consult placed per hospitalist. Orders received to place on Dr. Chinchilla Fruit list. Per Rosi Hernandez, Dr. Amos Weinstein is to talk to Dr. Ana Lopez regarding taking patient as it is above his level of expertise and Dr. Ana Lopez did the the pts previous surgery.  Electronically signed by Jacinda Montenegro RN on 10/22/2017 at 3:29 AM

## 2017-10-22 NOTE — CONSULTS
S: Patient seen for R hip periprosthetic fracture . O: BP (!) 157/81   Pulse 91   Temp 98.4 °F (36.9 °C) (Temporal)   Resp 16   Wt 159 lb 12.8 oz (72.5 kg)   SpO2 90%   BMI 22.93 kg/m² , Exam:R hip pain, scab on r knee    A:  R hip periprosthetic fx    P:  Plan on non op tx. Will re xray in 2 to 3 weeks. TTWB on R leg x 12 weeks. Full consult dictation to follow.     Electronically signed by Yaritza Bone MD on 10/22/2017 at 12:44 PM

## 2017-10-23 PROBLEM — K59.00 OBSTIPATION: Status: ACTIVE | Noted: 2017-10-23

## 2017-10-23 LAB
ANION GAP SERPL CALCULATED.3IONS-SCNC: 13 MMOL/L (ref 7–19)
BASOPHILS ABSOLUTE: 0.1 K/UL (ref 0–0.2)
BASOPHILS RELATIVE PERCENT: 0.7 % (ref 0–1)
BUN BLDV-MCNC: 17 MG/DL (ref 8–23)
CALCIUM SERPL-MCNC: 8.2 MG/DL (ref 8.8–10.2)
CHLORIDE BLD-SCNC: 97 MMOL/L (ref 98–111)
CO2: 23 MMOL/L (ref 22–29)
CREAT SERPL-MCNC: 1.1 MG/DL (ref 0.5–1.2)
EKG P AXIS: 56 DEGREES
EKG P-R INTERVAL: 188 MS
EKG Q-T INTERVAL: 384 MS
EKG QRS DURATION: 122 MS
EKG QTC CALCULATION (BAZETT): 420 MS
EKG T AXIS: 67 DEGREES
EOSINOPHILS ABSOLUTE: 0.1 K/UL (ref 0–0.6)
EOSINOPHILS RELATIVE PERCENT: 0.7 % (ref 0–5)
GFR NON-AFRICAN AMERICAN: >60
GLUCOSE BLD-MCNC: 169 MG/DL (ref 70–99)
GLUCOSE BLD-MCNC: 171 MG/DL (ref 74–109)
GLUCOSE BLD-MCNC: 174 MG/DL (ref 70–99)
GLUCOSE BLD-MCNC: 200 MG/DL (ref 70–99)
GLUCOSE BLD-MCNC: 205 MG/DL (ref 70–99)
HCT VFR BLD CALC: 34.3 % (ref 42–52)
HEMOGLOBIN: 11.7 G/DL (ref 14–18)
LYMPHOCYTES ABSOLUTE: 0.8 K/UL (ref 1.1–4.5)
LYMPHOCYTES RELATIVE PERCENT: 5.7 % (ref 20–40)
MCH RBC QN AUTO: 29.8 PG (ref 27–31)
MCHC RBC AUTO-ENTMCNC: 34.1 G/DL (ref 33–37)
MCV RBC AUTO: 87.5 FL (ref 80–94)
MONOCYTES ABSOLUTE: 1.4 K/UL (ref 0–0.9)
MONOCYTES RELATIVE PERCENT: 10 % (ref 0–10)
NEUTROPHILS ABSOLUTE: 11.3 K/UL (ref 1.5–7.5)
NEUTROPHILS RELATIVE PERCENT: 82.2 % (ref 50–65)
P2Y12 RESULT: 9 PRU (ref 194–418)
PDW BLD-RTO: 13.6 % (ref 11.5–14.5)
PERFORMED ON: ABNORMAL
PLATELET # BLD: 196 K/UL (ref 130–400)
PMV BLD AUTO: 11.8 FL (ref 9.4–12.4)
POTASSIUM SERPL-SCNC: 4.1 MMOL/L (ref 3.5–5)
RBC # BLD: 3.92 M/UL (ref 4.7–6.1)
SODIUM BLD-SCNC: 133 MMOL/L (ref 136–145)
WBC # BLD: 13.7 K/UL (ref 4.8–10.8)

## 2017-10-23 PROCEDURE — 80048 BASIC METABOLIC PNL TOTAL CA: CPT

## 2017-10-23 PROCEDURE — 36415 COLL VENOUS BLD VENIPUNCTURE: CPT

## 2017-10-23 PROCEDURE — 82948 REAGENT STRIP/BLOOD GLUCOSE: CPT

## 2017-10-23 PROCEDURE — 85025 COMPLETE CBC W/AUTO DIFF WBC: CPT

## 2017-10-23 PROCEDURE — 85576 BLOOD PLATELET AGGREGATION: CPT

## 2017-10-23 PROCEDURE — 2580000003 HC RX 258: Performed by: INTERNAL MEDICINE

## 2017-10-23 PROCEDURE — 6370000000 HC RX 637 (ALT 250 FOR IP): Performed by: INTERNAL MEDICINE

## 2017-10-23 PROCEDURE — 6370000000 HC RX 637 (ALT 250 FOR IP): Performed by: HOSPITALIST

## 2017-10-23 PROCEDURE — 94664 DEMO&/EVAL PT USE INHALER: CPT

## 2017-10-23 PROCEDURE — 99233 SBSQ HOSP IP/OBS HIGH 50: CPT | Performed by: HOSPITALIST

## 2017-10-23 PROCEDURE — 1210000000 HC MED SURG R&B

## 2017-10-23 PROCEDURE — 6360000002 HC RX W HCPCS: Performed by: INTERNAL MEDICINE

## 2017-10-23 RX ORDER — BACITRACIN ZINC AND POLYMYXIN B SULFATE 500; 1000 [USP'U]/G; [USP'U]/G
OINTMENT TOPICAL 2 TIMES DAILY
Status: DISCONTINUED | OUTPATIENT
Start: 2017-10-23 | End: 2017-11-02 | Stop reason: HOSPADM

## 2017-10-23 RX ORDER — TEMAZEPAM 30 MG/1
30 CAPSULE ORAL NIGHTLY PRN
Status: DISCONTINUED | OUTPATIENT
Start: 2017-10-23 | End: 2017-10-24

## 2017-10-23 RX ORDER — POLYETHYLENE GLYCOL 3350 17 G/17G
17 POWDER, FOR SOLUTION ORAL 2 TIMES DAILY
Status: DISCONTINUED | OUTPATIENT
Start: 2017-10-23 | End: 2017-10-30

## 2017-10-23 RX ADMIN — POLYETHYLENE GLYCOL 3350 17 G: 17 POWDER, FOR SOLUTION ORAL at 14:07

## 2017-10-23 RX ADMIN — ACETAMINOPHEN 650 MG: 325 TABLET, FILM COATED ORAL at 00:49

## 2017-10-23 RX ADMIN — HYDROMORPHONE HYDROCHLORIDE 0.5 MG: 1 INJECTION, SOLUTION INTRAMUSCULAR; INTRAVENOUS; SUBCUTANEOUS at 18:53

## 2017-10-23 RX ADMIN — DOCUSATE SODIUM 100 MG: 100 CAPSULE, LIQUID FILLED ORAL at 09:55

## 2017-10-23 RX ADMIN — CLOPIDOGREL BISULFATE 75 MG: 75 TABLET ORAL at 09:55

## 2017-10-23 RX ADMIN — TEMAZEPAM 30 MG: 30 CAPSULE ORAL at 22:15

## 2017-10-23 RX ADMIN — SODIUM CHLORIDE: 9 INJECTION, SOLUTION INTRAVENOUS at 17:50

## 2017-10-23 RX ADMIN — BACITRACIN ZINC AND POLYMYXIN B SULFATE: 500; 10000 OINTMENT TOPICAL at 22:15

## 2017-10-23 RX ADMIN — ACETAMINOPHEN 650 MG: 325 TABLET, FILM COATED ORAL at 06:41

## 2017-10-23 RX ADMIN — ENOXAPARIN SODIUM 40 MG: 40 INJECTION SUBCUTANEOUS at 09:55

## 2017-10-23 RX ADMIN — ACETAMINOPHEN 650 MG: 325 TABLET, FILM COATED ORAL at 13:05

## 2017-10-23 RX ADMIN — INSULIN LISPRO 5 UNITS: 100 INJECTION, SOLUTION INTRAVENOUS; SUBCUTANEOUS at 18:44

## 2017-10-23 RX ADMIN — INSULIN GLARGINE 30 UNITS: 100 INJECTION, SOLUTION SUBCUTANEOUS at 09:56

## 2017-10-23 RX ADMIN — BACITRACIN ZINC AND POLYMYXIN B SULFATE: 500; 10000 OINTMENT TOPICAL at 13:05

## 2017-10-23 RX ADMIN — PANTOPRAZOLE SODIUM 40 MG: 40 TABLET, DELAYED RELEASE ORAL at 06:41

## 2017-10-23 RX ADMIN — HYDROCODONE BITARTRATE AND ACETAMINOPHEN 0.5 TABLET: 5; 325 TABLET ORAL at 16:55

## 2017-10-23 RX ADMIN — INSULIN LISPRO 5 UNITS: 100 INJECTION, SOLUTION INTRAVENOUS; SUBCUTANEOUS at 13:06

## 2017-10-23 RX ADMIN — VENLAFAXINE 37.5 MG: 37.5 TABLET ORAL at 10:08

## 2017-10-23 ASSESSMENT — PAIN SCALES - GENERAL
PAINLEVEL_OUTOF10: 4
PAINLEVEL_OUTOF10: 7
PAINLEVEL_OUTOF10: 7
PAINLEVEL_OUTOF10: 4
PAINLEVEL_OUTOF10: 5
PAINLEVEL_OUTOF10: 8

## 2017-10-23 NOTE — PROGRESS NOTES
Physical Therapy  Name: Najma Gonsalves  MRN:  [id number]  Date of service:  10/23/2017  RN, Estella Guevara PT earlier in AM, but pt. just returning from testing at this time. Staff in pt's room. Will attempt eval at a later time.       Electronically signed by Deidre Scheuermann, PT on 10/23/2017 at 10:55 AM

## 2017-10-23 NOTE — PROGRESS NOTES
Subjective:      right hip periprostethic fracture  Systemic or Specific Complaints:No Complaints  no nausea Pain 5    Objective:     Patient Vitals for the past 24 hrs:   BP Temp Temp src Pulse Resp SpO2 Weight   10/23/17 0633 (!) 158/86 97.4 °F (36.3 °C) Temporal 89 16 90 % -   10/23/17 0551 - - - - - - 165 lb 7 oz (75 kg)   10/23/17 0303 (!) 147/79 98.7 °F (37.1 °C) Temporal 94 20 92 % -   10/22/17 2354 (!) 177/81 98.9 °F (37.2 °C) Temporal 98 20 93 % -   10/22/17 1851 (!) 164/90 99.2 °F (37.3 °C) Temporal 98 18 93 % -   10/22/17 1603 128/72 98.8 °F (37.1 °C) Temporal 80 16 91 % -   10/22/17 1200 - - - - - 90 % -   10/22/17 1200 (!) 157/81 98.4 °F (36.9 °C) Temporal 91 16 (!) 89 % -       General: alert, appears stated age and cooperative   Exam: Incision clean, dry, and intact, no evidence of infection. Neurovascular: Exam normal  No changes     Data Review:  Recent Labs      10/22/17   0221  10/23/17   0231   HGB  12.1*  11.7*     Recent Labs      10/23/17   0231   NA  133*   K  4.1   CREATININE  1.1     Recent Labs      10/23/17   0231   LABGLOM  >60           Assessment:     Status Post right hip periprosthetic fracture. Plan: Will treat non op for now. If conditions worsen, or fracture propagates we can then consider OR.       Electronically signed by Katie Llaens MD on 10/23/2017 at 7:18 AM

## 2017-10-23 NOTE — PROGRESS NOTES
Hospitalist Progress Note  10/23/2017 11:43 AM  Subjective:   Admit Date: 10/21/2017  PCP: Dorsie Severs, DO    Chief Complaint: Hip pain better, feeling better, Rash RUE    Subjective: Has rash R upper arm x 4 days, excoriated, initial lesion did not sound like shingles. Has been covered for protection. Reviewed D/C options, wife prefers [address]. We reviewed he'll be on lovenox till ambulatory, then can DC lovenox and restart aspirin for CVA prophylaxis, he is a strong plavix responder. Also on SCDs for DVT prophylaxis, risk benefit ratio favorable for lovenox. Interval History:    70 yo male admitted in transfer from [facility] with a periprosthetic hip fracture sustained from a fall when he was transferring from chair to go the bathroom. Also sustained R knee skin tear, having pain in R hip and groin Has a history of strokes and falls. Has a history of arthritis, stroke 12/14, CKD, DM, HTN, depression, Hx PUD, retroperitoneal bleed 2014, IVC filter, ulcer. ROS: 14 point review of systems is negative except as specifically addressed above.     DIET CARB CONTROL; Carb Control: 3 carbs/meal (approximate 1500 kcals/day)    Intake/Output Summary (Last 24 hours) at 10/23/17 1143  Last data filed at 10/23/17 0930   Gross per 24 hour   Intake          2996.78 ml   Output              400 ml   Net          2596.78 ml     Medications:   sodium chloride 75 mL/hr at 10/21/17 2224    dextrose       Current Facility-Administered Medications   Medication Dose Route Frequency Provider Last Rate Last Dose    bacitracin-polymyxin b (POLYSPORIN) ointment   Topical BID Maricel Donaldson MD        polyethylene glycol [facility]) packet 17 g  17 g Oral BID Maricel Donaldson MD        glycerin (Laxative) 2.1 g  1 suppository Rectal Daily PRN Maricel Donaldson MD        pantoprazole (PROTONIX) tablet 40 mg  40 mg Oral QAM AC Maricel Donaldson MD   40 mg at 10/23/17 0641    insulin lispro (HUMALOG) injection vial 0-35 Units  0-35 Units Subcutaneous 4x Daily AC & HS Shahla Mims MD   6 Units at 10/22/17 2030    clopidogrel (PLAVIX) tablet 75 mg  75 mg Oral Daily Shahla Mims MD   75 mg at 10/23/17 3164    zolpidem (AMBIEN) tablet 5 mg  5 mg Oral Nightly PRN Shahla Mims MD        0.9 % sodium chloride infusion   Intravenous Continuous Rey Roberto MD 75 mL/hr at 10/21/17 2224      acetaminophen (TYLENOL) tablet 650 mg  650 mg Oral Q4H PRN Rey Roberto MD   650 mg at 10/23/17 5321    docusate sodium (COLACE) capsule 100 mg  100 mg Oral BID Rey Roberto MD   100 mg at 10/23/17 0955    bisacodyl (DULCOLAX) suppository 10 mg  10 mg Rectal Daily PRN Rey Roberto MD        ondansetron (ZOFRAN) injection 4 mg  4 mg Intravenous Q6H PRN eRy Roberto MD        HYDROcodone-acetaminophen (NORCO) 5-325 MG per tablet 1 tablet  1 tablet Oral Q4H PRN Rey Roberto MD   1 tablet at 10/22/17 0346    HYDROmorphone (DILAUDID) injection 0.5 mg  0.5 mg Intravenous Q4H PRN Rod Obrien MD        glucose (GLUTOSE) 40 % oral gel 15 g  15 g Oral PRN Rod Obrien MD        dextrose 50 % solution 12.5 g  12.5 g Intravenous PRN Rey Roberto MD        glucagon (rDNA) injection 1 mg  1 mg Intramuscular PRN Rey Roberto MD        dextrose 5 % solution  100 mL/hr Intravenous PRN Rey Roberto MD        [facility]) tablet 37.5 mg  37.5 mg Oral TID Rod Obrien MD   37.5 mg at 10/23/17 1008    insulin glargine (LANTUS) injection vial 30 Units  30 Units Subcutaneous QAM Kym Obrien MD   30 Units at 10/23/17 0956    enoxaparin (LOVENOX) injection 40 mg  40 mg Subcutaneous Daily Kym Obrien MD   40 mg at 10/23/17 0955        Labs:     Recent Labs      10/22/17   0221  10/23/17   0231   WBC  11.4*  13.7*   RBC  4.08*  3.92*   HGB  12.1*  11.7*   HCT  36.9*  34.3*   MCV  90.4  87.5   MCH  29.7  29.8   MCHC  32.8*  34.1   PLT  218  196     Recent Labs

## 2017-10-24 PROBLEM — M19.90 OSTEOARTHRITIS: Chronic | Status: ACTIVE | Noted: 2017-10-22

## 2017-10-24 PROBLEM — N18.30 STAGE 3 CHRONIC KIDNEY DISEASE (HCC): Chronic | Status: ACTIVE | Noted: 2017-10-24

## 2017-10-24 PROBLEM — D63.8 ANEMIA, CHRONIC DISEASE: Chronic | Status: ACTIVE | Noted: 2017-10-22

## 2017-10-24 PROBLEM — I10 ESSENTIAL HYPERTENSION: Chronic | Status: ACTIVE | Noted: 2017-10-22

## 2017-10-24 PROBLEM — K59.00 OBSTIPATION: Chronic | Status: ACTIVE | Noted: 2017-10-23

## 2017-10-24 LAB
GLUCOSE BLD-MCNC: 139 MG/DL (ref 70–99)
GLUCOSE BLD-MCNC: 161 MG/DL (ref 70–99)
GLUCOSE BLD-MCNC: 175 MG/DL (ref 70–99)
GLUCOSE BLD-MCNC: 190 MG/DL (ref 70–99)
PERFORMED ON: ABNORMAL

## 2017-10-24 PROCEDURE — 1210000000 HC MED SURG R&B

## 2017-10-24 PROCEDURE — 97166 OT EVAL MOD COMPLEX 45 MIN: CPT

## 2017-10-24 PROCEDURE — 6370000000 HC RX 637 (ALT 250 FOR IP): Performed by: INTERNAL MEDICINE

## 2017-10-24 PROCEDURE — G8987 SELF CARE CURRENT STATUS: HCPCS

## 2017-10-24 PROCEDURE — G8988 SELF CARE GOAL STATUS: HCPCS

## 2017-10-24 PROCEDURE — 2580000003 HC RX 258: Performed by: INTERNAL MEDICINE

## 2017-10-24 PROCEDURE — 6370000000 HC RX 637 (ALT 250 FOR IP): Performed by: HOSPITALIST

## 2017-10-24 PROCEDURE — 6360000002 HC RX W HCPCS: Performed by: INTERNAL MEDICINE

## 2017-10-24 PROCEDURE — 82948 REAGENT STRIP/BLOOD GLUCOSE: CPT

## 2017-10-24 PROCEDURE — 97162 PT EVAL MOD COMPLEX 30 MIN: CPT

## 2017-10-24 PROCEDURE — G8979 MOBILITY GOAL STATUS: HCPCS

## 2017-10-24 PROCEDURE — G8978 MOBILITY CURRENT STATUS: HCPCS

## 2017-10-24 RX ORDER — TEMAZEPAM 30 MG/1
30 CAPSULE ORAL NIGHTLY PRN
Status: DISCONTINUED | OUTPATIENT
Start: 2017-10-24 | End: 2017-10-29 | Stop reason: SDUPTHER

## 2017-10-24 RX ORDER — SENNA PLUS 8.6 MG/1
2 TABLET ORAL NIGHTLY PRN
Status: DISCONTINUED | OUTPATIENT
Start: 2017-10-24 | End: 2017-11-02 | Stop reason: HOSPADM

## 2017-10-24 RX ORDER — VENLAFAXINE 37.5 MG/1
37.5 TABLET ORAL DAILY
Status: DISCONTINUED | OUTPATIENT
Start: 2017-10-24 | End: 2017-11-02 | Stop reason: HOSPADM

## 2017-10-24 RX ADMIN — BACITRACIN ZINC AND POLYMYXIN B SULFATE 14.2 G: 500; 10000 OINTMENT TOPICAL at 11:14

## 2017-10-24 RX ADMIN — HYDROMORPHONE HYDROCHLORIDE 0.5 MG: 1 INJECTION, SOLUTION INTRAMUSCULAR; INTRAVENOUS; SUBCUTANEOUS at 09:20

## 2017-10-24 RX ADMIN — CLOPIDOGREL BISULFATE 75 MG: 75 TABLET ORAL at 09:45

## 2017-10-24 RX ADMIN — HYDROCODONE BITARTRATE AND ACETAMINOPHEN 1 TABLET: 5; 325 TABLET ORAL at 20:49

## 2017-10-24 RX ADMIN — ENOXAPARIN SODIUM 40 MG: 40 INJECTION SUBCUTANEOUS at 09:49

## 2017-10-24 RX ADMIN — INSULIN LISPRO 5 UNITS: 100 INJECTION, SOLUTION INTRAVENOUS; SUBCUTANEOUS at 13:19

## 2017-10-24 RX ADMIN — POLYETHYLENE GLYCOL 3350 17 G: 17 POWDER, FOR SOLUTION ORAL at 20:49

## 2017-10-24 RX ADMIN — HYDROMORPHONE HYDROCHLORIDE 0.5 MG: 1 INJECTION, SOLUTION INTRAMUSCULAR; INTRAVENOUS; SUBCUTANEOUS at 12:05

## 2017-10-24 RX ADMIN — TEMAZEPAM 30 MG: 30 CAPSULE ORAL at 21:49

## 2017-10-24 RX ADMIN — HYDROCODONE BITARTRATE AND ACETAMINOPHEN 1 TABLET: 5; 325 TABLET ORAL at 11:47

## 2017-10-24 RX ADMIN — BACITRACIN ZINC AND POLYMYXIN B SULFATE 14.2 G: 500; 10000 OINTMENT TOPICAL at 20:49

## 2017-10-24 RX ADMIN — SODIUM CHLORIDE: 9 INJECTION, SOLUTION INTRAVENOUS at 07:47

## 2017-10-24 RX ADMIN — HYDROMORPHONE HYDROCHLORIDE 0.5 MG: 1 INJECTION, SOLUTION INTRAMUSCULAR; INTRAVENOUS; SUBCUTANEOUS at 04:11

## 2017-10-24 RX ADMIN — INSULIN LISPRO 4 UNITS: 100 INJECTION, SOLUTION INTRAVENOUS; SUBCUTANEOUS at 17:19

## 2017-10-24 RX ADMIN — INSULIN GLARGINE 30 UNITS: 100 INJECTION, SOLUTION SUBCUTANEOUS at 09:54

## 2017-10-24 RX ADMIN — DOCUSATE SODIUM 100 MG: 100 CAPSULE, LIQUID FILLED ORAL at 09:45

## 2017-10-24 RX ADMIN — POLYETHYLENE GLYCOL 3350 17 G: 17 POWDER, FOR SOLUTION ORAL at 09:53

## 2017-10-24 RX ADMIN — PANTOPRAZOLE SODIUM 40 MG: 40 TABLET, DELAYED RELEASE ORAL at 07:44

## 2017-10-24 RX ADMIN — DOCUSATE SODIUM 100 MG: 100 CAPSULE, LIQUID FILLED ORAL at 20:49

## 2017-10-24 RX ADMIN — VENLAFAXINE 37.5 MG: 37.5 TABLET ORAL at 09:45

## 2017-10-24 ASSESSMENT — PAIN DESCRIPTION - LOCATION: LOCATION: LEG

## 2017-10-24 ASSESSMENT — PAIN SCALES - GENERAL
PAINLEVEL_OUTOF10: 7
PAINLEVEL_OUTOF10: 4
PAINLEVEL_OUTOF10: 8
PAINLEVEL_OUTOF10: 10
PAINLEVEL_OUTOF10: 10
PAINLEVEL_OUTOF10: 7

## 2017-10-24 ASSESSMENT — PAIN DESCRIPTION - ORIENTATION: ORIENTATION: RIGHT

## 2017-10-24 NOTE — PROGRESS NOTES
SUP<>SIT  Score: CN  Functional Limitation: Mobility: Walking and moving around  Mobility: Walking and Moving Around Current Status (): 100 percent impaired, limited or restricted  Mobility: Walking and Moving Around Goal Status ():  At least 60 percent but less than 80 percent impaired, limited or restricted  OutComes Score                                           Goals  Short term goals  Time Frame for Short term goals: 14 DAYS BEFORE RE EVAL  Short term goal 1: SUP<>SIT MOD A 1+1  Short term goal 2: SB TF WITH MOD A 2       Therapy Time   Individual Concurrent Group Co-treatment   Time In           Time Out           Minutes                   Jacobo Salcedo PT    Electronically signed by Jacobo Salcedo PT on 10/24/2017 at 3:02 PM

## 2017-10-24 NOTE — PROGRESS NOTES
Hospitalist Progress Note  10/24/2017 5:10 PM  Subjective:   Admit Date: 10/21/2017  PCP: Shonda Pena DO    Chief Complaint: Right Hip Soreness    Subjective: Resting. Complaining of right hip soreness. Cumulative Hospital Course:  Mr. Maty Flores is a 69year old male who was transferred from [facility] with a periprosthetic hip fracture sustained from a fall when he was transferring from chair to go the bathroom. Also sustained R knee skin tear, having pain in R hip and groin Has a history of strokes and falls. Has a history of arthritis, stroke 12/14, CKD, DM, HTN, depression, Hx PUD, retroperitoneal bleed 2014, IVC filter, ulcer. He was seen and evaluated by Dr. Roman Drake, Orthopedic Surgery, who recommended non-surgical rehabilitation. Review of Systems:   Constitutional: Denies fever or chills. Denies change in energy level or malaise. HEENT: Denies headaches or visual disturbances. Denies difficulty swallowing or sore throat. Respiratory: Denies cough or hoarseness. Denies shortness of breath. Cardiovascular: Denies chest pain or pressure. Denies palpitations. Denies presyncope/syncope. Denies orthopnea/PND. Denies lower extremity edema. Gastrointestinal: Denies abdominal pain. Denies nausea/vomiting. Denies change in bowel habits or history of recent GI tract blood loss. Genitourinary: Denies urinary urgency or frequency. Denies dysuria, hematuria. Musculoskeletal: Right hip soreness. Neurological: Denies paresthesias. Denies headache. Denies seizure or stroke symptoms. Behavioral/Psych: Denies problems with anxiety or depression. All other ROS negative except where stated above.       DIET CARB CONTROL; Carb Control: 3 carbs/meal (approximate 1500 kcals/day)    Intake/Output Summary (Last 24 hours) at 10/24/17 1710  Last data filed at 10/24/17 1405   Gross per 24 hour   Intake          1920.25 ml   Output                0 ml   Net          1920.25 ml Medications:   sodium chloride 75 mL/hr at 10/24/17 0747    dextrose       Current Facility-Administered Medications   Medication Dose Route Frequency Provider Last Rate Last Dose    venlafaxine (EFFEXOR) tablet 37.5 mg  37.5 mg Oral Daily SHRAVAN Guardado        temazepam (RESTORIL) capsule 30 mg  30 mg Oral Nightly PRN Caryn Layne MD        bacitracin-polymyxin b (POLYSPORIN) ointment   Topical BID Caryn Layne MD   14.2 g at 10/24/17 1114    polyethylene glycol (GLYCOLAX) packet 17 g  17 g Oral BID Caryn Layne MD   17 g at 10/24/17 0923    glycerin (Laxative) 2.1 g  1 suppository Rectal Daily PRN Caryn Layne MD        pantoprazole (PROTONIX) tablet 40 mg  40 mg Oral QAM AC Caryn Layne MD   40 mg at 10/24/17 0744    insulin lispro (HUMALOG) injection vial 0-35 Units  0-35 Units Subcutaneous 4x Daily AC & HS Caryn Layne MD   5 Units at 10/24/17 1319    clopidogrel (PLAVIX) tablet 75 mg  75 mg Oral Daily Caryn Layne MD   75 mg at 10/24/17 0945    0.9 % sodium chloride infusion   Intravenous Continuous Shena Ruano MD 75 mL/hr at 10/24/17 0747      acetaminophen (TYLENOL) tablet 650 mg  650 mg Oral Q4H PRN Shena Ruano MD   650 mg at 10/23/17 1305    docusate sodium (COLACE) capsule 100 mg  100 mg Oral BID Shena Ruano MD   100 mg at 10/24/17 0945    bisacodyl (DULCOLAX) suppository 10 mg  10 mg Rectal Daily PRN Shena Ruano MD        ondansetron (ZOFRAN) injection 4 mg  4 mg Intravenous Q6H PRN Shena Ruano MD        HYDROcodone-acetaminophen (NORCO) 5-325 MG per tablet 1 tablet  1 tablet Oral Q4H PRN Shena Ruano MD   1 tablet at 10/24/17 1147    HYDROmorphone (DILAUDID) injection 0.5 mg  0.5 mg Intravenous Q4H PRN Rod Obrien MD   0.5 mg at 10/24/17 1205    glucose (GLUTOSE) 40 % oral gel 15 g  15 g Oral PRN Rod Obrien MD        dextrose 50 % solution 12.5 g  12.5 g Intravenous PRN Shena Ruano MD  glucagon (rDNA) injection 1 mg  1 mg Intramuscular PRN Rod Obrien MD        dextrose 5 % solution  100 mL/hr Intravenous PRN Abundio Sweeney MD        insulin glargine (LANTUS) injection vial 30 Units  30 Units Subcutaneous QAM Abundio Sweeney MD   30 Units at 10/24/17 0954    enoxaparin (LOVENOX) injection 40 mg  40 mg Subcutaneous Daily Abundio Sweeney MD   40 mg at 10/24/17 0949        Labs:     Recent Labs      10/22/17   0221  10/23/17   0231   WBC  11.4*  13.7*   RBC  4.08*  3.92*   HGB  12.1*  11.7*   HCT  36.9*  34.3*   MCV  90.4  87.5   MCH  29.7  29.8   MCHC  32.8*  34.1   PLT  218  196     Recent Labs      10/22/17   0222  10/23/17   0231   NA  138  133*   K  4.1  4.1   ANIONGAP  15  13   CL  100  97*   CO2  23  23   BUN  22  17   CREATININE  1.1  1.1   GLUCOSE  317*  171*   CALCIUM  8.5*  8.2*         Objective:   Vitals: BP (!) 154/81   Pulse 87   Temp 98.3 °F (36.8 °C) (Temporal)   Resp 16   Ht 5' 10\" (1.778 m)   Wt 165 lb 7 oz (75 kg)   SpO2 90%   BMI 23.74 kg/m²   24HR INTAKE/OUTPUT:    Intake/Output Summary (Last 24 hours) at 10/24/17 1710  Last data filed at 10/24/17 1405   Gross per 24 hour   Intake          1920.25 ml   Output                0 ml   Net          1920.25 ml     General appearance: alert and cooperative with exam  HEENT: atraumatic, eyes with clear conjunctiva and normal lids, pupils and irises normal, external ears normal, lips normal.  Neck without masses, lympadenopathy, bruit, thyroid normal  Lungs: no increased work of breathing, clear to auscultation bilaterally  Heart: regular rate and rhythm, S1, S2 normal, no murmur, click, rub or gallop  Abdomen: soft, non-tender; bowel sounds normal; no masses,  no organomegaly  Extremities: extremities normal, atraumatic, no cyanosis or edema  Neurologic: No focal neurologic deficits, normal sensation, alert and oriented, affect and mood appropriate.   Skin: dry slightly erythematous superficial lesions - covered with dry dressing    Assessment and Plan:     Principal Problem:    Closed fracture of right femur ([address] - Non-Surgical Rehab. PT/OT    Active Problems:    Essential hypertension - Controlled    Type 2 diabetes mellitus without complication, with long-term current use of insulin (HCC) - Accu-checks with SSI    Stage 3 chronic kidney disease - CR stable    Cerebral artery occlusion with cerebral infarction ([address] - on Plavix    Anemia, chronic disease - Hgb/Hct Stable    Osteoarthritis - Stable    Obstipation - no BM X 3 days      Advance Directive: Full Code    DVT prophylaxis: SCDs    Discharge planning: TBD. Social Service referral for SNF/Rehab.         Dorinda Vines, APRN

## 2017-10-25 LAB
GLUCOSE BLD-MCNC: 142 MG/DL (ref 70–99)
GLUCOSE BLD-MCNC: 181 MG/DL (ref 70–99)
GLUCOSE BLD-MCNC: 263 MG/DL (ref 70–99)
GLUCOSE BLD-MCNC: 283 MG/DL (ref 70–99)
PERFORMED ON: ABNORMAL

## 2017-10-25 PROCEDURE — 6360000002 HC RX W HCPCS: Performed by: INTERNAL MEDICINE

## 2017-10-25 PROCEDURE — 82948 REAGENT STRIP/BLOOD GLUCOSE: CPT

## 2017-10-25 PROCEDURE — 6370000000 HC RX 637 (ALT 250 FOR IP): Performed by: HOSPITALIST

## 2017-10-25 PROCEDURE — 1210000000 HC MED SURG R&B

## 2017-10-25 PROCEDURE — 6370000000 HC RX 637 (ALT 250 FOR IP): Performed by: INTERNAL MEDICINE

## 2017-10-25 PROCEDURE — 6370000000 HC RX 637 (ALT 250 FOR IP): Performed by: NURSE PRACTITIONER

## 2017-10-25 RX ADMIN — VENLAFAXINE 37.5 MG: 37.5 TABLET ORAL at 08:53

## 2017-10-25 RX ADMIN — INSULIN LISPRO 8 UNITS: 100 INJECTION, SOLUTION INTRAVENOUS; SUBCUTANEOUS at 12:58

## 2017-10-25 RX ADMIN — POLYETHYLENE GLYCOL 3350 17 G: 17 POWDER, FOR SOLUTION ORAL at 09:16

## 2017-10-25 RX ADMIN — INSULIN LISPRO 9 UNITS: 100 INJECTION, SOLUTION INTRAVENOUS; SUBCUTANEOUS at 22:39

## 2017-10-25 RX ADMIN — HYDROMORPHONE HYDROCHLORIDE 0.5 MG: 1 INJECTION, SOLUTION INTRAMUSCULAR; INTRAVENOUS; SUBCUTANEOUS at 19:37

## 2017-10-25 RX ADMIN — PANTOPRAZOLE SODIUM 40 MG: 40 TABLET, DELAYED RELEASE ORAL at 08:53

## 2017-10-25 RX ADMIN — BACITRACIN ZINC AND POLYMYXIN B SULFATE 14.2 G: 500; 10000 OINTMENT TOPICAL at 08:53

## 2017-10-25 RX ADMIN — HYDROCODONE BITARTRATE AND ACETAMINOPHEN 1 TABLET: 5; 325 TABLET ORAL at 09:16

## 2017-10-25 RX ADMIN — HYDROCODONE BITARTRATE AND ACETAMINOPHEN 1 TABLET: 5; 325 TABLET ORAL at 20:36

## 2017-10-25 RX ADMIN — ENOXAPARIN SODIUM 40 MG: 40 INJECTION SUBCUTANEOUS at 08:53

## 2017-10-25 RX ADMIN — DOCUSATE SODIUM 100 MG: 100 CAPSULE, LIQUID FILLED ORAL at 20:36

## 2017-10-25 RX ADMIN — INSULIN LISPRO 4 UNITS: 100 INJECTION, SOLUTION INTRAVENOUS; SUBCUTANEOUS at 09:16

## 2017-10-25 RX ADMIN — SENNOSIDES 17.2 MG: 8.6 TABLET, FILM COATED ORAL at 20:36

## 2017-10-25 RX ADMIN — INSULIN GLARGINE 30 UNITS: 100 INJECTION, SOLUTION SUBCUTANEOUS at 08:53

## 2017-10-25 RX ADMIN — BACITRACIN ZINC AND POLYMYXIN B SULFATE 14.2 G: 500; 10000 OINTMENT TOPICAL at 20:36

## 2017-10-25 RX ADMIN — POLYETHYLENE GLYCOL 3350 17 G: 17 POWDER, FOR SOLUTION ORAL at 20:36

## 2017-10-25 RX ADMIN — DOCUSATE SODIUM 100 MG: 100 CAPSULE, LIQUID FILLED ORAL at 08:53

## 2017-10-25 RX ADMIN — CLOPIDOGREL BISULFATE 75 MG: 75 TABLET ORAL at 08:53

## 2017-10-25 ASSESSMENT — PAIN SCALES - GENERAL
PAINLEVEL_OUTOF10: 8
PAINLEVEL_OUTOF10: 8
PAINLEVEL_OUTOF10: 6

## 2017-10-25 NOTE — PROGRESS NOTES
Occupational Therapy   Occupational Therapy Initial Assessment  Date: 10/24/2017   Patient Name: Joe Rhodes  MRN: [id number]     : 1940    Patient Diagnosis(es): There were no encounter diagnoses. has a past medical history of Arthritis; Blood circulation, collateral; Cerebral artery occlusion with cerebral infarction ([facility] [address] Chronic kidney disease; Diabetes mellitus ([facility] [address] History of blood transfusion; Hypertension; and Psychiatric problem. has a past surgical history that includes Hip fracture surgery; Mandible fracture surgery; Toe amputation (Right); fracture surgery; and joint replacement. Treatment Diagnosis: Right Hip Fx (conservative tx)      Restrictions  Restrictions/Precautions  Restrictions/Precautions: Weight Bearing, Fall Risk  Lower Extremity Weight Bearing Restrictions  Right Lower Extremity Weight Bearing: Toe Touch Weight Bearing    Subjective   General  Chart Reviewed: Yes  Patient assessed for rehabilitation services?: Yes  Family / Caregiver Present: No  Pain Assessment  Patient Currently in Pain: Yes  Pain Assessment: 0-10  Pain Level: 8  Pain Location: Leg  Pain Orientation: Right  Pain Intervention(s): Medication (see eMar), Repositioned (modified techniques)     Social/Functional History  Social/Functional History  Lives With: Spouse  Type of Home: House  Home Layout: One level  Home Access: Stairs to enter with rails  Entrance Stairs - Number of Steps: 2-3  Bathroom Shower/Tub: Walk-in shower, Shower chair without back  Bathroom Toilet: Standard  Bathroom Equipment: 3-in-1 commode  Home Equipment: Rolling walker, [employer]  ADL Assistance: Needs assistance  Ambulation Assistance: Independent (RW)  Transfer Assistance: Independent  Additional Comments: He states he used wheelchair and walker both, difficult to get details accurately, may need to verify information with spouse       Objective Patient is supervision for eating and grooming.  Mod to Dependent for

## 2017-10-25 NOTE — CARE COORDINATION
Pt has been accepted at [facility].  Pt is able to DC to the facility on Thursday October 26th if medically cleared from the hospital.   [facility]  [phone]  Electronically signed by Ash Raya on 10/25/2017 at 3:44 PM

## 2017-10-25 NOTE — PROGRESS NOTES
erythematous superficial lesions - covered with dry dressing    Assessment and Plan:     Principal Problem:    Closed fracture of right femur ([address] - Non-Surgical Rehab. PT/OT    Active Problems:    Essential hypertension - Controlled    Type 2 diabetes mellitus without complication, with long-term current use of insulin (HCC) - Accu-checks with SSI    Stage 3 chronic kidney disease - CR stable    Cerebral artery occlusion with cerebral infarction ([address] - on Plavix    Anemia, chronic disease - Hgb/Hct Stable    Osteoarthritis - Stable    Obstipation - no BM X 3 days      Advance Directive: Full Code    DVT prophylaxis: SCDs    Discharge planning: TBD. Awaiting response from SNF.          [place] Ovens, APRN

## 2017-10-26 ENCOUNTER — APPOINTMENT (OUTPATIENT)
Dept: GENERAL RADIOLOGY | Age: 77
DRG: 481 | End: 2017-10-26
Attending: HOSPITALIST
Payer: MEDICARE

## 2017-10-26 LAB
ABO/RH: NORMAL
ANTIBODY SCREEN: NORMAL
GLUCOSE BLD-MCNC: 146 MG/DL (ref 70–99)
GLUCOSE BLD-MCNC: 229 MG/DL (ref 70–99)
GLUCOSE BLD-MCNC: 278 MG/DL (ref 70–99)
HCT VFR BLD CALC: 34.5 % (ref 42–52)
P2Y12 RESULT: 33 PRU (ref 194–418)
PERFORMED ON: ABNORMAL
PLATELET # BLD: 257 K/UL (ref 130–400)

## 2017-10-26 PROCEDURE — 6370000000 HC RX 637 (ALT 250 FOR IP): Performed by: NURSE PRACTITIONER

## 2017-10-26 PROCEDURE — 6360000002 HC RX W HCPCS: Performed by: INTERNAL MEDICINE

## 2017-10-26 PROCEDURE — 86901 BLOOD TYPING SEROLOGIC RH(D): CPT

## 2017-10-26 PROCEDURE — 85014 HEMATOCRIT: CPT

## 2017-10-26 PROCEDURE — 1210000000 HC MED SURG R&B

## 2017-10-26 PROCEDURE — 73502 X-RAY EXAM HIP UNI 2-3 VIEWS: CPT

## 2017-10-26 PROCEDURE — 99232 SBSQ HOSP IP/OBS MODERATE 35: CPT | Performed by: NURSE PRACTITIONER

## 2017-10-26 PROCEDURE — 82948 REAGENT STRIP/BLOOD GLUCOSE: CPT

## 2017-10-26 PROCEDURE — 6370000000 HC RX 637 (ALT 250 FOR IP): Performed by: INTERNAL MEDICINE

## 2017-10-26 PROCEDURE — 6370000000 HC RX 637 (ALT 250 FOR IP): Performed by: HOSPITALIST

## 2017-10-26 PROCEDURE — 86850 RBC ANTIBODY SCREEN: CPT

## 2017-10-26 PROCEDURE — 86900 BLOOD TYPING SEROLOGIC ABO: CPT

## 2017-10-26 PROCEDURE — 36415 COLL VENOUS BLD VENIPUNCTURE: CPT

## 2017-10-26 PROCEDURE — 85049 AUTOMATED PLATELET COUNT: CPT

## 2017-10-26 PROCEDURE — 85576 BLOOD PLATELET AGGREGATION: CPT

## 2017-10-26 RX ADMIN — CLOPIDOGREL BISULFATE 75 MG: 75 TABLET ORAL at 11:10

## 2017-10-26 RX ADMIN — TEMAZEPAM 30 MG: 30 CAPSULE ORAL at 03:46

## 2017-10-26 RX ADMIN — HYDROCODONE BITARTRATE AND ACETAMINOPHEN 1 TABLET: 5; 325 TABLET ORAL at 03:42

## 2017-10-26 RX ADMIN — VENLAFAXINE 37.5 MG: 37.5 TABLET ORAL at 11:09

## 2017-10-26 RX ADMIN — INSULIN GLARGINE 30 UNITS: 100 INJECTION, SOLUTION SUBCUTANEOUS at 10:59

## 2017-10-26 RX ADMIN — INSULIN LISPRO 9 UNITS: 100 INJECTION, SOLUTION INTRAVENOUS; SUBCUTANEOUS at 14:20

## 2017-10-26 RX ADMIN — BACITRACIN ZINC AND POLYMYXIN B SULFATE: 500; 10000 OINTMENT TOPICAL at 21:00

## 2017-10-26 RX ADMIN — ENOXAPARIN SODIUM 40 MG: 40 INJECTION SUBCUTANEOUS at 11:10

## 2017-10-26 RX ADMIN — DOCUSATE SODIUM 100 MG: 100 CAPSULE, LIQUID FILLED ORAL at 21:00

## 2017-10-26 RX ADMIN — PANTOPRAZOLE SODIUM 40 MG: 40 TABLET, DELAYED RELEASE ORAL at 11:10

## 2017-10-26 RX ADMIN — HYDROCODONE BITARTRATE AND ACETAMINOPHEN 1 TABLET: 5; 325 TABLET ORAL at 17:16

## 2017-10-26 RX ADMIN — HYDROCODONE BITARTRATE AND ACETAMINOPHEN 1 TABLET: 5; 325 TABLET ORAL at 21:00

## 2017-10-26 RX ADMIN — BACITRACIN ZINC AND POLYMYXIN B SULFATE: 500; 10000 OINTMENT TOPICAL at 14:33

## 2017-10-26 RX ADMIN — HYDROCODONE BITARTRATE AND ACETAMINOPHEN 1 TABLET: 5; 325 TABLET ORAL at 10:52

## 2017-10-26 ASSESSMENT — PAIN SCALES - GENERAL
PAINLEVEL_OUTOF10: 2
PAINLEVEL_OUTOF10: 7
PAINLEVEL_OUTOF10: 7
PAINLEVEL_OUTOF10: 5

## 2017-10-26 NOTE — PROGRESS NOTES
(DILAUDID) injection 0.5 mg  0.5 mg Intravenous Q4H PRN Rod Obrien MD   0.5 mg at 10/25/17 1937    glucose (GLUTOSE) 40 % oral gel 15 g  15 g Oral PRN Rod Obrien MD        dextrose 50 % solution 12.5 g  12.5 g Intravenous PRN Walt Gagnon MD        glucagon (rDNA) injection 1 mg  1 mg Intramuscular PRN Sandie Obrien MD        dextrose 5 % solution  100 mL/hr Intravenous PRN Walt Gagnon MD        insulin glargine (LANTUS) injection vial 30 Units  30 Units Subcutaneous QAM Rod Obrien MD   30 Units at 10/26/17 1059    enoxaparin (LOVENOX) injection 40 mg  40 mg Subcutaneous Daily Rod Obrien MD   40 mg at 10/26/17 1110        Labs:     Recent Labs      10/23/17   0231   WBC  13.7*   RBC  3.92*   HGB  11.7*   HCT  34.3*   MCV  87.5   MCH  29.8   MCHC  34.1   PLT  196          Recent Labs      10/23/17   0231   NA  133*   K  4.1   ANIONGAP  13   CL  97*   CO2  23   BUN  17   CREATININE  1.1   GLUCOSE  171*   CALCIUM  8.2*          Objective:   Vitals: BP (!) 163/79   Pulse 89   Temp 98.4 °F (36.9 °C) (Temporal)   Resp 16   Ht 5' 10\" (1.778 m)   Wt 165 lb 7 oz (75 kg)   SpO2 92%   BMI 23.74 kg/m²   24HR INTAKE/OUTPUT:      Intake/Output Summary (Last 24 hours) at 10/26/17 1147  Last data filed at 10/26/17 0345   Gross per 24 hour   Intake             2143 ml   Output                0 ml   Net             2143 ml     General appearance: alert and cooperative with exam  HEENT: atraumatic, eyes with clear conjunctiva and normal lids, pupils and irises normal, external ears normal, lips normal.  Neck without masses, lympadenopathy, bruit, thyroid normal  Lungs: no increased work of breathing, clear to auscultation bilaterally  Heart: regular rate and rhythm, S1, S2 normal, no murmur, click, rub or gallop  Abdomen: soft, non-tender; bowel sounds normal; no masses,  no organomegaly  Extremities: extremities normal, atraumatic, no cyanosis or edema  Neurologic: No focal neurologic deficits, normal sensation, alert and oriented, affect and mood appropriate. Skin: dry slightly erythematous superficial lesions - covered with dry dressing    Assessment and Plan:     Principal Problem:    Closed fracture of right femur (HCC) - Increased pain - Recheck right hip XR    Active Problems:    Essential hypertension - Controlled    Type 2 diabetes mellitus without complication, with long-term current use of insulin (HCC) - Accu-checks with SSI    Stage 3 chronic kidney disease - CR stable    Cerebral artery occlusion with cerebral infarction ([facility] [address] - on Plavix    Anemia, chronic disease - Hgb/Hct Stable    Osteoarthritis - Stable    Obstipation - no BM X 3 days      Advance Directive: Full Code    DVT prophylaxis: SCDs    Discharge planning: TBD. Recheck right hip XR.         Nallely Lund APRN

## 2017-10-27 ENCOUNTER — APPOINTMENT (OUTPATIENT)
Dept: GENERAL RADIOLOGY | Age: 77
DRG: 481 | End: 2017-10-27
Attending: HOSPITALIST
Payer: MEDICARE

## 2017-10-27 ENCOUNTER — ANESTHESIA EVENT (OUTPATIENT)
Dept: OPERATING ROOM | Age: 77
DRG: 481 | End: 2017-10-27
Payer: MEDICARE

## 2017-10-27 ENCOUNTER — ANESTHESIA (OUTPATIENT)
Dept: OPERATING ROOM | Age: 77
DRG: 481 | End: 2017-10-27
Payer: MEDICARE

## 2017-10-27 VITALS
TEMPERATURE: 97.5 F | RESPIRATION RATE: 3 BRPM | SYSTOLIC BLOOD PRESSURE: 118 MMHG | DIASTOLIC BLOOD PRESSURE: 44 MMHG | OXYGEN SATURATION: 100 %

## 2017-10-27 LAB
GLUCOSE BLD-MCNC: 142 MG/DL (ref 70–99)
GLUCOSE BLD-MCNC: 240 MG/DL (ref 70–99)
GLUCOSE BLD-MCNC: 284 MG/DL (ref 70–99)
HBA1C MFR BLD: 8.3 %
HCT VFR BLD CALC: 30.1 % (ref 42–52)
HEMOGLOBIN: 9.7 G/DL (ref 14–18)
MCH RBC QN AUTO: 29 PG (ref 27–31)
MCHC RBC AUTO-ENTMCNC: 32.2 G/DL (ref 33–37)
MCV RBC AUTO: 90.1 FL (ref 80–94)
PDW BLD-RTO: 14.3 % (ref 11.5–14.5)
PERFORMED ON: ABNORMAL
PLATELET # BLD: 234 K/UL (ref 130–400)
PMV BLD AUTO: 10.7 FL (ref 9.4–12.4)
RBC # BLD: 3.34 M/UL (ref 4.7–6.1)
WBC # BLD: 19.6 K/UL (ref 4.8–10.8)

## 2017-10-27 PROCEDURE — 3700000001 HC ADD 15 MINUTES (ANESTHESIA): Performed by: ORTHOPAEDIC SURGERY

## 2017-10-27 PROCEDURE — 2500000003 HC RX 250 WO HCPCS: Performed by: ORTHOPAEDIC SURGERY

## 2017-10-27 PROCEDURE — 2580000003 HC RX 258: Performed by: ANESTHESIOLOGY

## 2017-10-27 PROCEDURE — 6360000002 HC RX W HCPCS: Performed by: INTERNAL MEDICINE

## 2017-10-27 PROCEDURE — C9250 ARTISS FIBRIN SEALANT: HCPCS | Performed by: ORTHOPAEDIC SURGERY

## 2017-10-27 PROCEDURE — 7100000001 HC PACU RECOVERY - ADDTL 15 MIN: Performed by: ORTHOPAEDIC SURGERY

## 2017-10-27 PROCEDURE — 2580000003 HC RX 258: Performed by: ORTHOPAEDIC SURGERY

## 2017-10-27 PROCEDURE — 6370000000 HC RX 637 (ALT 250 FOR IP): Performed by: NURSE PRACTITIONER

## 2017-10-27 PROCEDURE — 85027 COMPLETE CBC AUTOMATED: CPT

## 2017-10-27 PROCEDURE — 94664 DEMO&/EVAL PT USE INHALER: CPT

## 2017-10-27 PROCEDURE — 6370000000 HC RX 637 (ALT 250 FOR IP): Performed by: INTERNAL MEDICINE

## 2017-10-27 PROCEDURE — 3700000000 HC ANESTHESIA ATTENDED CARE: Performed by: ORTHOPAEDIC SURGERY

## 2017-10-27 PROCEDURE — 7100000000 HC PACU RECOVERY - FIRST 15 MIN: Performed by: ORTHOPAEDIC SURGERY

## 2017-10-27 PROCEDURE — 36415 COLL VENOUS BLD VENIPUNCTURE: CPT

## 2017-10-27 PROCEDURE — 71020 XR CHEST STANDARD TWO VW: CPT

## 2017-10-27 PROCEDURE — 2780000003 HC MISC SCREW $51-250: Performed by: ORTHOPAEDIC SURGERY

## 2017-10-27 PROCEDURE — 6360000002 HC RX W HCPCS: Performed by: NURSE ANESTHETIST, CERTIFIED REGISTERED

## 2017-10-27 PROCEDURE — 2780000010 HC IMPLANT OTHER: Performed by: ORTHOPAEDIC SURGERY

## 2017-10-27 PROCEDURE — 2720000001 HC MISC SURG SUPPLY STERILE $51-500: Performed by: ORTHOPAEDIC SURGERY

## 2017-10-27 PROCEDURE — 83036 HEMOGLOBIN GLYCOSYLATED A1C: CPT

## 2017-10-27 PROCEDURE — 3209999900 FLUORO FOR SURGICAL PROCEDURES

## 2017-10-27 PROCEDURE — 2580000003 HC RX 258: Performed by: NURSE ANESTHETIST, CERTIFIED REGISTERED

## 2017-10-27 PROCEDURE — P9016 RBC LEUKOCYTES REDUCED: HCPCS

## 2017-10-27 PROCEDURE — 2500000003 HC RX 250 WO HCPCS: Performed by: NURSE ANESTHETIST, CERTIFIED REGISTERED

## 2017-10-27 PROCEDURE — 73502 X-RAY EXAM HIP UNI 2-3 VIEWS: CPT

## 2017-10-27 PROCEDURE — 6370000000 HC RX 637 (ALT 250 FOR IP): Performed by: ORTHOPAEDIC SURGERY

## 2017-10-27 PROCEDURE — 2720000010 HC SURG SUPPLY STERILE: Performed by: ORTHOPAEDIC SURGERY

## 2017-10-27 PROCEDURE — 3600000004 HC SURGERY LEVEL 4 BASE: Performed by: ORTHOPAEDIC SURGERY

## 2017-10-27 PROCEDURE — C1729 CATH, DRAINAGE: HCPCS | Performed by: ORTHOPAEDIC SURGERY

## 2017-10-27 PROCEDURE — C9290 INJ, BUPIVACAINE LIPOSOME: HCPCS | Performed by: ORTHOPAEDIC SURGERY

## 2017-10-27 PROCEDURE — 6360000002 HC RX W HCPCS: Performed by: ORTHOPAEDIC SURGERY

## 2017-10-27 PROCEDURE — 87070 CULTURE OTHR SPECIMN AEROBIC: CPT

## 2017-10-27 PROCEDURE — 0QS604Z REPOSITION RIGHT UPPER FEMUR WITH INTERNAL FIXATION DEVICE, OPEN APPROACH: ICD-10-PCS | Performed by: ORTHOPAEDIC SURGERY

## 2017-10-27 PROCEDURE — 3600000014 HC SURGERY LEVEL 4 ADDTL 15MIN: Performed by: ORTHOPAEDIC SURGERY

## 2017-10-27 PROCEDURE — C1713 ANCHOR/SCREW BN/BN,TIS/BN: HCPCS | Performed by: ORTHOPAEDIC SURGERY

## 2017-10-27 PROCEDURE — 82948 REAGENT STRIP/BLOOD GLUCOSE: CPT

## 2017-10-27 PROCEDURE — 2000000000 HC ICU R&B

## 2017-10-27 DEVICE — IMPLANTABLE DEVICE: Type: IMPLANTABLE DEVICE | Site: FEMUR | Status: FUNCTIONAL

## 2017-10-27 DEVICE — NCB CABLE BUTTON
Type: IMPLANTABLE DEVICE | Site: FEMUR | Status: FUNCTIONAL
Brand: NCB®

## 2017-10-27 DEVICE — NCB® PP TROCHANTER PLATE, RIGHT, NARROW
Type: IMPLANTABLE DEVICE | Site: FEMUR | Status: FUNCTIONAL
Brand: NCB®

## 2017-10-27 DEVICE — NCB PP PROX FEM PLATE, R, 9H, L. 245MM
Type: IMPLANTABLE DEVICE | Site: FEMUR | Status: FUNCTIONAL
Brand: NCB®

## 2017-10-27 DEVICE — NCB SCREW 5.0   L = 36
Type: IMPLANTABLE DEVICE | Site: FEMUR | Status: FUNCTIONAL
Brand: NCB®

## 2017-10-27 DEVICE — CABLE SURG L635MM DIA1.8MM CO CHROM CERCLAGE CBL RDY: Type: IMPLANTABLE DEVICE | Site: FEMUR | Status: FUNCTIONAL

## 2017-10-27 DEVICE — NCB LOCKING CAP
Type: IMPLANTABLE DEVICE | Site: FEMUR | Status: FUNCTIONAL
Brand: NCB®

## 2017-10-27 DEVICE — TIP APPL L45CM FLX FOR SEAL EVICEL: Type: IMPLANTABLE DEVICE | Site: HIP | Status: FUNCTIONAL

## 2017-10-27 DEVICE — NCB SCREW 5.0   L = 34
Type: IMPLANTABLE DEVICE | Site: FEMUR | Status: FUNCTIONAL
Brand: NCB®

## 2017-10-27 RX ORDER — ROCURONIUM BROMIDE 10 MG/ML
INJECTION, SOLUTION INTRAVENOUS PRN
Status: DISCONTINUED | OUTPATIENT
Start: 2017-10-27 | End: 2017-10-27 | Stop reason: SDUPTHER

## 2017-10-27 RX ORDER — CLINDAMYCIN PHOSPHATE 900 MG/50ML
900 INJECTION INTRAVENOUS EVERY 8 HOURS
Status: DISPENSED | OUTPATIENT
Start: 2017-10-27 | End: 2017-10-29

## 2017-10-27 RX ORDER — ONDANSETRON 2 MG/ML
4 INJECTION INTRAMUSCULAR; INTRAVENOUS EVERY 6 HOURS PRN
Status: DISCONTINUED | OUTPATIENT
Start: 2017-10-27 | End: 2017-10-27 | Stop reason: SDUPTHER

## 2017-10-27 RX ORDER — LABETALOL HYDROCHLORIDE 5 MG/ML
5 INJECTION, SOLUTION INTRAVENOUS EVERY 10 MIN PRN
Status: DISCONTINUED | OUTPATIENT
Start: 2017-10-27 | End: 2017-10-27 | Stop reason: HOSPADM

## 2017-10-27 RX ORDER — TRAMADOL HYDROCHLORIDE 50 MG/1
50 TABLET ORAL EVERY 6 HOURS
Status: DISCONTINUED | OUTPATIENT
Start: 2017-10-27 | End: 2017-10-30

## 2017-10-27 RX ORDER — SODIUM CHLORIDE 0.9 % (FLUSH) 0.9 %
10 SYRINGE (ML) INJECTION EVERY 12 HOURS SCHEDULED
Status: DISCONTINUED | OUTPATIENT
Start: 2017-10-27 | End: 2017-10-27 | Stop reason: HOSPADM

## 2017-10-27 RX ORDER — OXYCODONE HYDROCHLORIDE 5 MG/1
10 TABLET ORAL EVERY 4 HOURS PRN
Status: DISCONTINUED | OUTPATIENT
Start: 2017-10-27 | End: 2017-10-30

## 2017-10-27 RX ORDER — SODIUM CHLORIDE 9 MG/ML
INJECTION, SOLUTION INTRAVENOUS CONTINUOUS
Status: DISCONTINUED | OUTPATIENT
Start: 2017-10-27 | End: 2017-10-30

## 2017-10-27 RX ORDER — METOCLOPRAMIDE HYDROCHLORIDE 5 MG/ML
10 INJECTION INTRAMUSCULAR; INTRAVENOUS
Status: DISCONTINUED | OUTPATIENT
Start: 2017-10-27 | End: 2017-10-27 | Stop reason: HOSPADM

## 2017-10-27 RX ORDER — MORPHINE SULFATE 4 MG/ML
4 INJECTION, SOLUTION INTRAMUSCULAR; INTRAVENOUS EVERY 5 MIN PRN
Status: DISCONTINUED | OUTPATIENT
Start: 2017-10-27 | End: 2017-10-27 | Stop reason: HOSPADM

## 2017-10-27 RX ORDER — VECURONIUM BROMIDE 1 MG/ML
INJECTION, POWDER, LYOPHILIZED, FOR SOLUTION INTRAVENOUS PRN
Status: DISCONTINUED | OUTPATIENT
Start: 2017-10-27 | End: 2017-10-27 | Stop reason: SDUPTHER

## 2017-10-27 RX ORDER — SODIUM CHLORIDE 0.9 % (FLUSH) 0.9 %
10 SYRINGE (ML) INJECTION PRN
Status: DISCONTINUED | OUTPATIENT
Start: 2017-10-27 | End: 2017-10-27 | Stop reason: HOSPADM

## 2017-10-27 RX ORDER — PROPOFOL 10 MG/ML
INJECTION, EMULSION INTRAVENOUS PRN
Status: DISCONTINUED | OUTPATIENT
Start: 2017-10-27 | End: 2017-10-27 | Stop reason: SDUPTHER

## 2017-10-27 RX ORDER — ACETAMINOPHEN 500 MG
1000 TABLET ORAL EVERY 8 HOURS
Status: DISCONTINUED | OUTPATIENT
Start: 2017-10-27 | End: 2017-11-02 | Stop reason: HOSPADM

## 2017-10-27 RX ORDER — SODIUM CHLORIDE 9 MG/ML
INJECTION, SOLUTION INTRAVENOUS CONTINUOUS PRN
Status: DISCONTINUED | OUTPATIENT
Start: 2017-10-27 | End: 2017-10-27 | Stop reason: SDUPTHER

## 2017-10-27 RX ORDER — MORPHINE SULFATE 10 MG/ML
4 INJECTION, SOLUTION INTRAMUSCULAR; INTRAVENOUS
Status: DISCONTINUED | OUTPATIENT
Start: 2017-10-27 | End: 2017-10-28

## 2017-10-27 RX ORDER — FENTANYL CITRATE 50 UG/ML
50 INJECTION, SOLUTION INTRAMUSCULAR; INTRAVENOUS
Status: DISCONTINUED | OUTPATIENT
Start: 2017-10-27 | End: 2017-10-27 | Stop reason: HOSPADM

## 2017-10-27 RX ORDER — NICOTINE POLACRILEX 4 MG
15 LOZENGE BUCCAL PRN
Status: DISCONTINUED | OUTPATIENT
Start: 2017-10-27 | End: 2017-10-27 | Stop reason: SDUPTHER

## 2017-10-27 RX ORDER — FENTANYL CITRATE 50 UG/ML
INJECTION, SOLUTION INTRAMUSCULAR; INTRAVENOUS PRN
Status: DISCONTINUED | OUTPATIENT
Start: 2017-10-27 | End: 2017-10-27 | Stop reason: SDUPTHER

## 2017-10-27 RX ORDER — MORPHINE SULFATE 10 MG/ML
2 INJECTION, SOLUTION INTRAMUSCULAR; INTRAVENOUS
Status: DISCONTINUED | OUTPATIENT
Start: 2017-10-27 | End: 2017-10-28

## 2017-10-27 RX ORDER — LIDOCAINE HYDROCHLORIDE 10 MG/ML
INJECTION, SOLUTION EPIDURAL; INFILTRATION; INTRACAUDAL; PERINEURAL PRN
Status: DISCONTINUED | OUTPATIENT
Start: 2017-10-27 | End: 2017-10-27 | Stop reason: SDUPTHER

## 2017-10-27 RX ORDER — TAMSULOSIN HYDROCHLORIDE 0.4 MG/1
0.4 CAPSULE ORAL DAILY
Status: DISCONTINUED | OUTPATIENT
Start: 2017-10-28 | End: 2017-10-27

## 2017-10-27 RX ORDER — 0.9 % SODIUM CHLORIDE 0.9 %
500 INTRAVENOUS SOLUTION INTRAVENOUS PRN
Status: DISCONTINUED | OUTPATIENT
Start: 2017-10-27 | End: 2017-10-28

## 2017-10-27 RX ORDER — MORPHINE SULFATE 4 MG/ML
2 INJECTION, SOLUTION INTRAMUSCULAR; INTRAVENOUS EVERY 5 MIN PRN
Status: DISCONTINUED | OUTPATIENT
Start: 2017-10-27 | End: 2017-10-27 | Stop reason: HOSPADM

## 2017-10-27 RX ORDER — LIDOCAINE HYDROCHLORIDE 10 MG/ML
1 INJECTION, SOLUTION EPIDURAL; INFILTRATION; INTRACAUDAL; PERINEURAL
Status: DISCONTINUED | OUTPATIENT
Start: 2017-10-27 | End: 2017-10-27 | Stop reason: HOSPADM

## 2017-10-27 RX ORDER — FENTANYL CITRATE 50 UG/ML
75 INJECTION, SOLUTION INTRAMUSCULAR; INTRAVENOUS
Status: DISCONTINUED | OUTPATIENT
Start: 2017-10-27 | End: 2017-11-02 | Stop reason: HOSPADM

## 2017-10-27 RX ORDER — HYDRALAZINE HYDROCHLORIDE 20 MG/ML
5 INJECTION INTRAMUSCULAR; INTRAVENOUS EVERY 10 MIN PRN
Status: DISCONTINUED | OUTPATIENT
Start: 2017-10-27 | End: 2017-10-27 | Stop reason: HOSPADM

## 2017-10-27 RX ORDER — ONDANSETRON 2 MG/ML
INJECTION INTRAMUSCULAR; INTRAVENOUS PRN
Status: DISCONTINUED | OUTPATIENT
Start: 2017-10-27 | End: 2017-10-27 | Stop reason: SDUPTHER

## 2017-10-27 RX ORDER — SODIUM CHLORIDE 0.9 % (FLUSH) 0.9 %
10 SYRINGE (ML) INJECTION PRN
Status: DISCONTINUED | OUTPATIENT
Start: 2017-10-27 | End: 2017-11-02 | Stop reason: HOSPADM

## 2017-10-27 RX ORDER — DEXTROSE MONOHYDRATE 25 G/50ML
12.5 INJECTION, SOLUTION INTRAVENOUS PRN
Status: DISCONTINUED | OUTPATIENT
Start: 2017-10-27 | End: 2017-10-27 | Stop reason: SDUPTHER

## 2017-10-27 RX ORDER — OXYCODONE HYDROCHLORIDE 5 MG/1
5 TABLET ORAL EVERY 4 HOURS PRN
Status: DISCONTINUED | OUTPATIENT
Start: 2017-10-27 | End: 2017-11-02 | Stop reason: HOSPADM

## 2017-10-27 RX ORDER — INSULIN GLARGINE 100 [IU]/ML
30 INJECTION, SOLUTION SUBCUTANEOUS EVERY MORNING
Status: DISCONTINUED | OUTPATIENT
Start: 2017-10-28 | End: 2017-11-01

## 2017-10-27 RX ORDER — SODIUM CHLORIDE 0.9 % (FLUSH) 0.9 %
10 SYRINGE (ML) INJECTION EVERY 12 HOURS SCHEDULED
Status: DISCONTINUED | OUTPATIENT
Start: 2017-10-27 | End: 2017-11-02 | Stop reason: HOSPADM

## 2017-10-27 RX ORDER — DEXTROSE MONOHYDRATE 50 MG/ML
100 INJECTION, SOLUTION INTRAVENOUS PRN
Status: DISCONTINUED | OUTPATIENT
Start: 2017-10-27 | End: 2017-10-27 | Stop reason: SDUPTHER

## 2017-10-27 RX ORDER — SCOLOPAMINE TRANSDERMAL SYSTEM 1 MG/1
1 PATCH, EXTENDED RELEASE TRANSDERMAL
Status: DISCONTINUED | OUTPATIENT
Start: 2017-10-27 | End: 2017-10-27 | Stop reason: HOSPADM

## 2017-10-27 RX ORDER — FENTANYL CITRATE 50 UG/ML
75 INJECTION, SOLUTION INTRAMUSCULAR; INTRAVENOUS
Status: DISCONTINUED | OUTPATIENT
Start: 2017-10-27 | End: 2017-10-30

## 2017-10-27 RX ORDER — MEPERIDINE HYDROCHLORIDE 50 MG/ML
12.5 INJECTION INTRAMUSCULAR; INTRAVENOUS; SUBCUTANEOUS EVERY 5 MIN PRN
Status: DISCONTINUED | OUTPATIENT
Start: 2017-10-27 | End: 2017-10-27 | Stop reason: HOSPADM

## 2017-10-27 RX ORDER — OXYCODONE HYDROCHLORIDE 5 MG/1
10 TABLET ORAL EVERY 4 HOURS PRN
Status: DISCONTINUED | OUTPATIENT
Start: 2017-10-27 | End: 2017-11-02 | Stop reason: HOSPADM

## 2017-10-27 RX ORDER — DOCUSATE SODIUM 100 MG/1
100 CAPSULE, LIQUID FILLED ORAL 2 TIMES DAILY
Status: DISCONTINUED | OUTPATIENT
Start: 2017-10-27 | End: 2017-10-27 | Stop reason: SDUPTHER

## 2017-10-27 RX ORDER — DIPHENHYDRAMINE HYDROCHLORIDE 50 MG/ML
12.5 INJECTION INTRAMUSCULAR; INTRAVENOUS
Status: DISCONTINUED | OUTPATIENT
Start: 2017-10-27 | End: 2017-10-27 | Stop reason: HOSPADM

## 2017-10-27 RX ORDER — PROMETHAZINE HYDROCHLORIDE 25 MG/ML
6.25 INJECTION, SOLUTION INTRAMUSCULAR; INTRAVENOUS
Status: DISCONTINUED | OUTPATIENT
Start: 2017-10-27 | End: 2017-10-27 | Stop reason: HOSPADM

## 2017-10-27 RX ORDER — TAMSULOSIN HYDROCHLORIDE 0.4 MG/1
0.4 CAPSULE ORAL DAILY
Status: DISCONTINUED | OUTPATIENT
Start: 2017-10-27 | End: 2017-11-02 | Stop reason: HOSPADM

## 2017-10-27 RX ORDER — FENTANYL CITRATE 50 UG/ML
100 INJECTION, SOLUTION INTRAMUSCULAR; INTRAVENOUS
Status: DISCONTINUED | OUTPATIENT
Start: 2017-10-27 | End: 2017-10-30

## 2017-10-27 RX ORDER — MORPHINE SULFATE 4 MG/ML
4 INJECTION, SOLUTION INTRAMUSCULAR; INTRAVENOUS
Status: DISCONTINUED | OUTPATIENT
Start: 2017-10-27 | End: 2017-10-27 | Stop reason: HOSPADM

## 2017-10-27 RX ORDER — OXYCODONE HCL 10 MG/1
10 TABLET, FILM COATED, EXTENDED RELEASE ORAL EVERY 12 HOURS SCHEDULED
Status: DISCONTINUED | OUTPATIENT
Start: 2017-10-27 | End: 2017-11-02 | Stop reason: HOSPADM

## 2017-10-27 RX ORDER — MIDAZOLAM HYDROCHLORIDE 1 MG/ML
2 INJECTION INTRAMUSCULAR; INTRAVENOUS
Status: DISCONTINUED | OUTPATIENT
Start: 2017-10-27 | End: 2017-10-27 | Stop reason: HOSPADM

## 2017-10-27 RX ORDER — FENTANYL CITRATE 50 UG/ML
50 INJECTION, SOLUTION INTRAMUSCULAR; INTRAVENOUS
Status: DISCONTINUED | OUTPATIENT
Start: 2017-10-27 | End: 2017-11-02 | Stop reason: HOSPADM

## 2017-10-27 RX ORDER — SODIUM CHLORIDE, SODIUM LACTATE, POTASSIUM CHLORIDE, CALCIUM CHLORIDE 600; 310; 30; 20 MG/100ML; MG/100ML; MG/100ML; MG/100ML
INJECTION, SOLUTION INTRAVENOUS CONTINUOUS
Status: DISCONTINUED | OUTPATIENT
Start: 2017-10-27 | End: 2017-10-27

## 2017-10-27 RX ORDER — OXYCODONE HYDROCHLORIDE 5 MG/1
20 TABLET ORAL EVERY 4 HOURS PRN
Status: DISCONTINUED | OUTPATIENT
Start: 2017-10-27 | End: 2017-10-30

## 2017-10-27 RX ADMIN — ROCURONIUM BROMIDE 10 MG: 10 INJECTION INTRAVENOUS at 19:05

## 2017-10-27 RX ADMIN — PHENYLEPHRINE HYDROCHLORIDE 100 MCG: 10 INJECTION INTRAVENOUS at 18:30

## 2017-10-27 RX ADMIN — INSULIN LISPRO 2 UNITS: 100 INJECTION, SOLUTION INTRAVENOUS; SUBCUTANEOUS at 23:40

## 2017-10-27 RX ADMIN — ACETAMINOPHEN 1000 MG: 500 TABLET, FILM COATED ORAL at 22:17

## 2017-10-27 RX ADMIN — PHENYLEPHRINE HYDROCHLORIDE 100 MCG: 10 INJECTION INTRAVENOUS at 18:55

## 2017-10-27 RX ADMIN — TAMSULOSIN HYDROCHLORIDE 0.4 MG: 0.4 CAPSULE ORAL at 22:17

## 2017-10-27 RX ADMIN — FENTANYL CITRATE 25 MCG: 50 INJECTION, SOLUTION INTRAMUSCULAR; INTRAVENOUS at 17:40

## 2017-10-27 RX ADMIN — INSULIN LISPRO 7 UNITS: 100 INJECTION, SOLUTION INTRAVENOUS; SUBCUTANEOUS at 08:31

## 2017-10-27 RX ADMIN — PHENYLEPHRINE HYDROCHLORIDE 100 MCG: 10 INJECTION INTRAVENOUS at 18:10

## 2017-10-27 RX ADMIN — SODIUM CHLORIDE, SODIUM LACTATE, POTASSIUM CHLORIDE, AND CALCIUM CHLORIDE: 600; 310; 30; 20 INJECTION, SOLUTION INTRAVENOUS at 17:32

## 2017-10-27 RX ADMIN — VECURONIUM BROMIDE FOR INJECTION 2 MG: 1 INJECTION, POWDER, LYOPHILIZED, FOR SOLUTION INTRAVENOUS at 17:45

## 2017-10-27 RX ADMIN — Medication 10 ML: at 22:17

## 2017-10-27 RX ADMIN — SODIUM CHLORIDE, SODIUM LACTATE, POTASSIUM CHLORIDE, AND CALCIUM CHLORIDE: 600; 310; 30; 20 INJECTION, SOLUTION INTRAVENOUS at 18:30

## 2017-10-27 RX ADMIN — PHENYLEPHRINE HYDROCHLORIDE 100 MCG: 10 INJECTION INTRAVENOUS at 19:00

## 2017-10-27 RX ADMIN — CLINDAMYCIN PHOSPHATE 900 MG: 18 INJECTION, SOLUTION INTRAVENOUS at 22:18

## 2017-10-27 RX ADMIN — MORPHINE SULFATE 4 MG: 10 INJECTION, SOLUTION INTRAMUSCULAR; INTRAVENOUS at 02:54

## 2017-10-27 RX ADMIN — TRAMADOL HYDROCHLORIDE 50 MG: 50 TABLET, FILM COATED ORAL at 22:17

## 2017-10-27 RX ADMIN — PHENYLEPHRINE HYDROCHLORIDE 100 MCG: 10 INJECTION INTRAVENOUS at 17:45

## 2017-10-27 RX ADMIN — LIDOCAINE HYDROCHLORIDE 5 ML: 10 INJECTION, SOLUTION EPIDURAL; INFILTRATION; INTRACAUDAL; PERINEURAL at 16:31

## 2017-10-27 RX ADMIN — MORPHINE SULFATE 4 MG: 10 INJECTION, SOLUTION INTRAMUSCULAR; INTRAVENOUS at 10:01

## 2017-10-27 RX ADMIN — PHENYLEPHRINE HYDROCHLORIDE 100 MCG: 10 INJECTION INTRAVENOUS at 18:15

## 2017-10-27 RX ADMIN — OXYCODONE HYDROCHLORIDE 10 MG: 10 TABLET, FILM COATED, EXTENDED RELEASE ORAL at 22:17

## 2017-10-27 RX ADMIN — VECURONIUM BROMIDE FOR INJECTION 2 MG: 1 INJECTION, POWDER, LYOPHILIZED, FOR SOLUTION INTRAVENOUS at 16:37

## 2017-10-27 RX ADMIN — PHENYLEPHRINE HYDROCHLORIDE 100 MCG: 10 INJECTION INTRAVENOUS at 17:55

## 2017-10-27 RX ADMIN — PHENYLEPHRINE HYDROCHLORIDE 100 MCG: 10 INJECTION INTRAVENOUS at 18:35

## 2017-10-27 RX ADMIN — PHENYLEPHRINE HYDROCHLORIDE 100 MCG: 10 INJECTION INTRAVENOUS at 16:58

## 2017-10-27 RX ADMIN — ROCURONIUM BROMIDE 10 MG: 10 INJECTION INTRAVENOUS at 19:30

## 2017-10-27 RX ADMIN — VECURONIUM BROMIDE FOR INJECTION 3 MG: 1 INJECTION, POWDER, LYOPHILIZED, FOR SOLUTION INTRAVENOUS at 17:00

## 2017-10-27 RX ADMIN — MORPHINE SULFATE 4 MG: 10 INJECTION, SOLUTION INTRAMUSCULAR; INTRAVENOUS at 14:14

## 2017-10-27 RX ADMIN — PHENYLEPHRINE HYDROCHLORIDE 100 MCG: 10 INJECTION INTRAVENOUS at 17:14

## 2017-10-27 RX ADMIN — SODIUM CHLORIDE, SODIUM LACTATE, POTASSIUM CHLORIDE, AND CALCIUM CHLORIDE: 600; 310; 30; 20 INJECTION, SOLUTION INTRAVENOUS at 14:50

## 2017-10-27 RX ADMIN — SODIUM CHLORIDE: 9 INJECTION, SOLUTION INTRAVENOUS at 17:03

## 2017-10-27 RX ADMIN — PHENYLEPHRINE HYDROCHLORIDE 100 MCG: 10 INJECTION INTRAVENOUS at 19:10

## 2017-10-27 RX ADMIN — PHENYLEPHRINE HYDROCHLORIDE 100 MCG: 10 INJECTION INTRAVENOUS at 18:25

## 2017-10-27 RX ADMIN — SUGAMMADEX 150 MG: 100 INJECTION, SOLUTION INTRAVENOUS at 19:49

## 2017-10-27 RX ADMIN — FENTANYL CITRATE 25 MCG: 50 INJECTION, SOLUTION INTRAMUSCULAR; INTRAVENOUS at 17:05

## 2017-10-27 RX ADMIN — HYDROCODONE BITARTRATE AND ACETAMINOPHEN 1 TABLET: 5; 325 TABLET ORAL at 01:29

## 2017-10-27 RX ADMIN — PHENYLEPHRINE HYDROCHLORIDE 100 MCG: 10 INJECTION INTRAVENOUS at 18:05

## 2017-10-27 RX ADMIN — FENTANYL CITRATE 25 MCG: 50 INJECTION, SOLUTION INTRAMUSCULAR; INTRAVENOUS at 16:31

## 2017-10-27 RX ADMIN — ONDANSETRON HYDROCHLORIDE 4 MG: 2 SOLUTION INTRAMUSCULAR; INTRAVENOUS at 19:49

## 2017-10-27 RX ADMIN — VECURONIUM BROMIDE FOR INJECTION 2 MG: 1 INJECTION, POWDER, LYOPHILIZED, FOR SOLUTION INTRAVENOUS at 17:24

## 2017-10-27 RX ADMIN — SODIUM CHLORIDE, SODIUM LACTATE, POTASSIUM CHLORIDE, AND CALCIUM CHLORIDE: 600; 310; 30; 20 INJECTION, SOLUTION INTRAVENOUS at 16:31

## 2017-10-27 RX ADMIN — PHENYLEPHRINE HYDROCHLORIDE 100 MCG: 10 INJECTION INTRAVENOUS at 17:26

## 2017-10-27 RX ADMIN — VECURONIUM BROMIDE FOR INJECTION 1 MG: 1 INJECTION, POWDER, LYOPHILIZED, FOR SOLUTION INTRAVENOUS at 16:31

## 2017-10-27 RX ADMIN — PHENYLEPHRINE HYDROCHLORIDE 100 MCG: 10 INJECTION INTRAVENOUS at 19:05

## 2017-10-27 RX ADMIN — CEFAZOLIN SODIUM 2 G: 2 SOLUTION INTRAVENOUS at 16:40

## 2017-10-27 RX ADMIN — PHENYLEPHRINE HYDROCHLORIDE 100 MCG: 10 INJECTION INTRAVENOUS at 18:50

## 2017-10-27 RX ADMIN — FENTANYL CITRATE 25 MCG: 50 INJECTION, SOLUTION INTRAMUSCULAR; INTRAVENOUS at 17:45

## 2017-10-27 RX ADMIN — PROPOFOL 100 MG: 10 INJECTION, EMULSION INTRAVENOUS at 16:31

## 2017-10-27 RX ADMIN — PHENYLEPHRINE HYDROCHLORIDE 100 MCG: 10 INJECTION INTRAVENOUS at 19:59

## 2017-10-27 RX ADMIN — SODIUM CHLORIDE: 9 INJECTION, SOLUTION INTRAVENOUS at 18:30

## 2017-10-27 ASSESSMENT — PAIN SCALES - GENERAL
PAINLEVEL_OUTOF10: 7
PAINLEVEL_OUTOF10: 0
PAINLEVEL_OUTOF10: 5
PAINLEVEL_OUTOF10: 0
PAINLEVEL_OUTOF10: 7
PAINLEVEL_OUTOF10: 5
PAINLEVEL_OUTOF10: 5
PAINLEVEL_OUTOF10: 0
PAINLEVEL_OUTOF10: 0

## 2017-10-27 ASSESSMENT — PAIN DESCRIPTION - ORIENTATION: ORIENTATION: RIGHT

## 2017-10-27 ASSESSMENT — PAIN DESCRIPTION - PAIN TYPE: TYPE: SURGICAL PAIN

## 2017-10-27 ASSESSMENT — PAIN DESCRIPTION - LOCATION: LOCATION: HIP

## 2017-10-27 NOTE — ANESTHESIA PROCEDURE NOTES
Central Venous Line:    A central venous line was placed using ultrasound guidance and surface landmarks, in the OR for the following indication(s): central venous access. 10/27/2017 5:10 PM10/27/2017 5:10 PM    Sterility preparation included the following: hand hygiene performed prior to procedure, maximum sterile barriers used and sterile technique used to drape from head to toe. The patient was placed in Trendelenburg position. The left internal jugular vein was prepped. The site was prepped with Chloraprep. A 7 Fr (size), 16 (length), triple lumen was placed. During the procedure, the following specific steps were taken: target vein identified, needle advanced into vein and blood aspirated and guidewire advanced into vein. Intravenous verification was obtained by venous blood return and x-ray. Post insertion care included: all ports aspirated, all ports flushed easily, guidewire removed intact, Biopatch applied, line sutured in place and dressing applied. During the procedure the patient experienced: patient tolerated procedure well with no complications. Anesthesia type: general.. No    Additional notes:  Sterile tegaderm was applied after line secured. Attempted left subclavian first but unable to hit vein, subclavian artery punctured 2x with thinwall needle thus aborted after 5 min of pressure held and IJ access secured.    Staffing  Anesthesiologist: Elena Aguirre  Performed: Anesthesiologist   Preanesthetic Checklist  Completed: patient identified, site marked, surgical consent, pre-op evaluation, timeout performed, IV checked, risks and benefits discussed, monitors and equipment checked, anesthesia consent given, oxygen available and patient being monitored

## 2017-10-27 NOTE — PROGRESS NOTES
Hospitalist Progress Note  10/27/2017 12:54 PM  Subjective:   Admit Date: 10/21/2017  PCP: Josefina Brothers DO    Chief Complaint: Right Hip Soreness    Subjective: Resting, wife at bedside. Cumulative Hospital Course:  Mr. Tati Ramirez is a 69year old male who was transferred from [facility] with a periprosthetic hip fracture sustained from a fall when he was transferring from chair to go the bathroom. Also sustained R knee skin tear, having pain in R hip and groin Has a history of strokes and falls. Has a history of arthritis, stroke 12/14, CKD, DM, HTN, depression, Hx PUD, retroperitoneal bleed 2014, IVC filter, ulcer. He was seen and evaluated by Dr. Atilio Barnhart, Orthopedic Surgery, who recommended non-surgical rehabilitation. Referral to SNF for rehabilitation. Patient accepted to [facility]. Repeat right hip XR ordered on 10/26/2017. Dr. Atilio Barnhart reviewed right hip XR, decision to proceed with OR on 10/27/2017. Review of Systems:   Constitutional: Denies fever or chills. Denies change in energy level or malaise. HEENT: Denies headaches or visual disturbances. Denies difficulty swallowing or sore throat. Respiratory: Denies cough or hoarseness. Denies shortness of breath. Cardiovascular: Denies chest pain or pressure. Denies palpitations. Denies presyncope/syncope. Denies orthopnea/PND. Denies lower extremity edema. Gastrointestinal: Denies abdominal pain. Denies nausea/vomiting. Denies change in bowel habits or history of recent GI tract blood loss. Genitourinary: Denies urinary urgency or frequency. Denies dysuria, hematuria. Musculoskeletal: Right hip fine as long as it's not moved. Neurological: Denies paresthesias. Denies headache. Denies seizure or stroke symptoms. Behavioral/Psych: Denies problems with anxiety or depression. All other ROS negative except where stated above.       DIET CARB CONTROL; Carb Control: 3 carbs/meal (approximate 1500 kcals/day)    Intake/Output Summary (Last 24 hours) at 10/27/17 1254  Last data filed at 10/27/17 0840   Gross per 24 hour   Intake             1020 ml   Output                0 ml   Net             1020 ml     Medications:   sodium chloride 75 mL/hr at 10/24/17 0747    dextrose       Current Facility-Administered Medications   Medication Dose Route Frequency Provider Last Rate Last Dose    morphine injection 2 mg  2 mg Intravenous Q2H PRN Mrailia Reed MD        morphine injection 4 mg  4 mg Intravenous Q2H PRN Marilia Reed MD   4 mg at 10/27/17 1001    insulin lispro (HUMALOG) injection vial 0-35 Units  0-35 Units Subcutaneous Q6H PRN SHRAVAN Anglin   7 Units at 10/27/17 0831    [START ON 10/28/2017] insulin glargine (LANTUS) injection vial 30 Units  30 Units Subcutaneous QAM SHRAVAN Anglin        venlafaxine [facility]) tablet 37.5 mg  37.5 mg Oral Daily SHRAVAN Anglin   Stopped at 10/27/17 0827    temazepam (RESTORIL) capsule 30 mg  30 mg Oral Nightly PRN Gabbi Cook MD   30 mg at 10/26/17 0346    senna (SENOKOT) tablet 17.2 mg  2 tablet Oral Nightly PRN Annita Engel MD   17.2 mg at 10/25/17 2036    bacitracin-polymyxin b (POLYSPORIN) ointment   Topical BID Gabbi Cook MD   Stopped at 10/27/17 1007    polyethylene glycol (GLYCOLAX) packet 17 g  17 g Oral BID Gabbi Cook MD   17 g at 10/25/17 2036    glycerin (Laxative) 2.1 g  1 suppository Rectal Daily PRN Gabbi Cook MD        pantoprazole (PROTONIX) tablet 40 mg  40 mg Oral QAM AC Gabbi Cook MD   Stopped at 10/27/17 2091    clopidogrel (PLAVIX) tablet 75 mg  75 mg Oral Daily Gabbi Cook MD   75 mg at 10/26/17 1110    0.9 % sodium chloride infusion   Intravenous Continuous Juancarlos Rhoda Obrien MD 75 mL/hr at 10/24/17 0747      acetaminophen (TYLENOL) tablet 650 mg  650 mg Oral Q4H PRN Anita Zuñiga MD   650 mg at 10/23/17 1305    docusate sodium (COLACE) capsule 100 mg  100 mg Oral BID Lilibeth Carranza MD   100 mg at 10/26/17 2100    bisacodyl (DULCOLAX) suppository 10 mg  10 mg Rectal Daily PRN Lilibeth Carranza MD        ondansetron (ZOFRAN) injection 4 mg  4 mg Intravenous Q6H PRN Lilibeth Carranza MD        HYDROcodone-acetaminophen (NORCO) 5-325 MG per tablet 1 tablet  1 tablet Oral Q4H PRN Lilibeth Carranza MD   1 tablet at 10/27/17 0129    HYDROmorphone (DILAUDID) injection 0.5 mg  0.5 mg Intravenous Q4H PRN Adonay Obrien MD   0.5 mg at 10/25/17 1937    glucose (GLUTOSE) 40 % oral gel 15 g  15 g Oral PRN Rod Obrien MD        dextrose 50 % solution 12.5 g  12.5 g Intravenous PRN Lilibeth Carranza MD        glucagon (rDNA) injection 1 mg  1 mg Intramuscular PRN Lilibeth Carranza MD        dextrose 5 % solution  100 mL/hr Intravenous PRN Lilibeth Carranza MD            Labs:     Recent Labs      10/23/17   0231   WBC  13.7*   RBC  3.92*   HGB  11.7*   HCT  34.3*   MCV  87.5   MCH  29.8   MCHC  34.1   PLT  196          Recent Labs      10/23/17   0231   NA  133*   K  4.1   ANIONGAP  13   CL  97*   CO2  23   BUN  17   CREATININE  1.1   GLUCOSE  171*   CALCIUM  8.2*          Objective:   Vitals: BP (!) 145/77   Pulse 93   Temp 97.2 °F (36.2 °C) (Temporal)   Resp 17   Ht 5' 10\" (1.778 m)   Wt 165 lb 7 oz (75 kg)   SpO2 96%   BMI 23.74 kg/m²   24HR INTAKE/OUTPUT:      Intake/Output Summary (Last 24 hours) at 10/27/17 1254  Last data filed at 10/27/17 0840   Gross per 24 hour   Intake             1020 ml   Output                0 ml   Net             1020 ml     General appearance: alert and cooperative with exam  HEENT: atraumatic, eyes with clear conjunctiva and normal lids, pupils and irises normal, external ears normal, lips normal.  Neck without masses, lympadenopathy, bruit, thyroid normal  Lungs: no increased work of breathing, clear to auscultation bilaterally  Heart: regular rate and rhythm, S1, S2 normal, no murmur, click, rub or gallop  Abdomen: soft, non-tender; bowel sounds normal; no masses,  no organomegaly  Extremities: extremities normal, atraumatic, no cyanosis or edema  Neurologic: No focal neurologic deficits, normal sensation, alert and oriented, affect and mood appropriate. Skin: dry slightly erythematous superficial lesions - covered with dry dressing    Assessment and Plan:     Principal Problem:    Closed fracture of right femur (HCC) - Increased pain - For OR today. Active Problems:    Essential hypertension - Controlled    Type 2 diabetes mellitus without complication, with long-term current use of insulin ([facility]) - Accu-checks with SSI    Stage 3 chronic kidney disease - CR stable    Cerebral artery occlusion with cerebral infarction ([facility] [address] - on Plavix    Anemia, chronic disease - Hgb/Hct Stable    Osteoarthritis - Stable    Obstipation - no BM X 3 days      Advance Directive: Full Code    DVT prophylaxis: SCDs    Discharge planning: TBD. For OR today.         Pam Mccarty, APRN

## 2017-10-27 NOTE — ANESTHESIA PRE PROCEDURE
Department of Anesthesiology  Preprocedure Note       Name:  Sarah Young   Age:  69 y.o.  :  1940                                          MRN:  [id number]         Date:  10/27/2017      Surgeon: Teri Castañeda):  Zakia Vasquez MD    Procedure: Procedure(s):  HIP TOTAL ARTHROPLASTY ANTERIOR APPROACH REVISION    Medications prior to admission:   Prior to Admission medications    Medication Sig Start Date End Date Taking? Authorizing Provider   clopidogrel (PLAVIX) 75 MG tablet Take 75 mg by mouth daily   Yes Historical Provider, MD   venlafaxine (EFFEXOR) 37.5 MG tablet Take 37.5 mg by mouth daily    Yes Historical Provider, MD   temazepam (RESTORIL) 30 MG capsule Take 30 mg by mouth as needed 16  Yes Historical Provider, MD   HUMALOG KWIKPEN 100 UNIT/ML pen  16  Yes Historical Provider, MD   insulin glargine (LANTUS) 100 UNIT/ML injection vial Inject 30 Units into the skin every morning   Yes Historical Provider, MD   Zinc 50 MG TABS Take by mouth daily   Yes Historical Provider, MD   aspirin 81 MG tablet Take 81 mg by mouth daily    Historical Provider, MD   lisinopril (PRINIVIL;ZESTRIL) 2.5 MG tablet Take 2.5 mg by mouth daily    Historical Provider, MD   Multiple Vitamins-Minerals (MULTIVITAMIN ADULT) TABS Take 1 tablet by mouth daily    Historical Provider, MD   naproxen (NAPROSYN) 500 MG tablet Take 500 mg by mouth daily    Historical Provider, MD   insulin aspart (NOVOLOG FLEXPEN) 100 UNIT/ML injection pen Inject into the skin 4 times daily (before meals and nightly) Indications: per sliding scale. 5 units over 150    Historical Provider, MD   UNABLE TO FIND Indications: [place]art 1 tablet daily    Historical Provider, MD   SAW PALMETTO, SERENOA REPENS, PO Take by mouth daily    Historical Provider, MD   Multiple Vitamins-Minerals (EYE VITAMINS PO) Take by mouth daily    Historical Provider, MD   collagenase 250 UNIT/GM ointment Apply topically twice daily.  16   Floridalma Olmstead MD 10/23/2017    ALKPHOS 110 11/25/2014    AST 18 11/25/2014    ALT 16 11/25/2014       POC Tests:   Recent Labs      10/27/17   1243   POCGLU  142*       Coags:   Lab Results   Component Value Date    PROTIME 12.9 04/18/2013    INR 1.01 04/18/2013       HCG (If Applicable): No results found for: PREGTESTUR, PREGSERUM, HCG, HCGQUANT     ABGs: No results found for: PHART, PO2ART, JBD1IAY, BNM0HLI, BEART, O5UMPZJI     Type & Screen (If Applicable):  No results found for: LABABO, [address]    Anesthesia Evaluation  Patient summary reviewed and Nursing notes reviewed no history of anesthetic complications:   Airway: Mallampati: II  TM distance: >3 FB   Neck ROM: full  Mouth opening: > = 3 FB Dental: normal exam   (+) upper dentures and lower dentures      Pulmonary:normal exam  breath sounds clear to auscultation                             Cardiovascular:    (+) hypertension:,       ECG reviewed  Rhythm: regular  Rate: normal                    Neuro/Psych:   (+) CVA: residual symptoms, psychiatric history:            GI/Hepatic/Renal: neg ROS            Endo/Other:    (+) Type II DM, , blood dyscrasia: anticoagulation therapy and anemia:. Comments: ivc filter Abdominal:       Abdomen: soft. Vascular:   + DVT, . Anesthesia Plan      general     ASA 3     (High risk of bleeding )  Induction: intravenous. BIS  MIPS: Postoperative opioids intended and Prophylactic antiemetics administered. Anesthetic plan and risks discussed with patient.                     Starr Pereyra MD   10/27/2017

## 2017-10-27 NOTE — PROGRESS NOTES
Subjective:      right hip periprostethic fracture  Systemic or Specific Complaints:More pain  no nausea Pain 6    Objective:     Patient Vitals for the past 24 hrs:   BP Temp Temp src Pulse Resp SpO2   10/27/17 0706 (!) 161/85 98.9 °F (37.2 °C) Temporal 90 17 94 %   10/27/17 0428 (!) 152/74 98.4 °F (36.9 °C) Temporal 95 18 97 %   10/27/17 0124 (!) 156/79 98.2 °F (36.8 °C) Temporal 84 20 100 %   10/26/17 1924 (!) 146/74 97.2 °F (36.2 °C) Temporal 86 20 99 %   10/26/17 1027 (!) 163/79 98.4 °F (36.9 °C) Temporal 89 - 92 %       General: alert, appears stated age and cooperative   Exam: Incision clean, dry, and intact, no evidence of infection. Neurovascular: Exam normal  No changes     Data Review:  No results for input(s): HGB in the last 72 hours. No results for input(s): NA, K, CREATININE in the last 72 hours. No results for input(s): LABGLOM in the last 72 hours. Assessment:     Status Post right hip periprosthetic fracture. Plan:     Plan on OR today.         Electronically signed by Tamara Melgar MD on 10/27/2017 at 8:14 AM

## 2017-10-27 NOTE — CARE COORDINATION
Pt will need to be re-evaluated on Monday for rehab placement at [facility].   [facility]   G   F  Electronically signed by Isauro Woods on 10/27/2017 at 3:47 PM

## 2017-10-28 LAB
ANION GAP SERPL CALCULATED.3IONS-SCNC: 15 MMOL/L (ref 7–19)
BUN BLDV-MCNC: 24 MG/DL (ref 8–23)
CALCIUM SERPL-MCNC: 7.3 MG/DL (ref 8.8–10.2)
CHLORIDE BLD-SCNC: 104 MMOL/L (ref 98–111)
CO2: 19 MMOL/L (ref 22–29)
CREAT SERPL-MCNC: 1.2 MG/DL (ref 0.5–1.2)
GFR NON-AFRICAN AMERICAN: 59
GLUCOSE BLD-MCNC: 249 MG/DL (ref 70–99)
GLUCOSE BLD-MCNC: 256 MG/DL (ref 70–99)
GLUCOSE BLD-MCNC: 258 MG/DL (ref 70–99)
GLUCOSE BLD-MCNC: 308 MG/DL (ref 74–109)
GLUCOSE BLD-MCNC: 314 MG/DL (ref 70–99)
GLUCOSE BLD-MCNC: 336 MG/DL (ref 70–99)
HCT VFR BLD CALC: 24.9 % (ref 42–52)
HEMOGLOBIN: 8 G/DL (ref 14–18)
PERFORMED ON: ABNORMAL
POTASSIUM SERPL-SCNC: 4.5 MMOL/L (ref 3.5–5)
SODIUM BLD-SCNC: 138 MMOL/L (ref 136–145)

## 2017-10-28 PROCEDURE — 99233 SBSQ HOSP IP/OBS HIGH 50: CPT | Performed by: HOSPITALIST

## 2017-10-28 PROCEDURE — 6360000002 HC RX W HCPCS: Performed by: ORTHOPAEDIC SURGERY

## 2017-10-28 PROCEDURE — 6370000000 HC RX 637 (ALT 250 FOR IP): Performed by: NURSE PRACTITIONER

## 2017-10-28 PROCEDURE — 6370000000 HC RX 637 (ALT 250 FOR IP): Performed by: ORTHOPAEDIC SURGERY

## 2017-10-28 PROCEDURE — 2580000003 HC RX 258: Performed by: ORTHOPAEDIC SURGERY

## 2017-10-28 PROCEDURE — G8979 MOBILITY GOAL STATUS: HCPCS

## 2017-10-28 PROCEDURE — G8978 MOBILITY CURRENT STATUS: HCPCS

## 2017-10-28 PROCEDURE — 2500000003 HC RX 250 WO HCPCS: Performed by: ORTHOPAEDIC SURGERY

## 2017-10-28 PROCEDURE — 97530 THERAPEUTIC ACTIVITIES: CPT

## 2017-10-28 PROCEDURE — 6370000000 HC RX 637 (ALT 250 FOR IP): Performed by: INTERNAL MEDICINE

## 2017-10-28 PROCEDURE — 1210000000 HC MED SURG R&B

## 2017-10-28 PROCEDURE — 80048 BASIC METABOLIC PNL TOTAL CA: CPT

## 2017-10-28 PROCEDURE — 85014 HEMATOCRIT: CPT

## 2017-10-28 PROCEDURE — 97164 PT RE-EVAL EST PLAN CARE: CPT

## 2017-10-28 PROCEDURE — 85018 HEMOGLOBIN: CPT

## 2017-10-28 PROCEDURE — 82948 REAGENT STRIP/BLOOD GLUCOSE: CPT

## 2017-10-28 PROCEDURE — 6370000000 HC RX 637 (ALT 250 FOR IP): Performed by: HOSPITALIST

## 2017-10-28 RX ADMIN — INSULIN LISPRO 1 UNITS: 100 INJECTION, SOLUTION INTRAVENOUS; SUBCUTANEOUS at 22:35

## 2017-10-28 RX ADMIN — TRAMADOL HYDROCHLORIDE 50 MG: 50 TABLET, FILM COATED ORAL at 08:03

## 2017-10-28 RX ADMIN — ACETAMINOPHEN 1000 MG: 500 TABLET, FILM COATED ORAL at 05:11

## 2017-10-28 RX ADMIN — ACETAMINOPHEN 1000 MG: 500 TABLET, FILM COATED ORAL at 13:20

## 2017-10-28 RX ADMIN — INSULIN LISPRO 4 UNITS: 100 INJECTION, SOLUTION INTRAVENOUS; SUBCUTANEOUS at 11:49

## 2017-10-28 RX ADMIN — SODIUM CHLORIDE: 9 INJECTION, SOLUTION INTRAVENOUS at 17:47

## 2017-10-28 RX ADMIN — OXYCODONE HYDROCHLORIDE 10 MG: 10 TABLET, FILM COATED, EXTENDED RELEASE ORAL at 08:03

## 2017-10-28 RX ADMIN — ACETAMINOPHEN 1000 MG: 500 TABLET, FILM COATED ORAL at 21:45

## 2017-10-28 RX ADMIN — DOCUSATE SODIUM 100 MG: 100 CAPSULE, LIQUID FILLED ORAL at 08:03

## 2017-10-28 RX ADMIN — SODIUM CHLORIDE: 9 INJECTION, SOLUTION INTRAVENOUS at 10:40

## 2017-10-28 RX ADMIN — PANTOPRAZOLE SODIUM 40 MG: 40 TABLET, DELAYED RELEASE ORAL at 05:11

## 2017-10-28 RX ADMIN — CEFAZOLIN 2 G: 1 INJECTION, POWDER, FOR SOLUTION INTRAMUSCULAR; INTRAVENOUS; PARENTERAL at 01:02

## 2017-10-28 RX ADMIN — INSULIN LISPRO 4 UNITS: 100 INJECTION, SOLUTION INTRAVENOUS; SUBCUTANEOUS at 08:21

## 2017-10-28 RX ADMIN — CLOPIDOGREL BISULFATE 75 MG: 75 TABLET ORAL at 08:03

## 2017-10-28 RX ADMIN — BACITRACIN ZINC AND POLYMYXIN B SULFATE: 500; 10000 OINTMENT TOPICAL at 09:24

## 2017-10-28 RX ADMIN — CEFAZOLIN 2 G: 1 INJECTION, POWDER, FOR SOLUTION INTRAMUSCULAR; INTRAVENOUS; PARENTERAL at 08:03

## 2017-10-28 RX ADMIN — CLINDAMYCIN PHOSPHATE 900 MG: 18 INJECTION, SOLUTION INTRAVENOUS at 05:43

## 2017-10-28 RX ADMIN — Medication 10 ML: at 08:23

## 2017-10-28 RX ADMIN — TRAMADOL HYDROCHLORIDE 50 MG: 50 TABLET, FILM COATED ORAL at 21:45

## 2017-10-28 RX ADMIN — CLINDAMYCIN PHOSPHATE 900 MG: 18 INJECTION, SOLUTION INTRAVENOUS at 22:35

## 2017-10-28 RX ADMIN — VENLAFAXINE 37.5 MG: 37.5 TABLET ORAL at 08:03

## 2017-10-28 RX ADMIN — TAMSULOSIN HYDROCHLORIDE 0.4 MG: 0.4 CAPSULE ORAL at 08:03

## 2017-10-28 RX ADMIN — BISACODYL 10 MG: 10 SUPPOSITORY RECTAL at 13:20

## 2017-10-28 RX ADMIN — SODIUM CHLORIDE: 9 INJECTION, SOLUTION INTRAVENOUS at 22:35

## 2017-10-28 RX ADMIN — POLYETHYLENE GLYCOL 3350 17 G: 17 POWDER, FOR SOLUTION ORAL at 08:03

## 2017-10-28 RX ADMIN — INSULIN GLARGINE 30 UNITS: 100 INJECTION, SOLUTION SUBCUTANEOUS at 08:32

## 2017-10-28 ASSESSMENT — PAIN SCALES - GENERAL
PAINLEVEL_OUTOF10: 2
PAINLEVEL_OUTOF10: 0
PAINLEVEL_OUTOF10: 0
PAINLEVEL_OUTOF10: 10
PAINLEVEL_OUTOF10: 10
PAINLEVEL_OUTOF10: 4
PAINLEVEL_OUTOF10: 0
PAINLEVEL_OUTOF10: 3

## 2017-10-28 ASSESSMENT — PAIN DESCRIPTION - PAIN TYPE: TYPE: SURGICAL PAIN

## 2017-10-28 ASSESSMENT — PAIN DESCRIPTION - ORIENTATION: ORIENTATION: RIGHT

## 2017-10-28 ASSESSMENT — PAIN DESCRIPTION - LOCATION: LOCATION: HIP
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
## 2017-10-28 NOTE — PROGRESS NOTES
Unable to Assess (reports pain but is unable to give a number)  Patient Observation  Observations: pt. with IV, telemetry, O2 sat monitor  Pre Treatment Pain Screening  Intervention List: Patient able to continue with treatment    Orientation  Orientation  Overall Orientation Status: Impaired  Orientation Level: Disoriented to situation;Disoriented to time;Disoriented to place;Oriented to person    Social/Functional History  Social/Functional History  Lives With: Spouse  Type of Home: House  Home Layout: One level  Home Access: Stairs to enter with rails  Entrance Stairs - Number of Steps: 2-3  Bathroom Shower/Tub: Walk-in shower, Shower chair without back  Bathroom Toilet: Standard  Bathroom Equipment: 3-in-1 commode  Home Equipment: Rolling walker, [employer]  ADL Assistance: Needs assistance  Ambulation Assistance: Needs assistance (per son, pt. needed help with RW)  Transfer Assistance: Needs assistance  Additional Comments: He states he used wheelchair and walker both, son confirms this. Per son, pt. just finished rehab and was home for a short time.   Objective     Observation/Palpation  Posture: Fair  Observation: pt. with forward flexed posture in sitting     PROM RLE (degrees)  RLE General PROM: LIMITED DUE TO PAIN, ankle WFLs, pt. only allows minimal flexion of knee and hip  PROM LLE (degrees)  LLE PROM: WFL  LLE General PROM: DF WFLs, knee and hip AAROM WFLs  Strength RLE  Comment: UNABLE TO ACTIVELY MOVE R LE DUE TO PAIN  Strength LLE  Comment: ABLE TO MOVE AGAINST GRAVITY in knee and hip BUT CAUSES PAIN IN R LE, ankle no active movements        Bed mobility  Rolling to Left: Maximum assistance (pt. cleaned up due to small BM in bed)  Rolling to Right: Maximum assistance  Supine to Sit: Maximum assistance (x1A)  Sit to Supine: Dependent/Total (x2A)  Scooting: Dependent/Total (x2A)  Comment: pt. sat on EOB x 5 mins, Min A, forward flexed posture  Transfers  Sit to Stand: Unable to assess  Bed to Walking and moving around  Mobility: Walking and Moving Around Current Status (): At least 80 percent but less than 100 percent impaired, limited or restricted  Mobility: Walking and Moving Around Goal Status ():  At least 40 percent but less than 60 percent impaired, limited or restricted  OutComes Score                                           Goals  Short term goals  Time Frame for Short term goals: 14 DAYS BEFORE RE EVAL  Short term goal 1: supine to sit Min A  Short term goal 2: sit to stand Min A with RW 25% PWB  Short term goal 3: stand pivot transfer to chair Min A, PWB 25%  Short term goal 4: amb. 10' with RW 25% PWB RLE  Patient Goals   Patient goals : get better, decrease pain       Therapy Time   Individual Concurrent Group Co-treatment   Time In           Time Out           Minutes                   Moiz Duke PT     Electronically signed by Moiz Duke PT on 10/28/2017 at 4:21 PM

## 2017-10-28 NOTE — OP NOTE
Department of Orthopedic Surgery  Brief Operative Report      Surgeon: Sasha Chen    Procedure: ORIF R Femur    Anesthesia:  General endotrachial anesthesia    Estimated blood loss:  1400cc    Fluids:4000cc, 600cc Prbc    TT:  Not used    UO: 400cc     Drians:  none      See dictated operative report for full details.     Electronically signed by Sera Murillo MD on 10/27/17 at 7:46 PM

## 2017-10-28 NOTE — OP NOTE
[facility] [address] [facility]                               OPERATIVE REPORT    PATIENT NAME: Jonny Chi                     :             1940  MED REC NO:   [id number]                               ROOM:            [facility]  ACCOUNT NO:   [de-identified]                            ADMISSION DATE:  10/21/2017  PROVIDER:     Luis Hobbs MD          DATE OF PROCEDURE:  10/27/2017    PREOPERATIVE DIAGNOSIS:  Right hip periprosthetic femur fracture, subtrochanteric with extension to the greater trochanter and femoral shaft. POSTOPERATIVE DIAGNOSIS: same. PROCEDURE:  ORIF of right periprosthetic femur fracture. SURGEON:  Luis Hobbs MD    FIRST SURGICAL ASSISTANT:  Silverio Ramirez PA-C. Please note, he was a  critical assistant in exposure and placement of the implants. ANESTHESIA:  General.    ESTIMATED BLOOD LOSS:  1400 mL. FLUIDS:  4000 mL of crystalloid, 600 mL of packed red blood cells. URINE OUTPUT:  400 mL. COMPONENTS USED:  Sobeida, 9-hole NCB locking plate with 3 cables, and  the use of a narrow greater trochanteric extension blade as well. INDICATIONS:  This is a 81-year-old gentleman who suffered the  above-stated fracture almost a week ago. He had a periprosthetic  fracture with his original surgery done over 3 to 4 years ago. He has  had a bipolar replacement done for fracture. We were initially going  to treat this nonoperatively, but because of progressive pain, it was  recommended that he undergo the above procedure. Please note his  original surgery was done on 2013 on the right hip. DESCRIPTION OF PROCEDURE:  After informed consent, he was taken to the  operating room, underwent general anesthesia. He was placed on [place]  table with the left leg in a well-leg abarca and the right leg in  traction.   Before we prepped, we examined the fracture on the  fluoroscopy and even with traction, there was no evidence of any  loosening or instability at this time at all. We then prepped and  draped the right femur in the usual sterile fashion. He was given 2  gm of Ancef prior to the incision. A 15 cm incision was made starting  at the tip of the greater trochanter extending distally after incising  through the skin. The IT band was incised in line with the skin  incision. The vastus lateralis fascia was incised and the anterior  three quarters of the muscle was then reflected over the femur. We  meticulously addressed all the perforating bleeders. Please note that  the patient is at high risk of stroke and had a stroke in September  and because of this, we operated with him without reversing his  Plavix. We then dissected down to the bone and then selected a size  up for a 9-hole NCB plate with a narrow trochanteric extension. The  plate was then placed on the lateral aspect of the femur. Two cables  were passed and tentatively tightened. We then looked at our lateral  view and placed 2 unicortical screws distally in order to center the  plate on the femur. Following this, we then placed 4 locking screws  into the greater trochanteric region, placed 2 more unicortical  locking screws proximally and then 3 bicortical screws distally and  all but the most distal one was placed in the locking mode. Following  this, we had an excellent reduction on the fracture and we then  irrigated with 3 L of irrigation. We mixed 20 mL of fibrin and  thrombin for hemostasis. We closed the fascia with 2-0 Vicryl suture. The IT band was closed with interrupted O Ethibond suture proximally  and #1 Vicryl suture distally, 2-0 Vicryl suture was used for  subcutaneous tissues and staples for the skin. Soft dressing was  applied. The patient was taken to the recovery room in stable  condition. POSTOPERATIVE PLANS:  1.   He will be 25% weightbearing and will just protect him as I think  it will take 12 weeks for the fracture to heal completely. 2.  He will be on 2 doses of Ancef and 6 doses of clindamycin. 3.  We never stopped his Plavix and we will reevaluate him in 24 to 48  hours and see if it is worthwhile to restart him either on low-dose  Lovenox versus Xarelto, but I think we will leave this up to the  medical service. If we feel that Plavix is adequate to defer DVT  prophylaxis, we may just leave him on his Plavix. Please note that we  will plan on going to [address] [place] [address] postoperative day #3 once he is  stabilized for at least 3 days on the floor.         Agustina Hill MD    D: 10/27/2017 21:04:43       T: 10/28/2017 2:02:51     [id number]  Job#: [id number]     Doc#: [id number]

## 2017-10-28 NOTE — PROGRESS NOTES
Subjective:     Post-Operative Day: 1 Status Post right ORIF periprosthetic femur fracture with NCB cable plate. Pt. Is awake and alert. Appears appropriately oriented. No new issues/complaints this am.  Doing well. Pain currently under control. Systemic or Specific Complaints:No Complaints  no nausea and no vomiting Pain 2    Objective:     Patient Vitals for the past 24 hrs:   BP Temp Temp src Pulse Resp SpO2 Height Weight   10/28/17 0803 (!) 95/44 - - 98 20 97 % - -   10/28/17 0700 (!) 96/38 - - 82 11 93 % - -   10/28/17 0600 (!) 104/48 - - 93 17 94 % - -   10/28/17 0500 (!) 112/52 - - 100 15 94 % - -   10/28/17 0400 (!) 107/35 98 °F (36.7 °C) Temporal 94 17 94 % - -   10/28/17 0300 116/83 - - 89 13 95 % - -   10/28/17 0200 (!) 115/46 - - 85 15 95 % - -   10/28/17 0100 (!) 87/39 97.4 °F (36.3 °C) Temporal 97 15 94 % - -   10/27/17 2300 (!) 97/45 - - 100 17 94 % - -   10/27/17 2234 - - - - - 96 % - -   10/27/17 2200 115/60 - - 100 19 96 % - -   10/27/17 2100 (!) 102/46 97 °F (36.1 °C) Temporal 105 16 96 % 5' 10\" (1.778 m) 166 lb 6.4 oz (75.5 kg)   10/27/17 2045 139/65 97.8 °F (36.6 °C) Temporal 106 14 100 % - -   10/27/17 2040 125/70 97.8 °F (36.6 °C) Temporal 110 20 99 % - -   10/27/17 2035 137/72 97.8 °F (36.6 °C) Temporal 115 14 98 % - -   10/27/17 2025 (!) 153/77 97.8 °F (36.6 °C) Temporal 109 16 100 % - -   10/27/17 2020 (!) 160/79 - - 112 16 100 % - -   10/27/17 2016 (!) 160/79 98 °F (36.7 °C) Temporal 110 18 - - -   10/27/17 1243 (!) 145/77 97.2 °F (36.2 °C) Temporal 93 17 96 % - -       General: alert, appears stated age, cooperative, no distress and pale   Exam: Fair motion to right hip without significant discomfort. Dressings are dry. Stable weakness right foot. Wound: Wound clean and dry no evidence of infection. , No Drainage and Wound Intact   Neurovascular:   Unchanged from previous   DVT Exam: No evidence of DVT seen on physical exam.   Xray:  right hip showed excellent reduction of fracture

## 2017-10-28 NOTE — PROGRESS NOTES
Called Dr. Fern Vlaenzuela about updated cbc. No further intervention needed ok to send to ICU.   Electronically signed by Henrry Lew RN on 10/27/2017 at 8:46 PM

## 2017-10-28 NOTE — ANESTHESIA POSTPROCEDURE EVALUATION
Department of Anesthesiology  Postprocedure Note    Patient: Monae Markham  MRN: [id number]  YOB: 1940  Date of evaluation: 10/27/2017  Time:  8:08 PM     Procedure Summary     Date:  10/27/17 Room / Location:  [facility] OR  / [facility] OR    Anesthesia Start:  1627 Anesthesia Stop:  2008    Procedure:  ORIF RIGHT FEMUR FOR PERIPROSTHETIC FX (Right Hip) Diagnosis:  ( )    Surgeon:  Valorie Camacho MD Responsible Provider:  Tyesha Clayton CRNA    Anesthesia Type:  general ASA Status:  3          Anesthesia Type: general    Jeannine Phase I:      Jeannine Phase II:      Last vitals: Reviewed and per EMR flowsheets.        Anesthesia Post Evaluation    Patient location during evaluation: PACU  Patient participation: complete - patient cannot participate  Level of consciousness: awake  Pain score: 0  Airway patency: patent  Nausea & Vomiting: no nausea and no vomiting  Complications: no  Cardiovascular status: hemodynamically stable  Respiratory status: spontaneous ventilation and nasal cannula  Hydration status: stable

## 2017-10-29 LAB
ANION GAP SERPL CALCULATED.3IONS-SCNC: 13 MMOL/L (ref 7–19)
BILIRUBIN URINE: ABNORMAL
BLOOD BANK DISPENSE STATUS: NORMAL
BLOOD BANK PRODUCT CODE: NORMAL
BLOOD, URINE: NEGATIVE
BPU ID: NORMAL
BUN BLDV-MCNC: 32 MG/DL (ref 8–23)
CALCIUM SERPL-MCNC: 7.2 MG/DL (ref 8.8–10.2)
CHLORIDE BLD-SCNC: 99 MMOL/L (ref 98–111)
CLARITY: ABNORMAL
CO2: 19 MMOL/L (ref 22–29)
COLOR: ABNORMAL
CREAT SERPL-MCNC: 1.4 MG/DL (ref 0.5–1.2)
DESCRIPTION BLOOD BANK: NORMAL
GFR NON-AFRICAN AMERICAN: 49
GLUCOSE BLD-MCNC: 163 MG/DL (ref 70–99)
GLUCOSE BLD-MCNC: 165 MG/DL (ref 70–99)
GLUCOSE BLD-MCNC: 210 MG/DL (ref 74–109)
GLUCOSE BLD-MCNC: 212 MG/DL (ref 70–99)
GLUCOSE BLD-MCNC: 218 MG/DL (ref 70–99)
GLUCOSE URINE: 500 MG/DL
HCT VFR BLD CALC: 20.8 % (ref 42–52)
HCT VFR BLD CALC: 23.1 % (ref 42–52)
HEMOGLOBIN: 6.7 G/DL (ref 14–18)
HEMOGLOBIN: 7.5 G/DL (ref 14–18)
KETONES, URINE: NEGATIVE MG/DL
LEUKOCYTE ESTERASE, URINE: NEGATIVE
MCH RBC QN AUTO: 28.3 PG (ref 27–31)
MCHC RBC AUTO-ENTMCNC: 32.5 G/DL (ref 33–37)
MCV RBC AUTO: 87.2 FL (ref 80–94)
MRSA CULTURE ONLY: NORMAL
NITRITE, URINE: NEGATIVE
PDW BLD-RTO: 15.1 % (ref 11.5–14.5)
PERFORMED ON: ABNORMAL
PH UA: 5.5
PLATELET # BLD: 257 K/UL (ref 130–400)
PMV BLD AUTO: 10.6 FL (ref 9.4–12.4)
POTASSIUM SERPL-SCNC: 3.9 MMOL/L (ref 3.5–5)
PROTEIN UA: NEGATIVE MG/DL
RBC # BLD: 2.65 M/UL (ref 4.7–6.1)
SODIUM BLD-SCNC: 131 MMOL/L (ref 136–145)
SPECIFIC GRAVITY UA: 1.02
UROBILINOGEN, URINE: 0.2 E.U./DL
WBC # BLD: 17.8 K/UL (ref 4.8–10.8)

## 2017-10-29 PROCEDURE — 81003 URINALYSIS AUTO W/O SCOPE: CPT

## 2017-10-29 PROCEDURE — 80048 BASIC METABOLIC PNL TOTAL CA: CPT

## 2017-10-29 PROCEDURE — 6370000000 HC RX 637 (ALT 250 FOR IP): Performed by: ORTHOPAEDIC SURGERY

## 2017-10-29 PROCEDURE — 36430 TRANSFUSION BLD/BLD COMPNT: CPT

## 2017-10-29 PROCEDURE — 6370000000 HC RX 637 (ALT 250 FOR IP): Performed by: NURSE PRACTITIONER

## 2017-10-29 PROCEDURE — 6370000000 HC RX 637 (ALT 250 FOR IP): Performed by: INTERNAL MEDICINE

## 2017-10-29 PROCEDURE — 2580000003 HC RX 258: Performed by: PHYSICIAN ASSISTANT

## 2017-10-29 PROCEDURE — 85014 HEMATOCRIT: CPT

## 2017-10-29 PROCEDURE — 6370000000 HC RX 637 (ALT 250 FOR IP): Performed by: HOSPITALIST

## 2017-10-29 PROCEDURE — 82948 REAGENT STRIP/BLOOD GLUCOSE: CPT

## 2017-10-29 PROCEDURE — 85027 COMPLETE CBC AUTOMATED: CPT

## 2017-10-29 PROCEDURE — 99233 SBSQ HOSP IP/OBS HIGH 50: CPT | Performed by: HOSPITALIST

## 2017-10-29 PROCEDURE — 2580000003 HC RX 258: Performed by: ORTHOPAEDIC SURGERY

## 2017-10-29 PROCEDURE — 1210000000 HC MED SURG R&B

## 2017-10-29 PROCEDURE — 2500000003 HC RX 250 WO HCPCS: Performed by: ORTHOPAEDIC SURGERY

## 2017-10-29 PROCEDURE — 85018 HEMOGLOBIN: CPT

## 2017-10-29 PROCEDURE — P9016 RBC LEUKOCYTES REDUCED: HCPCS

## 2017-10-29 PROCEDURE — 51701 INSERT BLADDER CATHETER: CPT

## 2017-10-29 PROCEDURE — 51798 US URINE CAPACITY MEASURE: CPT

## 2017-10-29 RX ORDER — 0.9 % SODIUM CHLORIDE 0.9 %
250 INTRAVENOUS SOLUTION INTRAVENOUS ONCE
Status: COMPLETED | OUTPATIENT
Start: 2017-10-29 | End: 2017-10-29

## 2017-10-29 RX ORDER — TEMAZEPAM 15 MG/1
30 CAPSULE ORAL NIGHTLY
Status: DISCONTINUED | OUTPATIENT
Start: 2017-10-29 | End: 2017-11-02 | Stop reason: HOSPADM

## 2017-10-29 RX ORDER — TEMAZEPAM 15 MG/1
30 CAPSULE ORAL ONCE
Status: COMPLETED | OUTPATIENT
Start: 2017-10-29 | End: 2017-10-29

## 2017-10-29 RX ORDER — SODIUM CHLORIDE 9 MG/ML
INJECTION, SOLUTION INTRAVENOUS ONCE
Status: DISCONTINUED | OUTPATIENT
Start: 2017-10-28 | End: 2017-11-02 | Stop reason: HOSPADM

## 2017-10-29 RX ADMIN — TAMSULOSIN HYDROCHLORIDE 0.4 MG: 0.4 CAPSULE ORAL at 09:00

## 2017-10-29 RX ADMIN — CLINDAMYCIN PHOSPHATE 900 MG: 18 INJECTION, SOLUTION INTRAVENOUS at 06:19

## 2017-10-29 RX ADMIN — TRAMADOL HYDROCHLORIDE 50 MG: 50 TABLET, FILM COATED ORAL at 08:59

## 2017-10-29 RX ADMIN — TEMAZEPAM 30 MG: 15 CAPSULE ORAL at 01:24

## 2017-10-29 RX ADMIN — INSULIN LISPRO 2 UNITS: 100 INJECTION, SOLUTION INTRAVENOUS; SUBCUTANEOUS at 12:45

## 2017-10-29 RX ADMIN — PANTOPRAZOLE SODIUM 40 MG: 40 TABLET, DELAYED RELEASE ORAL at 06:18

## 2017-10-29 RX ADMIN — TRAMADOL HYDROCHLORIDE 50 MG: 50 TABLET, FILM COATED ORAL at 21:53

## 2017-10-29 RX ADMIN — CLOPIDOGREL BISULFATE 75 MG: 75 TABLET ORAL at 09:00

## 2017-10-29 RX ADMIN — TEMAZEPAM 30 MG: 15 CAPSULE ORAL at 21:49

## 2017-10-29 RX ADMIN — POLYETHYLENE GLYCOL 3350 17 G: 17 POWDER, FOR SOLUTION ORAL at 09:00

## 2017-10-29 RX ADMIN — DOCUSATE SODIUM 100 MG: 100 CAPSULE, LIQUID FILLED ORAL at 09:00

## 2017-10-29 RX ADMIN — BACITRACIN ZINC AND POLYMYXIN B SULFATE: 500; 10000 OINTMENT TOPICAL at 21:49

## 2017-10-29 RX ADMIN — INSULIN LISPRO 2 UNITS: 100 INJECTION, SOLUTION INTRAVENOUS; SUBCUTANEOUS at 08:00

## 2017-10-29 RX ADMIN — OXYCODONE HYDROCHLORIDE 10 MG: 10 TABLET, FILM COATED, EXTENDED RELEASE ORAL at 08:59

## 2017-10-29 RX ADMIN — SODIUM CHLORIDE 250 ML: 9 INJECTION, SOLUTION INTRAVENOUS at 09:45

## 2017-10-29 RX ADMIN — SODIUM CHLORIDE: 9 INJECTION, SOLUTION INTRAVENOUS at 05:39

## 2017-10-29 RX ADMIN — ACETAMINOPHEN 1000 MG: 500 TABLET, FILM COATED ORAL at 21:49

## 2017-10-29 RX ADMIN — OXYCODONE HYDROCHLORIDE 10 MG: 10 TABLET, FILM COATED, EXTENDED RELEASE ORAL at 21:49

## 2017-10-29 RX ADMIN — VENLAFAXINE 37.5 MG: 37.5 TABLET ORAL at 09:00

## 2017-10-29 RX ADMIN — Medication 10 ML: at 09:00

## 2017-10-29 RX ADMIN — INSULIN GLARGINE 30 UNITS: 100 INJECTION, SOLUTION SUBCUTANEOUS at 09:10

## 2017-10-29 RX ADMIN — BACITRACIN ZINC AND POLYMYXIN B SULFATE: 500; 10000 OINTMENT TOPICAL at 00:12

## 2017-10-29 RX ADMIN — BACITRACIN ZINC AND POLYMYXIN B SULFATE 14.2 G: 500; 10000 OINTMENT TOPICAL at 08:59

## 2017-10-29 RX ADMIN — CLINDAMYCIN PHOSPHATE 900 MG: 18 INJECTION, SOLUTION INTRAVENOUS at 14:30

## 2017-10-29 RX ADMIN — DOCUSATE SODIUM 100 MG: 100 CAPSULE, LIQUID FILLED ORAL at 21:49

## 2017-10-29 RX ADMIN — TRAMADOL HYDROCHLORIDE 50 MG: 50 TABLET, FILM COATED ORAL at 15:45

## 2017-10-29 RX ADMIN — ACETAMINOPHEN 650 MG: 325 TABLET, FILM COATED ORAL at 09:48

## 2017-10-29 RX ADMIN — ACETAMINOPHEN 1000 MG: 500 TABLET, FILM COATED ORAL at 13:18

## 2017-10-29 ASSESSMENT — PAIN SCALES - GENERAL
PAINLEVEL_OUTOF10: 7
PAINLEVEL_OUTOF10: 8
PAINLEVEL_OUTOF10: 7
PAINLEVEL_OUTOF10: 4
PAINLEVEL_OUTOF10: 9
PAINLEVEL_OUTOF10: 10

## 2017-10-29 NOTE — PLAN OF CARE
Problem: Risk for Impaired Skin Integrity  Goal: Tissue integrity - skin and mucous membranes  Structural intactness and normal physiological function of skin and  mucous membranes. Outcome: Ongoing    Intervention: SKIN ASSESSMENT  See assessment. Intervention: PRESSURE ULCER PREVENTION  Turn pt; dolphin mattress. Intervention: TURN PATIENT  Turn pt. Problem: Falls - Risk of  Intervention: Fall risk assessment  Pt is to call for assistance. Intervention: Postfall assessment  None. Intervention: Fall precautions  Pt is to call for assistance. Intervention: Toileting assistance  Pt is to call for assistance. Goal: Absence of falls  Outcome: Ongoing      Problem: Pain:  Intervention: Opioid analgesia side-effects  Ongoing. Intervention: Assess barriers to pain control  Ongoing. Intervention: Promote participation in pain management plan  Ongoing.     Goal: Pain level will decrease  Pain level will decrease   Outcome: Ongoing    Goal: Control of acute pain  Control of acute pain   Outcome: Ongoing    Goal: Control of chronic pain  Control of chronic pain   Outcome: Ongoing

## 2017-10-29 NOTE — PROGRESS NOTES
[facility]      Patient:    Juliette Mcghee  YOB: 1940  Date of Service:  10/29/2017  MRN:    [id number]    Room:   [address] [place] [address]   PCP:    Marissa Fletcher DO  Advance Directive:  DNR-CC  Admit Date:   10/21/2017       Hospital Day:  8    Chief Complaint:      Subjective:  Patient is awake and alert. He denies any pain today. He received 2 units of PRBCs this morning. He     Cumulative hospital history:   Mr. Vannesa Cates is a 69year old male who was transferred from [facility] with a periprosthetic hip fracture sustained from a fall when he was transferring from chair to go the bathroom.  Also sustained R knee skin tear, having pain in R hip and groin Has a history of strokes and falls.  Has a history of arthritis, stroke 12/14, CKD, DM, HTN, depression, Hx PUD, retroperitoneal bleed 2014, IVC filter, ulcer.     He was seen and evaluated by Dr. Gama Barlow, Orthopedic Surgery, who recommended non-surgical rehabilitation initially. Referral to SNF for rehabilitation. Patient accepted to [facility] and Rehab.      Repeat right hip XR ordered on 10/26/2017. Dr. Gama Barlow reviewed right hip XR, decision to proceed with OR on 10/27/2017 due to increasing pain. Plavix was not held due to cardiovascular risk and he did require 2 units PRBC infusion. Hemoglobin stable the morning post op.     Review of Systems:   Constitutional / general:  Denies fever / chills / sweats  Head:  Denies headache / neck stiffness / trauma / visual change  Eyes:  Denies blurry vision / acute visual change or loss / itching / redness  ENT: Denies sore throat / hoarseness / nasal drainage / ear pain  CV:  Denies chest pain / palpitations/ orthopnea   Respiratory:  Denies cough / shortness of breath / sputum / hemoptysis  GI: Denies nausea / vomiting / abdominal pain / diarrhea / constipation  :  Denies dysuria / hesitancy / urgency / hematuria   Neuro: Denies paralysis / syncope / seizure / dysphagia /

## 2017-10-30 ENCOUNTER — APPOINTMENT (OUTPATIENT)
Dept: GENERAL RADIOLOGY | Age: 77
DRG: 481 | End: 2017-10-30
Attending: HOSPITALIST
Payer: MEDICARE

## 2017-10-30 LAB
ANION GAP SERPL CALCULATED.3IONS-SCNC: 11 MMOL/L (ref 7–19)
BUN BLDV-MCNC: 26 MG/DL (ref 8–23)
CALCIUM SERPL-MCNC: 7.3 MG/DL (ref 8.8–10.2)
CHLORIDE BLD-SCNC: 105 MMOL/L (ref 98–111)
CO2: 21 MMOL/L (ref 22–29)
CREAT SERPL-MCNC: 1.1 MG/DL (ref 0.5–1.2)
GFR NON-AFRICAN AMERICAN: >60
GLUCOSE BLD-MCNC: 121 MG/DL (ref 70–99)
GLUCOSE BLD-MCNC: 163 MG/DL (ref 70–99)
GLUCOSE BLD-MCNC: 166 MG/DL (ref 70–99)
GLUCOSE BLD-MCNC: 169 MG/DL (ref 70–99)
GLUCOSE BLD-MCNC: 53 MG/DL (ref 74–109)
GLUCOSE BLD-MCNC: 57 MG/DL (ref 70–99)
GLUCOSE BLD-MCNC: 62 MG/DL (ref 70–99)
HCT VFR BLD CALC: 22.4 % (ref 42–52)
HEMOGLOBIN: 7.5 G/DL (ref 14–18)
PERFORMED ON: ABNORMAL
POTASSIUM SERPL-SCNC: 3.7 MMOL/L (ref 3.5–5)
SODIUM BLD-SCNC: 137 MMOL/L (ref 136–145)

## 2017-10-30 PROCEDURE — 71010 XR CHEST PORTABLE: CPT

## 2017-10-30 PROCEDURE — 6370000000 HC RX 637 (ALT 250 FOR IP): Performed by: INTERNAL MEDICINE

## 2017-10-30 PROCEDURE — 82948 REAGENT STRIP/BLOOD GLUCOSE: CPT

## 2017-10-30 PROCEDURE — 97535 SELF CARE MNGMENT TRAINING: CPT

## 2017-10-30 PROCEDURE — 97168 OT RE-EVAL EST PLAN CARE: CPT

## 2017-10-30 PROCEDURE — 2700000000 HC OXYGEN THERAPY PER DAY

## 2017-10-30 PROCEDURE — 2580000003 HC RX 258: Performed by: ORTHOPAEDIC SURGERY

## 2017-10-30 PROCEDURE — 97530 THERAPEUTIC ACTIVITIES: CPT

## 2017-10-30 PROCEDURE — 6370000000 HC RX 637 (ALT 250 FOR IP): Performed by: NURSE PRACTITIONER

## 2017-10-30 PROCEDURE — 85018 HEMOGLOBIN: CPT

## 2017-10-30 PROCEDURE — 6370000000 HC RX 637 (ALT 250 FOR IP): Performed by: ORTHOPAEDIC SURGERY

## 2017-10-30 PROCEDURE — 85014 HEMATOCRIT: CPT

## 2017-10-30 PROCEDURE — 6360000002 HC RX W HCPCS: Performed by: HOSPITALIST

## 2017-10-30 PROCEDURE — 6370000000 HC RX 637 (ALT 250 FOR IP): Performed by: HOSPITALIST

## 2017-10-30 PROCEDURE — 74000 XR ABDOMEN LIMITED (KUB): CPT

## 2017-10-30 PROCEDURE — 80048 BASIC METABOLIC PNL TOTAL CA: CPT

## 2017-10-30 PROCEDURE — 1210000000 HC MED SURG R&B

## 2017-10-30 PROCEDURE — 99232 SBSQ HOSP IP/OBS MODERATE 35: CPT | Performed by: NURSE PRACTITIONER

## 2017-10-30 RX ORDER — POLYETHYLENE GLYCOL 3350 17 G/17G
17 POWDER, FOR SOLUTION ORAL DAILY PRN
Status: DISCONTINUED | OUTPATIENT
Start: 2017-10-30 | End: 2017-11-02 | Stop reason: HOSPADM

## 2017-10-30 RX ORDER — FUROSEMIDE 10 MG/ML
40 INJECTION INTRAMUSCULAR; INTRAVENOUS ONCE
Status: COMPLETED | OUTPATIENT
Start: 2017-10-30 | End: 2017-10-30

## 2017-10-30 RX ADMIN — ACETAMINOPHEN 1000 MG: 500 TABLET, FILM COATED ORAL at 21:22

## 2017-10-30 RX ADMIN — Medication 10 ML: at 07:58

## 2017-10-30 RX ADMIN — TAMSULOSIN HYDROCHLORIDE 0.4 MG: 0.4 CAPSULE ORAL at 07:56

## 2017-10-30 RX ADMIN — ACETAMINOPHEN 1000 MG: 500 TABLET, FILM COATED ORAL at 05:52

## 2017-10-30 RX ADMIN — Medication 10 ML: at 21:24

## 2017-10-30 RX ADMIN — BACITRACIN ZINC AND POLYMYXIN B SULFATE: 500; 10000 OINTMENT TOPICAL at 07:56

## 2017-10-30 RX ADMIN — PANTOPRAZOLE SODIUM 40 MG: 40 TABLET, DELAYED RELEASE ORAL at 05:52

## 2017-10-30 RX ADMIN — INSULIN LISPRO 1 UNITS: 100 INJECTION, SOLUTION INTRAVENOUS; SUBCUTANEOUS at 13:03

## 2017-10-30 RX ADMIN — BACITRACIN ZINC AND POLYMYXIN B SULFATE: 500; 10000 OINTMENT TOPICAL at 21:23

## 2017-10-30 RX ADMIN — DOCUSATE SODIUM 100 MG: 100 CAPSULE, LIQUID FILLED ORAL at 07:56

## 2017-10-30 RX ADMIN — FUROSEMIDE 40 MG: 10 INJECTION, SOLUTION INTRAMUSCULAR; INTRAVENOUS at 12:59

## 2017-10-30 RX ADMIN — VENLAFAXINE 37.5 MG: 37.5 TABLET ORAL at 07:56

## 2017-10-30 RX ADMIN — TEMAZEPAM 30 MG: 15 CAPSULE ORAL at 21:22

## 2017-10-30 RX ADMIN — OXYCODONE HYDROCHLORIDE 10 MG: 10 TABLET, FILM COATED, EXTENDED RELEASE ORAL at 07:55

## 2017-10-30 RX ADMIN — POLYETHYLENE GLYCOL 3350 17 G: 17 POWDER, FOR SOLUTION ORAL at 07:56

## 2017-10-30 RX ADMIN — SODIUM CHLORIDE: 9 INJECTION, SOLUTION INTRAVENOUS at 01:58

## 2017-10-30 RX ADMIN — OXYCODONE HYDROCHLORIDE 10 MG: 5 TABLET ORAL at 16:54

## 2017-10-30 RX ADMIN — CLOPIDOGREL BISULFATE 75 MG: 75 TABLET ORAL at 07:55

## 2017-10-30 RX ADMIN — INSULIN GLARGINE 30 UNITS: 100 INJECTION, SOLUTION SUBCUTANEOUS at 09:00

## 2017-10-30 ASSESSMENT — PAIN SCALES - GENERAL
PAINLEVEL_OUTOF10: 7
PAINLEVEL_OUTOF10: 2
PAINLEVEL_OUTOF10: 7
PAINLEVEL_OUTOF10: 5
PAINLEVEL_OUTOF10: 10
PAINLEVEL_OUTOF10: 3

## 2017-10-30 ASSESSMENT — PAIN DESCRIPTION - PAIN TYPE: TYPE: SURGICAL PAIN

## 2017-10-30 ASSESSMENT — PAIN DESCRIPTION - LOCATION: LOCATION: LEG;HIP

## 2017-10-30 ASSESSMENT — PAIN DESCRIPTION - ORIENTATION: ORIENTATION: RIGHT

## 2017-10-30 NOTE — PROGRESS NOTES
Physical Therapy  Name: Leo Childs  MRN:  [id number]  Date of service:  10/30/2017       10/30/17 1231   Restrictions/Precautions   Restrictions/Precautions Weight Bearing; Fall Risk   Lower Extremity Weight Bearing Restrictions   Partial Weight Bearing Percentage Or Pounds 25%   Pain Screening   Patient Currently in Pain Yes   Pain Assessment   Pain Assessment FLACC   Pain Level 7   Pain Type Surgical pain   Pain Location Leg;Hip   Pain Orientation Right   Pain Intervention(s) Medication (see eMar);Repositioned;Cold applied;Elevation; Ambulation/Increased activity   Strength RLE   Comment UNABLE TO ACTIVELY MOVE R LE DUE TO PAIN   Strength LLE   Comment ABLE TO MOVE AGAINST GRAVITY in knee and hip BUT CAUSES PAIN IN R LE, ankle no active movements   PROM RLE (degrees)   RLE General PROM LIMITED DUE TO PAIN, ankle WFLs, pt. only allows minimal flexion of knee and hip   PROM LLE (degrees)   LLE PROM WFL   LLE General PROM DF WFLs, knee and hip AAROM WFLs   AROM LLE (degrees)   LLE AROM  WFL   Bed Mobility   Rolling Maximal assistance  (x2)   Supine to Sit Dependent/Total  (x2)   Sit to Supine Dependent/Total  (x2)   Scooting Dependent/Total  (x2)   Transfers   Sit to Stand Unable to assess   Bed to Chair Unable to assess   Ambulation   Ambulation? No   WB Status 25% PWB RLE   Patient Goals    Patient goals  get better, decrease pain   Short term goals   Time Frame for Short term goals 14 DAYS BEFORE RE EVAL   Short term goal 1 supine to sit Min A   Short term goal 2 sit to stand Min A with RW 25% PWB   Short term goal 3 stand pivot transfer to chair Min A, PWB 25%   Short term goal 4 amb. 10' with RW 25% PWB RLE   Conditions Requiring Skilled Therapeutic Intervention   Assessment Patient sitting on EOB, leans to left to relieve pressure off right hip area. Difficult to get patient centered in sitting due to pain. Worked on bilateral rolling with pillows between knees.  Assisted with changing pads and cleanup due to

## 2017-10-30 NOTE — PROGRESS NOTES
Occupational Therapy   Occupational Therapy Re Assessment and Progress Note  Date: 10/30/2017   Patient Name: Dilan Goodrich  MRN: [id number]     : 1940    Patient Diagnosis(es): There were no encounter diagnoses. has a past medical history of Arthritis; Blood circulation, collateral; Cerebral artery occlusion with cerebral infarction ([facility] [address] Chronic kidney disease; Diabetes mellitus ([facility] [address] History of blood transfusion; Hypertension; and Psychiatric problem. has a past surgical history that includes Hip fracture surgery; Mandible fracture surgery; Toe amputation (Right); fracture surgery; and joint replacement.     Treatment Diagnosis: Right Hip Fx s/p ORIF      Restrictions  Restrictions/Precautions  Restrictions/Precautions: Weight Bearing, Fall Risk  Required Braces or Orthoses?: No  Lower Extremity Weight Bearing Restrictions  Right Lower Extremity Weight Bearing: Partial Weight Bearing  Partial Weight Bearing Percentage Or Pounds: 25%    Subjective   General  Chart Reviewed: Yes  Patient assessed for rehabilitation services?: Yes  Family / Caregiver Present: Yes (spouse)  Pain Assessment  Patient Currently in Pain: Yes  Pain Assessment: FLACC  Pain Level: 7  Pain Type: Surgical pain  Pain Location: Leg, Hip  Pain Orientation: Right  Pain Intervention(s): Medication (see eMar), Repositioned, Cold applied, Elevation, Ambulation/Increased activity  Response to Pain Intervention: Other (Comment) (pt stated, \"minimal\")  Oxygen Therapy  SpO2: 90 %  O2 Device: None (Room air)  Social/Functional History  Social/Functional History  Lives With: Spouse  Type of Home: House  Home Layout: One level  Home Access: Stairs to enter with rails  Entrance Stairs - Number of Steps: 2-3  Bathroom Shower/Tub: Walk-in shower, Shower chair without back  Bathroom Toilet: Standard  Bathroom Equipment: 3-in-1 commode  Home Equipment: Rolling walker, [employer]  ADL Assistance: Needs assistance  Ambulation Assistance: Needs assistance (per son, pt. needed help with RW)  Transfer Assistance: Needs assistance  Additional Comments: He states he used wheelchair and walker both, son confirms this. Per son, pt. just finished rehab and was home for a short time. Patient had assist with dressing but she did not ambulate with him at home       Objective   Vision:  (hx of decreased peripheral vision even prior to stroke per spouse)  Hearing: Within functional limits    Orientation  Overall Orientation Status:  (cues for hospital, month, but not , or year)     Balance  Sitting Balance: Contact guard assistance (leans to left to offload right hip, can minimally release hand for simlple reaching tasks)  Standing Balance  Activity: Did not attempt standing based upon pain level and difficulty with midline sitting  ADL  Feeding: Supervision;Setup  Grooming: Supervision;Setup  UE Bathing: Moderate assistance  LE Bathing: Dependent/Total  UE Dressing: Moderate assistance  LE Dressing: Dependent/Total  Toileting: Dependent/Total        Bed mobility  Supine to Sit: Maximum assistance  Sit to Supine: Dependent/Total  Comment: Assist of two for bed mobility to help minimize pain  Transfers  Transfer Comments: Did not perform transfer. When EOB sitting improves, patient will be good sliding board transfer candidate     Cognition  Cognition Comment: Awakens easily, follows simple commands. LUE PROM (degrees)  LUE PROM: WNL  LUE AROM (degrees)  LUE AROM : WNL  RUE PROM (degrees)  RUE PROM: WNL  RUE AROM (degrees)  RUE AROM : WNL  LUE Strength  Gross LUE Strength: WFL  RUE Strength  Gross RUE Strength: WFL                  Assessment   Performance deficits / Impairments: Decreased functional mobility ; Decreased ADL status; Decreased cognition  Assessment: Reassessment and tx performed s/p surgery. Patient still with pain upon movement but much improved as compared to prior to surgery.   When EOB sitting improves feel he will be a good sliding board candidate. Treatment Diagnosis: Right Hip Fx s/p ORIF  Activity Tolerance  Activity Tolerance: Patient limited by pain  Safety Devices  Safety Devices in place: Yes  Type of devices: Call light within reach; Left in bed;Bed alarm in place        Discharge Recommendations:  Patient would benefit from continued therapy after discharge     Plan   Plan  Times per week: 4-8 visits weekly qd to bid as tolerated  Specific instructions for Next Treatment: follow up with physician/nurse regarding intolerance for therapy due to pain  Current Treatment Recommendations: Functional Mobility Training, Cognitive Reorientation, Patient/Caregiver Education & Training, Equipment Evaluation, Education, & procurement, Self-Care / ADL  Plan Comment: Previous goal are still appropriate and relevant.     G-Code     OutComes Score                                           Goals  Short term goals  Short term goal 1: Patient will be able to actively participate in 1-2 handed EOB activities with supervision  Short term goal 2: Perform sliding board transfer with assist of 2  Short term goal 3: Perform dressing using recommended strategies supine or sitting with mod A of one  Long term goals  Long term goal 1: Upgrade ADL and mobility as tolerated       Therapy Time   Individual Concurrent Group Co-treatment   Time In 0900         Time Out 1000         Minutes [facility] Jose Kaba OT Electronically signed by Isaak Armijo OT on 10/30/2017 at 12:44 PM

## 2017-10-30 NOTE — PROGRESS NOTES
[facility]      Patient:    Leo Childs  YOB: 1940  Date of Service:  10/30/2017  MRN:    [id number]    Room:   [address] [place] [address]   PCP:    Lore Hendrickson DO  Advance Directive:  DNR-CC  Admit Date:   10/21/2017       Hospital Day:  9    Chief Complaint:      Subjective:  Patient is awake and alert. He denies any pain today. He received 2 units of PRBCs this morning. He     Cumulative hospital history:   Mr. Марина Gomez is a 69year old male who was transferred from [facility] with a periprosthetic hip fracture sustained from a fall when he was transferring from chair to go the bathroom.  Also sustained R knee skin tear, having pain in R hip and groin Has a history of strokes and falls.  Has a history of arthritis, stroke 12/14, CKD, DM, HTN, depression, Hx PUD, retroperitoneal bleed 2014, IVC filter, ulcer.     He was seen and evaluated by Dr. Mickey Erickson, Orthopedic Surgery, who recommended non-surgical rehabilitation initially. Referral to SNF for rehabilitation. Patient accepted to [facility] and Rehab.      Repeat right hip XR ordered on 10/26/2017. Dr. Mickey Erickson reviewed right hip XR, decision to proceed with OR on 10/27/2017 due to increasing pain. Plavix was not held due to cardiovascular risk and he did require 2 units PRBC infusion. Hemoglobin stable the morning post op.     Review of Systems:   Constitutional / general:  Denies fever / chills / sweats  Head:  Denies headache / neck stiffness / trauma / visual change  Eyes:  Denies blurry vision / acute visual change or loss / itching / redness  ENT: Denies sore throat / hoarseness / nasal drainage / ear pain  CV:  Denies chest pain / palpitations/ orthopnea   Respiratory:  Denies cough / shortness of breath / sputum / hemoptysis  GI: Denies nausea / vomiting / abdominal pain / diarrhea / constipation  :  Denies dysuria / hesitancy / urgency / hematuria   Neuro: Denies paralysis / syncope / seizure / dysphagia / headache / paresthesias  Musculoskeletal:  + Minimal hip pain with movement. Denies muscle weakness /joint stiffness  Vascular: Denies edema / claudication / varicosities  Heme / endocrine: + Easy bruising. Denies excessive sweating / heat or cold intolerance  Psychiatric:  Denies depression / anxiety / insomnia / mood changes  Skin:  Denies new rashes / lesions / skin hair or nail changes    14 point review of systems is negative except as specifically addressed above. Objective:   VITALS:  /71   Pulse 98   Temp 97.8 °F (36.6 °C) (Temporal)   Resp 18   Ht 5' 10\" (1.778 m)   Wt 183 lb 7 oz (83.2 kg)   SpO2 90%   BMI 26.32 kg/m²   24HR INTAKE/OUTPUT:      Intake/Output Summary (Last 24 hours) at 10/30/17 1201  Last data filed at 10/30/17 0800   Gross per 24 hour   Intake          5405.44 ml   Output                0 ml   Net          5405.44 ml     General appearance: alert, appears stated age, cooperative and no distress  Head: Normocephalic, without obvious abnormality, atraumatic  Eyes: conjunctivae/corneas clear. PERRL, EOM's intact. Ears: normal external ears and nose, throat without exudate  Neck: no adenopathy, no carotid bruit, no JVD, supple, symmetrical, trachea midline and thyroid not enlarged, symmetric, no tenderness/mass/nodules  Lungs: clear to auscultation bilaterally,no rales or wheezes   Heart: Tachycardic, Regular rhythm, S1, S2 normal, no murmur  Abdomen: soft, non-tender; non-distended, normal bowel sounds no masses, no organomegaly  Extremities: Right hip with bandage - C/D/I. Skin: Skin color, texture, turgor normal. No rashes or lesions  Lymphatic: No palpable lymph node enlargment  Neurologic: Generalized weakness, no focal deficits.   Psychiatric: Alert and oriented, thought content appropriate, normal insight, mood appropriate      Medications:      sodium chloride      dextrose        furosemide  40 mg Intravenous Once    temazepam  30 mg Oral Nightly    insulin

## 2017-10-30 NOTE — CARE COORDINATION
The [address] at [facility]  Notification of Admission Decision      [] Patient has been accepted for admit to [facility] on :       Please write discharge orders and summary prior to discharge. [] Patient acceptance to Rehab pending the following :    [] Eval in progress       [x] Patient determined to be ineligible for services at [facility] because : too low level      We recommend you consider: SNF as already in progress       Thank you for your referral, we appreciate you.     Electronically Signed by Demond Paul, Admissions Coordinator 10/30/2017 12:54 PM

## 2017-10-30 NOTE — PROGRESS NOTES
Palliative Care:      Palliative Care  made an initial visit to introduce himself and offer support to Patient who presented with Periprosthetic Hip Fracture sustained from a fall when he was transferring from a chair to go to the bathroom. Patient has had surgery to repair damage and now Patient is recovering. Past Medical History:      Patient has a history of Arthritis, Blood Circulation, Cerebral Artery Occlusion with Cerebral Infarction, CKD, Diabetes Mellitus, Blood Transfusion, Hypertension, Psychiatric Problem    Advance Directives:      Patient has an Advanced Directive and is a DNR. Pain/Other Symptoms:    Patient was resting at the time of the visit. Activity:         Patient was lying in his bed at the time of the visit. Psychological/Spiritual:         Patient has good Spiritual Support. Patient/Family Discussion:      Patient has good Family Support. Patient was present in the room at the time of the visit to speak with this . Plan/expectations:  Plans are uncertain for Patient at this time. Patient's Wife reports that Patient spent time at a SNF before coming home, but recently fell and broke his hip again. Wife reports that she would rather Patient go to the 8th floor or possibly to [facility]. Palliative Care will continue to follow up.       Electronically signed by Elvia Woods on 10/30/2017 at 1:42 PM

## 2017-10-30 NOTE — PROGRESS NOTES
Dr. Sebastien Hernandez was up here on rounds and informed him about pt's Oxygen Sats this morning was 85 and that I applied O2 Nasal Canula at 2 liters on pt and it brought O2 sats level up to 94%. Electronically signed by Humble Dobbs LPN on 20/65/5305 at 7:32 AM

## 2017-10-31 LAB
ANION GAP SERPL CALCULATED.3IONS-SCNC: 7 MMOL/L (ref 7–19)
BASOPHILS ABSOLUTE: 0.1 K/UL (ref 0–0.2)
BASOPHILS RELATIVE PERCENT: 0.4 % (ref 0–1)
BUN BLDV-MCNC: 23 MG/DL (ref 8–23)
CALCIUM SERPL-MCNC: 7.8 MG/DL (ref 8.8–10.2)
CHLORIDE BLD-SCNC: 101 MMOL/L (ref 98–111)
CO2: 26 MMOL/L (ref 22–29)
CREAT SERPL-MCNC: 1.1 MG/DL (ref 0.5–1.2)
EOSINOPHILS ABSOLUTE: 0.3 K/UL (ref 0–0.6)
EOSINOPHILS RELATIVE PERCENT: 2.3 % (ref 0–5)
GFR NON-AFRICAN AMERICAN: >60
GLUCOSE BLD-MCNC: 182 MG/DL (ref 74–109)
GLUCOSE BLD-MCNC: 187 MG/DL (ref 70–99)
GLUCOSE BLD-MCNC: 231 MG/DL (ref 70–99)
GLUCOSE BLD-MCNC: 241 MG/DL (ref 70–99)
GLUCOSE BLD-MCNC: 273 MG/DL (ref 70–99)
GLUCOSE BLD-MCNC: 329 MG/DL (ref 70–99)
HCT VFR BLD CALC: 21.8 % (ref 42–52)
HEMOGLOBIN: 7.2 G/DL (ref 14–18)
LYMPHOCYTES ABSOLUTE: 1.3 K/UL (ref 1.1–4.5)
LYMPHOCYTES RELATIVE PERCENT: 10.1 % (ref 20–40)
MAGNESIUM: 1.9 MG/DL (ref 1.6–2.4)
MCH RBC QN AUTO: 28.8 PG (ref 27–31)
MCHC RBC AUTO-ENTMCNC: 33 G/DL (ref 33–37)
MCV RBC AUTO: 87.2 FL (ref 80–94)
MONOCYTES ABSOLUTE: 1.4 K/UL (ref 0–0.9)
MONOCYTES RELATIVE PERCENT: 11 % (ref 0–10)
NEUTROPHILS ABSOLUTE: 9.8 K/UL (ref 1.5–7.5)
NEUTROPHILS RELATIVE PERCENT: 74.9 % (ref 50–65)
PDW BLD-RTO: 15.4 % (ref 11.5–14.5)
PERFORMED ON: ABNORMAL
PHOSPHORUS: 2.8 MG/DL (ref 2.5–4.5)
PLATELET # BLD: 379 K/UL (ref 130–400)
PMV BLD AUTO: 9.9 FL (ref 9.4–12.4)
POTASSIUM SERPL-SCNC: 3.5 MMOL/L (ref 3.5–5)
RBC # BLD: 2.5 M/UL (ref 4.7–6.1)
SODIUM BLD-SCNC: 134 MMOL/L (ref 136–145)
WBC # BLD: 13.1 K/UL (ref 4.8–10.8)

## 2017-10-31 PROCEDURE — 6370000000 HC RX 637 (ALT 250 FOR IP): Performed by: NURSE PRACTITIONER

## 2017-10-31 PROCEDURE — 6370000000 HC RX 637 (ALT 250 FOR IP): Performed by: INTERNAL MEDICINE

## 2017-10-31 PROCEDURE — 97530 THERAPEUTIC ACTIVITIES: CPT

## 2017-10-31 PROCEDURE — 6370000000 HC RX 637 (ALT 250 FOR IP): Performed by: HOSPITALIST

## 2017-10-31 PROCEDURE — 85025 COMPLETE CBC W/AUTO DIFF WBC: CPT

## 2017-10-31 PROCEDURE — 1210000000 HC MED SURG R&B

## 2017-10-31 PROCEDURE — 83735 ASSAY OF MAGNESIUM: CPT

## 2017-10-31 PROCEDURE — 99233 SBSQ HOSP IP/OBS HIGH 50: CPT | Performed by: HOSPITALIST

## 2017-10-31 PROCEDURE — 2580000003 HC RX 258: Performed by: ORTHOPAEDIC SURGERY

## 2017-10-31 PROCEDURE — 84100 ASSAY OF PHOSPHORUS: CPT

## 2017-10-31 PROCEDURE — 80048 BASIC METABOLIC PNL TOTAL CA: CPT

## 2017-10-31 PROCEDURE — 6370000000 HC RX 637 (ALT 250 FOR IP): Performed by: ORTHOPAEDIC SURGERY

## 2017-10-31 PROCEDURE — 82948 REAGENT STRIP/BLOOD GLUCOSE: CPT

## 2017-10-31 RX ADMIN — ACETAMINOPHEN 1000 MG: 500 TABLET, FILM COATED ORAL at 06:00

## 2017-10-31 RX ADMIN — TEMAZEPAM 30 MG: 15 CAPSULE ORAL at 20:41

## 2017-10-31 RX ADMIN — BACITRACIN ZINC AND POLYMYXIN B SULFATE 14.2 G: 500; 10000 OINTMENT TOPICAL at 10:09

## 2017-10-31 RX ADMIN — ACETAMINOPHEN 1000 MG: 500 TABLET, FILM COATED ORAL at 20:41

## 2017-10-31 RX ADMIN — VENLAFAXINE 37.5 MG: 37.5 TABLET ORAL at 10:10

## 2017-10-31 RX ADMIN — INSULIN LISPRO 11 UNITS: 100 INJECTION, SOLUTION INTRAVENOUS; SUBCUTANEOUS at 14:42

## 2017-10-31 RX ADMIN — TAMSULOSIN HYDROCHLORIDE 0.4 MG: 0.4 CAPSULE ORAL at 10:10

## 2017-10-31 RX ADMIN — ACETAMINOPHEN 1000 MG: 500 TABLET, FILM COATED ORAL at 14:13

## 2017-10-31 RX ADMIN — INSULIN LISPRO 1 UNITS: 100 INJECTION, SOLUTION INTRAVENOUS; SUBCUTANEOUS at 20:40

## 2017-10-31 RX ADMIN — PANTOPRAZOLE SODIUM 40 MG: 40 TABLET, DELAYED RELEASE ORAL at 06:00

## 2017-10-31 RX ADMIN — OXYCODONE HYDROCHLORIDE 10 MG: 10 TABLET, FILM COATED, EXTENDED RELEASE ORAL at 10:10

## 2017-10-31 RX ADMIN — INSULIN GLARGINE 30 UNITS: 100 INJECTION, SOLUTION SUBCUTANEOUS at 10:09

## 2017-10-31 RX ADMIN — Medication 10 ML: at 16:29

## 2017-10-31 RX ADMIN — CLOPIDOGREL BISULFATE 75 MG: 75 TABLET ORAL at 10:10

## 2017-10-31 RX ADMIN — DOCUSATE SODIUM 100 MG: 100 CAPSULE, LIQUID FILLED ORAL at 10:10

## 2017-10-31 ASSESSMENT — PAIN SCALES - GENERAL
PAINLEVEL_OUTOF10: 5
PAINLEVEL_OUTOF10: 7
PAINLEVEL_OUTOF10: 7
PAINLEVEL_OUTOF10: 6
PAINLEVEL_OUTOF10: 6
PAINLEVEL_OUTOF10: 4
PAINLEVEL_OUTOF10: 4
PAINLEVEL_OUTOF10: 7

## 2017-10-31 ASSESSMENT — PAIN DESCRIPTION - LOCATION
LOCATION: HIP;LEG
LOCATION: BACK

## 2017-10-31 ASSESSMENT — PAIN DESCRIPTION - ORIENTATION
ORIENTATION: LOWER
ORIENTATION: LEFT

## 2017-10-31 ASSESSMENT — PAIN DESCRIPTION - PAIN TYPE
TYPE: SURGICAL PAIN
TYPE: CHRONIC PAIN

## 2017-10-31 NOTE — PROGRESS NOTES
Subjective:     Post-Operative Day: 4 Status Post right ORIF periprosthetic femur fracture with NCB cable plate. Pt. Is awake and alert. More oriented today. Answers questions appropriately. No new issues/complaints this am.  Doing well. Pain currently under control. Systemic or Specific Complaints:No Complaints  no nausea and no vomiting Pain 3    Objective:     Patient Vitals for the past 24 hrs:   BP Temp Temp src Pulse Resp SpO2   10/31/17 0703 (!) 152/77 97 °F (36.1 °C) Temporal 96 18 96 %   10/31/17 0431 (!) 152/83 96.7 °F (35.9 °C) Temporal 98 20 91 %   10/30/17 2353 132/68 98.4 °F (36.9 °C) Temporal 90 20 92 %   10/30/17 2156 (!) 142/70 99.1 °F (37.3 °C) Temporal 111 20 94 %   10/30/17 2000 (!) 160/77 98.9 °F (37.2 °C) Temporal 116 20 94 %   10/30/17 1108 132/71 97.8 °F (36.6 °C) Temporal 98 18 90 %   10/30/17 1021 (!) 140/84 - - 101 - 95 %       General: alert, appears stated age, cooperative, no distress and pale   Exam: Fair motion to right hip without significant discomfort. Op-site is clean and dry. Stable weakness right foot. Wound: Wound clean and dry no evidence of infection. , No Drainage and Wound Intact   Neurovascular:   Unchanged from previous   DVT Exam: No evidence of DVT seen on physical exam.   Xray:  none     Data Review:  Recent Labs      10/30/17   0600  10/31/17   0415   HGB  7.5*  7.2*     Recent Labs      10/31/17   0415   NA  134*   K  3.5   CREATININE  1.1     Recent Labs      10/31/17   0415   LABGLOM  >60     No results for input(s): INR in the last 72 hours. Assessment:     Status Post right ORIF periprosthetic femur fracture. Doing well postoperatively. Anemia due to surgical blood loss-- transfused 2 units PRBCs. Plan:     Pt. Will continue with current cares. Pt. Will be 25% weight bearing to right lower extremity for 12 weeks. Okay to resume his plavix. Plan to go to [facility] rehab once stable for 3 days.           Electronically signed by Negrita Blair

## 2017-10-31 NOTE — PROGRESS NOTES
Occupational Therapy  Facility/Department: [facility] SURG SERVICES  Daily Treatment Note  NAME: Ariel Riley  : 1940  MRN: [id number]    Date of Service: 10/31/2017    Patient Diagnosis(es): There were no encounter diagnoses. has a past medical history of Arthritis; Blood circulation, collateral; Cerebral artery occlusion with cerebral infarction ([facility] [address] Chronic kidney disease; Diabetes mellitus ([facility] [address] History of blood transfusion; Hypertension; and Psychiatric problem. has a past surgical history that includes Hip fracture surgery; Mandible fracture surgery; Toe amputation (Right); fracture surgery; joint replacement; and Total hip arthroplasty (Right, 10/27/2017). Restrictions  Restrictions/Precautions  Restrictions/Precautions: Weight Bearing, Fall Risk  Required Braces or Orthoses?: No  Lower Extremity Weight Bearing Restrictions  Right Lower Extremity Weight Bearing: Partial Weight Bearing  Partial Weight Bearing Percentage Or Pounds: 25%  Subjective   General  Chart Reviewed: Yes  Patient assessed for rehabilitation services?: Yes  Family / Caregiver Present: No (arrived end of tx)  Pain Assessment  Pain Assessment: FLACC  Pain Level: 6  Pain Type: Surgical pain  Pain Intervention(s): Repositioned; Ambulation/Increased activity; Medication (see eMar); Elevation;Cold applied  Response to Pain Intervention: Patient Satisfied   Orientation     Objective                Bed mobility  Rolling to Left: Maximum assistance (using pillows between knees for pain management)  Supine to Sit: Maximum assistance (But patient showing increased active participation)  Transfers  Slide Board: Maximum assistance (Patient actively participating in helping to scoot over but still needs Assist of 2 for pain management and safety)                       Cognition  Cognition Comment: Awakens easily, follows simple commands.                      Type of ROM/Therapeutic Exercise  Comment: Performed beginning level reaching

## 2017-10-31 NOTE — PROGRESS NOTES
[facility] Hospitalists      Patient:    Amna Mccarty  YOB: 1940  Date of Service:  10/31/2017  MRN:    [id number]    Room:   [address] [place] [address]   PCP:    Jimmie Cody DO  Advance Directive:  DNR-CC  Admit Date:   10/21/2017       Hospital Day:  10    Chief Complaint:  Fall    Subjective: Patient is awake and alert with wife at bedside. Patient states that pain is well controlled today. Patient states that he has had multiple bowel movements. Patient denies any symptoms of orthostasis such as dizziness, lightheadedness, racing heart rate, or shortness of breath. Patient used incentive spirometer and was able to achieve goal.    Cumulative hospital history:   Mr. Leeann Gr is a 69year old male who was transferred from [facility] with a periprosthetic hip fracture sustained from a fall when he was transferring from chair to go the bathroom.  Also sustained R knee skin tear, having pain in R hip and groin Has a history of strokes and falls.  Has a history of arthritis, stroke 12/14, CKD, DM, HTN, depression, Hx PUD, retroperitoneal bleed 2014, IVC filter, ulcer.     He was seen and evaluated by Dr. Sasha Chen, Orthopedic Surgery, who recommended non-surgical rehabilitation initially. Referral to SNF for rehabilitation. Patient accepted to [facility].      Repeat right hip XR ordered on 10/26/2017. Dr. Sasha Chen reviewed right hip XR, decision to proceed with OR on 10/27/2017 due to increasing pain. Plavix was not held due to cardiovascular risk. Patient has received 4 units of RBCs since surgery. On 10/30/17 chest x-ray was obtained and was unremarkable. A KUB was obtained as well and indicated no acute process. Review Of Symptoms:    14 point review of systems is negative except as specifically addressed above.     Objective:   VITALS:  BP (!) 158/85   Pulse 90   Temp 98.1 °F (36.7 °C) (Temporal)   Resp 14   Ht 5' 10\" (1.778 m)   Wt 183 lb 7 oz (83.2 kg)   SpO2 98%   BMI 26.32 kg/m²   24HR INTAKE/OUTPUT:      Intake/Output Summary (Last 24 hours) at 10/31/17 1054  Last data filed at 10/31/17 0431   Gross per 24 hour   Intake             1050 ml   Output                0 ml   Net             1050 ml     General appearance: alert, appears stated age, cooperative and no distress  Head: Normocephalic, without obvious abnormality, atraumatic  Eyes: conjunctivae/corneas clear. PERRL, EOM's intact. Ears: normal external ears and nose, throat without exudate  Neck: no adenopathy, no carotid bruit, no JVD, supple, symmetrical, trachea midline and thyroid not enlarged, symmetric, no tenderness/mass/nodules  Lungs: clear to auscultation bilaterally,no rales or wheezes   Heart: regular/tachycardic, Regular rhythm, S1, S2 normal, no murmur  Abdomen: soft, non-tender; non-distended, normal bowel sounds no masses, no organomegaly  Extremities: Right hip with bandage - C/D/I. Has staples to surgical incision  Skin: Skin color, texture, turgor normal. No rashes or lesions  Neurologic: Generalized weakness, no focal deficits.      Medications:      sodium chloride      dextrose        temazepam  30 mg Oral Nightly    insulin glargine  30 Units Subcutaneous QAM    sodium chloride flush  10 mL Intravenous 2 times per day    acetaminophen  1,000 mg Oral Q8H    insulin lispro  0-6 Units Subcutaneous TID WC    insulin lispro  0-3 Units Subcutaneous Nightly    oxyCODONE  10 mg Oral 2 times per day    tamsulosin  0.4 mg Oral Daily    venlafaxine  37.5 mg Oral Daily    bacitracin-polymyxin b   Topical BID    pantoprazole  40 mg Oral QAM AC    clopidogrel  75 mg Oral Daily    docusate sodium  100 mg Oral BID     polyethylene glycol, insulin lispro, sodium chloride flush, oxyCODONE **OR** oxyCODONE, fentanNYL **OR** fentanNYL, senna, glycerin (Laxative), acetaminophen, bisacodyl, ondansetron, glucose, dextrose, glucagon (rDNA), dextrose  Dietary Nutrition Supplements: Diabetic Oral Supplement  DIET GENERAL; Carb Control: 3 carbs/meal (approximate 1500 kcals/day)     Lab and other Data:     Recent Labs      10/29/17   1645  10/30/17   0600  10/31/17   0415   WBC  17.8*   --   13.1*   HGB  7.5*  7.5*  7.2*   PLT  257   --   379     Recent Labs      10/29/17   0625  10/30/17   0600  10/31/17   0415   NA  131*  137  134*   K  3.9  3.7  3.5   CL  99  105  101   CO2  19*  21*  26   BUN  32*  26*  23   CREATININE  1.4*  1.1  1.1   GLUCOSE  210*  53*  182*     UA:  Recent Labs      10/29/17   0130   COLORU  DK YELLOW   PHUR  5.5   CLARITYU  CLOUDY*   SPECGRAV  1.021   LEUKOCYTESUR  Negative   UROBILINOGEN  0.2   BILIRUBINUR  SMALL*   BLOODU  Negative   GLUCOSEU  500*     Problem List:     Patient Active Problem List    Diagnosis Date Noted    Stage 3 chronic kidney disease 10/24/2017    Obstipation 10/23/2017    Closed fracture of right femur ([address]     Type 2 diabetes mellitus without complication, with long-term current use of insulin ([address]     Anemia, chronic disease 10/22/2017    Essential hypertension 10/22/2017    Osteoarthritis 10/22/2017    Non-pressure chronic ulcer of right ankle with fat layer exposed ([address] 06/27/2016    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 Gama Barlow 10/27/17. Active Problems:  Cerebral artery occlusion with cerebral infarction (HCC) - On Plavix  Anemia, chronic disease and now with acute blood loss - total of 4 units of packed red blood cells, monitor CBC, keep hemoglobin above 7 or if symptomatic, transfuse as needed   Essential hypertension, currently with hypotensive episodes. No BP meds. Continue IVF's. Osteoarthritis - Noted.   Obstipation - Continue bowel regimen decreased bowel regimen, obstipation resolved  Type 2 diabetes mellitus without complication, with long-term current use of insulin, Accu-Cheks, sliding scale, Lantus every a.m. Stage 3 chronic kidney disease - stable continue to monitor    Antibiotics:  None  DVT Prophylaxis:  SCDs  GI prophylaxis:  Protonix    SHRAVAN Cruz - BC    I have independently interviewed and examined the patient. I have discussed key elements of the care plan with the PA or APRN & agree with the findings and care plan as documented above. Umang Spence MD    CC: Feeling better  S: Tolerated ORIF, pain controlled, no other complaints  O:Vitals: BP (!) 158/85   Pulse 90   Temp 98.1 °F (36.7 °C) (Temporal)   Resp 14   Ht 5' 10\" (1.778 m)   Wt 183 lb 7 oz (83.2 kg)   SpO2 98%   BMI 26.32 kg/m²   24HR INTAKE/OUTPUT:    Intake/Output Summary (Last 24 hours) at 10/31/17 1820  Last data filed at 10/31/17 1334   Gross per 24 hour   Intake              910 ml   Output                0 ml   Net              910 ml     General appearance: alert and cooperative with exam,very pleasant  HEENT: atraumatic, eyes with clear conjunctiva and normal lids, pupils and irises normal, external ears and nose are normal, lips normal. Neck without masses, lympadenopathy, bruit, thyroid normal  Lungs: no increased work of breathing, clear to auscultation bilaterally without rales, rhonchi or wheezes  Heart: regular rate and rhythm, S1, S2 normal, no murmur, click, rub or gallop  Abdomen: soft, non-tender; bowel sounds normal; no masses,  no organomegaly  Extremities: 3 extremities normal, atraumatic, no cyanosis or edema, R hip dressings in place  Neurologic: No focal neurologic deficits, normal sensation, alert and oriented, affect and mood appropriate. Skin: no rashes, nodules. A/P: Now SP ORIF PP Hip fx 10/27/17, post-op anemia, monitor, support with PRBCs.

## 2017-10-31 NOTE — PROGRESS NOTES
Occupational Therapy  Facility/Department: [facility] SURG SERVICES  Daily Treatment Note  NAME: Santi Anders  : 1940  MRN: [id number]    Date of Service: 10/31/2017    Patient Diagnosis(es): There were no encounter diagnoses. has a past medical history of Arthritis; Blood circulation, collateral; Cerebral artery occlusion with cerebral infarction ([facility] Chronic kidney disease; Diabetes mellitus ([facility] History of blood transfusion; Hypertension; and Psychiatric problem. has a past surgical history that includes Hip fracture surgery; Mandible fracture surgery; Toe amputation (Right); fracture surgery; joint replacement; and Total hip arthroplasty (Right, 10/27/2017). Restrictions  Restrictions/Precautions  Restrictions/Precautions: Weight Bearing, Fall Risk  Required Braces or Orthoses?: No  Lower Extremity Weight Bearing Restrictions  Right Lower Extremity Weight Bearing: Partial Weight Bearing  Partial Weight Bearing Percentage Or Pounds: 25%  Subjective   General  Chart Reviewed: Yes  Patient assessed for rehabilitation services?: Yes  Family / Caregiver Present: Yes  General Comment  Comments: Patient usually states he is pain free at rest.  Complains of pain only upon movement. Pain Assessment  Patient Currently in Pain: Yes  Pain Assessment: 0-10  Pain Level: 7  Pain Type: Surgical pain  Pain Location: Hip;Leg  Pain Orientation: Left  Pain Intervention(s): Repositioned; Ambulation/Increased activity;RN notified  Response to Pain Intervention: Patient Satisfied  Vital Signs  Patient Currently in Pain: Yes   Orientation     Objective    ADL  Toileting: Dependent/Total (incontinent of urine)        Balance  Sitting Balance: Contact guard assistance  Standing Balance  Activity: Did not attempt standing based upon pain level.  Anticipate he will not be able to maintain 25% wbing  Bed mobility  Rolling to Left: Maximum assistance (using pillows between knees for pain management)  Supine to Sit: Maximum assistance (But patient showing increased active participation)  Sit to Supine: Maximum assistance (assist of two for pain management)  Transfers  Slide Board: Maximum assistance (drop arm recliner to bed)                       Cognition  Cognition Comment: Awake, alert, participating            Positioning  Bed Positioning Type: Lower extremity  Bed Postion Comment: heels off loaded        Type of ROM/Therapeutic Exercise  Comment: Performed beginning level reaching activities and UE AROM for 30 reps total to increase active movement and tolerance                    Assessment   Performance deficits / Impairments: Decreased functional mobility ; Decreased ADL status; Decreased cognition  Assessment: Was able to tolerate OOB for several hours today. Assisted back to bed with assist of two using sliding board. Treatment Diagnosis: Right Hip Fx s/p ORIF  Patient Education: sliding board transfers, exercises  Barriers to Learning: will need repetition  Discharge Recommendations: Patient would benefit from continued therapy after discharge  REQUIRES OT FOLLOW UP: Yes  Activity Tolerance  Activity Tolerance: Patient limited by pain  Activity Tolerance: but still participates  Safety Devices  Safety Devices in place: Yes  Type of devices: Bed alarm in place;Call light within reach; Left in bed       Discharge Recommendations:  Patient would benefit from continued therapy after discharge     Plan   Plan  Times per week: 4-8 visits weekly qd to bid as tolerated  Specific instructions for Next Treatment: follow up with physician/nurse regarding intolerance for therapy due to pain  Current Treatment Recommendations: Functional Mobility Training, Cognitive Reorientation, Patient/Caregiver Education & Training, Equipment Evaluation, Education, & procurement, Self-Care / ADL  Plan Comment: Previous goal are still appropriate and relevant.   G-Code     OutComes Score                                             Goals  Short term

## 2017-11-01 LAB
ABO/RH: NORMAL
ANION GAP SERPL CALCULATED.3IONS-SCNC: 11 MMOL/L (ref 7–19)
ANTIBODY SCREEN: NORMAL
BASOPHILS ABSOLUTE: 0.1 K/UL (ref 0–0.2)
BASOPHILS RELATIVE PERCENT: 0.5 % (ref 0–1)
BUN BLDV-MCNC: 21 MG/DL (ref 8–23)
CALCIUM SERPL-MCNC: 7.9 MG/DL (ref 8.8–10.2)
CHLORIDE BLD-SCNC: 101 MMOL/L (ref 98–111)
CO2: 26 MMOL/L (ref 22–29)
CREAT SERPL-MCNC: 1.1 MG/DL (ref 0.5–1.2)
EOSINOPHILS ABSOLUTE: 0.3 K/UL (ref 0–0.6)
EOSINOPHILS RELATIVE PERCENT: 2.8 % (ref 0–5)
GFR NON-AFRICAN AMERICAN: >60
GLUCOSE BLD-MCNC: 114 MG/DL (ref 70–99)
GLUCOSE BLD-MCNC: 138 MG/DL (ref 70–99)
GLUCOSE BLD-MCNC: 149 MG/DL (ref 70–99)
GLUCOSE BLD-MCNC: 211 MG/DL (ref 74–109)
GLUCOSE BLD-MCNC: 226 MG/DL (ref 70–99)
HCT VFR BLD CALC: 22.7 % (ref 42–52)
HEMOGLOBIN: 7.4 G/DL (ref 14–18)
LYMPHOCYTES ABSOLUTE: 1.6 K/UL (ref 1.1–4.5)
LYMPHOCYTES RELATIVE PERCENT: 12.7 % (ref 20–40)
MAGNESIUM: 1.9 MG/DL (ref 1.6–2.4)
MCH RBC QN AUTO: 28.6 PG (ref 27–31)
MCHC RBC AUTO-ENTMCNC: 32.6 G/DL (ref 33–37)
MCV RBC AUTO: 87.6 FL (ref 80–94)
MONOCYTES ABSOLUTE: 1.3 K/UL (ref 0–0.9)
MONOCYTES RELATIVE PERCENT: 10.4 % (ref 0–10)
NEUTROPHILS ABSOLUTE: 8.7 K/UL (ref 1.5–7.5)
NEUTROPHILS RELATIVE PERCENT: 71.5 % (ref 50–65)
PDW BLD-RTO: 15 % (ref 11.5–14.5)
PERFORMED ON: ABNORMAL
PHOSPHORUS: 2.5 MG/DL (ref 2.5–4.5)
PLATELET # BLD: 471 K/UL (ref 130–400)
PMV BLD AUTO: 9.7 FL (ref 9.4–12.4)
POTASSIUM SERPL-SCNC: 3.8 MMOL/L (ref 3.5–5)
RBC # BLD: 2.59 M/UL (ref 4.7–6.1)
SODIUM BLD-SCNC: 138 MMOL/L (ref 136–145)
WBC # BLD: 12.2 K/UL (ref 4.8–10.8)

## 2017-11-01 PROCEDURE — 99232 SBSQ HOSP IP/OBS MODERATE 35: CPT | Performed by: HOSPITALIST

## 2017-11-01 PROCEDURE — 6370000000 HC RX 637 (ALT 250 FOR IP): Performed by: HOSPITALIST

## 2017-11-01 PROCEDURE — 1210000000 HC MED SURG R&B

## 2017-11-01 PROCEDURE — 80048 BASIC METABOLIC PNL TOTAL CA: CPT

## 2017-11-01 PROCEDURE — 97530 THERAPEUTIC ACTIVITIES: CPT

## 2017-11-01 PROCEDURE — 2580000003 HC RX 258: Performed by: ORTHOPAEDIC SURGERY

## 2017-11-01 PROCEDURE — 6370000000 HC RX 637 (ALT 250 FOR IP): Performed by: ORTHOPAEDIC SURGERY

## 2017-11-01 PROCEDURE — 82948 REAGENT STRIP/BLOOD GLUCOSE: CPT

## 2017-11-01 PROCEDURE — 6370000000 HC RX 637 (ALT 250 FOR IP): Performed by: NURSE PRACTITIONER

## 2017-11-01 PROCEDURE — 86900 BLOOD TYPING SEROLOGIC ABO: CPT

## 2017-11-01 PROCEDURE — 86850 RBC ANTIBODY SCREEN: CPT

## 2017-11-01 PROCEDURE — 83735 ASSAY OF MAGNESIUM: CPT

## 2017-11-01 PROCEDURE — 86901 BLOOD TYPING SEROLOGIC RH(D): CPT

## 2017-11-01 PROCEDURE — 85025 COMPLETE CBC W/AUTO DIFF WBC: CPT

## 2017-11-01 PROCEDURE — 6370000000 HC RX 637 (ALT 250 FOR IP): Performed by: INTERNAL MEDICINE

## 2017-11-01 PROCEDURE — 84100 ASSAY OF PHOSPHORUS: CPT

## 2017-11-01 RX ORDER — INSULIN GLARGINE 100 [IU]/ML
35 INJECTION, SOLUTION SUBCUTANEOUS EVERY MORNING
Status: DISCONTINUED | OUTPATIENT
Start: 2017-11-02 | End: 2017-11-02 | Stop reason: HOSPADM

## 2017-11-01 RX ADMIN — PANTOPRAZOLE SODIUM 40 MG: 40 TABLET, DELAYED RELEASE ORAL at 05:32

## 2017-11-01 RX ADMIN — OXYCODONE HYDROCHLORIDE 10 MG: 10 TABLET, FILM COATED, EXTENDED RELEASE ORAL at 21:16

## 2017-11-01 RX ADMIN — ACETAMINOPHEN 1000 MG: 500 TABLET, FILM COATED ORAL at 13:09

## 2017-11-01 RX ADMIN — INSULIN LISPRO 2 UNITS: 100 INJECTION, SOLUTION INTRAVENOUS; SUBCUTANEOUS at 07:43

## 2017-11-01 RX ADMIN — TAMSULOSIN HYDROCHLORIDE 0.4 MG: 0.4 CAPSULE ORAL at 07:44

## 2017-11-01 RX ADMIN — Medication 10 ML: at 08:30

## 2017-11-01 RX ADMIN — INSULIN GLARGINE 30 UNITS: 100 INJECTION, SOLUTION SUBCUTANEOUS at 07:44

## 2017-11-01 RX ADMIN — VENLAFAXINE 37.5 MG: 37.5 TABLET ORAL at 07:45

## 2017-11-01 RX ADMIN — OXYCODONE HYDROCHLORIDE 10 MG: 10 TABLET, FILM COATED, EXTENDED RELEASE ORAL at 07:44

## 2017-11-01 RX ADMIN — Medication 10 ML: at 21:16

## 2017-11-01 RX ADMIN — CLOPIDOGREL BISULFATE 75 MG: 75 TABLET ORAL at 07:44

## 2017-11-01 RX ADMIN — ACETAMINOPHEN 1000 MG: 500 TABLET, FILM COATED ORAL at 05:32

## 2017-11-01 RX ADMIN — ACETAMINOPHEN 1000 MG: 500 TABLET, FILM COATED ORAL at 21:15

## 2017-11-01 RX ADMIN — OXYCODONE HYDROCHLORIDE 5 MG: 5 TABLET ORAL at 01:52

## 2017-11-01 RX ADMIN — TEMAZEPAM 30 MG: 15 CAPSULE ORAL at 21:16

## 2017-11-01 ASSESSMENT — PAIN SCALES - GENERAL
PAINLEVEL_OUTOF10: 8
PAINLEVEL_OUTOF10: 0
PAINLEVEL_OUTOF10: 3
PAINLEVEL_OUTOF10: 5
PAINLEVEL_OUTOF10: 8
PAINLEVEL_OUTOF10: 3
PAINLEVEL_OUTOF10: 5
PAINLEVEL_OUTOF10: 4
PAINLEVEL_OUTOF10: 7

## 2017-11-01 ASSESSMENT — PAIN DESCRIPTION - PAIN TYPE
TYPE: SURGICAL PAIN
TYPE: SURGICAL PAIN

## 2017-11-01 ASSESSMENT — PAIN DESCRIPTION - ORIENTATION
ORIENTATION: RIGHT
ORIENTATION: LEFT

## 2017-11-01 ASSESSMENT — PAIN DESCRIPTION - LOCATION
LOCATION: HIP;LEG
LOCATION: HIP

## 2017-11-01 NOTE — PROGRESS NOTES
[facility] Hospitalists      Patient:    Jessica Bell  YOB: 1940  Date of Service:  11/1/2017  MRN:    [id number]    Room:   [address] [place] [address]   PCP:    Dorsie Severs, DO  Advance Directive:  DNR-CC  Admit Date:   10/21/2017       Hospital Day:  11    Chief Complaint:  Fall    Subjective: Patient is sitting up in bedside chair. He is eating his lunch. He is very alert and talkative today. He states he is feeling very good. Family friend at bedside wife had errands to run today. Informed patient and friend that Hgb has remained the same actually 0.2 higher today versus yesterday. If trend remains the same, patient will be ready for discharge possible tomorrow. Cumulative hospital history:   Mr. Coleen Gonzales is a 69year old male who was transferred from [facility] with a periprosthetic hip fracture sustained from a fall when he was transferring from chair to go the bathroom.  Also sustained R knee skin tear, having pain in R hip and groin Has a history of strokes and falls.  Has a history of arthritis, stroke 12/14, CKD, DM, HTN, depression, Hx PUD, retroperitoneal bleed 2014, IVC filter, ulcer.     He was seen and evaluated by Dr. Leo Araujo, Orthopedic Surgery, who recommended non-surgical rehabilitation initially. Referral to SNF for rehabilitation. Patient accepted to [facility] and Rehab.      Repeat right hip XR ordered on 10/26/2017. Dr. Leo Araujo reviewed right hip XR, decision to proceed with OR on 10/27/2017 due to increasing pain. Plavix was not held due to cardiovascular risk. Patient has received 4 units of RBCs since surgery. On 10/30/17 chest x-ray was obtained and was unremarkable. A KUB was obtained as well and indicated no acute process. Review Of Symptoms:    14 point review of systems is negative except as specifically addressed above.     Objective:   VITALS:  BP (!) 108/52   Pulse 94   Temp 98.2 °F (36.8 °C) (Temporal)   Resp 16   Ht 5' 10\" (1.778 m)   Wt 185 lb 4.8 oz (84.1 kg)   SpO2 100%   BMI 26.59 kg/m²   24HR INTAKE/OUTPUT:      Intake/Output Summary (Last 24 hours) at 11/01/17 1647  Last data filed at 11/01/17 1438   Gross per 24 hour   Intake             1010 ml   Output                0 ml   Net             1010 ml     General appearance: alert, appears stated age, cooperative and no distress  Head: Normocephalic, without obvious abnormality, atraumatic  Eyes: conjunctivae/corneas clear. PERRL, EOM's intact. Ears: normal external ears and nose, throat without exudate  Neck: no adenopathy, no carotid bruit, no JVD, supple, symmetrical, trachea midline and thyroid not enlarged, symmetric, no tenderness/mass/nodules  Lungs: clear to auscultation bilaterally,no rales or wheezes   Heart: regular/tachycardic, Regular rhythm, S1, S2 normal, no murmur  Abdomen: soft, non-tender; non-distended, normal bowel sounds no masses, no organomegaly  Extremities: Right hip with bandage - C/D/I. Has staples to surgical incision  Skin: Skin color, texture, turgor normal. No rashes or lesions  Neurologic: Generalized weakness, no focal deficits.      Medications:      sodium chloride      dextrose        [START ON 11/2/2017] insulin glargine  35 Units Subcutaneous QAM    temazepam  30 mg Oral Nightly    sodium chloride flush  10 mL Intravenous 2 times per day    acetaminophen  1,000 mg Oral Q8H    insulin lispro  0-6 Units Subcutaneous TID WC    insulin lispro  0-3 Units Subcutaneous Nightly    oxyCODONE  10 mg Oral 2 times per day    tamsulosin  0.4 mg Oral Daily    venlafaxine  37.5 mg Oral Daily    bacitracin-polymyxin b   Topical BID    pantoprazole  40 mg Oral QAM AC    clopidogrel  75 mg Oral Daily    docusate sodium  100 mg Oral BID     polyethylene glycol, insulin lispro, sodium chloride flush, oxyCODONE **OR** oxyCODONE, fentanNYL **OR** fentanNYL, senna, glycerin (Laxative), acetaminophen, bisacodyl, ondansetron, glucose, dextrose, glucagon (rDNA), 3 chronic kidney disease - stable continue to monitor    Antibiotics:  None  DVT Prophylaxis:  SCDs & TEDS on patient  GI prophylaxis:  Protonix    Patient has a bed offer at [facility]. Patient's wife wanted him at [facility]' rehab but patient can not tolerate the required amount of therapy. Patient's condition has improved over the past few days. Rehab reviewed patient's chart again and he still will not qualify for their services. Patient will need to go to [facility] and possible tomorrow. Catarino Joel, SHRAVAN - BC    I have independently interviewed and examined the patient. I have discussed key elements of the care plan with the PA or APRN & agree with the findings and care plan as documented above. Latasha Robertson MD      CC: Just maneuvered back to bed  S: Feels like he is a little better, no CP, SOA, agrees to [facility] placement for PT, wife would like to wait till Saturday.   O:Vitals: BP (!) 108/52   Pulse 94   Temp 98.2 °F (36.8 °C) (Temporal)   Resp 16   Ht 5' 10\" (1.778 m)   Wt 185 lb 4.8 oz (84.1 kg)   SpO2 100%   BMI 26.59 kg/m²   24HR INTAKE/OUTPUT:      Intake/Output Summary (Last 24 hours) at 11/01/17 1647  Last data filed at 11/01/17 1438   Gross per 24 hour   Intake             1010 ml   Output                0 ml   Net             1010 ml     General appearance: alert and cooperative with exam,very pleasant  HEENT: atraumatic, eyes with clear conjunctiva and normal lids, pupils and irises normal, external ears and nose are normal, lips normal. Neck without masses, lympadenopathy, bruit, thyroid normal  Lungs: no increased work of breathing, clear to auscultation bilaterally without rales, rhonchi or wheezes  Heart: regular rate and rhythm, S1, S2 normal, no murmur, click, rub or gallop  Abdomen: soft, non-tender; bowel sounds normal; no masses,  no organomegaly  Extremities: 3 extremities normal, atraumatic, no cyanosis or edema, R hip dressings in place  Neurologic: No focal neurologic deficits, normal sensation, alert and oriented, affect and mood appropriate. Skin: no rashes, nodules. A/P: Now SP ORIF PP Hip fx 10/27/17,nya Hg, support with PRBCs PRN, stable for D/C to ECF in am if no change.

## 2017-11-01 NOTE — PROGRESS NOTES
2.)  Transfers  Slide Board: Maximum assistance (Mod to Max A of 2)                                                                 Assessment   Performance deficits / Impairments: Decreased functional mobility ; Decreased ADL status; Decreased cognition  Assessment: Pt having outbursts of pain at times even with no movement. Pt's progress is limited by pain. Treatment Diagnosis: Right Hip Fx s/p ORIF  Patient Education: sliding board transfers, exercises  Discharge Recommendations: Patient would benefit from continued therapy after discharge  REQUIRES OT FOLLOW UP: Yes  Activity Tolerance  Activity Tolerance: Patient limited by pain       Discharge Recommendations:  Patient would benefit from continued therapy after discharge     Plan   Plan  Times per week: 4-8 visits weekly qd to bid as tolerated  Specific instructions for Next Treatment: follow up with physician/nurse regarding intolerance for therapy due to pain  Current Treatment Recommendations: Functional Mobility Training, Cognitive Reorientation, Patient/Caregiver Education & Training, Equipment Evaluation, Education, & procurement, Self-Care / ADL  Plan Comment: Previous goal are still appropriate and relevant.   G-Code     OutComes Score                                             Goals  Short term goals  Short term goal 1: Patient will be able to actively participate in 1-2 handed EOB activities with supervision  Short term goal 2: Perform sliding board transfer with assist of 2  Short term goal 3: Perform dressing using recommended strategies supine or sitting with mod A of one  Long term goals  Long term goal 1: Upgrade ADL and mobility as tolerated       Therapy Time   Individual Concurrent Group Co-treatment   Time In           Time Out           Minutes                   PASCALE [place]

## 2017-11-01 NOTE — PROGRESS NOTES
Physical Therapy  Name: Mo Byrne  MRN:  [id number]  Date of service:  11/1/2017 11/01/17 1609   Subjective   Subjective Patient agreeable to work with therapy. General Comment   Comments Charting is for both morning and afternoon tx's. Bed Mobility   Rolling Moderate assistance   Supine to Sit Moderate assistance;Maximal assistance  (x2)   Sit to Supine Moderate assistance;Maximal assistance  (x2)   Transfers   Lateral Transfers Moderate Assistance;Maximum Assistance  (x2 sliding board to and from chair)   Ambulation   Ambulation? No   WB Status 25% PWB RLE   Balance   Comments unable to test standing due to pain   Other Activities   Comment Assisted PCA with rolling for cleanup and linen change   Patient Goals    Patient goals  get better, decrease pain   Short term goals   Time Frame for Short term goals 14 DAYS BEFORE RE EVAL   Short term goal 1 supine to sit Min A   Short term goal 2 sit to stand Min A with RW 25% PWB   Short term goal 3 stand pivot transfer to chair Min A, PWB 25%   Short term goal 4 amb. 10' with RW 25% PWB RLE   Conditions Requiring Skilled Therapeutic Intervention   Assessment Patient sitting on EOB, leans to left to relieve pressure off right hip area. Difficult to get patient centered in sitting due to pain. Worked on bilateral rolling with pillows between knees. Assisted with changing pads and cleanup due to bowel and bladder incontinence. Left patient in supine with all needs in reach, fresh active ice applied.    Treatment Diagnosis impaired mobility and gait due to FALL WITH R HIP FX   Prognosis Fair         Electronically signed by Edgar Erickson PTA on 11/1/2017 at 4:12 PM

## 2017-11-02 VITALS
TEMPERATURE: 97.4 F | DIASTOLIC BLOOD PRESSURE: 79 MMHG | HEIGHT: 70 IN | HEART RATE: 92 BPM | WEIGHT: 187.1 LBS | RESPIRATION RATE: 14 BRPM | OXYGEN SATURATION: 96 % | BODY MASS INDEX: 26.79 KG/M2 | SYSTOLIC BLOOD PRESSURE: 168 MMHG

## 2017-11-02 LAB
ANION GAP SERPL CALCULATED.3IONS-SCNC: 15 MMOL/L (ref 7–19)
BASOPHILS ABSOLUTE: 0.1 K/UL (ref 0–0.2)
BASOPHILS RELATIVE PERCENT: 0.7 % (ref 0–1)
BUN BLDV-MCNC: 19 MG/DL (ref 8–23)
CALCIUM SERPL-MCNC: 8.2 MG/DL (ref 8.8–10.2)
CHLORIDE BLD-SCNC: 105 MMOL/L (ref 98–111)
CO2: 23 MMOL/L (ref 22–29)
CREAT SERPL-MCNC: 1.1 MG/DL (ref 0.5–1.2)
EOSINOPHILS ABSOLUTE: 0.5 K/UL (ref 0–0.6)
EOSINOPHILS RELATIVE PERCENT: 4.9 % (ref 0–5)
GFR NON-AFRICAN AMERICAN: >60
GLUCOSE BLD-MCNC: 120 MG/DL (ref 70–99)
GLUCOSE BLD-MCNC: 156 MG/DL (ref 74–109)
GLUCOSE BLD-MCNC: 177 MG/DL (ref 70–99)
GLUCOSE BLD-MCNC: 337 MG/DL (ref 70–99)
HCT VFR BLD CALC: 23.4 % (ref 42–52)
HEMOGLOBIN: 7.6 G/DL (ref 14–18)
LYMPHOCYTES ABSOLUTE: 1.7 K/UL (ref 1.1–4.5)
LYMPHOCYTES RELATIVE PERCENT: 17.2 % (ref 20–40)
MAGNESIUM: 1.9 MG/DL (ref 1.6–2.4)
MCH RBC QN AUTO: 28.8 PG (ref 27–31)
MCHC RBC AUTO-ENTMCNC: 32.5 G/DL (ref 33–37)
MCV RBC AUTO: 88.6 FL (ref 80–94)
MONOCYTES ABSOLUTE: 1.2 K/UL (ref 0–0.9)
MONOCYTES RELATIVE PERCENT: 11.4 % (ref 0–10)
NEUTROPHILS ABSOLUTE: 6.2 K/UL (ref 1.5–7.5)
NEUTROPHILS RELATIVE PERCENT: 61.5 % (ref 50–65)
PDW BLD-RTO: 15 % (ref 11.5–14.5)
PERFORMED ON: ABNORMAL
PHOSPHORUS: 3.7 MG/DL (ref 2.5–4.5)
PLATELET # BLD: 551 K/UL (ref 130–400)
PMV BLD AUTO: 9.3 FL (ref 9.4–12.4)
POTASSIUM SERPL-SCNC: 3.8 MMOL/L (ref 3.5–5)
RBC # BLD: 2.64 M/UL (ref 4.7–6.1)
SODIUM BLD-SCNC: 143 MMOL/L (ref 136–145)
WBC # BLD: 10.1 K/UL (ref 4.8–10.8)

## 2017-11-02 PROCEDURE — 6370000000 HC RX 637 (ALT 250 FOR IP): Performed by: HOSPITALIST

## 2017-11-02 PROCEDURE — 85025 COMPLETE CBC W/AUTO DIFF WBC: CPT

## 2017-11-02 PROCEDURE — 6370000000 HC RX 637 (ALT 250 FOR IP): Performed by: NURSE PRACTITIONER

## 2017-11-02 PROCEDURE — 6370000000 HC RX 637 (ALT 250 FOR IP): Performed by: ORTHOPAEDIC SURGERY

## 2017-11-02 PROCEDURE — 97530 THERAPEUTIC ACTIVITIES: CPT

## 2017-11-02 PROCEDURE — 84100 ASSAY OF PHOSPHORUS: CPT

## 2017-11-02 PROCEDURE — 6370000000 HC RX 637 (ALT 250 FOR IP): Performed by: INTERNAL MEDICINE

## 2017-11-02 PROCEDURE — 2580000003 HC RX 258: Performed by: ORTHOPAEDIC SURGERY

## 2017-11-02 PROCEDURE — 99239 HOSP IP/OBS DSCHRG MGMT >30: CPT | Performed by: HOSPITALIST

## 2017-11-02 PROCEDURE — 83735 ASSAY OF MAGNESIUM: CPT

## 2017-11-02 PROCEDURE — 82948 REAGENT STRIP/BLOOD GLUCOSE: CPT

## 2017-11-02 PROCEDURE — 80048 BASIC METABOLIC PNL TOTAL CA: CPT

## 2017-11-02 RX ORDER — POLYETHYLENE GLYCOL 3350 17 G/17G
17 POWDER, FOR SOLUTION ORAL DAILY PRN
Qty: 527 G | Refills: 1 | DISCHARGE
Start: 2017-11-02 | End: 2017-12-02

## 2017-11-02 RX ORDER — SENNA PLUS 8.6 MG/1
2 TABLET ORAL NIGHTLY PRN
DISCHARGE
Start: 2017-11-02 | End: 2017-12-02

## 2017-11-02 RX ORDER — PSEUDOEPHEDRINE HCL 30 MG
100 TABLET ORAL 2 TIMES DAILY
DISCHARGE
Start: 2017-11-02 | End: 2017-12-06

## 2017-11-02 RX ORDER — BACITRACIN ZINC AND POLYMYXIN B SULFATE 500; 1000 [USP'U]/G; [USP'U]/G
OINTMENT TOPICAL
Refills: 1 | DISCHARGE
Start: 2017-11-02 | End: 2017-11-09

## 2017-11-02 RX ORDER — PANTOPRAZOLE SODIUM 40 MG/1
40 TABLET, DELAYED RELEASE ORAL
Qty: 30 TABLET | Refills: 0 | DISCHARGE
Start: 2017-11-03 | End: 2017-12-06

## 2017-11-02 RX ORDER — OXYCODONE HYDROCHLORIDE 5 MG/1
5 TABLET ORAL EVERY 4 HOURS PRN
Qty: 18 TABLET | Refills: 0 | Status: SHIPPED | OUTPATIENT
Start: 2017-11-02

## 2017-11-02 RX ORDER — TEMAZEPAM 30 MG/1
30 CAPSULE ORAL NIGHTLY
Qty: 3 CAPSULE | Refills: 0 | Status: SHIPPED | OUTPATIENT
Start: 2017-11-02 | End: 2017-11-05

## 2017-11-02 RX ORDER — OXYCODONE HCL 10 MG/1
10 TABLET, FILM COATED, EXTENDED RELEASE ORAL EVERY 12 HOURS SCHEDULED
Qty: 6 TABLET | Refills: 0 | Status: SHIPPED | OUTPATIENT
Start: 2017-11-02 | End: 2017-11-05

## 2017-11-02 RX ORDER — TAMSULOSIN HYDROCHLORIDE 0.4 MG/1
0.4 CAPSULE ORAL DAILY
Qty: 30 CAPSULE | Refills: 0 | DISCHARGE
Start: 2017-11-03

## 2017-11-02 RX ADMIN — INSULIN LISPRO 4 UNITS: 100 INJECTION, SOLUTION INTRAVENOUS; SUBCUTANEOUS at 12:12

## 2017-11-02 RX ADMIN — OXYCODONE HYDROCHLORIDE 10 MG: 10 TABLET, FILM COATED, EXTENDED RELEASE ORAL at 07:22

## 2017-11-02 RX ADMIN — VENLAFAXINE 37.5 MG: 37.5 TABLET ORAL at 07:22

## 2017-11-02 RX ADMIN — ACETAMINOPHEN 650 MG: 325 TABLET, FILM COATED ORAL at 11:13

## 2017-11-02 RX ADMIN — ACETAMINOPHEN 1000 MG: 500 TABLET, FILM COATED ORAL at 14:45

## 2017-11-02 RX ADMIN — Medication 10 ML: at 07:25

## 2017-11-02 RX ADMIN — CLOPIDOGREL BISULFATE 75 MG: 75 TABLET ORAL at 07:22

## 2017-11-02 RX ADMIN — INSULIN LISPRO 1 UNITS: 100 INJECTION, SOLUTION INTRAVENOUS; SUBCUTANEOUS at 07:22

## 2017-11-02 RX ADMIN — ACETAMINOPHEN 1000 MG: 500 TABLET, FILM COATED ORAL at 06:17

## 2017-11-02 RX ADMIN — INSULIN GLARGINE 35 UNITS: 100 INJECTION, SOLUTION SUBCUTANEOUS at 07:22

## 2017-11-02 RX ADMIN — PANTOPRAZOLE SODIUM 40 MG: 40 TABLET, DELAYED RELEASE ORAL at 06:17

## 2017-11-02 RX ADMIN — TAMSULOSIN HYDROCHLORIDE 0.4 MG: 0.4 CAPSULE ORAL at 07:22

## 2017-11-02 ASSESSMENT — PAIN DESCRIPTION - LOCATION: LOCATION: HIP

## 2017-11-02 ASSESSMENT — PAIN DESCRIPTION - ORIENTATION: ORIENTATION: RIGHT

## 2017-11-02 ASSESSMENT — PAIN SCALES - GENERAL
PAINLEVEL_OUTOF10: 4
PAINLEVEL_OUTOF10: 5
PAINLEVEL_OUTOF10: 4
PAINLEVEL_OUTOF10: 0
PAINLEVEL_OUTOF10: 4

## 2017-11-02 ASSESSMENT — PAIN DESCRIPTION - PAIN TYPE: TYPE: SURGICAL PAIN

## 2017-11-02 NOTE — PROGRESS NOTES
Subjective:     Post-Operative Day: 6 Status Post right ORIF periprosthetic femur fracture with NCB cable plate. Pt. Is awake and alert. More oriented today. Answers questions appropriately. No new issues/complaints this am.  Doing well. Pain currently under control. Systemic or Specific Complaints:No Complaints  no nausea and no vomiting Pain 3    Objective:     Patient Vitals for the past 24 hrs:   BP Temp Temp src Pulse Resp SpO2 Weight   11/02/17 0637 (!) 159/84 97.4 °F (36.3 °C) Temporal 82 16 95 % -   11/02/17 0511 - - - - - - 187 lb 1.6 oz (84.9 kg)   11/02/17 0509 (!) 143/78 97.8 °F (36.6 °C) Temporal 84 18 93 % -   11/02/17 0051 (!) 144/81 97.9 °F (36.6 °C) Temporal 85 18 96 % -   11/01/17 1913 (!) 148/76 98.4 °F (36.9 °C) Temporal 91 18 95 % -   11/01/17 1445 (!) 108/52 98.2 °F (36.8 °C) Temporal 94 16 100 % -   11/01/17 1023 (!) 148/77 98.4 °F (36.9 °C) Temporal 92 18 100 % -       General: alert, appears stated age, cooperative, no distress and pale   Exam: Fair motion to right hip without significant discomfort. Op-site is clean and dry. Stable weakness right foot. Wound: Wound clean and dry no evidence of infection. , No Drainage and Wound Intact   Neurovascular:   Unchanged from previous   DVT Exam: No evidence of DVT seen on physical exam.   Xray:  none     Data Review:  Recent Labs      10/31/17   0415  11/01/17 0215   HGB  7.2*  7.4*     Recent Labs      11/01/17 0215   NA  138   K  3.8   CREATININE  1.1     Recent Labs      11/01/17 0215   LABGLOM  >60     No results for input(s): INR in the last 72 hours. Assessment:     Status Post right ORIF periprosthetic femur fracture. Doing well postoperatively. Plan:     Pt. Will continue with current cares. Pt. Will be 25% weight bearing to right lower extremity for 12 weeks. Okay to resume his plavix. Plan to go to [facility] rehab once stable for 3 days.           Electronically signed by Crystal Baron MD on 11/2/2017 at 7:03 AM

## 2017-11-02 NOTE — PROGRESS NOTES
Nutrition Assessment    Type and Reason for Visit: Reassess    Nutrition Recommendations: Continue current POC & continue to monitor overall nutritional status    Malnutrition Assessment:  · Malnutrition Status: No malnutrition    Nutrition Diagnosis:   · Problem: No nutrition diagnosis at this time    Nutrition Assessment:  · Subjective Assessment: Appetite/PO intake remains good. No new nutritional recommendations/interventions are warranted at this time. · Wound Type: Surgical Wound (bruising)  · Current Nutrition Therapies:  · Oral Diet Orders: Carb Control 3 Carbs/Meal   · Oral Diet intake: 51-75%, %  · Oral Nutrition Supplement (ONS) Orders: Diabetic Oral Supplement  · Anthropometric Measures:  · Ht: 5' 10\" (177.8 cm)   · Current Body Wt: 187 lb 2 oz (84.9 kg)  · Admission Body Wt: 165 lb 7 oz (75 kg)  · Ideal Body Wt: 166 lb (75.3 kg), % Ideal Body 112.6%  · BMI Classification: BMI 25.0 - 29.9 Overweight    Estimated Intake vs Estimated Needs: Intake Meets Needs    Nutrition Risk Level: Low    Nutrition Interventions:   Continue current diet  Continued Inpatient Monitoring    Nutrition Evaluation:   · Evaluation: Goal achieved   · Goals: New Goal: Pt will continue to consume % of meals & supplements    · Monitoring: Meal Intake, Supplement Intake, Skin Integrity, Wound Healing, Weight, Pertinent Labs    See Adult Nutrition Doc Flowsheet for more detail.      Electronically signed by Bryson Daniels RD, LD on 11/2/17 at 12:48 PM    Contact Number: [phone]

## 2017-11-02 NOTE — DISCHARGE SUMMARY
UNIT/ML pen               insulin aspart (NOVOLOG FLEXPEN) 100 UNIT/ML injection pen  Inject into the skin 4 times daily (before meals and nightly) Indications: per sliding scale. 5 units over 150             insulin glargine (LANTUS) 100 UNIT/ML injection vial  Inject 30 Units into the skin every morning             lisinopril (PRINIVIL;ZESTRIL) 2.5 MG tablet  Take 2.5 mg by mouth daily             Multiple Vitamins-Minerals (EYE VITAMINS PO)  Take by mouth daily             Multiple Vitamins-Minerals (MULTIVITAMIN ADULT) TABS  Take 1 tablet by mouth daily             pantoprazole (PROTONIX) 40 MG tablet  Take 1 tablet by mouth every morning (before breakfast)             polyethylene glycol (GLYCOLAX) packet  Take 17 g by mouth daily as needed for Constipation             SAW DEBORA NICK, PO  Take by mouth daily             senna (SENOKOT) 8.6 MG tablet  Take 2 tablets by mouth nightly as needed (constipation)             SODIUM CHLORIDE, EXTERNAL, (SALINE WOUND WASH) 0.9 % SOLN  1000 cc bottle normal saline. Apply as directed             tamsulosin (FLOMAX) 0.4 MG capsule  Take 1 capsule by mouth daily             temazepam (RESTORIL) 30 MG capsule  Take 30 mg by mouth as needed             UNABLE TO FIND  Indications: [place]'s Wart 1 tablet daily             venlafaxine (EFFEXOR) 37.5 MG tablet  Take 37.5 mg by mouth daily              Zinc 50 MG TABS  Take by mouth daily               Discharge Instructions: Follow up with Josefina Brothers DO in 3-5 days after discharge from rehab. Take medications as directed. Resume activity as tolerated. Diet: Dietary Nutrition Supplements: Diabetic Oral Supplement  DIET GENERAL; Carb Control: 3 carbs/meal (approximate 1500 kcals/day)     Disposition: Patient is medically stable and will be discharged to [facility]. Time spent on discharge 35 minutes.     Signed:  Ronan Flores PA-C     I have independently interviewed and examined the patient. I have discussed key elements of the care plan with the PA or APRN & agree with the findings and care plan as documented above. Dima Boyd MD      CC: Feeling better  S: Ready to go to [facility]CF, pain well controlled, he's ready to go! O:Vitals: BP (!) 168/79   Pulse 92   Temp 97.4 °F (36.3 °C) (Temporal)   Resp 14   Ht 5' 10\" (1.778 m)   Wt 187 lb 1.6 oz (84.9 kg)   SpO2 96%   BMI 26.85 kg/m²   24HR INTAKE/OUTPUT:    Intake/Output Summary (Last 24 hours) at 11/02/17 1313  Last data filed at 11/02/17 0905   Gross per 24 hour   Intake             1570 ml   Output                0 ml   Net             1570 ml     General appearance: alert and cooperative with exam,very pleasant  HEENT: atraumatic, eyes with clear conjunctiva and normal lids, pupils and irises normal, external ears and nose are normal, lips normal. Neck without masses, lympadenopathy, bruit, thyroid normal  Lungs: no increased work of breathing, clear to auscultation bilaterally without rales, rhonchi or wheezes  Heart: regular rate and rhythm, S1, S2 normal, no murmur, click, rub or gallop  Abdomen: soft, non-tender; bowel sounds normal; no masses,  no organomegaly  Extremities: 3 extremities normal, atraumatic, no cyanosis or edema, R hip dressings in place  Neurologic: No focal neurologic deficits, normal sensation, alert and oriented, affect and mood appropriate. Skin: no rashes, nodules.   A/P: Now SP ORIF PP Hip fx 10/27/17,stable Hg and VS, support with PRBCs PRN, stable for D/C to [facility] today.

## 2017-11-02 NOTE — PROGRESS NOTES
Physical Therapy  Tabby Garcia  [id number]     11/02/17 1058   Subjective   Subjective Agrees to work with therapy this morning. Bed Mobility   Supine to Sit Moderate assistance;Maximal assistance  (x2)   Transfers   Sit to Stand Unable to assess   Bed to Chair Moderate assistance  (x2)   Lateral Transfers Moderate Assistance  (x2 via sliding board)   Ambulation   Ambulation? No   WB Status 25% PWB RLE   Other Activities   Comment Assisted patient with transferring bed to chair. Patient willing to participate with therapy. Patient positioned for comfort with all needs in reach. Short term goals   Time Frame for Short term goals 14 DAYS BEFORE RE EVAL   Short term goal 1 supine to sit Min A   Short term goal 2 sit to stand Min A with RW 25% PWB   Short term goal 3 stand pivot transfer to chair Min A, PWB 25%   Short term goal 4 amb. 10' with RW 25% PWB RLE   Activity Tolerance   Activity Tolerance Patient Tolerated treatment well   Safety Devices   Type of devices Call light within reach; Chair alarm in place; Left in chair   Electronically signed by Ramo Gupta PTA on 11/2/2017 at 11:01 AM

## 2017-11-02 NOTE — PROGRESS NOTES
Board: Moderate assistance (Mod A of 2 with 25% PWB RLE)                                                                 Assessment   Performance deficits / Impairments: Decreased functional mobility ; Decreased ADL status; Decreased cognition  Assessment: Pt still limited by pain but is improving, able to tolerate tx better today. Treatment Diagnosis: Right Hip Fx s/p ORIF  Patient Education: sliding board transfers, exercises  Discharge Recommendations: Patient would benefit from continued therapy after discharge  REQUIRES OT FOLLOW UP: Yes  Activity Tolerance  Activity Tolerance: Patient limited by pain  Activity Tolerance: Limited by pain but improving. Discharge Recommendations:  Patient would benefit from continued therapy after discharge     Plan   Plan  Times per week: 4-8 visits weekly qd to bid as tolerated  Specific instructions for Next Treatment: follow up with physician/nurse regarding intolerance for therapy due to pain  Current Treatment Recommendations: Functional Mobility Training, Cognitive Reorientation, Patient/Caregiver Education & Training, Equipment Evaluation, Education, & procurement, Self-Care / ADL  Plan Comment: Previous goal are still appropriate and relevant.   G-Code     OutComes Score                                             Goals  Short term goals  Short term goal 1: Patient will be able to actively participate in 1-2 handed EOB activities with supervision  Short term goal 2: Perform sliding board transfer with assist of 2  Short term goal 3: Perform dressing using recommended strategies supine or sitting with mod A of one  Long term goals  Long term goal 1: Upgrade ADL and mobility as tolerated       Therapy Time   Individual Concurrent Group Co-treatment   Time In           Time Out           Minutes                   PASCALE Jacques

## 2017-11-02 NOTE — PROGRESS NOTES
Palliative Care  made a follow up visit to offer support to Patient. Patient was sitting up in a chair with his Wife present in the room. Patient reports that he is feeling good right now and Wife reports that Patient is about to transfer to a SNF this afternoon. Wife was thankful to  for continuing to follow up with Patient.       Electronically signed by Nancy Carmona on 11/2/2017 at 1:14 PM

## 2017-11-07 ENCOUNTER — LAB REQUISITION (OUTPATIENT)
Dept: LAB | Facility: HOSPITAL | Age: 77
End: 2017-11-07

## 2017-11-07 DIAGNOSIS — Z00.00 ENCOUNTER FOR GENERAL ADULT MEDICAL EXAMINATION WITHOUT ABNORMAL FINDINGS: ICD-10-CM

## 2017-11-07 LAB
ALBUMIN SERPL-MCNC: 2.4 G/DL (ref 3.5–5)
ALBUMIN/GLOB SERPL: 1 G/DL (ref 1.1–2.5)
ALP SERPL-CCNC: 179 U/L (ref 24–120)
ALT SERPL W P-5'-P-CCNC: 35 U/L (ref 0–54)
ANION GAP SERPL CALCULATED.3IONS-SCNC: 8 MMOL/L (ref 4–13)
AST SERPL-CCNC: 19 U/L (ref 7–45)
BASOPHILS # BLD AUTO: 0.05 10*3/MM3 (ref 0–0.2)
BASOPHILS NFR BLD AUTO: 0.4 % (ref 0–2)
BILIRUB SERPL-MCNC: 0.4 MG/DL (ref 0.1–1)
BUN BLD-MCNC: 22 MG/DL (ref 5–21)
BUN/CREAT SERPL: 21 (ref 7–25)
CALCIUM SPEC-SCNC: 8.5 MG/DL (ref 8.4–10.4)
CHLORIDE SERPL-SCNC: 102 MMOL/L (ref 98–110)
CO2 SERPL-SCNC: 27 MMOL/L (ref 24–31)
CREAT BLD-MCNC: 1.05 MG/DL (ref 0.5–1.4)
DEPRECATED RDW RBC AUTO: 47.7 FL (ref 40–54)
EOSINOPHIL # BLD AUTO: 0.49 10*3/MM3 (ref 0–0.7)
EOSINOPHIL NFR BLD AUTO: 3.7 % (ref 0–4)
ERYTHROCYTE [DISTWIDTH] IN BLOOD BY AUTOMATED COUNT: 14.7 % (ref 12–15)
GFR SERPL CREATININE-BSD FRML MDRD: 69 ML/MIN/1.73
GLOBULIN UR ELPH-MCNC: 2.4 GM/DL
GLUCOSE BLD-MCNC: 92 MG/DL (ref 70–100)
HBA1C MFR BLD: 7.4 %
HCT VFR BLD AUTO: 25.4 % (ref 40–52)
HGB BLD-MCNC: 8.1 G/DL (ref 14–18)
IMM GRANULOCYTES # BLD: 0.19 10*3/MM3 (ref 0–0.03)
IMM GRANULOCYTES NFR BLD: 1.5 % (ref 0–5)
LYMPHOCYTES # BLD AUTO: 1.89 10*3/MM3 (ref 0.72–4.86)
LYMPHOCYTES NFR BLD AUTO: 14.4 % (ref 15–45)
MCH RBC QN AUTO: 28.2 PG (ref 28–32)
MCHC RBC AUTO-ENTMCNC: 31.9 G/DL (ref 33–36)
MCV RBC AUTO: 88.5 FL (ref 82–95)
MONOCYTES # BLD AUTO: 1.59 10*3/MM3 (ref 0.19–1.3)
MONOCYTES NFR BLD AUTO: 12.1 % (ref 4–12)
NEUTROPHILS # BLD AUTO: 8.89 10*3/MM3 (ref 1.87–8.4)
NEUTROPHILS NFR BLD AUTO: 67.9 % (ref 39–78)
PLATELET # BLD AUTO: 747 10*3/MM3 (ref 130–400)
PMV BLD AUTO: 9.4 FL (ref 6–12)
POTASSIUM BLD-SCNC: 4.3 MMOL/L (ref 3.5–5.3)
PROT SERPL-MCNC: 4.8 G/DL (ref 6.3–8.7)
RBC # BLD AUTO: 2.87 10*6/MM3 (ref 4.8–5.9)
SODIUM BLD-SCNC: 137 MMOL/L (ref 135–145)
WBC NRBC COR # BLD: 13.1 10*3/MM3 (ref 4.8–10.8)

## 2017-11-07 PROCEDURE — 85025 COMPLETE CBC W/AUTO DIFF WBC: CPT | Performed by: NURSE PRACTITIONER

## 2017-11-07 PROCEDURE — 83036 HEMOGLOBIN GLYCOSYLATED A1C: CPT | Performed by: NURSE PRACTITIONER

## 2017-11-07 PROCEDURE — 36415 COLL VENOUS BLD VENIPUNCTURE: CPT | Performed by: NURSE PRACTITIONER

## 2017-11-07 PROCEDURE — 80053 COMPREHEN METABOLIC PANEL: CPT | Performed by: NURSE PRACTITIONER

## 2017-11-29 ENCOUNTER — LAB REQUISITION (OUTPATIENT)
Dept: LAB | Facility: HOSPITAL | Age: 77
End: 2017-11-29

## 2017-11-29 DIAGNOSIS — Z00.00 ENCOUNTER FOR GENERAL ADULT MEDICAL EXAMINATION WITHOUT ABNORMAL FINDINGS: ICD-10-CM

## 2017-11-29 LAB
HCT VFR BLD AUTO: 26.3 % (ref 40–52)
HGB BLD-MCNC: 8.2 G/DL (ref 14–18)

## 2017-11-29 PROCEDURE — 85014 HEMATOCRIT: CPT | Performed by: NURSE PRACTITIONER

## 2017-11-29 PROCEDURE — 85018 HEMOGLOBIN: CPT | Performed by: NURSE PRACTITIONER

## 2017-11-29 PROCEDURE — 36415 COLL VENOUS BLD VENIPUNCTURE: CPT | Performed by: NURSE PRACTITIONER

## 2017-12-06 RX ORDER — SULFAMETHOXAZOLE AND TRIMETHOPRIM 800; 160 MG/1; MG/1
1 TABLET ORAL 2 TIMES DAILY
Status: ON HOLD | COMMUNITY
End: 2017-12-13 | Stop reason: HOSPADM

## 2017-12-06 RX ORDER — SENNA PLUS 8.6 MG/1
1 TABLET ORAL NIGHTLY PRN
COMMUNITY

## 2017-12-06 RX ORDER — ACETAMINOPHEN 325 MG/1
650 TABLET ORAL EVERY 6 HOURS PRN
COMMUNITY

## 2017-12-06 RX ORDER — BACITRACIN 500 UNIT/G
1 OINTMENT (GRAM) TOPICAL DAILY
COMMUNITY

## 2017-12-06 RX ORDER — PANTOPRAZOLE SODIUM 40 MG/1
40 TABLET, DELAYED RELEASE ORAL DAILY
COMMUNITY

## 2017-12-06 RX ORDER — POLYETHYLENE GLYCOL 3350 17 G/17G
17 POWDER, FOR SOLUTION ORAL DAILY PRN
COMMUNITY

## 2017-12-06 RX ORDER — DOCUSATE SODIUM 100 MG/1
100 CAPSULE, LIQUID FILLED ORAL 2 TIMES DAILY
COMMUNITY

## 2017-12-06 RX ORDER — TEMAZEPAM 15 MG/1
30 CAPSULE ORAL NIGHTLY
COMMUNITY

## 2017-12-06 RX ORDER — ACETAMINOPHEN 160 MG/5ML
900 SUSPENSION, ORAL (FINAL DOSE FORM) ORAL DAILY
COMMUNITY

## 2017-12-06 RX ORDER — TIZANIDINE 4 MG/1
4 TABLET ORAL EVERY 8 HOURS PRN
COMMUNITY

## 2017-12-06 RX ORDER — INSULIN GLARGINE 100 [IU]/ML
15 INJECTION, SOLUTION SUBCUTANEOUS NIGHTLY
COMMUNITY

## 2017-12-08 ENCOUNTER — HOSPITAL ENCOUNTER (INPATIENT)
Age: 77
LOS: 5 days | Discharge: NURSING, REHAB OR LONG-TERM CARE | DRG: 863 | End: 2017-12-13
Attending: ORTHOPAEDIC SURGERY | Admitting: ORTHOPAEDIC SURGERY
Payer: MEDICARE

## 2017-12-08 ENCOUNTER — ANESTHESIA EVENT (OUTPATIENT)
Dept: OPERATING ROOM | Age: 77
DRG: 863 | End: 2017-12-08
Payer: MEDICARE

## 2017-12-08 ENCOUNTER — ANESTHESIA (OUTPATIENT)
Dept: OPERATING ROOM | Age: 77
DRG: 863 | End: 2017-12-08
Payer: MEDICARE

## 2017-12-08 ENCOUNTER — APPOINTMENT (OUTPATIENT)
Dept: GENERAL RADIOLOGY | Age: 77
DRG: 863 | End: 2017-12-08
Attending: ORTHOPAEDIC SURGERY
Payer: MEDICARE

## 2017-12-08 VITALS
RESPIRATION RATE: 13 BRPM | TEMPERATURE: 97.3 F | SYSTOLIC BLOOD PRESSURE: 99 MMHG | OXYGEN SATURATION: 100 % | DIASTOLIC BLOOD PRESSURE: 51 MMHG

## 2017-12-08 PROBLEM — T81.30XA WOUND DISRUPTION: Status: ACTIVE | Noted: 2017-12-08

## 2017-12-08 PROBLEM — T81.30XA WOUND DEHISCENCE: Status: ACTIVE | Noted: 2017-12-08

## 2017-12-08 LAB
GLUCOSE BLD-MCNC: 228 MG/DL (ref 70–99)
GLUCOSE BLD-MCNC: 272 MG/DL (ref 70–99)
GLUCOSE BLD-MCNC: 278 MG/DL (ref 70–99)
GLUCOSE BLD-MCNC: 350 MG/DL (ref 70–99)
GLUCOSE BLD-MCNC: 473 MG/DL (ref 70–99)
HBA1C MFR BLD: 6.8 %
HCT VFR BLD CALC: 28.1 % (ref 42–52)
HEMOGLOBIN: 8.7 G/DL (ref 14–18)
MCH RBC QN AUTO: 28.2 PG (ref 27–31)
MCHC RBC AUTO-ENTMCNC: 31 G/DL (ref 33–37)
MCV RBC AUTO: 91.2 FL (ref 80–94)
PDW BLD-RTO: 15.3 % (ref 11.5–14.5)
PERFORMED ON: ABNORMAL
PLATELET # BLD: 448 K/UL (ref 130–400)
PMV BLD AUTO: 9.6 FL (ref 9.4–12.4)
RBC # BLD: 3.08 M/UL (ref 4.7–6.1)
WBC # BLD: 11 K/UL (ref 4.8–10.8)

## 2017-12-08 PROCEDURE — 87205 SMEAR GRAM STAIN: CPT

## 2017-12-08 PROCEDURE — 2500000003 HC RX 250 WO HCPCS: Performed by: ORTHOPAEDIC SURGERY

## 2017-12-08 PROCEDURE — 6360000002 HC RX W HCPCS: Performed by: NURSE ANESTHETIST, CERTIFIED REGISTERED

## 2017-12-08 PROCEDURE — 7100000001 HC PACU RECOVERY - ADDTL 15 MIN: Performed by: ORTHOPAEDIC SURGERY

## 2017-12-08 PROCEDURE — 1210000000 HC MED SURG R&B

## 2017-12-08 PROCEDURE — 3700000000 HC ANESTHESIA ATTENDED CARE: Performed by: ORTHOPAEDIC SURGERY

## 2017-12-08 PROCEDURE — 2580000003 HC RX 258: Performed by: ORTHOPAEDIC SURGERY

## 2017-12-08 PROCEDURE — 6370000000 HC RX 637 (ALT 250 FOR IP): Performed by: ORTHOPAEDIC SURGERY

## 2017-12-08 PROCEDURE — 2720000000 HC MISC SURG SUPPLY STERILE $0-50: Performed by: ORTHOPAEDIC SURGERY

## 2017-12-08 PROCEDURE — 83036 HEMOGLOBIN GLYCOSYLATED A1C: CPT

## 2017-12-08 PROCEDURE — 3600000003 HC SURGERY LEVEL 3 BASE: Performed by: ORTHOPAEDIC SURGERY

## 2017-12-08 PROCEDURE — 6370000000 HC RX 637 (ALT 250 FOR IP): Performed by: FAMILY MEDICINE

## 2017-12-08 PROCEDURE — 94664 DEMO&/EVAL PT USE INHALER: CPT

## 2017-12-08 PROCEDURE — 6360000002 HC RX W HCPCS: Performed by: ORTHOPAEDIC SURGERY

## 2017-12-08 PROCEDURE — 85027 COMPLETE CBC AUTOMATED: CPT

## 2017-12-08 PROCEDURE — 73502 X-RAY EXAM HIP UNI 2-3 VIEWS: CPT

## 2017-12-08 PROCEDURE — 87070 CULTURE OTHR SPECIMN AEROBIC: CPT

## 2017-12-08 PROCEDURE — 87102 FUNGUS ISOLATION CULTURE: CPT

## 2017-12-08 PROCEDURE — 87176 TISSUE HOMOGENIZATION CULTR: CPT

## 2017-12-08 PROCEDURE — 0S990ZX DRAINAGE OF RIGHT HIP JOINT, OPEN APPROACH, DIAGNOSTIC: ICD-10-PCS | Performed by: ORTHOPAEDIC SURGERY

## 2017-12-08 PROCEDURE — 7100000000 HC PACU RECOVERY - FIRST 15 MIN: Performed by: ORTHOPAEDIC SURGERY

## 2017-12-08 PROCEDURE — 2720000001 HC MISC SURG SUPPLY STERILE $51-500: Performed by: ORTHOPAEDIC SURGERY

## 2017-12-08 PROCEDURE — 86403 PARTICLE AGGLUT ANTBDY SCRN: CPT

## 2017-12-08 PROCEDURE — 87186 SC STD MICRODIL/AGAR DIL: CPT

## 2017-12-08 PROCEDURE — 2500000003 HC RX 250 WO HCPCS: Performed by: NURSE ANESTHETIST, CERTIFIED REGISTERED

## 2017-12-08 PROCEDURE — 82948 REAGENT STRIP/BLOOD GLUCOSE: CPT

## 2017-12-08 PROCEDURE — 3700000001 HC ADD 15 MINUTES (ANESTHESIA): Performed by: ORTHOPAEDIC SURGERY

## 2017-12-08 PROCEDURE — 3600000013 HC SURGERY LEVEL 3 ADDTL 15MIN: Performed by: ORTHOPAEDIC SURGERY

## 2017-12-08 PROCEDURE — 36415 COLL VENOUS BLD VENIPUNCTURE: CPT

## 2017-12-08 RX ORDER — FENTANYL CITRATE 50 UG/ML
INJECTION, SOLUTION INTRAMUSCULAR; INTRAVENOUS PRN
Status: DISCONTINUED | OUTPATIENT
Start: 2017-12-08 | End: 2017-12-08 | Stop reason: SDUPTHER

## 2017-12-08 RX ORDER — MORPHINE SULFATE 1 MG/ML
4 INJECTION, SOLUTION EPIDURAL; INTRATHECAL; INTRAVENOUS EVERY 5 MIN PRN
Status: DISCONTINUED | OUTPATIENT
Start: 2017-12-08 | End: 2017-12-08 | Stop reason: HOSPADM

## 2017-12-08 RX ORDER — ONDANSETRON 2 MG/ML
INJECTION INTRAMUSCULAR; INTRAVENOUS PRN
Status: DISCONTINUED | OUTPATIENT
Start: 2017-12-08 | End: 2017-12-08 | Stop reason: SDUPTHER

## 2017-12-08 RX ORDER — MULTIVITAMIN WITH FOLIC ACID 400 MCG
1 TABLET ORAL DAILY
Status: DISCONTINUED | OUTPATIENT
Start: 2017-12-08 | End: 2017-12-09

## 2017-12-08 RX ORDER — TEMAZEPAM 15 MG/1
30 CAPSULE ORAL NIGHTLY
Status: DISCONTINUED | OUTPATIENT
Start: 2017-12-08 | End: 2017-12-10

## 2017-12-08 RX ORDER — DEXTROSE MONOHYDRATE 25 G/50ML
12.5 INJECTION, SOLUTION INTRAVENOUS PRN
Status: DISCONTINUED | OUTPATIENT
Start: 2017-12-08 | End: 2017-12-08 | Stop reason: SDUPTHER

## 2017-12-08 RX ORDER — OXYCODONE HYDROCHLORIDE 5 MG/1
10 TABLET ORAL EVERY 4 HOURS PRN
Status: DISCONTINUED | OUTPATIENT
Start: 2017-12-08 | End: 2017-12-13 | Stop reason: HOSPADM

## 2017-12-08 RX ORDER — SENNA PLUS 8.6 MG/1
1 TABLET ORAL NIGHTLY
Status: DISCONTINUED | OUTPATIENT
Start: 2017-12-08 | End: 2017-12-13 | Stop reason: HOSPADM

## 2017-12-08 RX ORDER — VANCOMYCIN HYDROCHLORIDE 1 G/200ML
1000 INJECTION, SOLUTION INTRAVENOUS ONCE
Status: COMPLETED | OUTPATIENT
Start: 2017-12-08 | End: 2017-12-08

## 2017-12-08 RX ORDER — OXYCODONE HYDROCHLORIDE 5 MG/1
20 TABLET ORAL EVERY 4 HOURS PRN
Status: DISCONTINUED | OUTPATIENT
Start: 2017-12-08 | End: 2017-12-13 | Stop reason: HOSPADM

## 2017-12-08 RX ORDER — HYDRALAZINE HYDROCHLORIDE 20 MG/ML
5 INJECTION INTRAMUSCULAR; INTRAVENOUS EVERY 10 MIN PRN
Status: DISCONTINUED | OUTPATIENT
Start: 2017-12-08 | End: 2017-12-08 | Stop reason: HOSPADM

## 2017-12-08 RX ORDER — FENTANYL CITRATE 50 UG/ML
75 INJECTION, SOLUTION INTRAMUSCULAR; INTRAVENOUS
Status: DISCONTINUED | OUTPATIENT
Start: 2017-12-08 | End: 2017-12-13 | Stop reason: HOSPADM

## 2017-12-08 RX ORDER — DEXTROSE MONOHYDRATE 50 MG/ML
100 INJECTION, SOLUTION INTRAVENOUS PRN
Status: DISCONTINUED | OUTPATIENT
Start: 2017-12-08 | End: 2017-12-08 | Stop reason: SDUPTHER

## 2017-12-08 RX ORDER — LISINOPRIL 5 MG/1
2.5 TABLET ORAL DAILY
Status: DISCONTINUED | OUTPATIENT
Start: 2017-12-08 | End: 2017-12-13 | Stop reason: HOSPADM

## 2017-12-08 RX ORDER — TIZANIDINE 4 MG/1
4 TABLET ORAL EVERY 8 HOURS PRN
Status: DISCONTINUED | OUTPATIENT
Start: 2017-12-08 | End: 2017-12-13 | Stop reason: HOSPADM

## 2017-12-08 RX ORDER — HYDROMORPHONE HCL 110MG/55ML
2 PATIENT CONTROLLED ANALGESIA SYRINGE INTRAVENOUS
Status: DISCONTINUED | OUTPATIENT
Start: 2017-12-08 | End: 2017-12-08 | Stop reason: ALTCHOICE

## 2017-12-08 RX ORDER — INSULIN GLARGINE 100 [IU]/ML
21 INJECTION, SOLUTION SUBCUTANEOUS EVERY MORNING
Status: DISCONTINUED | OUTPATIENT
Start: 2017-12-08 | End: 2017-12-11

## 2017-12-08 RX ORDER — LIDOCAINE HYDROCHLORIDE 10 MG/ML
INJECTION, SOLUTION INFILTRATION; PERINEURAL PRN
Status: DISCONTINUED | OUTPATIENT
Start: 2017-12-08 | End: 2017-12-08 | Stop reason: SDUPTHER

## 2017-12-08 RX ORDER — OXYCODONE HYDROCHLORIDE 5 MG/1
5 TABLET ORAL EVERY 4 HOURS PRN
Status: DISCONTINUED | OUTPATIENT
Start: 2017-12-08 | End: 2017-12-13 | Stop reason: HOSPADM

## 2017-12-08 RX ORDER — SODIUM CHLORIDE 0.9 % (FLUSH) 0.9 %
10 SYRINGE (ML) INJECTION EVERY 12 HOURS SCHEDULED
Status: DISCONTINUED | OUTPATIENT
Start: 2017-12-08 | End: 2017-12-13 | Stop reason: HOSPADM

## 2017-12-08 RX ORDER — FENTANYL CITRATE 50 UG/ML
50 INJECTION, SOLUTION INTRAMUSCULAR; INTRAVENOUS
Status: DISCONTINUED | OUTPATIENT
Start: 2017-12-08 | End: 2017-12-13 | Stop reason: HOSPADM

## 2017-12-08 RX ORDER — SODIUM CHLORIDE, SODIUM LACTATE, POTASSIUM CHLORIDE, CALCIUM CHLORIDE 600; 310; 30; 20 MG/100ML; MG/100ML; MG/100ML; MG/100ML
INJECTION, SOLUTION INTRAVENOUS CONTINUOUS
Status: DISCONTINUED | OUTPATIENT
Start: 2017-12-08 | End: 2017-12-08

## 2017-12-08 RX ORDER — ONDANSETRON 2 MG/ML
4 INJECTION INTRAMUSCULAR; INTRAVENOUS EVERY 6 HOURS PRN
Status: DISCONTINUED | OUTPATIENT
Start: 2017-12-08 | End: 2017-12-13 | Stop reason: HOSPADM

## 2017-12-08 RX ORDER — ENALAPRILAT 2.5 MG/2ML
1.25 INJECTION INTRAVENOUS
Status: DISCONTINUED | OUTPATIENT
Start: 2017-12-08 | End: 2017-12-08 | Stop reason: HOSPADM

## 2017-12-08 RX ORDER — 0.9 % SODIUM CHLORIDE 0.9 %
500 INTRAVENOUS SOLUTION INTRAVENOUS PRN
Status: DISCONTINUED | OUTPATIENT
Start: 2017-12-08 | End: 2017-12-13 | Stop reason: HOSPADM

## 2017-12-08 RX ORDER — MORPHINE SULFATE 1 MG/ML
2 INJECTION, SOLUTION EPIDURAL; INTRATHECAL; INTRAVENOUS EVERY 5 MIN PRN
Status: DISCONTINUED | OUTPATIENT
Start: 2017-12-08 | End: 2017-12-08 | Stop reason: HOSPADM

## 2017-12-08 RX ORDER — CLOPIDOGREL BISULFATE 75 MG/1
75 TABLET ORAL DAILY
Status: DISCONTINUED | OUTPATIENT
Start: 2017-12-08 | End: 2017-12-13 | Stop reason: HOSPADM

## 2017-12-08 RX ORDER — POLYETHYLENE GLYCOL 3350 17 G/17G
17 POWDER, FOR SOLUTION ORAL DAILY
Status: DISCONTINUED | OUTPATIENT
Start: 2017-12-08 | End: 2017-12-13 | Stop reason: HOSPADM

## 2017-12-08 RX ORDER — ROCURONIUM BROMIDE 10 MG/ML
INJECTION, SOLUTION INTRAVENOUS PRN
Status: DISCONTINUED | OUTPATIENT
Start: 2017-12-08 | End: 2017-12-08 | Stop reason: SDUPTHER

## 2017-12-08 RX ORDER — INSULIN GLARGINE 100 [IU]/ML
15 INJECTION, SOLUTION SUBCUTANEOUS NIGHTLY
Status: DISCONTINUED | OUTPATIENT
Start: 2017-12-08 | End: 2017-12-10

## 2017-12-08 RX ORDER — METOCLOPRAMIDE HYDROCHLORIDE 5 MG/ML
10 INJECTION INTRAMUSCULAR; INTRAVENOUS
Status: DISCONTINUED | OUTPATIENT
Start: 2017-12-08 | End: 2017-12-08 | Stop reason: HOSPADM

## 2017-12-08 RX ORDER — NICOTINE POLACRILEX 4 MG
15 LOZENGE BUCCAL PRN
Status: DISCONTINUED | OUTPATIENT
Start: 2017-12-08 | End: 2017-12-08 | Stop reason: SDUPTHER

## 2017-12-08 RX ORDER — ASPIRIN 81 MG/1
81 TABLET, CHEWABLE ORAL DAILY
Status: DISCONTINUED | OUTPATIENT
Start: 2017-12-08 | End: 2017-12-13 | Stop reason: HOSPADM

## 2017-12-08 RX ORDER — LIDOCAINE HYDROCHLORIDE 10 MG/ML
1 INJECTION, SOLUTION EPIDURAL; INFILTRATION; INTRACAUDAL; PERINEURAL ONCE
Status: COMPLETED | OUTPATIENT
Start: 2017-12-08 | End: 2017-12-08

## 2017-12-08 RX ORDER — FENTANYL CITRATE 50 UG/ML
100 INJECTION, SOLUTION INTRAMUSCULAR; INTRAVENOUS
Status: DISCONTINUED | OUTPATIENT
Start: 2017-12-08 | End: 2017-12-13 | Stop reason: HOSPADM

## 2017-12-08 RX ORDER — OXYCODONE HCL 10 MG/1
10 TABLET, FILM COATED, EXTENDED RELEASE ORAL EVERY 12 HOURS SCHEDULED
Status: DISCONTINUED | OUTPATIENT
Start: 2017-12-08 | End: 2017-12-13 | Stop reason: HOSPADM

## 2017-12-08 RX ORDER — NICOTINE POLACRILEX 4 MG
15 LOZENGE BUCCAL PRN
Status: DISCONTINUED | OUTPATIENT
Start: 2017-12-08 | End: 2017-12-13 | Stop reason: HOSPADM

## 2017-12-08 RX ORDER — DOCUSATE SODIUM 100 MG/1
100 CAPSULE, LIQUID FILLED ORAL 2 TIMES DAILY
Status: DISCONTINUED | OUTPATIENT
Start: 2017-12-08 | End: 2017-12-13 | Stop reason: HOSPADM

## 2017-12-08 RX ORDER — TAMSULOSIN HYDROCHLORIDE 0.4 MG/1
0.4 CAPSULE ORAL DAILY
Status: DISCONTINUED | OUTPATIENT
Start: 2017-12-08 | End: 2017-12-13 | Stop reason: HOSPADM

## 2017-12-08 RX ORDER — PROMETHAZINE HYDROCHLORIDE 25 MG/ML
6.25 INJECTION, SOLUTION INTRAMUSCULAR; INTRAVENOUS
Status: DISCONTINUED | OUTPATIENT
Start: 2017-12-08 | End: 2017-12-08 | Stop reason: HOSPADM

## 2017-12-08 RX ORDER — MORPHINE SULFATE 10 MG/ML
INJECTION, SOLUTION INTRAMUSCULAR; INTRAVENOUS PRN
Status: DISCONTINUED | OUTPATIENT
Start: 2017-12-08 | End: 2017-12-08 | Stop reason: SDUPTHER

## 2017-12-08 RX ORDER — SODIUM CHLORIDE 9 MG/ML
INJECTION, SOLUTION INTRAVENOUS CONTINUOUS
Status: DISCONTINUED | OUTPATIENT
Start: 2017-12-08 | End: 2017-12-13 | Stop reason: HOSPADM

## 2017-12-08 RX ORDER — DEXTROSE MONOHYDRATE 50 MG/ML
100 INJECTION, SOLUTION INTRAVENOUS PRN
Status: DISCONTINUED | OUTPATIENT
Start: 2017-12-08 | End: 2017-12-13 | Stop reason: HOSPADM

## 2017-12-08 RX ORDER — ACETAMINOPHEN 500 MG
1000 TABLET ORAL EVERY 8 HOURS
Status: DISCONTINUED | OUTPATIENT
Start: 2017-12-08 | End: 2017-12-13 | Stop reason: HOSPADM

## 2017-12-08 RX ORDER — DEXTROSE MONOHYDRATE 25 G/50ML
12.5 INJECTION, SOLUTION INTRAVENOUS PRN
Status: DISCONTINUED | OUTPATIENT
Start: 2017-12-08 | End: 2017-12-13 | Stop reason: HOSPADM

## 2017-12-08 RX ORDER — DOCUSATE SODIUM 100 MG/1
100 CAPSULE, LIQUID FILLED ORAL 2 TIMES DAILY
Status: DISCONTINUED | OUTPATIENT
Start: 2017-12-08 | End: 2017-12-08 | Stop reason: SDUPTHER

## 2017-12-08 RX ORDER — DIPHENHYDRAMINE HYDROCHLORIDE 50 MG/ML
12.5 INJECTION INTRAMUSCULAR; INTRAVENOUS
Status: DISCONTINUED | OUTPATIENT
Start: 2017-12-08 | End: 2017-12-08 | Stop reason: HOSPADM

## 2017-12-08 RX ORDER — MEPERIDINE HYDROCHLORIDE 50 MG/ML
12.5 INJECTION INTRAMUSCULAR; INTRAVENOUS; SUBCUTANEOUS EVERY 5 MIN PRN
Status: DISCONTINUED | OUTPATIENT
Start: 2017-12-08 | End: 2017-12-08 | Stop reason: HOSPADM

## 2017-12-08 RX ORDER — SODIUM CHLORIDE 0.9 % (FLUSH) 0.9 %
10 SYRINGE (ML) INJECTION PRN
Status: DISCONTINUED | OUTPATIENT
Start: 2017-12-08 | End: 2017-12-13 | Stop reason: HOSPADM

## 2017-12-08 RX ORDER — PROPOFOL 10 MG/ML
INJECTION, EMULSION INTRAVENOUS PRN
Status: DISCONTINUED | OUTPATIENT
Start: 2017-12-08 | End: 2017-12-08 | Stop reason: SDUPTHER

## 2017-12-08 RX ORDER — PANTOPRAZOLE SODIUM 40 MG/1
40 TABLET, DELAYED RELEASE ORAL DAILY
Status: DISCONTINUED | OUTPATIENT
Start: 2017-12-08 | End: 2017-12-13 | Stop reason: HOSPADM

## 2017-12-08 RX ORDER — LABETALOL HYDROCHLORIDE 5 MG/ML
5 INJECTION, SOLUTION INTRAVENOUS EVERY 10 MIN PRN
Status: DISCONTINUED | OUTPATIENT
Start: 2017-12-08 | End: 2017-12-08 | Stop reason: HOSPADM

## 2017-12-08 RX ORDER — VANCOMYCIN HYDROCHLORIDE 1 G/200ML
1000 INJECTION, SOLUTION INTRAVENOUS EVERY 12 HOURS
Status: DISCONTINUED | OUTPATIENT
Start: 2017-12-08 | End: 2017-12-08 | Stop reason: DRUGHIGH

## 2017-12-08 RX ORDER — CEFEPIME HYDROCHLORIDE 2 G/1
INJECTION, POWDER, FOR SOLUTION INTRAVENOUS PRN
Status: DISCONTINUED | OUTPATIENT
Start: 2017-12-08 | End: 2017-12-08 | Stop reason: SDUPTHER

## 2017-12-08 RX ORDER — TRAMADOL HYDROCHLORIDE 50 MG/1
50 TABLET ORAL EVERY 6 HOURS
Status: DISCONTINUED | OUTPATIENT
Start: 2017-12-08 | End: 2017-12-13 | Stop reason: HOSPADM

## 2017-12-08 RX ADMIN — INSULIN GLARGINE 15 UNITS: 100 INJECTION, SOLUTION SUBCUTANEOUS at 21:34

## 2017-12-08 RX ADMIN — PROPOFOL 100 MG: 10 INJECTION, EMULSION INTRAVENOUS at 07:11

## 2017-12-08 RX ADMIN — SODIUM CHLORIDE, SODIUM LACTATE, POTASSIUM CHLORIDE, AND CALCIUM CHLORIDE: 600; 310; 30; 20 INJECTION, SOLUTION INTRAVENOUS at 06:23

## 2017-12-08 RX ADMIN — SODIUM CHLORIDE, SODIUM LACTATE, POTASSIUM CHLORIDE, AND CALCIUM CHLORIDE: 600; 310; 30; 20 INJECTION, SOLUTION INTRAVENOUS at 08:24

## 2017-12-08 RX ADMIN — FENTANYL CITRATE 25 MCG: 50 INJECTION, SOLUTION INTRAMUSCULAR; INTRAVENOUS at 07:54

## 2017-12-08 RX ADMIN — LIDOCAINE HYDROCHLORIDE 1 ML: 10 INJECTION, SOLUTION EPIDURAL; INFILTRATION; INTRACAUDAL; PERINEURAL at 06:23

## 2017-12-08 RX ADMIN — SODIUM CHLORIDE: 9 INJECTION, SOLUTION INTRAVENOUS at 10:17

## 2017-12-08 RX ADMIN — FENTANYL CITRATE 25 MCG: 50 INJECTION, SOLUTION INTRAMUSCULAR; INTRAVENOUS at 07:11

## 2017-12-08 RX ADMIN — INSULIN LISPRO 12 UNITS: 100 INJECTION, SOLUTION INTRAVENOUS; SUBCUTANEOUS at 19:59

## 2017-12-08 RX ADMIN — DOCUSATE SODIUM 100 MG: 100 CAPSULE, LIQUID FILLED ORAL at 19:58

## 2017-12-08 RX ADMIN — VANCOMYCIN HYDROCHLORIDE 1000 MG: 1 INJECTION, SOLUTION INTRAVENOUS at 06:23

## 2017-12-08 RX ADMIN — Medication 10 ML: at 20:01

## 2017-12-08 RX ADMIN — FENTANYL CITRATE 25 MCG: 50 INJECTION, SOLUTION INTRAMUSCULAR; INTRAVENOUS at 08:23

## 2017-12-08 RX ADMIN — TEMAZEPAM 30 MG: 15 CAPSULE ORAL at 19:59

## 2017-12-08 RX ADMIN — FENTANYL CITRATE 25 MCG: 50 INJECTION, SOLUTION INTRAMUSCULAR; INTRAVENOUS at 07:50

## 2017-12-08 RX ADMIN — ONDANSETRON 4 MG: 2 INJECTION INTRAMUSCULAR; INTRAVENOUS at 10:16

## 2017-12-08 RX ADMIN — ONDANSETRON 4 MG: 2 INJECTION INTRAMUSCULAR; INTRAVENOUS at 16:49

## 2017-12-08 RX ADMIN — Medication 10 ML: at 10:17

## 2017-12-08 RX ADMIN — LIDOCAINE HYDROCHLORIDE 50 MG: 10 INJECTION, SOLUTION INFILTRATION; PERINEURAL at 07:11

## 2017-12-08 RX ADMIN — MORPHINE SULFATE 4 MG: 10 INJECTION INTRAMUSCULAR; INTRAVENOUS; SUBCUTANEOUS at 08:10

## 2017-12-08 RX ADMIN — MORPHINE SULFATE 2 MG: 10 INJECTION INTRAMUSCULAR; INTRAVENOUS; SUBCUTANEOUS at 07:46

## 2017-12-08 RX ADMIN — VANCOMYCIN HYDROCHLORIDE 1250 MG: 10 INJECTION, POWDER, LYOPHILIZED, FOR SOLUTION INTRAVENOUS at 23:58

## 2017-12-08 RX ADMIN — ONDANSETRON HYDROCHLORIDE 4 MG: 2 SOLUTION INTRAMUSCULAR; INTRAVENOUS at 07:50

## 2017-12-08 RX ADMIN — ROCURONIUM BROMIDE 40 MG: 10 INJECTION INTRAVENOUS at 07:11

## 2017-12-08 RX ADMIN — OXYCODONE HYDROCHLORIDE 10 MG: 10 TABLET, FILM COATED, EXTENDED RELEASE ORAL at 19:59

## 2017-12-08 RX ADMIN — MORPHINE SULFATE 2 MG: 10 INJECTION INTRAMUSCULAR; INTRAVENOUS; SUBCUTANEOUS at 07:50

## 2017-12-08 RX ADMIN — SODIUM CHLORIDE: 9 INJECTION, SOLUTION INTRAVENOUS at 16:49

## 2017-12-08 RX ADMIN — CEFEPIME 2 G: 2 INJECTION, POWDER, FOR SOLUTION INTRAMUSCULAR; INTRAVENOUS at 07:36

## 2017-12-08 RX ADMIN — SUGAMMADEX 130 MG: 100 INJECTION, SOLUTION INTRAVENOUS at 08:22

## 2017-12-08 RX ADMIN — MORPHINE SULFATE 1 MG: 10 INJECTION INTRAMUSCULAR; INTRAVENOUS; SUBCUTANEOUS at 07:53

## 2017-12-08 RX ADMIN — MORPHINE SULFATE 1 MG: 10 INJECTION INTRAMUSCULAR; INTRAVENOUS; SUBCUTANEOUS at 08:05

## 2017-12-08 RX ADMIN — FENTANYL CITRATE 25 MCG: 50 INJECTION, SOLUTION INTRAMUSCULAR; INTRAVENOUS at 08:35

## 2017-12-08 RX ADMIN — SODIUM CHLORIDE, SODIUM LACTATE, POTASSIUM CHLORIDE, AND CALCIUM CHLORIDE: 600; 310; 30; 20 INJECTION, SOLUTION INTRAVENOUS at 07:07

## 2017-12-08 ASSESSMENT — PAIN SCALES - GENERAL
PAINLEVEL_OUTOF10: 1
PAINLEVEL_OUTOF10: 0
PAINLEVEL_OUTOF10: 0

## 2017-12-08 ASSESSMENT — LIFESTYLE VARIABLES: SMOKING_STATUS: 0

## 2017-12-08 ASSESSMENT — PAIN - FUNCTIONAL ASSESSMENT: PAIN_FUNCTIONAL_ASSESSMENT: 0-10

## 2017-12-08 NOTE — ANESTHESIA PRE PROCEDURE
Department of Anesthesiology  Preprocedure Note       Name:  Nubia Felix   Age:  69 y.o.  :  1940                                          MRN:  [id number]         Date:  2017      Surgeon: Birdie Fiore):  Albertina Saleh MD    Procedure: Procedure(s):  HIP INCISION AND DRAINAGE    Medications prior to admission:   Prior to Admission medications    Medication Sig Start Date End Date Taking? Authorizing Provider   docusate sodium (COLACE) 100 MG capsule Take 100 mg by mouth 2 times daily   Yes Historical Provider, MD   pantoprazole (PROTONIX) 40 MG tablet Take 40 mg by mouth daily   Yes Historical Provider, MD [place] 160 MG CAPS Take 1 capsule by mouth daily   Yes Historical Provider, MD   acetaminophen (TYLENOL) 325 MG tablet Take 650 mg by mouth every 6 hours as needed for Pain   Yes Historical Provider, MD   polyethylene glycol (GLYCOLAX) powder Take 17 g by mouth daily as needed   Yes Historical Provider, MD   senna (SENOKOT) 8.6 MG tablet Take 1 tablet by mouth nightly as needed for Constipation   Yes Historical Provider, MD   tiZANidine (ZANAFLEX) 4 MG tablet Take 4 mg by mouth every 8 hours as needed   Yes Historical Provider, MD   insulin glargine (LANTUS) 100 UNIT/ML injection vial Inject 15 Units into the skin nightly   Yes Historical Provider, MD   St Johns Wort 300 MG TABS Take 900 mg by mouth daily   Yes Historical Provider, MD   temazepam (RESTORIL) 15 MG capsule Take 30 mg by mouth nightly . Yes Historical Provider, MD   sulfamethoxazole-trimethoprim (BACTRIM DS;SEPTRA DS) 800-160 MG per tablet Take 1 tablet by mouth 2 times daily   Yes Historical Provider, MD   tamsulosin (FLOMAX) 0.4 MG capsule Take 1 capsule by mouth daily 11/3/17  Yes Dulce Maria Roman PA-C   oxyCODONE (ROXICODONE) 5 MG immediate release tablet Take 1 tablet by mouth every 4 hours as needed for Pain .  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     TOTAL HIP ARTHROPLASTY Right 10/27/2017    ORIF RIGHT FEMUR FOR PERIPROSTHETIC FX performed by Sera Murillo MD at [place] [id number] History:    Social History   Substance Use Topics    Smoking status: Current Some Day Smoker     Packs/day: 0.50     Years: 15.00     Types: Cigarettes    Smokeless tobacco: Never Used    Alcohol use No                                Ready to quit: Not Answered  Counseling given: Not Answered      Vital Signs (Current):   Vitals:    12/06/17 1338 12/08/17 0628   BP:  136/69   Pulse:  69   Resp:  14   Temp:  98.6 °F (37 °C)   TempSrc:  Tympanic   SpO2:  99%   Weight: 146 lb (66.2 kg) 146 lb (66.2 kg)   Height:  6' (1.829 m)                                              BP Readings from Last 3 Encounters:   12/08/17 136/69   11/02/17 (!) 168/79   10/27/17 (!) 118/44       NPO Status: Time of last liquid consumption: 1800                        Time of last solid consumption: 1800                        Date of last liquid consumption: 12/07/17                        Date of last solid food consumption: 12/07/17    BMI:   Wt Readings from Last 3 Encounters:   12/08/17 146 lb (66.2 kg)   11/02/17 187 lb 1.6 oz (84.9 kg)   11/01/16 184 lb (83.5 kg)     Body mass index is 19.8 kg/m².     CBC:   Lab Results   Component Value Date    WBC 10.1 11/02/2017    RBC 2.64 11/02/2017    HGB 7.6 11/02/2017    HCT 23.4 11/02/2017    MCV 88.6 11/02/2017    RDW 15.0 11/02/2017     11/02/2017       CMP:   Lab Results   Component Value Date     11/02/2017    K 3.8 11/02/2017     11/02/2017    CO2 23 11/02/2017    BUN 19 11/02/2017    CREATININE 1.1 11/02/2017    LABGLOM >60 11/02/2017    GLUCOSE 156 11/02/2017    PROT 6.4 11/25/2014    CALCIUM 8.2 11/02/2017    ALKPHOS 110 11/25/2014    AST 18 11/25/2014    ALT 16 11/25/2014       POC Tests:   Recent Labs      12/08/17   0617   POCGLU  228*       Coags:   Lab Results   Component Value Date

## 2017-12-08 NOTE — PLAN OF CARE
Problem: Falls - Risk of  Goal: Absence of falls  Outcome: Ongoing      Problem: Risk for Impaired Skin Integrity  Goal: Tissue integrity - skin and mucous membranes  Structural intactness and normal physiological function of skin and  mucous membranes. Outcome: Ongoing      Problem: SAFETY  Goal: Free from accidental physical injury  Outcome: Ongoing    Goal: Free from intentional harm  Outcome: Ongoing      Problem: DAILY CARE  Goal: Daily care needs are met  Outcome: Ongoing      Problem: PAIN  Goal: Patient's pain/discomfort is manageable  Outcome: Ongoing      Problem: SKIN INTEGRITY  Goal: Skin integrity is maintained or improved  Outcome: Ongoing      Problem: KNOWLEDGE DEFICIT  Goal: Patient/S.O. demonstrates understanding of disease process, treatment plan, medications, and discharge instructions.   Outcome: Ongoing      Problem: DISCHARGE BARRIERS  Goal: Patient's continuum of care needs are met  Outcome: Ongoing

## 2017-12-09 LAB
ABO/RH: NORMAL
ANION GAP SERPL CALCULATED.3IONS-SCNC: 15 MMOL/L (ref 7–19)
ANTIBODY IDENTIFICATION: NORMAL
ANTIBODY SCREEN: NORMAL
ANTIBODY SCREEN: NORMAL
BUN BLDV-MCNC: 24 MG/DL (ref 8–23)
CALCIUM SERPL-MCNC: 8 MG/DL (ref 8.8–10.2)
CHLORIDE BLD-SCNC: 99 MMOL/L (ref 98–111)
CO2: 20 MMOL/L (ref 22–29)
CREAT SERPL-MCNC: 1.4 MG/DL (ref 0.5–1.2)
E (BIG) ANTIGEN: NORMAL
GFR NON-AFRICAN AMERICAN: 49
GLUCOSE BLD-MCNC: 191 MG/DL (ref 70–99)
GLUCOSE BLD-MCNC: 243 MG/DL (ref 70–99)
GLUCOSE BLD-MCNC: 290 MG/DL (ref 74–109)
GLUCOSE BLD-MCNC: 325 MG/DL (ref 70–99)
GLUCOSE BLD-MCNC: 331 MG/DL (ref 70–99)
GLUCOSE BLD-MCNC: 80 MG/DL (ref 70–99)
HCT VFR BLD CALC: 20.6 % (ref 42–52)
HEMOGLOBIN: 6.2 G/DL (ref 14–18)
PERFORMED ON: ABNORMAL
PERFORMED ON: NORMAL
POTASSIUM SERPL-SCNC: 5.3 MMOL/L (ref 3.5–5)
SODIUM BLD-SCNC: 134 MMOL/L (ref 136–145)

## 2017-12-09 PROCEDURE — 86901 BLOOD TYPING SEROLOGIC RH(D): CPT

## 2017-12-09 PROCEDURE — 86850 RBC ANTIBODY SCREEN: CPT

## 2017-12-09 PROCEDURE — 6370000000 HC RX 637 (ALT 250 FOR IP): Performed by: FAMILY MEDICINE

## 2017-12-09 PROCEDURE — 82948 REAGENT STRIP/BLOOD GLUCOSE: CPT

## 2017-12-09 PROCEDURE — 36415 COLL VENOUS BLD VENIPUNCTURE: CPT

## 2017-12-09 PROCEDURE — 86870 RBC ANTIBODY IDENTIFICATION: CPT

## 2017-12-09 PROCEDURE — 86905 BLOOD TYPING RBC ANTIGENS: CPT

## 2017-12-09 PROCEDURE — 1210000000 HC MED SURG R&B

## 2017-12-09 PROCEDURE — 86900 BLOOD TYPING SEROLOGIC ABO: CPT

## 2017-12-09 PROCEDURE — 85018 HEMOGLOBIN: CPT

## 2017-12-09 PROCEDURE — 6360000002 HC RX W HCPCS: Performed by: ORTHOPAEDIC SURGERY

## 2017-12-09 PROCEDURE — 6370000000 HC RX 637 (ALT 250 FOR IP): Performed by: ORTHOPAEDIC SURGERY

## 2017-12-09 PROCEDURE — 80048 BASIC METABOLIC PNL TOTAL CA: CPT

## 2017-12-09 PROCEDURE — 36430 TRANSFUSION BLD/BLD COMPNT: CPT

## 2017-12-09 PROCEDURE — P9016 RBC LEUKOCYTES REDUCED: HCPCS

## 2017-12-09 PROCEDURE — 2580000003 HC RX 258: Performed by: ORTHOPAEDIC SURGERY

## 2017-12-09 PROCEDURE — 85014 HEMATOCRIT: CPT

## 2017-12-09 PROCEDURE — 86922 COMPATIBILITY TEST ANTIGLOB: CPT

## 2017-12-09 RX ORDER — 0.9 % SODIUM CHLORIDE 0.9 %
250 INTRAVENOUS SOLUTION INTRAVENOUS ONCE
Status: DISCONTINUED | OUTPATIENT
Start: 2017-12-09 | End: 2017-12-13 | Stop reason: HOSPADM

## 2017-12-09 RX ORDER — MULTIVITAMIN WITH FOLIC ACID 400 MCG
1 TABLET ORAL DAILY
Status: DISCONTINUED | OUTPATIENT
Start: 2017-12-09 | End: 2017-12-13 | Stop reason: HOSPADM

## 2017-12-09 RX ADMIN — CLOPIDOGREL BISULFATE 75 MG: 75 TABLET ORAL at 07:58

## 2017-12-09 RX ADMIN — POLYETHYLENE GLYCOL 3350 17 G: 17 POWDER, FOR SOLUTION ORAL at 07:58

## 2017-12-09 RX ADMIN — ASPIRIN 81 MG CHEWABLE TABLET 81 MG: 81 TABLET CHEWABLE at 07:58

## 2017-12-09 RX ADMIN — INSULIN LISPRO 8 UNITS: 100 INJECTION, SOLUTION INTRAVENOUS; SUBCUTANEOUS at 05:01

## 2017-12-09 RX ADMIN — PANTOPRAZOLE SODIUM 40 MG: 40 TABLET, DELAYED RELEASE ORAL at 07:58

## 2017-12-09 RX ADMIN — ACETAMINOPHEN 1000 MG: 500 TABLET, FILM COATED ORAL at 02:51

## 2017-12-09 RX ADMIN — DOCUSATE SODIUM 100 MG: 100 CAPSULE, LIQUID FILLED ORAL at 20:19

## 2017-12-09 RX ADMIN — SODIUM CHLORIDE: 9 INJECTION, SOLUTION INTRAVENOUS at 05:41

## 2017-12-09 RX ADMIN — INSULIN LISPRO 4 UNITS: 100 INJECTION, SOLUTION INTRAVENOUS; SUBCUTANEOUS at 11:48

## 2017-12-09 RX ADMIN — CEFEPIME HYDROCHLORIDE 2 G: 2 INJECTION, SOLUTION INTRAVENOUS at 07:58

## 2017-12-09 RX ADMIN — THERA TABS 1 TABLET: TAB at 11:35

## 2017-12-09 RX ADMIN — ACETAMINOPHEN 1000 MG: 500 TABLET, FILM COATED ORAL at 20:20

## 2017-12-09 RX ADMIN — TAMSULOSIN HYDROCHLORIDE 0.4 MG: 0.4 CAPSULE ORAL at 07:58

## 2017-12-09 RX ADMIN — DOCUSATE SODIUM 100 MG: 100 CAPSULE, LIQUID FILLED ORAL at 07:58

## 2017-12-09 RX ADMIN — TRAMADOL HYDROCHLORIDE 50 MG: 50 TABLET, FILM COATED ORAL at 11:35

## 2017-12-09 RX ADMIN — SENNOSIDES 8.6 MG: 8.6 TABLET, FILM COATED ORAL at 20:20

## 2017-12-09 RX ADMIN — INSULIN GLARGINE 21 UNITS: 100 INJECTION, SOLUTION SUBCUTANEOUS at 08:08

## 2017-12-09 RX ADMIN — TEMAZEPAM 30 MG: 15 CAPSULE ORAL at 20:19

## 2017-12-09 RX ADMIN — ACETAMINOPHEN 1000 MG: 500 TABLET, FILM COATED ORAL at 11:35

## 2017-12-09 RX ADMIN — INSULIN LISPRO 8 UNITS: 100 INJECTION, SOLUTION INTRAVENOUS; SUBCUTANEOUS at 16:29

## 2017-12-09 RX ADMIN — SODIUM CHLORIDE: 9 INJECTION, SOLUTION INTRAVENOUS at 11:48

## 2017-12-09 RX ADMIN — INSULIN GLARGINE 15 UNITS: 100 INJECTION, SOLUTION SUBCUTANEOUS at 20:26

## 2017-12-09 ASSESSMENT — PAIN SCALES - GENERAL
PAINLEVEL_OUTOF10: 0
PAINLEVEL_OUTOF10: 9
PAINLEVEL_OUTOF10: 1
PAINLEVEL_OUTOF10: 0
PAINLEVEL_OUTOF10: 8

## 2017-12-09 ASSESSMENT — PAIN DESCRIPTION - PAIN TYPE: TYPE: ACUTE PAIN;SURGICAL PAIN

## 2017-12-09 ASSESSMENT — PAIN DESCRIPTION - LOCATION: LOCATION: HIP

## 2017-12-09 NOTE — PROGRESS NOTES
Type screen was ordered at 0845 this morning and was still not drawn at 1100. Called lab to verify this. This nurse gabe type and screen and sent at [phone]. Called blood bank to verify if blood was ready to transfuse. Blood bank stated they would call me when blood is ready. Attempted to call back at this time, and blood is still not ready for transfusion. Will continue to monitor.

## 2017-12-09 NOTE — PLAN OF CARE
Problem: Falls - Risk of  Goal: Absence of falls  Outcome: Ongoing      Problem: Risk for Impaired Skin Integrity  Goal: Tissue integrity - skin and mucous membranes  Structural intactness and normal physiological function of skin and  mucous membranes. Outcome: Ongoing      Problem: SAFETY  Goal: Free from accidental physical injury  Outcome: Ongoing    Goal: Free from intentional harm  Outcome: Ongoing      Problem: DAILY CARE  Goal: Daily care needs are met  Outcome: Ongoing      Problem: PAIN  Goal: Patient's pain/discomfort is manageable  Outcome: Ongoing      Problem: SKIN INTEGRITY  Goal: Skin integrity is maintained or improved  Outcome: Ongoing      Problem: KNOWLEDGE DEFICIT  Goal: Patient/S.O. demonstrates understanding of disease process, treatment plan, medications, and discharge instructions.   Outcome: Ongoing      Problem: Pain:  Goal: Pain level will decrease  Pain level will decrease   Outcome: Ongoing    Goal: Control of acute pain  Control of acute pain   Outcome: Ongoing    Goal: Control of chronic pain  Control of chronic pain   Outcome: Ongoing

## 2017-12-09 NOTE — PLAN OF CARE
Problem: Skin Integrity:  Goal: Demonstration of wound healing without infection will improve  Demonstration of wound healing without infection will improve  Outcome: Ongoing    Goal: Complications related to intravenous access or infusion will be avoided or minimized  Complications related to intravenous access or infusion will be avoided or minimized  Outcome: Ongoing

## 2017-12-09 NOTE — CARE COORDINATION
Spoke with Author Dec from [facility] she states she is aware of this patient and they should be able to take him back at discharge. It will be just a matter of paperwork.     .Electronically signed by Rodell Bloch, RN on 12/9/2017 at 1:49 PM

## 2017-12-09 NOTE — CONSULTS
Take 1 tablet by mouth nightly as needed for Constipation   Yes Historical Provider, MD   tiZANidine (ZANAFLEX) 4 MG tablet Take 4 mg by mouth every 8 hours as needed   Yes Historical Provider, MD   insulin glargine (LANTUS) 100 UNIT/ML injection vial Inject 15 Units into the skin nightly   Yes Historical Provider, MD   St Johns Wort 300 MG TABS Take 900 mg by mouth daily   Yes Historical Provider, MD   temazepam (RESTORIL) 15 MG capsule Take 30 mg by mouth nightly . Yes Historical Provider, MD   sulfamethoxazole-trimethoprim (BACTRIM DS;SEPTRA DS) 800-160 MG per tablet Take 1 tablet by mouth 2 times daily   Yes Historical Provider, MD   tamsulosin (FLOMAX) 0.4 MG capsule Take 1 capsule by mouth daily 11/3/17  Yes Ramirez Bailey PA-C   oxyCODONE (ROXICODONE) 5 MG immediate release tablet Take 1 tablet by mouth every 4 hours as needed for Pain . 11/2/17  Yes Leo Coy MD   clopidogrel (PLAVIX) 75 MG tablet Take 75 mg by mouth daily    Yes Historical Provider, MD   HUMALOG KWIKPEN 100 UNIT/ML pen  7/25/16  Yes Historical Provider, MD   aspirin 81 MG tablet Take 81 mg by mouth daily   Yes Historical Provider, MD   insulin glargine (LANTUS) 100 UNIT/ML injection vial Inject 21 Units into the skin every morning    Yes Historical Provider, MD   lisinopril (PRINIVIL;ZESTRIL) 2.5 MG tablet Take 2.5 mg by mouth daily    Yes Historical Provider, MD   Multiple Vitamins-Minerals (MULTIVITAMIN ADULT) TABS Take 1 tablet by mouth daily   Yes Historical Provider, MD   insulin aspart (NOVOLOG FLEXPEN) 100 UNIT/ML injection pen Inject into the skin 4 times daily (before meals and nightly) Indications: per sliding scale. 5 units over 150   Yes Historical Provider, MD   Zinc 50 MG TABS Take by mouth daily   Yes Historical Provider, MD   Multiple Vitamins-Minerals (EYE VITAMINS PO) Take by mouth daily   Yes Historical Provider, MD   SODIUM CHLORIDE, EXTERNAL, (SALINE WOUND [address]) 0.9 % SOLN 1000 cc bottle normal saline. Apply as directed 6/27/16  Yes Sonya Khan MD       Allergies:  Penicillins and Ambien [zolpidem tartrate]    Social History:   Social History     Social History    Marital status:      Spouse name: N/A    Number of children: N/A    Years of education: N/A     Occupational History    Not on file. Social History Main Topics    Smoking status: Current Some Day Smoker     Packs/day: 0.50     Years: 15.00     Types: Cigarettes    Smokeless tobacco: Never Used    Alcohol use No    Drug use: No    Sexual activity: Not on file     Other Topics Concern    Not on file     Social History Narrative    No narrative on file       Family History:       Problem Relation Age of Onset    Cancer Mother     Cancer Brother       Review of systems:    Con: no fever/chills or significant weight changes   Heent: denies HA, change in vision or hearing. No significant sinus drainage. No             difficulties swallowing. No recent trauma noted. CV: denies CP, dyspnea on exertion, Palpitations. No change in exercise tolerance  Pulm: No cough, sputum production, denies hemoptysis   GI: denies changes in bowel habits, no diarrhea or significant constipation. No BRBPR       or melena. : no dysuria, hesitancy or dysuria. Denies hematuria. Derm: no rashes or new lesions of concern. Psych: no signs of depression, anxiety or significant mood changes.   Neuro: denies focal weakness or change in mentation  A full 14 point review of systems is otherwise negative outside listed above and HPI      Physical Exam:    Vitals: BP (!) 101/54   Pulse 84   Temp 97.8 °F (36.6 °C) (Temporal)   Resp 18   Ht 6' (1.829 m)   Wt 146 lb (66.2 kg)   SpO2 97%   BMI 19.80 kg/m²     GENERAL: WD/WN not in acute distress, resting well in bed  HEENT: NC/AT PERRL, EOMI grossly, TM's clear, OP negative without sig erythema or exudate, neck is supple without masses or carotid bruit noted    CV: normal S1 and S2, no sig murmur, lift or gallop, normal PMI  CHEST: clear to auscultation both anterior and posterior, no rales rhonchi or wheeze appreciated. ABD: soft NT/ND with normoactive BS noted. No guarding or rebounding noted  /rectal: deferred  EXT: no cyanosis or clubbing noted, normal ROM  DERM: skin is warm and dry without sig rashes on exposed areas  PSYCH: appears mentally stable with no obvious abnormalities  NEURO: CN 2-12 apper grossly intact without sig deficits in motor or sensory       CBC:   Recent Labs      12/08/17   0558  12/09/17   0348   WBC  11.0*   --    HGB  8.7*  6.2*   PLT  448*   --      BMP:    Recent Labs      12/09/17 0348   NA  134*   K  5.3*   CL  99   CO2  20*   BUN  24*   CREATININE  1.4*   GLUCOSE  290*     -----------------------------------------------------------------    Radiology:     Xr Hip 2-3 Vw W Pelvis Right    Result Date: 12/8/2017  Examination. XR HIP 2-3 VW W PELVIS RIGHT History: Postop arthroplasty. A frontal projection of the pelvis and frontal and lateral views of the right hip are compared with the previous study dated 10/27/2017. There is evidence of right hip arthroplasty. No hardware complication. No change in the previous study. There is evidence of a fracture through the proximal femur in the subtrochanteric area. There is a metallic plate and screws along the lateral aspect of the proximal femur. There are cerclage wires in place. There is a drainage tube in place which was not seen in the previous study. Metallic sutures are seen along the lateral aspect of the upper thigh. A normal right hip arthroplasty. A well aligned, internally fixed fracture of the proximal femur. No hardware complication. Other findings as above. The above findings are recorded on a digital voice clip in PACS. Signed by Dr Fany Austin on 12/8/2017 9:56 AM      Assessment and Plan   1.      Active Problems:    Wound disruption    Wound dehiscence  POST OP ANEMIA  HYPOTENSION SECONDARY TO VOLUME DEPLETION  SHERLY SECONDARY TO VOLUME DEPLETION  HYPERKALEMIA SECONDARY TO SHERLY    PLAN:    TRANSFUSE STAT  IVF BOLUS  IV ABX  WOUND CARE    Electronically signed by Elizabeth Crump MD on 12/9/2017 at 12:01 PM

## 2017-12-10 LAB
ANION GAP SERPL CALCULATED.3IONS-SCNC: 16 MMOL/L (ref 7–19)
BASOPHILS ABSOLUTE: 0.1 K/UL (ref 0–0.2)
BASOPHILS RELATIVE PERCENT: 0.8 % (ref 0–1)
BUN BLDV-MCNC: 23 MG/DL (ref 8–23)
CALCIUM SERPL-MCNC: 8.7 MG/DL (ref 8.8–10.2)
CHLORIDE BLD-SCNC: 107 MMOL/L (ref 98–111)
CO2: 18 MMOL/L (ref 22–29)
CREAT SERPL-MCNC: 1.3 MG/DL (ref 0.5–1.2)
EOSINOPHILS ABSOLUTE: 0.6 K/UL (ref 0–0.6)
EOSINOPHILS RELATIVE PERCENT: 4.8 % (ref 0–5)
GFR NON-AFRICAN AMERICAN: 54
GLUCOSE BLD-MCNC: 137 MG/DL (ref 70–99)
GLUCOSE BLD-MCNC: 138 MG/DL (ref 70–99)
GLUCOSE BLD-MCNC: 193 MG/DL (ref 70–99)
GLUCOSE BLD-MCNC: 228 MG/DL (ref 70–99)
GLUCOSE BLD-MCNC: 31 MG/DL (ref 74–109)
GLUCOSE BLD-MCNC: 43 MG/DL (ref 70–99)
GLUCOSE BLD-MCNC: 89 MG/DL (ref 70–99)
HCT VFR BLD CALC: 26.7 % (ref 42–52)
HEMOGLOBIN: 8.4 G/DL (ref 14–18)
LYMPHOCYTES ABSOLUTE: 2.4 K/UL (ref 1.1–4.5)
LYMPHOCYTES RELATIVE PERCENT: 19.9 % (ref 20–40)
MCH RBC QN AUTO: 28.4 PG (ref 27–31)
MCHC RBC AUTO-ENTMCNC: 31.5 G/DL (ref 33–37)
MCV RBC AUTO: 90.2 FL (ref 80–94)
MONOCYTES ABSOLUTE: 1.4 K/UL (ref 0–0.9)
MONOCYTES RELATIVE PERCENT: 11.6 % (ref 0–10)
NEUTROPHILS ABSOLUTE: 7.4 K/UL (ref 1.5–7.5)
NEUTROPHILS RELATIVE PERCENT: 62.4 % (ref 50–65)
PDW BLD-RTO: 15.9 % (ref 11.5–14.5)
PERFORMED ON: ABNORMAL
PERFORMED ON: NORMAL
PLATELET # BLD: 343 K/UL (ref 130–400)
PMV BLD AUTO: 9.7 FL (ref 9.4–12.4)
POTASSIUM SERPL-SCNC: 4.2 MMOL/L (ref 3.5–5)
RBC # BLD: 2.96 M/UL (ref 4.7–6.1)
SODIUM BLD-SCNC: 141 MMOL/L (ref 136–145)
WBC # BLD: 11.9 K/UL (ref 4.8–10.8)

## 2017-12-10 PROCEDURE — 1210000000 HC MED SURG R&B

## 2017-12-10 PROCEDURE — 36415 COLL VENOUS BLD VENIPUNCTURE: CPT

## 2017-12-10 PROCEDURE — 2580000003 HC RX 258: Performed by: ORTHOPAEDIC SURGERY

## 2017-12-10 PROCEDURE — 6370000000 HC RX 637 (ALT 250 FOR IP): Performed by: FAMILY MEDICINE

## 2017-12-10 PROCEDURE — 6370000000 HC RX 637 (ALT 250 FOR IP): Performed by: ORTHOPAEDIC SURGERY

## 2017-12-10 PROCEDURE — 80048 BASIC METABOLIC PNL TOTAL CA: CPT

## 2017-12-10 PROCEDURE — 82948 REAGENT STRIP/BLOOD GLUCOSE: CPT

## 2017-12-10 PROCEDURE — 2580000003 HC RX 258: Performed by: FAMILY MEDICINE

## 2017-12-10 PROCEDURE — 6360000002 HC RX W HCPCS: Performed by: ORTHOPAEDIC SURGERY

## 2017-12-10 PROCEDURE — 85025 COMPLETE CBC W/AUTO DIFF WBC: CPT

## 2017-12-10 RX ORDER — TEMAZEPAM 30 MG/1
30 CAPSULE ORAL NIGHTLY
Status: DISCONTINUED | OUTPATIENT
Start: 2017-12-10 | End: 2017-12-13 | Stop reason: HOSPADM

## 2017-12-10 RX ORDER — INSULIN GLARGINE 100 [IU]/ML
10 INJECTION, SOLUTION SUBCUTANEOUS NIGHTLY
Status: DISCONTINUED | OUTPATIENT
Start: 2017-12-10 | End: 2017-12-11

## 2017-12-10 RX ADMIN — LISINOPRIL 2.5 MG: 5 TABLET ORAL at 08:45

## 2017-12-10 RX ADMIN — PANTOPRAZOLE SODIUM 40 MG: 40 TABLET, DELAYED RELEASE ORAL at 08:45

## 2017-12-10 RX ADMIN — DOCUSATE SODIUM 100 MG: 100 CAPSULE, LIQUID FILLED ORAL at 20:19

## 2017-12-10 RX ADMIN — ASPIRIN 81 MG CHEWABLE TABLET 81 MG: 81 TABLET CHEWABLE at 08:45

## 2017-12-10 RX ADMIN — THERA TABS 1 TABLET: TAB at 08:45

## 2017-12-10 RX ADMIN — SODIUM CHLORIDE: 9 INJECTION, SOLUTION INTRAVENOUS at 23:32

## 2017-12-10 RX ADMIN — INSULIN LISPRO 2 UNITS: 100 INJECTION, SOLUTION INTRAVENOUS; SUBCUTANEOUS at 11:57

## 2017-12-10 RX ADMIN — CEFEPIME HYDROCHLORIDE 2 G: 2 INJECTION, SOLUTION INTRAVENOUS at 08:45

## 2017-12-10 RX ADMIN — DOCUSATE SODIUM 100 MG: 100 CAPSULE, LIQUID FILLED ORAL at 08:45

## 2017-12-10 RX ADMIN — ACETAMINOPHEN 1000 MG: 500 TABLET, FILM COATED ORAL at 12:04

## 2017-12-10 RX ADMIN — Medication 10 ML: at 08:46

## 2017-12-10 RX ADMIN — INSULIN GLARGINE 21 UNITS: 100 INJECTION, SOLUTION SUBCUTANEOUS at 09:27

## 2017-12-10 RX ADMIN — TEMAZEPAM 30 MG: 30 CAPSULE ORAL at 20:19

## 2017-12-10 RX ADMIN — INSULIN LISPRO 2 UNITS: 100 INJECTION, SOLUTION INTRAVENOUS; SUBCUTANEOUS at 20:20

## 2017-12-10 RX ADMIN — DEXTROSE MONOHYDRATE 12.5 G: 25 INJECTION, SOLUTION INTRAVENOUS at 03:40

## 2017-12-10 RX ADMIN — POLYETHYLENE GLYCOL 3350 17 G: 17 POWDER, FOR SOLUTION ORAL at 08:45

## 2017-12-10 RX ADMIN — DEXTROSE 15 G: 15 GEL ORAL at 03:40

## 2017-12-10 RX ADMIN — SODIUM CHLORIDE: 9 INJECTION, SOLUTION INTRAVENOUS at 16:53

## 2017-12-10 RX ADMIN — CLOPIDOGREL BISULFATE 75 MG: 75 TABLET ORAL at 08:45

## 2017-12-10 RX ADMIN — SODIUM CHLORIDE: 9 INJECTION, SOLUTION INTRAVENOUS at 12:05

## 2017-12-10 RX ADMIN — TAMSULOSIN HYDROCHLORIDE 0.4 MG: 0.4 CAPSULE ORAL at 08:45

## 2017-12-10 RX ADMIN — VANCOMYCIN HYDROCHLORIDE 1250 MG: 10 INJECTION, POWDER, LYOPHILIZED, FOR SOLUTION INTRAVENOUS at 00:48

## 2017-12-10 RX ADMIN — ACETAMINOPHEN 1000 MG: 500 TABLET, FILM COATED ORAL at 04:32

## 2017-12-10 ASSESSMENT — PAIN SCALES - GENERAL
PAINLEVEL_OUTOF10: 5
PAINLEVEL_OUTOF10: 9
PAINLEVEL_OUTOF10: 1

## 2017-12-10 NOTE — PROGRESS NOTES
Nightly       Data:     Code Status: Full Code    Family History   Problem Relation Age of Onset    Cancer Mother     Cancer Brother      Social History     Social History    Marital status:      Spouse name: N/A    Number of children: N/A    Years of education: N/A     Occupational History    Not on file.      Social History Main Topics    Smoking status: Current Some Day Smoker     Packs/day: 0.50     Years: 15.00     Types: Cigarettes    Smokeless tobacco: Never Used    Alcohol use No    Drug use: No    Sexual activity: Not on file     Other Topics Concern    Not on file     Social History Narrative    No narrative on file       Labs:  Hematology:  Recent Labs      17   0558  17   0348  12/10/17   0319   WBC  11.0*   --   11.9*   HGB  8.7*  6.2*  8.4*   HCT  28.1*  20.6*  26.7*   PLT  448*   --   343     Chemistry:  Recent Labs      17   0348  12/10/17   0319   NA  134*  141   K  5.3*  4.2   CL  99  107   CO2  20*  18*   GLUCOSE  290*  31*   BUN  24*  23   CREATININE  1.4*  1.3*   ANIONGAP  15  16   LABGLOM  49*  54*   CALCIUM  8.0*  8.7*     Recent Labs      17   0558   LABA1C  6.8*       Objective:     Vitals: /69   Pulse 90   Temp 97.5 °F (36.4 °C) (Oral)   Resp 16   Ht 6' (1.829 m)   Wt 146 lb (66.2 kg)   SpO2 98%   BMI 19.80 kg/m²    Intake/Output Summary (Last 24 hours) at 12/10/17 1126  Last data filed at 12/10/17 0941   Gross per 24 hour   Intake             3451 ml   Output              450 ml   Net             3001 ml    Temp (24hrs), Av.6 °F (37 °C), Min:97.5 °F (36.4 °C), Max:99.7 °F (37.6 °C)    Glucose:  Recent Labs      12/10/17   0319  12/10/17   0351  12/10/17   0621  12/10/17   1118   POCGLU  43*  137*  89  193*     Physical Examination:   General appearance - alert, well appearing, and in no distress and oriented to person, place, and time  Mental status - alert, oriented to person, place, and time, normal mood, behavior, speech, dress, motor activity, and thought processes  Eyes - pupils equal and reactive, extraocular eye movements intact  Ears - bilateral TM's and external ear canals normal, hearing grossly normal bilaterally  Mouth - mucous membranes moist, pharynx normal without lesions  Neck - supple, no significant adenopathy  Lymphatics - no palpable lymphadenopathy, no hepatosplenomegaly  Chest - clear to auscultation, no wheezes, rales or rhonchi, symmetric air entry, no tachypnea, retractions or cyanosis  Heart - normal rate, regular rhythm, normal S1, S2, no murmurs, rubs, clicks or gallops  Abdomen - soft, nontender, nondistended, no masses or organomegaly bowel sounds normal  Neurological - alert, oriented, normal speech, no focal findings or movement disorder noted  Musculoskeletal - no joint tenderness, deformity or swelling  Extremities - peripheral pulses normal, no pedal edema, no clubbing or cyanosis  Skin - normal coloration and turgor, no rashes, no suspicious skin lesions noted      Assessment and Plan:     Primary Problem:  Hip infection    Hospital Problem list:  Active Problems:    Wound disruption    Wound dehiscence      PMH:  Past Medical History:   Diagnosis Date    Arthritis     Blood circulation, collateral     Cerebral artery occlusion with cerebral infarction [facility])     December 2014    Chronic kidney disease     Diabetes mellitus ([facility] [address]     History of blood transfusion     Hypertension     Psychiatric problem     Depression       Treatment Plan: Active Problems:    Wound disruption    Wound dehiscence  POST OP ANEMIA  HYPOTENSION SECONDARY TO VOLUME DEPLETION  SHERLY SECONDARY TO VOLUME DEPLETION  HYPERKALEMIA SECONDARY TO SHERLY  HYPOGLYCEMIA     PLAN:      - Cont monitor H/H  - Cont fluids  - Cont iv abx   - WOUND CARE  - Decrease night dose of lantus     Discharge planning:   Home    Reviewed treatment plans with the patient and/or family.    15 minutes spent in face to face interaction and coordination of care. Electronically signed by Jacy Yang PA-C on 12/10/2017 at 11:26 AM     I have discussed the care of Sarah Young, including pertinent history and exam findings with the ARNP/PA. I have seen and examined the patient and the key elements of all parts of the encounter have been performed by me. I agree with the assessment and plan as outlined by the ARNP/PA. Please refer to my separate note for complete documentation.      Electronically signed by Yusuf Suarez MD on 12/10/2017 at 1:12 PM

## 2017-12-10 NOTE — PROGRESS NOTES
Subjective:     Post-Operative Day: 2 Status Post right hip I&D 20 cm incision. Pt. Is awake and alert. No new complaints. No pain at this time. Some issues with hypoglycemia and confusion last pm.  Much better this am.  Systemic or Specific Complaints:No Complaints  no nausea and no vomiting Pain denies    Objective:     Patient Vitals for the past 24 hrs:   BP Temp Temp src Pulse Resp SpO2   12/10/17 0621 (!) 155/84 98.6 °F (37 °C) Temporal 99 18 97 %   12/10/17 0051 120/64 98.1 °F (36.7 °C) Temporal 77 18 97 %   12/10/17 0050 120/64 98.1 °F (36.7 °C) - 77 18 -   12/10/17 0013 131/65 98.2 °F (36.8 °C) Temporal 76 16 98 %   12/09/17 2203 125/66 98.3 °F (36.8 °C) Temporal 77 16 99 %   12/09/17 2145 125/66 98.3 °F (36.8 °C) - 77 16 -   12/09/17 1922 121/70 98.5 °F (36.9 °C) Temporal 80 16 97 %   12/09/17 1820 (!) 125/58 98.6 °F (37 °C) Temporal 83 14 98 %   12/09/17 1532 (!) 117/51 98.6 °F (37 °C) Temporal 90 18 98 %   12/09/17 1511 (!) 108/55 99 °F (37.2 °C) Temporal 85 16 99 %   12/09/17 1456 (!) 111/55 99 °F (37.2 °C) Temporal 87 16 98 %   12/09/17 1218 (!) 112/54 99.2 °F (37.3 °C) Temporal 94 18 97 %       General: alert, appears stated age, cooperative and no distress   Exam: Some bloody staining to dressings but intact. Drain is intact. Wound: Wound clean and dry no evidence of infection. , No Drainage and Wound Intact   Neurovascular: Exam normal     DVT Exam: No evidence of DVT seen on physical exam.   Xray:      Surgical cultures:  none       Gram stain showed gram positive cocci in pairs     Data Review:  Recent Labs      12/09/17   0348  12/10/17   0319   HGB  6.2*  8.4*     Recent Labs      12/10/17   0319   NA  141   K  4.2   CREATININE  1.3*     Recent Labs      12/10/17   0319   LABGLOM  54*     No results for input(s): INR in the last 72 hours. Assessment:     Status Post right hip I&D. Doing well postoperatively. Plan:     D/c drain today. Dressing change. Awaiting final culture results.

## 2017-12-10 NOTE — PLAN OF CARE
Problem: Falls - Risk of  Goal: Absence of falls  Outcome: Ongoing      Problem: Risk for Impaired Skin Integrity  Goal: Tissue integrity - skin and mucous membranes  Structural intactness and normal physiological function of skin and  mucous membranes. Outcome: Ongoing      Problem: SAFETY  Goal: Free from accidental physical injury  Outcome: Ongoing    Goal: Free from intentional harm  Outcome: Ongoing      Problem: DAILY CARE  Goal: Daily care needs are met  Outcome: Ongoing      Problem: PAIN  Goal: Patient's pain/discomfort is manageable  Outcome: Ongoing      Problem: SKIN INTEGRITY  Goal: Skin integrity is maintained or improved  Outcome: Ongoing      Problem: KNOWLEDGE DEFICIT  Goal: Patient/S.O. demonstrates understanding of disease process, treatment plan, medications, and discharge instructions.   Outcome: Ongoing      Problem: DISCHARGE BARRIERS  Goal: Patient's continuum of care needs are met  Outcome: Ongoing      Problem: Pain:  Goal: Pain level will decrease  Pain level will decrease   Outcome: Ongoing    Goal: Control of acute pain  Control of acute pain   Outcome: Ongoing    Goal: Control of chronic pain  Control of chronic pain   Outcome: Ongoing      Problem: Skin Integrity:  Goal: Demonstration of wound healing without infection will improve  Demonstration of wound healing without infection will improve   Outcome: Ongoing    Goal: Complications related to intravenous access or infusion will be avoided or minimized  Complications related to intravenous access or infusion will be avoided or minimized   Outcome: Ongoing

## 2017-12-11 LAB
ANION GAP SERPL CALCULATED.3IONS-SCNC: 11 MMOL/L (ref 7–19)
BUN BLDV-MCNC: 19 MG/DL (ref 8–23)
CALCIUM SERPL-MCNC: 8.3 MG/DL (ref 8.8–10.2)
CHLORIDE BLD-SCNC: 109 MMOL/L (ref 98–111)
CO2: 23 MMOL/L (ref 22–29)
CREAT SERPL-MCNC: 0.9 MG/DL (ref 0.5–1.2)
GFR NON-AFRICAN AMERICAN: >60
GLUCOSE BLD-MCNC: 242 MG/DL (ref 70–99)
GLUCOSE BLD-MCNC: 375 MG/DL (ref 70–99)
GLUCOSE BLD-MCNC: 416 MG/DL (ref 70–99)
GLUCOSE BLD-MCNC: 518 MG/DL (ref 70–99)
GLUCOSE BLD-MCNC: 54 MG/DL (ref 74–109)
GLUCOSE BLD-MCNC: 66 MG/DL (ref 70–99)
GLUCOSE BLD-MCNC: 95 MG/DL (ref 70–99)
PERFORMED ON: ABNORMAL
PERFORMED ON: NORMAL
POTASSIUM SERPL-SCNC: 4.6 MMOL/L (ref 3.5–5)
SODIUM BLD-SCNC: 143 MMOL/L (ref 136–145)

## 2017-12-11 PROCEDURE — 2580000003 HC RX 258: Performed by: ORTHOPAEDIC SURGERY

## 2017-12-11 PROCEDURE — G8987 SELF CARE CURRENT STATUS: HCPCS

## 2017-12-11 PROCEDURE — 97165 OT EVAL LOW COMPLEX 30 MIN: CPT

## 2017-12-11 PROCEDURE — 6370000000 HC RX 637 (ALT 250 FOR IP): Performed by: ORTHOPAEDIC SURGERY

## 2017-12-11 PROCEDURE — 36415 COLL VENOUS BLD VENIPUNCTURE: CPT

## 2017-12-11 PROCEDURE — 1210000000 HC MED SURG R&B

## 2017-12-11 PROCEDURE — G8988 SELF CARE GOAL STATUS: HCPCS

## 2017-12-11 PROCEDURE — 6370000000 HC RX 637 (ALT 250 FOR IP): Performed by: FAMILY MEDICINE

## 2017-12-11 PROCEDURE — G8979 MOBILITY GOAL STATUS: HCPCS

## 2017-12-11 PROCEDURE — 80048 BASIC METABOLIC PNL TOTAL CA: CPT

## 2017-12-11 PROCEDURE — G8978 MOBILITY CURRENT STATUS: HCPCS

## 2017-12-11 PROCEDURE — 97161 PT EVAL LOW COMPLEX 20 MIN: CPT

## 2017-12-11 PROCEDURE — 82948 REAGENT STRIP/BLOOD GLUCOSE: CPT

## 2017-12-11 PROCEDURE — G8989 SELF CARE D/C STATUS: HCPCS

## 2017-12-11 PROCEDURE — 6360000002 HC RX W HCPCS: Performed by: ORTHOPAEDIC SURGERY

## 2017-12-11 RX ORDER — INSULIN GLARGINE 100 [IU]/ML
14 INJECTION, SOLUTION SUBCUTANEOUS NIGHTLY
Status: DISCONTINUED | OUTPATIENT
Start: 2017-12-11 | End: 2017-12-13 | Stop reason: HOSPADM

## 2017-12-11 RX ADMIN — INSULIN LISPRO 6 UNITS: 100 INJECTION, SOLUTION INTRAVENOUS; SUBCUTANEOUS at 17:36

## 2017-12-11 RX ADMIN — TEMAZEPAM 30 MG: 30 CAPSULE ORAL at 19:51

## 2017-12-11 RX ADMIN — ASPIRIN 81 MG CHEWABLE TABLET 81 MG: 81 TABLET CHEWABLE at 09:28

## 2017-12-11 RX ADMIN — ACETAMINOPHEN 1000 MG: 500 TABLET, FILM COATED ORAL at 03:40

## 2017-12-11 RX ADMIN — LISINOPRIL 2.5 MG: 5 TABLET ORAL at 09:28

## 2017-12-11 RX ADMIN — SODIUM CHLORIDE: 9 INJECTION, SOLUTION INTRAVENOUS at 05:29

## 2017-12-11 RX ADMIN — INSULIN LISPRO 4 UNITS: 100 INJECTION, SOLUTION INTRAVENOUS; SUBCUTANEOUS at 12:52

## 2017-12-11 RX ADMIN — CLOPIDOGREL BISULFATE 75 MG: 75 TABLET ORAL at 09:28

## 2017-12-11 RX ADMIN — CEFEPIME HYDROCHLORIDE 2 G: 2 INJECTION, SOLUTION INTRAVENOUS at 07:03

## 2017-12-11 RX ADMIN — INSULIN LISPRO 6 UNITS: 100 INJECTION, SOLUTION INTRAVENOUS; SUBCUTANEOUS at 21:22

## 2017-12-11 RX ADMIN — THERA TABS 1 TABLET: TAB at 09:28

## 2017-12-11 RX ADMIN — PANTOPRAZOLE SODIUM 40 MG: 40 TABLET, DELAYED RELEASE ORAL at 09:28

## 2017-12-11 RX ADMIN — TAMSULOSIN HYDROCHLORIDE 0.4 MG: 0.4 CAPSULE ORAL at 09:28

## 2017-12-11 RX ADMIN — ACETAMINOPHEN 1000 MG: 500 TABLET, FILM COATED ORAL at 19:50

## 2017-12-11 RX ADMIN — ACETAMINOPHEN 1000 MG: 500 TABLET, FILM COATED ORAL at 11:42

## 2017-12-11 ASSESSMENT — PAIN SCALES - GENERAL
PAINLEVEL_OUTOF10: 0

## 2017-12-11 NOTE — PROGRESS NOTES
Minimal assistance  UE Dressing: Supervision  LE Dressing: Minimal assistance (Assist only with seated LB dsg.)  Toileting: Supervision           Transfers  Stand Pivot Transfers: Supervision  Sit to stand: Modified independent  Transfer Comments: SPS tfers to recliner without use of walker. Cognition  Overall Cognitive Status: WNL                 LUE AROM (degrees)  LUE AROM : WNL  RUE AROM (degrees)  RUE AROM : WNL  LUE Strength  Gross LUE Strength: WNL  RUE Strength  Gross RUE Strength: WNL                  Assessment   Performance deficits / Impairments: Decreased ADL status; Decreased functional mobility   Assessment: Pt. needs assist only with seated LB dsg. Spouse willing to help with dressing tasks. PT to see for tfers. OT eval only  Treatment Diagnosis: R hip I and D  Prognosis: Good  Decision Making: Low Complexity  No Skilled OT: No OT goals identified  REQUIRES OT FOLLOW UP: No  Activity Tolerance  Activity Tolerance: Patient Tolerated treatment well        Discharge Recommendations:        Plan   Plan  Times per week: OT eval only    G-Code  OT G-codes  Functional Assessment Tool Used: ADLs, transfers  Score: CI  Functional Limitation: Self care  Self Care Current Status (): At least 1 percent but less than 20 percent impaired, limited or restricted  Self Care Goal Status (): At least 1 percent but less than 20 percent impaired, limited or restricted  Self Care Discharge Status ():  At least 1 percent but less than 20 percent impaired, limited or restricted  OutComes Score                                           AM-PAC Score             Goals  Short term goals  Time Frame for Short term goals: OT eval only  Long term goals  Time Frame for Long term goals : OT eval only       Therapy Time   Individual Concurrent Group Co-treatment   Time In           Time Out           Minutes                   Jeremy Ragsdale OT

## 2017-12-11 NOTE — PLAN OF CARE
Problem: Falls - Risk of  Goal: Absence of falls  Outcome: Ongoing      Problem: SAFETY  Goal: Free from accidental physical injury  Outcome: Ongoing    Goal: Free from intentional harm  Outcome: Ongoing      Problem: DAILY CARE  Goal: Daily care needs are met  Outcome: Ongoing      Problem: PAIN  Goal: Patient's pain/discomfort is manageable  Outcome: Ongoing      Problem: SKIN INTEGRITY  Goal: Skin integrity is maintained or improved  Outcome: Ongoing      Problem: KNOWLEDGE DEFICIT  Goal: Patient/S.O. demonstrates understanding of disease process, treatment plan, medications, and discharge instructions.   Outcome: Ongoing      Problem: DISCHARGE BARRIERS  Goal: Patient's continuum of care needs are met  Outcome: Ongoing

## 2017-12-11 NOTE — PROGRESS NOTES
History    Marital status:      Spouse name: N/A    Number of children: N/A    Years of education: N/A     Occupational History    Not on file. Social History Main Topics    Smoking status: Current Some Day Smoker     Packs/day: 0.50     Years: 15.00     Types: Cigarettes    Smokeless tobacco: Never Used    Alcohol use No    Drug use: No    Sexual activity: Not on file     Other Topics Concern    Not on file     Social History Narrative    No narrative on file       Labs:  Hematology:  Recent Labs      12/09/17   0348  12/10/17   0319   WBC   --   11.9*   HGB  6.2*  8.4*   HCT  20.6*  26.7*   PLT   --   343     Chemistry:  Recent Labs      12/09/17   0348  12/10/17   0319  12/11/17   0343   NA  134*  141  143   K  5.3*  4.2  4.6   CL  99  107  109   CO2  20*  18*  23   GLUCOSE  290*  31*  54*   BUN  24*  23  19   CREATININE  1.4*  1.3*  0.9   ANIONGAP  15  16  11   LABGLOM  49*  54*  >60   CALCIUM  8.0*  8.7*  8.3*     No results for input(s): PROT, LABALBU, LABA1C, B9JISSG, T0XKMUR, FT4, TSH, AST, ALT, LDH, GGT, ALKPHOS, BILITOT, BILIDIR, AMMONIA, AMYLASE, LIPASE, LACTATE, CHOL, HDL, LDLCHOLESTEROL, CHOLHDLRATIO, TRIG, VLDL, PHENYTOIN, PHENYF in the last 72 hours.     Objective:     Vitals: BP (!) 140/77   Pulse 84   Temp 98.1 °F (36.7 °C) (Temporal)   Resp 16   Ht 6' (1.829 m)   Wt 146 lb (66.2 kg)   SpO2 98%   BMI 19.80 kg/m²    Intake/Output Summary (Last 24 hours) at 17 0735  Last data filed at 17 0530   Gross per 24 hour   Intake             3560 ml   Output             1300 ml   Net             2260 ml    Temp (24hrs), Av °F (36.7 °C), Min:96.8 °F (36 °C), Max:99.7 °F (37.6 °C)    Glucose:  Recent Labs      12/10/17   1640  12/10/17   1836  17   0534  17   0701   [facility]  138*  228*  66*  95     Physical Examination:   General appearance - alert, well appearing, and in no distress and oriented to person, place, and time  Mental status - alert, oriented to po intake  The patient was seen on rounds today. Reviewed the last 24 hours of labs and x-rays that are available. Updated overnight status with nursing staff. Reviewed the case with Brian Mac PA-C. All questions were answered to the patient's/family's understanding. Patient is medically stable, please see orders for further details. I have discussed the care of Amna Mccarty, including pertinent history and exam findings with the ARNP/PA. I have seen and examined the patient and the key elements of all parts of the encounter have been performed by me. I agree with the assessment and plan as outlined by the ARNP/PA. Please refer to my separate note for complete documentation.      Electronically signed by Ryann Root MD on 12/11/2017 at 8:18 AM

## 2017-12-11 NOTE — PROGRESS NOTES
Nutrition Assessment    Type and Reason for Visit: Reassess    Nutrition Recommendations: continue current plan of care. Malnutrition Assessment:  · Malnutrition Status: At risk for malnutrition    Nutrition Diagnosis:   · Problem: Increased nutrient needs  · Etiology: related to Increased demand for energy/nutrients due to     Signs and symptoms:  as evidenced by Presence of wounds    Nutrition Assessment:  · Subjective Assessment: PO intake recorded %. Talked with pt wife, stated pt really enjoys the ONS. · Wound Type: Surgical Wound  · Current Nutrition Therapies:  · Oral Diet Orders: Carb Control 4 Carbs/Meal   · Oral Diet intake: %  · Oral Nutrition Supplement (ONS) Orders: Diabetic Oral Supplement  · ONS intake: %  · Anthropometric Measures:  · Ht: 6' (182.9 cm)   · Current Body Wt: 146 lb (66.2 kg)  · Admission Body Wt: 146 lb (66.2 kg)  · Ideal Body Wt: 178 lb (80.7 kg),   · % Ideal Body 82%  · BMI Classification: BMI 18.5 - 24.9 Normal Weight    Estimated Intake vs Estimated Needs: Intake Meets Needs    Nutrition Risk Level: Low    Nutrition Interventions:   Continue current diet, Continue current ONS  Continued Inpatient Monitoring    Nutrition Evaluation:   · Evaluation: Goals set   · Goals: continue po intake of %, wound improvement. · Monitoring: Meal Intake, Supplement Intake, Skin Integrity, Wound Healing, Weight, Pertinent Labs    See Adult Nutrition Doc Flowsheet for more detail.      Electronically signed by Beck Almeida MS, RD, LD on 12/11/17 at 9:35 AM    Contact Number: [phone]

## 2017-12-11 NOTE — PROGRESS NOTES
Subjective:     Post-Operative Day: 2 Status Post right hip I&D 20 cm incision. Pt. Is awake and alert. No new complaints. No pain at this time. Some issues with hypoglycemia and confusion last pm.  Much better this am.  Systemic or Specific Complaints:No Complaints  no nausea and no vomiting Pain denies    Objective:     Patient Vitals for the past 24 hrs:   BP Temp Temp src Pulse Resp SpO2   12/11/17 0808 129/70 98.9 °F (37.2 °C) Temporal 80 18 98 %   12/11/17 0011 (!) 140/77 98.1 °F (36.7 °C) Temporal 84 16 98 %   12/10/17 1833 134/74 96.8 °F (36 °C) Oral 97 18 100 %   12/10/17 1121 - 97.5 °F (36.4 °C) Oral - - -   12/10/17 1117 135/69 99.7 °F (37.6 °C) Temporal 90 16 98 %       General: alert, appears stated age, cooperative and no distress   Exam: Some bloody staining to dressings but intact. Drain is intact. Wound: Wound clean and dry no evidence of infection. , No Drainage and Wound Intact   Neurovascular: Exam normal     DVT Exam: No evidence of DVT seen on physical exam.   Xray:      Surgical cultures:  none       Positive for MRSA     Data Review:  Recent Labs      12/09/17   0348  12/10/17   0319   HGB  6.2*  8.4*     Recent Labs      12/11/17   0343   NA  143   K  4.6   CREATININE  0.9     Recent Labs      12/11/17   0343   LABGLOM  >60     No results for input(s): INR in the last 72 hours. Assessment:     Status Post right hip I&D. Doing well postoperatively. Plan:     Consult to ID for antibiotic coverage. Continue with current cares.     Electronically signed by Mehran Isaac PA-C on 12/11/2017 at 8:47 AM

## 2017-12-12 LAB
ANAEROBIC CULTURE: ABNORMAL
ANAEROBIC CULTURE: ABNORMAL
CULTURE SURGICAL: ABNORMAL
CULTURE SURGICAL: ABNORMAL
GLUCOSE BLD-MCNC: 194 MG/DL (ref 70–99)
GLUCOSE BLD-MCNC: 261 MG/DL (ref 70–99)
GLUCOSE BLD-MCNC: 293 MG/DL (ref 70–99)
GLUCOSE BLD-MCNC: 294 MG/DL (ref 70–99)
GLUCOSE BLD-MCNC: 330 MG/DL (ref 70–99)
GRAM STAIN RESULT: ABNORMAL
GRAM STAIN RESULT: ABNORMAL
ORGANISM: ABNORMAL
ORGANISM: ABNORMAL
PERFORMED ON: ABNORMAL

## 2017-12-12 PROCEDURE — 2580000003 HC RX 258: Performed by: INTERNAL MEDICINE

## 2017-12-12 PROCEDURE — 6370000000 HC RX 637 (ALT 250 FOR IP): Performed by: ORTHOPAEDIC SURGERY

## 2017-12-12 PROCEDURE — 2580000003 HC RX 258: Performed by: ORTHOPAEDIC SURGERY

## 2017-12-12 PROCEDURE — 6360000002 HC RX W HCPCS: Performed by: INTERNAL MEDICINE

## 2017-12-12 PROCEDURE — 6370000000 HC RX 637 (ALT 250 FOR IP): Performed by: PHYSICIAN ASSISTANT

## 2017-12-12 PROCEDURE — 1210000000 HC MED SURG R&B

## 2017-12-12 PROCEDURE — 82948 REAGENT STRIP/BLOOD GLUCOSE: CPT

## 2017-12-12 RX ORDER — VANCOMYCIN HYDROCHLORIDE 1 G/200ML
1000 INJECTION, SOLUTION INTRAVENOUS EVERY 12 HOURS
Status: DISCONTINUED | OUTPATIENT
Start: 2017-12-12 | End: 2017-12-12

## 2017-12-12 RX ORDER — INSULIN GLARGINE 100 [IU]/ML
15 INJECTION, SOLUTION SUBCUTANEOUS ONCE
Status: COMPLETED | OUTPATIENT
Start: 2017-12-12 | End: 2017-12-12

## 2017-12-12 RX ADMIN — PANTOPRAZOLE SODIUM 40 MG: 40 TABLET, DELAYED RELEASE ORAL at 08:32

## 2017-12-12 RX ADMIN — ASPIRIN 81 MG CHEWABLE TABLET 81 MG: 81 TABLET CHEWABLE at 08:32

## 2017-12-12 RX ADMIN — INSULIN LISPRO 12 UNITS: 100 INJECTION, SOLUTION INTRAVENOUS; SUBCUTANEOUS at 08:34

## 2017-12-12 RX ADMIN — METFORMIN HYDROCHLORIDE 500 MG: 500 TABLET, FILM COATED ORAL at 08:32

## 2017-12-12 RX ADMIN — INSULIN LISPRO 9 UNITS: 100 INJECTION, SOLUTION INTRAVENOUS; SUBCUTANEOUS at 17:26

## 2017-12-12 RX ADMIN — TAMSULOSIN HYDROCHLORIDE 0.4 MG: 0.4 CAPSULE ORAL at 08:33

## 2017-12-12 RX ADMIN — METFORMIN HYDROCHLORIDE 500 MG: 500 TABLET, FILM COATED ORAL at 17:33

## 2017-12-12 RX ADMIN — THERA TABS 1 TABLET: TAB at 08:32

## 2017-12-12 RX ADMIN — CLOPIDOGREL BISULFATE 75 MG: 75 TABLET ORAL at 08:33

## 2017-12-12 RX ADMIN — INSULIN GLARGINE 15 UNITS: 100 INJECTION, SOLUTION SUBCUTANEOUS at 08:35

## 2017-12-12 RX ADMIN — VANCOMYCIN HYDROCHLORIDE 750 MG: 10 INJECTION, POWDER, LYOPHILIZED, FOR SOLUTION INTRAVENOUS at 19:14

## 2017-12-12 RX ADMIN — TEMAZEPAM 30 MG: 30 CAPSULE ORAL at 20:51

## 2017-12-12 RX ADMIN — ACETAMINOPHEN 1000 MG: 500 TABLET, FILM COATED ORAL at 12:55

## 2017-12-12 RX ADMIN — DOCUSATE SODIUM 100 MG: 100 CAPSULE, LIQUID FILLED ORAL at 20:51

## 2017-12-12 RX ADMIN — ACETAMINOPHEN 1000 MG: 500 TABLET, FILM COATED ORAL at 19:23

## 2017-12-12 RX ADMIN — LISINOPRIL 2.5 MG: 5 TABLET ORAL at 08:33

## 2017-12-12 RX ADMIN — SODIUM CHLORIDE: 9 INJECTION, SOLUTION INTRAVENOUS at 08:32

## 2017-12-12 RX ADMIN — INSULIN LISPRO 9 UNITS: 100 INJECTION, SOLUTION INTRAVENOUS; SUBCUTANEOUS at 12:55

## 2017-12-12 RX ADMIN — VANCOMYCIN HYDROCHLORIDE 750 MG: 10 INJECTION, POWDER, LYOPHILIZED, FOR SOLUTION INTRAVENOUS at 08:31

## 2017-12-12 ASSESSMENT — PAIN SCALES - GENERAL
PAINLEVEL_OUTOF10: 0
PAINLEVEL_OUTOF10: 1
PAINLEVEL_OUTOF10: 0
PAINLEVEL_OUTOF10: 0
PAINLEVEL_OUTOF10: 3

## 2017-12-12 NOTE — PROGRESS NOTES
17 1412   Subjective   Subjective I do not feel like getting up now   Physical Therapy  Facility/Department: [facility] 3 GALO/VAS/MED  Daily Treatment Note  NAME: Sarah Meter  : 1940  MRN: [id number]    Date of Service: 2017    Patient Diagnosis(es):   Patient Active Problem List    Diagnosis Date Noted    Wound disruption 2017    Wound dehiscence 2017    Anemia     Type 2 diabetes mellitus with complication, with long-term current use of insulin ([address]     Stage 3 chronic kidney disease 10/24/2017    Obstipation 10/23/2017    Closed fracture of right femur ([address]     Type 2 diabetes mellitus without complication, with long-term current use of insulin ([address]     Anemia, chronic disease 10/22/2017    Essential hypertension 10/22/2017    Osteoarthritis 10/22/2017    Non-pressure chronic ulcer of right ankle with fat layer exposed ([address] 2016    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 ([address] 2016    Cerebral artery occlusion with cerebral infarction [facility])        Past Medical History:   Diagnosis Date    Arthritis     Blood circulation, collateral     Cerebral artery occlusion with cerebral infarction [facility])     2014    Chronic kidney disease     Diabetes mellitus ([address]     History of blood transfusion     Hypertension     Psychiatric problem     Depression     Past Surgical History:   Procedure Laterality Date    FRACTURE SURGERY      HIP FRACTURE SURGERY      JOINT REPLACEMENT      hip    MANDIBLE FRACTURE SURGERY      OR INCIS/DRAIN THIGH/KNEE ABSCESS,DEEP Right 2017    HIP INCISION AND DRAINAGE performed by Zakia Vasquez MD at [address] TOE AMPUTATION Right     5th metatarsal     TOTAL HIP ARTHROPLASTY Right 10/27/2017    ORIF RIGHT FEMUR FOR PERIPROSTHETIC FX performed by Zakia Vasquez MD at [place] [address]  Restrictions/Precautions  Restrictions/Precautions: Weight Bearing  Lower Extremity Weight Bearing

## 2017-12-12 NOTE — PROGRESS NOTES
and in no distress and oriented to person, place, and time  Mental status - alert, oriented to person, place, and time, normal mood, behavior, speech, dress, motor activity, and thought processes  Eyes - pupils equal and reactive, extraocular eye movements intact  Ears - bilateral TM's and external ear canals normal, hearing grossly normal bilaterally  Mouth - mucous membranes moist, pharynx normal without lesions  Neck - supple, no significant adenopathy  Lymphatics - no palpable lymphadenopathy, no hepatosplenomegaly  Chest - clear to auscultation, no wheezes, rales or rhonchi, symmetric air entry, no tachypnea, retractions or cyanosis  Heart - normal rate, regular rhythm, normal S1, S2, no murmurs, rubs, clicks or gallops  Abdomen - soft, nontender, nondistended, no masses or organomegaly bowel sounds normal  Neurological - alert, oriented, normal speech, no focal findings or movement disorder noted  Musculoskeletal - normal post op right hip joint tenderness, stiffness & swelling  Extremities - peripheral pulses normal, no pedal edema, no clubbing or cyanosis  Skin - normal post op changes right hip, dsg intact. Assessment and Plan:     Primary Problem:  Six weeks status post right hip ORIF for a periprosthetic fracture admit c wound infection   S/P I and D of right hip-- cx MRSA    Hospital Problem list:Treatment Plan: Active Problems:    Hypertension--zestril    Insulin dependant Diabetes mellitus type 2 c hyperglycemia. Blood sugars noted. Explained to patient the need for better control to optimize the healing process. Reviewed home & [facility] care medication regimen, IV fluids, and dietary intake over the last 24 hours to help determine the etiology of the hyperglycemia. Adjustments made and discussed with staff, see orders for further details. Anemia c hx transfusion--Cont monitor H/H    SHERLY- resolved.  Cr 0.9    GERD- protonix    BPH- flomax    Hyperkalemia- stable, resolved    Discharge planning:

## 2017-12-12 NOTE — PROGRESS NOTES
Pharmacy Note  Vancomycin Consult    Susie Almeida is a 69 y.o. male started on Vancomycin for Right hip wound infection with MRSA; consult received from Dr. Nilo Tejada to manage therapy. Also receiving the following antibiotics: none. 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 ([address]    Anemia, chronic disease    Essential hypertension    Osteoarthritis    Obstipation    Closed fracture of right femur ([address]    Type 2 diabetes mellitus without complication, with long-term current use of insulin ([address]    Stage 3 chronic kidney disease    Anemia    Type 2 diabetes mellitus with complication, with long-term current use of insulin ([address]    Wound disruption    Wound dehiscence       Allergies:  Penicillins and Ambien [zolpidem tartrate]     Temp max: 98.6    Recent Labs      12/10/17   0319  12/11/17   0343   BUN  23  19       Recent Labs      12/10/17   0319  12/11/17   0343   CREATININE  1.3*  0.9       Recent Labs      12/10/17   0319   WBC  11.9*         Intake/Output Summary (Last 24 hours) at 12/12/17 0089  Last data filed at 12/12/17 0241   Gross per 24 hour   Intake 1401 ml   Output 1250 ml   Net 151 ml       Culture Date Source Results   12/08/17 Surgical MRSA       Ht Readings from Last 1 Encounters:   12/08/17 6' (1.829 m)        Wt Readings from Last 1 Encounters:   12/08/17 146 lb (66.2 kg)         Body mass index is 19.8 kg/m². Estimated Creatinine Clearance: 65 mL/min (based on SCr of 0.9 mg/dL). Assessment/Plan:  Will initiate vancomycin 750 mg IV every 12 hours. Timing of trough level will be determined based on culture results, renal function, and clinical response. Thank you for the consult. Will continue to follow.     Electronically signed by Blair Moreno, [address] [place] [address] on 12/12/2017 at 6:32 AM

## 2017-12-12 NOTE — CONSULTS
INFECTIOUS DISEASES CONSULT NOTE    Patient:  Joe Rhodes 69 y.o. male  ROOM # [unfilled]  YOB: 1940  MRN: [id number]  CSN:  [id number]  Admit date: 12/8/2017   Admitting Physician: Sanjana Galo MD  Primary Care Physician: Leonela Jenkins DO  REFERRING PROVIDER: Jose Barajas MD    Reason for Consultation: Recommendations for antibiotic management of possible right hip infection. History of C. diff colitis. History of Present Illness/Chief Complaint: Pleasant 60-year-old man. History is obtained from patient, his wife, and chart review. They indicate back in 2013 he had a right hip fracture that was surgically repaired. Indicate in October of this year he had a stroke. Report a few weeks after discharge from his stroke he had a fall at home. Once they describe he had a periprosthetic hip fracture. Indicate initially the wound is healing, but wasn't progressing as well as they have hoped. He received some outpatient oral antibiotic treatment. Some ongoing issues regarding the right hip when he was taken back for surgery on December 8, 2017. Nga Robles He underwent irrigation. Per review of the operative note there is no grossly purulent material. There was some serous drainage. Deep cultures were obtained. Infectious disease asked to evaluate make recommendations for antimicrobial treatment. He is not having fevers, chills, or night sweats prior to admission. He had some weight loss over the past year but it had stabilized over the past few months. Indicates a few days ago. Had a hard stool. Past few days he has had some loose stool but it has not been frequent, has not been liquid, and has not been odorous. Bowel movements have not been large volume.     Current Scheduled Medications:    insulin glargine  14 Units Subcutaneous Nightly    temazepam  30 mg Oral Nightly    sodium chloride  250 mL Intravenous Once    multivitamin  1 tablet Oral Daily    aspirin  81 mg Oral Daily    clopidogrel 75 mg Oral Daily    docusate sodium  100 mg Oral BID    lisinopril  2.5 mg Oral Daily    pantoprazole  40 mg Oral Daily    polyethylene glycol  17 g Oral Daily    senna  1 tablet Oral Nightly    tamsulosin  0.4 mg Oral Daily    sodium chloride flush  10 mL Intravenous 2 times per day    acetaminophen  1,000 mg Oral Q8H    oxyCODONE  10 mg Oral 2 times per day    traMADol  50 mg Oral Q6H    insulin lispro  0-12 Units Subcutaneous TID WC    insulin lispro  0-6 Units Subcutaneous Nightly     Current PRN Medications:  magic butt cream, tiZANidine, sodium chloride, sodium chloride flush, oxyCODONE **OR** oxyCODONE, ondansetron, oxyCODONE **OR** oxyCODONE, fentanNYL **OR** fentanNYL, fentanNYL **OR** fentanNYL, glucose, dextrose, glucagon (rDNA), dextrose    Allergies: Allergies   Allergen Reactions    Penicillins Swelling    Ambien [Zolpidem Tartrate] Other (See Comments)     Hallucination and confusion. Past Medical History: Stroke. Chronic kidney disease. Diabetes mellitus. Hypertension. Depression. Degenerative arthritis. They report he had an episode of C. diff number of years ago. Past Surgical History: Hip arthroplasty. Amputation. Repair of right hip fracture. Social History: . Retired. Previously worked as a . Previous history of tobacco use. Quit in October. No alcohol use. Family History: Cancer in mother and brother. No close contacts have been ill    Exposure History:     Review of Systems:   No cough, sputum production or chest pain  No nausea or vomiting. No abdominal pain. He has not had adenopathy  No urinary tract complaints  No new neurological symptoms subsequent to his discharge following stroke in October.   Please see HPI for remaining pertinent positives negatives from his complete review of systems    Vital Signs:  /70   Pulse 73   Temp 97.4 °F (36.3 °C) (Temporal)   Resp 16   Ht 6' (1.829 m)   Wt 146 lb (66.2 kg)   SpO2 99%   BMI Suggest antibiotic treatment with vancomycin  Will ask micro-to report susceptibility to daptomycin, rifampin, linezolid, and ceftaroline in case we need additional antimicrobial options down the road  Additional antibiotic recommendations to follow      Marvin Greco MD  12/12/17  6:07 AM

## 2017-12-12 NOTE — PROGRESS NOTES
Subjective:     Post-Operative Day: 3 Status Post right hip I&D 20 cm incision. Pt. Is awake and alert. No new complaints. No pain at this time. Systemic or Specific Complaints:No Complaints  no nausea and no vomiting Pain denies    Objective:     Patient Vitals for the past 24 hrs:   BP Temp Temp src Pulse Resp SpO2   12/12/17 0613 120/70 97.2 °F (36.2 °C) Oral - 18 -   12/12/17 0411 130/70 97.4 °F (36.3 °C) Temporal 73 16 99 %   12/12/17 0004 123/69 97.4 °F (36.3 °C) Temporal 79 16 98 %   12/11/17 1928 - 97.1 °F (36.2 °C) Oral - - -   12/11/17 1927 139/70 98.6 °F (37 °C) Temporal 83 16 100 %   12/11/17 1115 130/80 97.6 °F (36.4 °C) Oral 70 16 -   12/11/17 0808 129/70 98.9 °F (37.2 °C) Temporal 80 18 98 %       General: alert, appears stated age, cooperative and no distress   Exam: Some bloody staining to dressings but intact. Drain is intact. Wound: Wound clean and dry no evidence of infection. , No Drainage and Wound Intact   Neurovascular: Exam normal     DVT Exam: No evidence of DVT seen on physical exam.   Xray:      Surgical cultures:  none       Positive for MRSA     Data Review:  Recent Labs      12/10/17   0319   HGB  8.4*     Recent Labs      12/11/17   0343   NA  143   K  4.6   CREATININE  0.9     Recent Labs      12/11/17   0343   LABGLOM  >60     No results for input(s): INR in the last 72 hours. Assessment:     Status Post right hip I&D. Doing well postoperatively. Plan:     Pt. Is doing fairly well. Vancomycin for MRSA infection. Plan to go back to [facility] in 1-2 days.   Electronically signed by Joaquim Randhawa PA-C on 12/12/2017 at 7:42 AM

## 2017-12-12 NOTE — CARE COORDINATION
Per Nayana at [facility], they will accept pt back when ever medically cleared

## 2017-12-13 VITALS
HEART RATE: 80 BPM | DIASTOLIC BLOOD PRESSURE: 70 MMHG | BODY MASS INDEX: 19.77 KG/M2 | OXYGEN SATURATION: 99 % | HEIGHT: 72 IN | RESPIRATION RATE: 16 BRPM | WEIGHT: 146 LBS | TEMPERATURE: 97.6 F | SYSTOLIC BLOOD PRESSURE: 120 MMHG

## 2017-12-13 LAB
BLOOD BANK DISPENSE STATUS: NORMAL
BLOOD BANK PRODUCT CODE: NORMAL
BPU ID: NORMAL
DESCRIPTION BLOOD BANK: NORMAL
GLUCOSE BLD-MCNC: 293 MG/DL (ref 70–99)
GLUCOSE BLD-MCNC: 327 MG/DL (ref 70–99)
GLUCOSE BLD-MCNC: 362 MG/DL (ref 70–99)
PERFORMED ON: ABNORMAL

## 2017-12-13 PROCEDURE — 6370000000 HC RX 637 (ALT 250 FOR IP): Performed by: ORTHOPAEDIC SURGERY

## 2017-12-13 PROCEDURE — 82948 REAGENT STRIP/BLOOD GLUCOSE: CPT

## 2017-12-13 PROCEDURE — 6370000000 HC RX 637 (ALT 250 FOR IP): Performed by: PHYSICIAN ASSISTANT

## 2017-12-13 PROCEDURE — 2580000003 HC RX 258: Performed by: INTERNAL MEDICINE

## 2017-12-13 PROCEDURE — 6360000002 HC RX W HCPCS: Performed by: INTERNAL MEDICINE

## 2017-12-13 RX ORDER — INSULIN GLARGINE 100 [IU]/ML
15 INJECTION, SOLUTION SUBCUTANEOUS ONCE
Status: COMPLETED | OUTPATIENT
Start: 2017-12-13 | End: 2017-12-13

## 2017-12-13 RX ORDER — LINEZOLID 600 MG/1
600 TABLET, FILM COATED ORAL 2 TIMES DAILY
Qty: 28 TABLET | Refills: 0
Start: 2017-12-13 | End: 2017-12-27

## 2017-12-13 RX ADMIN — VANCOMYCIN HYDROCHLORIDE 750 MG: 10 INJECTION, POWDER, LYOPHILIZED, FOR SOLUTION INTRAVENOUS at 07:56

## 2017-12-13 RX ADMIN — TAMSULOSIN HYDROCHLORIDE 0.4 MG: 0.4 CAPSULE ORAL at 07:57

## 2017-12-13 RX ADMIN — INSULIN LISPRO 12 UNITS: 100 INJECTION, SOLUTION INTRAVENOUS; SUBCUTANEOUS at 07:59

## 2017-12-13 RX ADMIN — THERA TABS 1 TABLET: TAB at 07:57

## 2017-12-13 RX ADMIN — ASPIRIN 81 MG CHEWABLE TABLET 81 MG: 81 TABLET CHEWABLE at 07:57

## 2017-12-13 RX ADMIN — ACETAMINOPHEN 1000 MG: 500 TABLET, FILM COATED ORAL at 11:48

## 2017-12-13 RX ADMIN — INSULIN GLARGINE 15 UNITS: 100 INJECTION, SOLUTION SUBCUTANEOUS at 11:49

## 2017-12-13 RX ADMIN — METFORMIN HYDROCHLORIDE 500 MG: 500 TABLET, FILM COATED ORAL at 07:57

## 2017-12-13 RX ADMIN — LISINOPRIL 2.5 MG: 5 TABLET ORAL at 07:58

## 2017-12-13 RX ADMIN — CLOPIDOGREL BISULFATE 75 MG: 75 TABLET ORAL at 07:56

## 2017-12-13 RX ADMIN — PANTOPRAZOLE SODIUM 40 MG: 40 TABLET, DELAYED RELEASE ORAL at 07:57

## 2017-12-13 RX ADMIN — INSULIN LISPRO 12 UNITS: 100 INJECTION, SOLUTION INTRAVENOUS; SUBCUTANEOUS at 11:48

## 2017-12-13 RX ADMIN — DOCUSATE SODIUM 100 MG: 100 CAPSULE, LIQUID FILLED ORAL at 07:57

## 2017-12-13 ASSESSMENT — PAIN SCALES - GENERAL
PAINLEVEL_OUTOF10: 0
PAINLEVEL_OUTOF10: 1

## 2017-12-13 NOTE — DISCHARGE SUMMARY
medicine team follow throughout their stay. His surgical cultures were positive for MRSA. He was treated with IV vancomycin and now will be on Zosyn. Dr. Alec Moore from [facility] was consulted as well. Acute postoperative blood loss anemia after joint replacement being monitored with daily hemoglobin/hematocrit. The patients dressing was changed/incision was checked on day of d/c. The patient will be discharged at this time to  [facility] with their current diet restrictions and will continue to follow the hip precautions outlined to them by us and PT/OT. Condition on Discharge: Stable    Plan  Followup at scheduled appointment time (3-4 weeks post-op). Patient was instructed on the use of pain medications, the signs and symptoms of infection, and was given our number to call should they have any questions or concerns following discharge.

## 2017-12-13 NOTE — PROGRESS NOTES
Subjective:     Post-Operative Day: 4 Status Post right hip I&D 20 cm incision. Pt. Is awake and alert. No new complaints. No pain at this time. Systemic or Specific Complaints:No Complaints  no nausea and no vomiting Pain denies    Objective:     Patient Vitals for the past 24 hrs:   BP Temp Temp src Pulse Resp   12/13/17 0715 120/70 97.6 °F (36.4 °C) Oral 80 16   12/13/17 0012 138/68 97.4 °F (36.3 °C) Oral 78 15   12/12/17 2015 (!) 142/66 97.1 °F (36.2 °C) Oral 82 16       General: alert, appears stated age, cooperative and no distress   Exam: Some bloody staining to dressings but intact. Drain is intact. Wound: Wound clean and dry no evidence of infection. , No Drainage and Wound Intact   Neurovascular: Exam normal     DVT Exam: No evidence of DVT seen on physical exam.   Xray:      Surgical cultures:  none       Positive for MRSA     Data Review:  No results for input(s): HGB in the last 72 hours. Recent Labs      12/11/17   0343   NA  143   K  4.6   CREATININE  0.9     Recent Labs      12/11/17   0343   LABGLOM  >60     No results for input(s): INR in the last 72 hours. Assessment:     Status Post right hip I&D. Doing well postoperatively. Plan:     Pt. Is doing fairly well. Zyvox for MRSA infection. Plan to go back to [facility] today.   Pt. Will f/u in office in 3-4 weeks for suture/staple removal.  Electronically signed by Janeth Pierson PA-C on 12/13/2017 at 10:14 AM

## 2017-12-13 NOTE — PROGRESS NOTES
Infectious Diseases Progress Note    Patient:  Elsie Moss  YOB: 1940  MRN: [id number]   Admit date: 12/8/2017   Admitting Physician: Juice Del Cid MD  Primary Care Physician: Beltran Penny DO    Chief Complaint/Interval History:  Patient seen and examined this morning on rounds at around 7:15 AM. I spoke with Dr. Ana Lopez yesterday. Patient feels fine. He is without fever or chills. He is up to chair. He has had no drainage from wound. He hopes to go to nursing home today. They have plans to go to [facility] for additional rehab. Discussed with Dr. Ana Lopez yesterday antibiotic options. He confirmed that the operative findings were all superficial. There is no evidence of disruption of the fascia. There is no exposed hardware. We talked about the risks and benefits of IV versus oral therapy. Both of us were in agreement that oral treatment would be appropriate with adjustment and plan if he had recurrence or no ongoing improvement. In/Out    Intake/Output Summary (Last 24 hours) at 12/13/17 0718  Last data filed at 12/13/17 5730   Gross per 24 hour   Intake             2755 ml   Output             1500 ml   Net             1255 ml     Allergies: Allergies   Allergen Reactions    Penicillins Swelling    Ambien [Zolpidem Tartrate] Other (See Comments)     Hallucination and confusion.      Current Meds:   vancomycin (VANCOCIN) intermittent dosing (placeholder) RX Placeholder   vancomycin (VANCOCIN) 750 mg in dextrose 5 % 250 mL IVPB Q12H   metFORMIN (GLUCOPHAGE) tablet 500 mg BID WC   insulin lispro (HUMALOG) injection vial 0-18 Units TID    magic butt cream Q4H PRN   insulin glargine (LANTUS) injection vial 14 Units Nightly   temazepam (RESTORIL) capsule 30 mg Nightly   0.9 % sodium chloride bolus Once   multivitamin 1 tablet Daily   aspirin chewable tablet 81 mg Daily   clopidogrel (PLAVIX) tablet 75 mg Daily   docusate sodium (COLACE) capsule 100 mg BID   lisinopril (PRINIVIL;ZESTRIL) tablet 2.5 mg Daily   pantoprazole (PROTONIX) tablet 40 mg Daily   polyethylene glycol (GLYCOLAX) packet 17 g Daily   senna (SENOKOT) tablet 8.6 mg Nightly   tamsulosin (FLOMAX) capsule 0.4 mg Daily   tiZANidine (ZANAFLEX) tablet 4 mg Q8H PRN   0.9 % sodium chloride infusion Continuous   0.9 % sodium chloride bolus PRN   sodium chloride flush 0.9 % injection 10 mL 2 times per day   sodium chloride flush 0.9 % injection 10 mL PRN   acetaminophen (TYLENOL) tablet 1,000 mg Q8H   oxyCODONE (ROXICODONE) immediate release tablet 5 mg Q4H PRN   Or    oxyCODONE (ROXICODONE) immediate release tablet 10 mg Q4H PRN   ondansetron (ZOFRAN) injection 4 mg Q6H PRN   oxyCODONE (OXYCONTIN) extended release tablet 10 mg 2 times per day   oxyCODONE (ROXICODONE) immediate release tablet 10 mg Q4H PRN   Or    oxyCODONE (ROXICODONE) immediate release tablet 20 mg Q4H PRN   traMADol (ULTRAM) tablet 50 mg Q6H   fentaNYL (SUBLIMAZE) injection 50 mcg Q3H PRN   Or    fentaNYL (SUBLIMAZE) injection 75 mcg Q3H PRN   fentaNYL (SUBLIMAZE) injection 75 mcg Q3H PRN   Or    fentaNYL (SUBLIMAZE) injection 100 mcg Q3H PRN   glucose (GLUTOSE) 40 % oral gel 15 g PRN   dextrose 50 % solution 12.5 g PRN   glucagon (rDNA) injection 1 mg PRN   dextrose 5 % solution PRN     ROS no nausea, diarrhea, or rash. Vital Signs:  /70   Pulse 80   Temp 97.6 °F (36.4 °C) (Oral)   Resp 16   Ht 6' (1.829 m)   Wt 146 lb (66.2 kg)   SpO2 99%   BMI 19.80 kg/m²     Physical Exam   Hip incision without erythema. No significant drainage at present. Line/IV site: No erythema, warmth, induration, or tenderness. Lab Results:  CBC: No results for input(s): WBC, HGB, PLT in the last 72 hours.   BMP:  Recent Labs      12/11/17   0343   NA  143   K  4.6   CL  109   CO2  23   BUN  19   CREATININE  0.9   GLUCOSE  54*     Culture Results:  12/8/2017 superficial hip culture - MRSA  Culture & Susceptibility   Culture & Susceptibility     STAPH AUREUS MRSA     Antibiotic Interpretation ALEX Unit   azithromycin Resistant  mcg/mL   benzylpenicillin Resistant >=0.5 mcg/mL   clindamycin Sensitive <=0.25 mcg/mL   doxycycline Sensitive  mcg/mL   erythromycin Resistant >=8 mcg/mL   inducible clindamycin resistance Negative Neg mcg/mL   oxacillin Resistant >=4 mcg/mL   tetracycline Sensitive <=1 mcg/mL   trimethoprim-sulfamethoxazole Resistant >=320 mcg/mL   vancomycin Sensitive 1 mcg/mL      Spoke with micro-and they confirm the organism was linezolid susceptible. They were going to unsuppressed the result. Radiology: None    Additional Studies Reviewed:  None    Impression:  Likely superficial wound infection right hip. Feel oral antibiotic treatment appropriate. Dr. Gama Barlow in agreement. Talked with patient and wife and they're comfortable with that plan as well. Recommendations:  Discontinue vancomycin  (Linezolid 600 mg by mouth every 12 hours for 2 weeks  Okay with me for transfer to [facility] nursing home for additional management  Discussed with Leatha Gil  Okay with me for discharge when released by others  Sree Dan going to keep follow-up with nursing home attending and orthopedic surgery. I would be happy to see if any recurrent symptoms or problems. They were comfortable with that plan.     Zoraida Givens MD

## 2017-12-13 NOTE — DISCHARGE SUMMARY
[facility]  Dr. Linwood Mtz. Beltrán  Discharge Summary       Kimmy Conklin is a 69 y.o. male underwent I&D of 20 cm right hip total hip procedure without complication. Kimmy Conklin was admitted to the floor following their recovery in the PACU.      Discharge Diagnosis  right Hip I&D    Current Inpatient Medications    Current Facility-Administered Medications: insulin glargine (LANTUS) injection vial 15 Units, 15 Units, Subcutaneous, Once  vancomycin (VANCOCIN) intermittent dosing (placeholder), , Other, RX Placeholder  vancomycin (VANCOCIN) 750 mg in dextrose 5 % 250 mL IVPB, 750 mg, Intravenous, Q12H  metFORMIN (GLUCOPHAGE) tablet 500 mg, 500 mg, Oral, BID WC  insulin lispro (HUMALOG) injection vial 0-18 Units, 0-18 Units, Subcutaneous, TID WC  magic butt cream, , Topical, Q4H PRN  insulin glargine (LANTUS) injection vial 14 Units, 14 Units, Subcutaneous, Nightly  temazepam (RESTORIL) capsule 30 mg, 30 mg, Oral, Nightly  0.9 % sodium chloride bolus, 250 mL, Intravenous, Once  multivitamin 1 tablet, 1 tablet, Oral, Daily  aspirin chewable tablet 81 mg, 81 mg, Oral, Daily  clopidogrel (PLAVIX) tablet 75 mg, 75 mg, Oral, Daily  docusate sodium (COLACE) capsule 100 mg, 100 mg, Oral, BID  lisinopril (PRINIVIL;ZESTRIL) tablet 2.5 mg, 2.5 mg, Oral, Daily  pantoprazole (PROTONIX) tablet 40 mg, 40 mg, Oral, Daily  polyethylene glycol (GLYCOLAX) packet 17 g, 17 g, Oral, Daily  senna (SENOKOT) tablet 8.6 mg, 1 tablet, Oral, Nightly  tamsulosin (FLOMAX) capsule 0.4 mg, 0.4 mg, Oral, Daily  tiZANidine (ZANAFLEX) tablet 4 mg, 4 mg, Oral, Q8H PRN  0.9 % sodium chloride infusion, , Intravenous, Continuous  0.9 % sodium chloride bolus, 500 mL, Intravenous, PRN  sodium chloride flush 0.9 % injection 10 mL, 10 mL, Intravenous, 2 times per day  sodium chloride flush 0.9 % injection 10 mL, 10 mL, Intravenous, PRN  acetaminophen (TYLENOL) tablet 1,000 mg, 1,000 mg, Oral, Q8H  oxyCODONE (ROXICODONE) medical standpoint the patient remained stable and continued to have the medicine team follow throughout their stay. His surgical cultures were positive for MRSA. He was treated with IV vancomycin and now will be on Zyvox. Dr. Thomas Mir from [facility] was consulted as well. Acute postoperative blood loss anemia after joint replacement being monitored with daily hemoglobin/hematocrit. The patients dressing was changed/incision was checked on day of d/c. The patient will be discharged at this time to  [facility] with their current diet restrictions and will continue to follow the hip precautions outlined to them by us and PT/OT. Condition on Discharge: Stable    Plan  Followup at scheduled appointment time (3-4 weeks post-op). Patient was instructed on the use of pain medications, the signs and symptoms of infection, and was given our number to call should they have any questions or concerns following discharge.

## 2017-12-13 NOTE — PROGRESS NOTES
Subjective:     Post-Operative Day: 4 Status Post right hip I&D 20 cm incision. Pt. Is awake and alert. No new complaints. No pain at this time. Systemic or Specific Complaints:No Complaints  no nausea and no vomiting Pain denies    Objective:     Patient Vitals for the past 24 hrs:   BP Temp Temp src Pulse Resp   12/13/17 0715 120/70 97.6 °F (36.4 °C) Oral 80 16   12/13/17 0012 138/68 97.4 °F (36.3 °C) Oral 78 15   12/12/17 2015 (!) 142/66 97.1 °F (36.2 °C) Oral 82 16       General: alert, appears stated age, cooperative and no distress   Exam: Some bloody staining to dressings but intact. Drain is intact. Wound: Wound clean and dry no evidence of infection. , No Drainage and Wound Intact   Neurovascular: Exam normal     DVT Exam: No evidence of DVT seen on physical exam.   Xray:      Surgical cultures:  none       Positive for MRSA     Data Review:  No results for input(s): HGB in the last 72 hours. Recent Labs      12/11/17   0343   NA  143   K  4.6   CREATININE  0.9     Recent Labs      12/11/17   0343   LABGLOM  >60     No results for input(s): INR in the last 72 hours. Assessment:     Status Post right hip I&D. Doing well postoperatively. Plan:     Pt. Is doing fairly well. Zosyn for MRSA infection. Plan to go back to [facility] today.   Pt. Will f/u in office in 3-4 weeks for suture/staple removal.  Electronically signed by Becky Jack PA-C on 12/13/2017 at 8:14 AM

## 2017-12-13 NOTE — PROGRESS NOTES
[facility]  Daily Progress Note  Cory Stephenson  MRN: [id number] LOS: 5    Admit Date: 12/8/2017 12/13/2017 7:56 AM    Subjective: doing excellent, ready for dc, +BM, shower, ambulatory. Pain controlled. Wife at bedside. Chief Complaint:  Infected right hip    Interval History:    Reviewed overnight events and nursing notes. Status:  improved  Pain:  some relief    ROS:  10 point ROS obtained:   Gen: No fevers  HEENT: No migraine or visual change  Lung: No cough, hemopotysis or wheezing  Cv: No chest pain or pressure  Abd: No nausea or vomit, no change in bowel fct, no gi bleeding  Ext: right hip positive for stiffness, pain and some swelling  Neuro: No seizure or syncope  Skin: No new rashes, has hx of  drainage from right hip  Endo: No polyuria, polyphagia or poilydypsia  Psyc: No inc anxiety or depression  MS: right hip  joint pain & swelling reported    DIET:  DIET CARB CONTROL;   Dietary Nutrition Supplements: Diabetic Oral Supplement    Medications:      sodium chloride 150 mL/hr at 12/12/17 8938    dextrose        vancomycin (VANCOCIN) intermittent dosing (placeholder)   Other RX Placeholder    vancomycin  750 mg Intravenous Q12H    metFORMIN  500 mg Oral BID WC    insulin lispro  0-18 Units Subcutaneous TID WC    insulin glargine  14 Units Subcutaneous Nightly    temazepam  30 mg Oral Nightly    sodium chloride  250 mL Intravenous Once    multivitamin  1 tablet Oral Daily    aspirin  81 mg Oral Daily    clopidogrel  75 mg Oral Daily    docusate sodium  100 mg Oral BID    lisinopril  2.5 mg Oral Daily    pantoprazole  40 mg Oral Daily    polyethylene glycol  17 g Oral Daily    senna  1 tablet Oral Nightly    tamsulosin  0.4 mg Oral Daily    sodium chloride flush  10 mL Intravenous 2 times per day    acetaminophen  1,000 mg Oral Q8H    oxyCODONE  10 mg Oral 2 times per day    traMADol  50 mg Oral Q6H       Data:     Code Status: Full Code    Family History   Problem Relation Age of Onset    Cancer Mother     Cancer Brother      Social History     Social History    Marital status:      Spouse name: N/A    Number of children: N/A    Years of education: N/A     Occupational History    Not on file. Social History Main Topics    Smoking status: Current Some Day Smoker     Packs/day: 0.50     Years: 15.00     Types: Cigarettes    Smokeless tobacco: Never Used    Alcohol use No    Drug use: No    Sexual activity: Not on file     Other Topics Concern    Not on file     Social History Narrative    No narrative on file       Labs:  Hematology:  No results for input(s): WBC, HGB, HCT, PLT, SEDRATE, CRP, INR in the last 72 hours. Invalid input(s): PT  Chemistry:  Recent Labs      17   0343   NA  143   K  4.6   CL  109   CO2  23   GLUCOSE  54*   BUN  19   CREATININE  0.9   ANIONGAP  11   LABGLOM  >60   CALCIUM  8.3*     No results for input(s): PROT, LABALBU, LABA1C, T7SZIPJ, Q5DOVFZ, FT4, TSH, AST, ALT, LDH, GGT, ALKPHOS, BILITOT, BILIDIR, AMMONIA, AMYLASE, LIPASE, LACTATE, CHOL, HDL, LDLCHOLESTEROL, CHOLHDLRATIO, TRIG, VLDL, PHENYTOIN, PHENYF in the last 72 hours.     Objective:     Vitals: /70   Pulse 80   Temp 97.6 °F (36.4 °C) (Oral)   Resp 16   Ht 6' (1.829 m)   Wt 146 lb (66.2 kg)   SpO2 99%   BMI 19.80 kg/m²      Intake/Output Summary (Last 24 hours) at 17 0756  Last data filed at 17 3091   Gross per 24 hour   Intake             2755 ml   Output             1500 ml   Net             1255 ml    Temp (24hrs), Av.4 °F (36.3 °C), Min:97.1 °F (36.2 °C), Max:97.6 °F (36.4 °C)    Glucose:  Recent Labs      17   1649  17   2042  17   0148  17   0712   POCGLU  261*  293*  293*  362*     Physical Examination:   General appearance - alert, well appearing, and in no distress and oriented to person, place, and time  Mental status - alert, oriented to person, place, and time, normal mood, behavior, speech, dress, c patient & wife    Electronically signed by Kvng Mayers PA-C on 12/13/2017 at 7:56 AM       Ok to go to [facility] today  F/U Dr Tarun Barlow upon D/C form [facility]    Pt was seen for medical consult during the acute care setting, they will return to their primary care provider and have been instructed to follow up with them concerning abnormal lab/x-rays. We will be available for 30 days if return to ER, otherwise after that date they will go to Hospital ist service. All questions and concerns addressed prior to discharge. I have discussed the care of Carl Roberson, including pertinent history and exam findings with the ARNP/PA. I have seen and examined the patient and the key elements of all parts of the encounter have been performed by me. I agree with the assessment and plan as outlined by the ARNP/PA. Please refer to my separate note for complete documentation.      Electronically signed by Steve Le MD on 12/13/2017 at 8:52 AM

## 2017-12-13 NOTE — DISCHARGE SUMMARY
[facility]  Discharge Summary       Dallas Christie is a 69 y.o. male underwent I&D of 20 cm right hip total hip procedure without complication. Dallas Christie was admitted to the floor following their recovery in the PACU.      Discharge Diagnosis  right Hip I&D    Current Inpatient Medications    Current Facility-Administered Medications: insulin glargine (LANTUS) injection vial 15 Units, 15 Units, Subcutaneous, Once  vancomycin (VANCOCIN) intermittent dosing (placeholder), , Other, RX Placeholder  vancomycin (VANCOCIN) 750 mg in dextrose 5 % 250 mL IVPB, 750 mg, Intravenous, Q12H  metFORMIN (GLUCOPHAGE) tablet 500 mg, 500 mg, Oral, BID WC  insulin lispro (HUMALOG) injection vial 0-18 Units, 0-18 Units, Subcutaneous, TID WC  magic butt cream, , Topical, Q4H PRN  insulin glargine (LANTUS) injection vial 14 Units, 14 Units, Subcutaneous, Nightly  temazepam (RESTORIL) capsule 30 mg, 30 mg, Oral, Nightly  0.9 % sodium chloride bolus, 250 mL, Intravenous, Once  multivitamin 1 tablet, 1 tablet, Oral, Daily  aspirin chewable tablet 81 mg, 81 mg, Oral, Daily  clopidogrel (PLAVIX) tablet 75 mg, 75 mg, Oral, Daily  docusate sodium (COLACE) capsule 100 mg, 100 mg, Oral, BID  lisinopril (PRINIVIL;ZESTRIL) tablet 2.5 mg, 2.5 mg, Oral, Daily  pantoprazole (PROTONIX) tablet 40 mg, 40 mg, Oral, Daily  polyethylene glycol (GLYCOLAX) packet 17 g, 17 g, Oral, Daily  senna (SENOKOT) tablet 8.6 mg, 1 tablet, Oral, Nightly  tamsulosin (FLOMAX) capsule 0.4 mg, 0.4 mg, Oral, Daily  tiZANidine (ZANAFLEX) tablet 4 mg, 4 mg, Oral, Q8H PRN  0.9 % sodium chloride infusion, , Intravenous, Continuous  0.9 % sodium chloride bolus, 500 mL, Intravenous, PRN  sodium chloride flush 0.9 % injection 10 mL, 10 mL, Intravenous, 2 times per day  sodium chloride flush 0.9 % injection 10 mL, 10 mL, Intravenous, PRN  acetaminophen (TYLENOL) tablet 1,000 mg, 1,000 mg, Oral, Q8H  oxyCODONE (ROXICODONE)

## 2017-12-18 ENCOUNTER — LAB REQUISITION (OUTPATIENT)
Dept: LAB | Facility: HOSPITAL | Age: 77
End: 2017-12-18

## 2017-12-18 DIAGNOSIS — Z00.00 ENCOUNTER FOR GENERAL ADULT MEDICAL EXAMINATION WITHOUT ABNORMAL FINDINGS: ICD-10-CM

## 2017-12-18 LAB
ALBUMIN SERPL-MCNC: 2.9 G/DL (ref 3.5–5)
ALBUMIN/GLOB SERPL: 1.2 G/DL (ref 1.1–2.5)
ALP SERPL-CCNC: 107 U/L (ref 24–120)
ALT SERPL W P-5'-P-CCNC: 33 U/L (ref 0–54)
ANION GAP SERPL CALCULATED.3IONS-SCNC: 10 MMOL/L (ref 4–13)
AST SERPL-CCNC: 19 U/L (ref 7–45)
BASOPHILS # BLD AUTO: 0.08 10*3/MM3 (ref 0–0.2)
BASOPHILS NFR BLD AUTO: 0.8 % (ref 0–2)
BILIRUB SERPL-MCNC: 0.2 MG/DL (ref 0.1–1)
BUN BLD-MCNC: 31 MG/DL (ref 5–21)
BUN/CREAT SERPL: 23.5 (ref 7–25)
CALCIUM SPEC-SCNC: 8.6 MG/DL (ref 8.4–10.4)
CHLORIDE SERPL-SCNC: 106 MMOL/L (ref 98–110)
CO2 SERPL-SCNC: 23 MMOL/L (ref 24–31)
CREAT BLD-MCNC: 1.32 MG/DL (ref 0.5–1.4)
DEPRECATED RDW RBC AUTO: 51.8 FL (ref 40–54)
EOSINOPHIL # BLD AUTO: 0.57 10*3/MM3 (ref 0–0.7)
EOSINOPHIL NFR BLD AUTO: 5.5 % (ref 0–4)
ERYTHROCYTE [DISTWIDTH] IN BLOOD BY AUTOMATED COUNT: 16.1 % (ref 12–15)
GFR SERPL CREATININE-BSD FRML MDRD: 53 ML/MIN/1.73
GLOBULIN UR ELPH-MCNC: 2.5 GM/DL
GLUCOSE BLD-MCNC: 97 MG/DL (ref 70–100)
HCT VFR BLD AUTO: 27.2 % (ref 40–52)
HGB BLD-MCNC: 8.6 G/DL (ref 14–18)
IMM GRANULOCYTES # BLD: 0.11 10*3/MM3 (ref 0–0.03)
IMM GRANULOCYTES NFR BLD: 1.1 % (ref 0–5)
LYMPHOCYTES # BLD AUTO: 1.36 10*3/MM3 (ref 0.72–4.86)
LYMPHOCYTES NFR BLD AUTO: 13.2 % (ref 15–45)
MCH RBC QN AUTO: 27.8 PG (ref 28–32)
MCHC RBC AUTO-ENTMCNC: 31.6 G/DL (ref 33–36)
MCV RBC AUTO: 88 FL (ref 82–95)
MONOCYTES # BLD AUTO: 1.26 10*3/MM3 (ref 0.19–1.3)
MONOCYTES NFR BLD AUTO: 12.2 % (ref 4–12)
NEUTROPHILS # BLD AUTO: 6.92 10*3/MM3 (ref 1.87–8.4)
NEUTROPHILS NFR BLD AUTO: 67.2 % (ref 39–78)
NRBC BLD MANUAL-RTO: 0 /100 WBC (ref 0–0)
PLATELET # BLD AUTO: 399 10*3/MM3 (ref 130–400)
PMV BLD AUTO: 10.2 FL (ref 6–12)
POTASSIUM BLD-SCNC: 4.6 MMOL/L (ref 3.5–5.3)
PROT SERPL-MCNC: 5.4 G/DL (ref 6.3–8.7)
RBC # BLD AUTO: 3.09 10*6/MM3 (ref 4.8–5.9)
SODIUM BLD-SCNC: 139 MMOL/L (ref 135–145)
WBC NRBC COR # BLD: 10.3 10*3/MM3 (ref 4.8–10.8)

## 2017-12-18 PROCEDURE — 36415 COLL VENOUS BLD VENIPUNCTURE: CPT | Performed by: NURSE PRACTITIONER

## 2017-12-18 PROCEDURE — 80053 COMPREHEN METABOLIC PANEL: CPT | Performed by: NURSE PRACTITIONER

## 2017-12-18 PROCEDURE — 85025 COMPLETE CBC W/AUTO DIFF WBC: CPT | Performed by: NURSE PRACTITIONER

## 2018-01-01 ENCOUNTER — LAB REQUISITION (OUTPATIENT)
Dept: LAB | Facility: HOSPITAL | Age: 78
End: 2018-01-01

## 2018-01-01 ENCOUNTER — APPOINTMENT (OUTPATIENT)
Dept: GENERAL RADIOLOGY | Facility: HOSPITAL | Age: 78
End: 2018-01-01

## 2018-01-01 ENCOUNTER — APPOINTMENT (OUTPATIENT)
Dept: CT IMAGING | Facility: HOSPITAL | Age: 78
End: 2018-01-01

## 2018-01-01 ENCOUNTER — APPOINTMENT (OUTPATIENT)
Dept: ULTRASOUND IMAGING | Facility: HOSPITAL | Age: 78
End: 2018-01-01

## 2018-01-01 ENCOUNTER — APPOINTMENT (OUTPATIENT)
Dept: CARDIOLOGY | Facility: HOSPITAL | Age: 78
End: 2018-01-01
Attending: PSYCHIATRY & NEUROLOGY

## 2018-01-01 ENCOUNTER — HOSPITAL ENCOUNTER (INPATIENT)
Facility: HOSPITAL | Age: 78
LOS: 17 days | Discharge: NURSING, REHAB OR LONG-TERM CARE | End: 2018-09-28
Attending: PSYCHIATRY & NEUROLOGY | Admitting: PSYCHIATRY & NEUROLOGY

## 2018-01-01 ENCOUNTER — HOSPITAL ENCOUNTER (EMERGENCY)
Facility: HOSPITAL | Age: 78
Discharge: HOME | End: 2018-05-04
Admitting: EMERGENCY MEDICINE

## 2018-01-01 ENCOUNTER — HOSPITAL ENCOUNTER (EMERGENCY)
Facility: HOSPITAL | Age: 78
Discharge: ANOTHER HOSPITAL | End: 2018-12-29
Attending: EMERGENCY MEDICINE | Admitting: EMERGENCY MEDICINE

## 2018-01-01 ENCOUNTER — HOSPITAL ENCOUNTER (EMERGENCY)
Facility: HOSPITAL | Age: 78
Discharge: HOME | End: 2018-03-15
Attending: EMERGENCY MEDICINE | Admitting: EMERGENCY MEDICINE

## 2018-01-01 ENCOUNTER — APPOINTMENT (OUTPATIENT)
Dept: MRI IMAGING | Facility: HOSPITAL | Age: 78
End: 2018-01-01

## 2018-01-01 VITALS
HEART RATE: 73 BPM | WEIGHT: 174.5 LBS | TEMPERATURE: 99 F | SYSTOLIC BLOOD PRESSURE: 123 MMHG | BODY MASS INDEX: 23.13 KG/M2 | OXYGEN SATURATION: 94 % | DIASTOLIC BLOOD PRESSURE: 55 MMHG | HEIGHT: 73 IN | RESPIRATION RATE: 18 BRPM

## 2018-01-01 VITALS
BODY MASS INDEX: 23.43 KG/M2 | DIASTOLIC BLOOD PRESSURE: 63 MMHG | SYSTOLIC BLOOD PRESSURE: 130 MMHG | HEIGHT: 72 IN | OXYGEN SATURATION: 95 % | RESPIRATION RATE: 16 BRPM | WEIGHT: 173 LBS | TEMPERATURE: 99.1 F | HEART RATE: 88 BPM

## 2018-01-01 VITALS
DIASTOLIC BLOOD PRESSURE: 69 MMHG | TEMPERATURE: 98.3 F | BODY MASS INDEX: 24.46 KG/M2 | SYSTOLIC BLOOD PRESSURE: 149 MMHG | HEIGHT: 71 IN | HEART RATE: 76 BPM | OXYGEN SATURATION: 98 % | RESPIRATION RATE: 16 BRPM | WEIGHT: 174.7 LBS

## 2018-01-01 VITALS
RESPIRATION RATE: 17 BRPM | HEIGHT: 73 IN | DIASTOLIC BLOOD PRESSURE: 71 MMHG | WEIGHT: 171 LBS | HEART RATE: 83 BPM | SYSTOLIC BLOOD PRESSURE: 127 MMHG | TEMPERATURE: 97.4 F | BODY MASS INDEX: 22.66 KG/M2 | OXYGEN SATURATION: 98 %

## 2018-01-01 DIAGNOSIS — E16.2 HYPOGLYCEMIA: Primary | ICD-10-CM

## 2018-01-01 DIAGNOSIS — R13.12 OROPHARYNGEAL DYSPHAGIA: ICD-10-CM

## 2018-01-01 DIAGNOSIS — Z00.00 ENCOUNTER FOR GENERAL ADULT MEDICAL EXAMINATION WITHOUT ABNORMAL FINDINGS: ICD-10-CM

## 2018-01-01 DIAGNOSIS — Z86.73 H/O: CVA (CEREBROVASCULAR ACCIDENT): ICD-10-CM

## 2018-01-01 DIAGNOSIS — Z78.9 IMPAIRED MOBILITY AND ADLS: ICD-10-CM

## 2018-01-01 DIAGNOSIS — R41.82 ALTERED MENTAL STATUS, UNSPECIFIED ALTERED MENTAL STATUS TYPE: Primary | ICD-10-CM

## 2018-01-01 DIAGNOSIS — K59.00 CONSTIPATION, UNSPECIFIED CONSTIPATION TYPE: Primary | ICD-10-CM

## 2018-01-01 DIAGNOSIS — Z74.09 IMPAIRED MOBILITY AND ADLS: ICD-10-CM

## 2018-01-01 DIAGNOSIS — R41.89 IMPAIRED COGNITION: ICD-10-CM

## 2018-01-01 DIAGNOSIS — Z74.09 IMPAIRED MOBILITY: ICD-10-CM

## 2018-01-01 DIAGNOSIS — I63.9 CEREBROVASCULAR ACCIDENT (CVA), UNSPECIFIED MECHANISM (HCC): Primary | ICD-10-CM

## 2018-01-01 LAB
ABO + RH BLD: NORMAL
ABO + RH BLD: NORMAL
ABO GROUP BLD: NORMAL
ACANTHOCYTES BLD QL SMEAR: ABNORMAL
ALBUMIN SERPL-MCNC: 1.8 G/DL (ref 3.5–5)
ALBUMIN SERPL-MCNC: 1.9 G/DL (ref 3.5–5)
ALBUMIN SERPL-MCNC: 2 G/DL (ref 3.5–5)
ALBUMIN SERPL-MCNC: 2.1 G/DL (ref 3.5–5)
ALBUMIN SERPL-MCNC: 2.2 G/DL (ref 3.5–5)
ALBUMIN SERPL-MCNC: 2.2 G/DL (ref 3.5–5)
ALBUMIN SERPL-MCNC: 2.3 G/DL (ref 3.5–5)
ALBUMIN SERPL-MCNC: 2.5 G/DL (ref 3.5–5)
ALBUMIN SERPL-MCNC: 2.6 G/DL (ref 3.5–5)
ALBUMIN SERPL-MCNC: 2.6 G/DL (ref 3.5–5)
ALBUMIN SERPL-MCNC: 2.8 G/DL (ref 3.5–5)
ALBUMIN SERPL-MCNC: 2.9 G/DL (ref 3.5–5)
ALBUMIN SERPL-MCNC: 3.4 G/DL (ref 3.5–5)
ALBUMIN SERPL-MCNC: 3.5 G/DL (ref 3.5–5)
ALBUMIN SERPL-MCNC: 3.5 G/DL (ref 3.5–5)
ALBUMIN SERPL-MCNC: 3.8 G/DL (ref 3.5–5)
ALBUMIN SERPL-MCNC: 4.3 G/DL (ref 3.5–5)
ALBUMIN/GLOB SERPL: 0.7 G/DL (ref 1.1–2.5)
ALBUMIN/GLOB SERPL: 0.8 G/DL (ref 1.1–2.5)
ALBUMIN/GLOB SERPL: 0.9 G/DL (ref 1.1–2.5)
ALBUMIN/GLOB SERPL: 1 G/DL (ref 1.1–2.5)
ALBUMIN/GLOB SERPL: 1.1 G/DL (ref 1.1–2.5)
ALBUMIN/GLOB SERPL: 1.2 G/DL (ref 1.1–2.5)
ALBUMIN/GLOB SERPL: 1.2 G/DL (ref 1.1–2.5)
ALBUMIN/GLOB SERPL: 1.3 G/DL (ref 1.1–2.5)
ALBUMIN/GLOB SERPL: 1.3 G/DL (ref 1.1–2.5)
ALBUMIN/GLOB SERPL: 1.5 G/DL (ref 1.1–2.5)
ALP SERPL-CCNC: 100 U/L (ref 24–120)
ALP SERPL-CCNC: 100 U/L (ref 24–120)
ALP SERPL-CCNC: 102 U/L (ref 24–120)
ALP SERPL-CCNC: 103 U/L (ref 24–120)
ALP SERPL-CCNC: 115 U/L (ref 24–120)
ALP SERPL-CCNC: 116 U/L (ref 24–120)
ALP SERPL-CCNC: 75 U/L (ref 24–120)
ALP SERPL-CCNC: 78 U/L (ref 24–120)
ALP SERPL-CCNC: 81 U/L (ref 24–120)
ALP SERPL-CCNC: 81 U/L (ref 24–120)
ALP SERPL-CCNC: 84 U/L (ref 24–120)
ALP SERPL-CCNC: 85 U/L (ref 24–120)
ALP SERPL-CCNC: 85 U/L (ref 24–120)
ALP SERPL-CCNC: 88 U/L (ref 24–120)
ALP SERPL-CCNC: 89 U/L (ref 24–120)
ALP SERPL-CCNC: 89 U/L (ref 24–120)
ALP SERPL-CCNC: 90 U/L (ref 24–120)
ALP SERPL-CCNC: 91 U/L (ref 24–120)
ALP SERPL-CCNC: 92 U/L (ref 24–120)
ALP SERPL-CCNC: 94 U/L (ref 24–120)
ALP SERPL-CCNC: 97 U/L (ref 24–120)
ALP SERPL-CCNC: 97 U/L (ref 24–120)
ALT SERPL W P-5'-P-CCNC: 19 U/L (ref 0–54)
ALT SERPL W P-5'-P-CCNC: 21 U/L (ref 0–54)
ALT SERPL W P-5'-P-CCNC: 21 U/L (ref 0–54)
ALT SERPL W P-5'-P-CCNC: 23 U/L (ref 0–54)
ALT SERPL W P-5'-P-CCNC: 24 U/L (ref 0–54)
ALT SERPL W P-5'-P-CCNC: 25 U/L (ref 0–54)
ALT SERPL W P-5'-P-CCNC: 27 U/L (ref 0–54)
ALT SERPL W P-5'-P-CCNC: 31 U/L (ref 0–54)
ALT SERPL W P-5'-P-CCNC: 32 U/L (ref 0–54)
ALT SERPL W P-5'-P-CCNC: 39 U/L (ref 0–54)
ALT SERPL W P-5'-P-CCNC: 39 U/L (ref 0–54)
ALT SERPL W P-5'-P-CCNC: 44 U/L (ref 0–54)
ALT SERPL W P-5'-P-CCNC: 48 U/L (ref 0–54)
ALT SERPL W P-5'-P-CCNC: 50 U/L (ref 0–54)
ALT SERPL W P-5'-P-CCNC: 54 U/L (ref 0–54)
ALT SERPL W P-5'-P-CCNC: 56 U/L (ref 0–54)
ALT SERPL W P-5'-P-CCNC: 57 U/L (ref 0–54)
ALT SERPL W P-5'-P-CCNC: 63 U/L (ref 0–54)
ANION GAP SERPL CALCULATED.3IONS-SCNC: 10 MMOL/L (ref 4–13)
ANION GAP SERPL CALCULATED.3IONS-SCNC: 10 MMOL/L (ref 4–13)
ANION GAP SERPL CALCULATED.3IONS-SCNC: 11 MMOL/L (ref 4–13)
ANION GAP SERPL CALCULATED.3IONS-SCNC: 12 MMOL/L (ref 4–13)
ANION GAP SERPL CALCULATED.3IONS-SCNC: 15 MMOL/L (ref 4–13)
ANION GAP SERPL CALCULATED.3IONS-SCNC: 16 MMOL/L (ref 4–13)
ANION GAP SERPL CALCULATED.3IONS-SCNC: 21 MMOL/L (ref 4–13)
ANION GAP SERPL CALCULATED.3IONS-SCNC: 6 MMOL/L (ref 4–13)
ANION GAP SERPL CALCULATED.3IONS-SCNC: 7 MMOL/L (ref 4–13)
ANION GAP SERPL CALCULATED.3IONS-SCNC: 8 MMOL/L (ref 4–13)
ANION GAP SERPL CALCULATED.3IONS-SCNC: 9 MMOL/L (ref 4–13)
ANTI-E: NORMAL
APTT PPP: 31.6 SECONDS (ref 24.1–34.8)
APTT PPP: 32.3 SECONDS (ref 24.1–34.8)
APTT PPP: 35.8 SECONDS (ref 24.1–34.8)
ARTICHOKE IGE QN: 74 MG/DL (ref 0–99)
ARTICHOKE IGE QN: <30 MG/DL (ref 0–99)
ARTICHOKE IGE QN: <30 MG/DL (ref 0–99)
AST SERPL-CCNC: 11 U/L (ref 7–45)
AST SERPL-CCNC: 13 U/L (ref 7–45)
AST SERPL-CCNC: 16 U/L (ref 7–45)
AST SERPL-CCNC: 17 U/L (ref 7–45)
AST SERPL-CCNC: 22 U/L (ref 7–45)
AST SERPL-CCNC: 24 U/L (ref 7–45)
AST SERPL-CCNC: 25 U/L (ref 7–45)
AST SERPL-CCNC: 27 U/L (ref 7–45)
AST SERPL-CCNC: 28 U/L (ref 7–45)
AST SERPL-CCNC: 30 U/L (ref 7–45)
AST SERPL-CCNC: 30 U/L (ref 7–45)
AST SERPL-CCNC: 31 U/L (ref 7–45)
AST SERPL-CCNC: 33 U/L (ref 7–45)
AST SERPL-CCNC: 36 U/L (ref 7–45)
AST SERPL-CCNC: 36 U/L (ref 7–45)
AST SERPL-CCNC: 37 U/L (ref 7–45)
AST SERPL-CCNC: 47 U/L (ref 7–45)
AST SERPL-CCNC: 53 U/L (ref 7–45)
AST SERPL-CCNC: 63 U/L (ref 7–45)
AST SERPL-CCNC: 69 U/L (ref 7–45)
AST SERPL-CCNC: 81 U/L (ref 7–45)
AST SERPL-CCNC: 85 U/L (ref 7–45)
BACTERIA SPEC AEROBE CULT: NORMAL
BACTERIA SPEC RESP CULT: NORMAL
BACTERIA UR QL AUTO: ABNORMAL /HPF
BACTERIA UR QL AUTO: ABNORMAL /HPF
BASOPHILS # BLD AUTO: 0.05 10*3/MM3 (ref 0–0.2)
BASOPHILS # BLD AUTO: 0.05 10*3/MM3 (ref 0–0.2)
BASOPHILS # BLD AUTO: 0.06 10*3/MM3 (ref 0–0.2)
BASOPHILS # BLD AUTO: 0.08 10*3/MM3 (ref 0–0.2)
BASOPHILS # BLD AUTO: 0.09 10*3/MM3 (ref 0–0.2)
BASOPHILS # BLD AUTO: 0.1 10*3/MM3 (ref 0–0.2)
BASOPHILS # BLD AUTO: 0.1 10*3/MM3 (ref 0–0.2)
BASOPHILS # BLD AUTO: 0.11 10*3/MM3 (ref 0–0.2)
BASOPHILS # BLD AUTO: 0.12 10*3/MM3 (ref 0–0.2)
BASOPHILS # BLD AUTO: 0.14 10*3/MM3 (ref 0–0.2)
BASOPHILS # BLD AUTO: 0.16 10*3/MM3 (ref 0–0.2)
BASOPHILS # BLD AUTO: 0.16 10*3/MM3 (ref 0–0.2)
BASOPHILS NFR BLD AUTO: 0.2 % (ref 0–2)
BASOPHILS NFR BLD AUTO: 0.3 % (ref 0–2)
BASOPHILS NFR BLD AUTO: 0.4 % (ref 0–2)
BASOPHILS NFR BLD AUTO: 0.4 % (ref 0–2)
BASOPHILS NFR BLD AUTO: 0.5 % (ref 0–2)
BASOPHILS NFR BLD AUTO: 0.6 % (ref 0–2)
BASOPHILS NFR BLD AUTO: 0.7 % (ref 0–2)
BASOPHILS NFR BLD AUTO: 0.8 % (ref 0–2)
BASOPHILS NFR BLD AUTO: 0.9 % (ref 0–2)
BASOPHILS NFR BLD AUTO: 1 % (ref 0–2)
BASOPHILS NFR BLD AUTO: 1.1 % (ref 0–2)
BH BB BLOOD EXPIRATION DATE: NORMAL
BH BB BLOOD EXPIRATION DATE: NORMAL
BH BB BLOOD TYPE BARCODE: 9500
BH BB BLOOD TYPE BARCODE: 9500
BH BB DISPENSE STATUS: NORMAL
BH BB DISPENSE STATUS: NORMAL
BH BB PRODUCT CODE: NORMAL
BH BB PRODUCT CODE: NORMAL
BH BB UNIT NUMBER: NORMAL
BH BB UNIT NUMBER: NORMAL
BH CV ECHO MEAS - AO MAX PG (FULL): 1.1 MMHG
BH CV ECHO MEAS - AO MAX PG: 3.7 MMHG
BH CV ECHO MEAS - AO MEAN PG (FULL): 1 MMHG
BH CV ECHO MEAS - AO MEAN PG: 2 MMHG
BH CV ECHO MEAS - AO ROOT AREA (BSA CORRECTED): 1.6
BH CV ECHO MEAS - AO ROOT AREA: 8.6 CM^2
BH CV ECHO MEAS - AO ROOT DIAM: 3.3 CM
BH CV ECHO MEAS - AO V2 MAX: 96.1 CM/SEC
BH CV ECHO MEAS - AO V2 MEAN: 64.1 CM/SEC
BH CV ECHO MEAS - AO V2 VTI: 20.3 CM
BH CV ECHO MEAS - AVA(I,A): 3.1 CM^2
BH CV ECHO MEAS - AVA(I,D): 3.1 CM^2
BH CV ECHO MEAS - AVA(V,A): 2.9 CM^2
BH CV ECHO MEAS - AVA(V,D): 2.9 CM^2
BH CV ECHO MEAS - BSA(HAYCOCK): 2 M^2
BH CV ECHO MEAS - BSA: 2 M^2
BH CV ECHO MEAS - BZI_BMI: 23.1 KILOGRAMS/M^2
BH CV ECHO MEAS - BZI_METRIC_HEIGHT: 185.4 CM
BH CV ECHO MEAS - BZI_METRIC_WEIGHT: 79.4 KG
BH CV ECHO MEAS - EDV(CUBED): 56.6 ML
BH CV ECHO MEAS - EDV(MOD-SP4): 98.5 ML
BH CV ECHO MEAS - EDV(TEICH): 63.5 ML
BH CV ECHO MEAS - EF(CUBED): 59.5 %
BH CV ECHO MEAS - EF(MOD-SP4): 58.6 %
BH CV ECHO MEAS - EF(TEICH): 51.8 %
BH CV ECHO MEAS - ESV(CUBED): 22.9 ML
BH CV ECHO MEAS - ESV(MOD-SP4): 40.8 ML
BH CV ECHO MEAS - ESV(TEICH): 30.6 ML
BH CV ECHO MEAS - FS: 26 %
BH CV ECHO MEAS - IVS/LVPW: 0.89
BH CV ECHO MEAS - IVSD: 0.96 CM
BH CV ECHO MEAS - LA DIMENSION: 3.2 CM
BH CV ECHO MEAS - LA/AO: 0.97
BH CV ECHO MEAS - LAT PEAK E' VEL: 11.7 CM/SEC
BH CV ECHO MEAS - LV DIASTOLIC VOL/BSA (35-75): 48.4 ML/M^2
BH CV ECHO MEAS - LV MASS(C)D: 122.4 GRAMS
BH CV ECHO MEAS - LV MASS(C)DI: 60.2 GRAMS/M^2
BH CV ECHO MEAS - LV MAX PG: 2.6 MMHG
BH CV ECHO MEAS - LV MEAN PG: 1 MMHG
BH CV ECHO MEAS - LV SYSTOLIC VOL/BSA (12-30): 20.1 ML/M^2
BH CV ECHO MEAS - LV V1 MAX: 80.9 CM/SEC
BH CV ECHO MEAS - LV V1 MEAN: 55.8 CM/SEC
BH CV ECHO MEAS - LV V1 VTI: 17.9 CM
BH CV ECHO MEAS - LVIDD: 3.8 CM
BH CV ECHO MEAS - LVIDS: 2.8 CM
BH CV ECHO MEAS - LVLD AP4: 8.8 CM
BH CV ECHO MEAS - LVLS AP4: 7.6 CM
BH CV ECHO MEAS - LVOT AREA (M): 3.5 CM^2
BH CV ECHO MEAS - LVOT AREA: 3.5 CM^2
BH CV ECHO MEAS - LVOT DIAM: 2.1 CM
BH CV ECHO MEAS - LVPWD: 1.1 CM
BH CV ECHO MEAS - MED PEAK E' VEL: 7.72 CM/SEC
BH CV ECHO MEAS - MV A MAX VEL: 84.3 CM/SEC
BH CV ECHO MEAS - MV DEC TIME: 0.2 SEC
BH CV ECHO MEAS - MV E MAX VEL: 55.5 CM/SEC
BH CV ECHO MEAS - MV E/A: 0.66
BH CV ECHO MEAS - RAP SYSTOLE: 5 MMHG
BH CV ECHO MEAS - RVSP: 8 MMHG
BH CV ECHO MEAS - SI(AO): 85.4 ML/M^2
BH CV ECHO MEAS - SI(CUBED): 16.6 ML/M^2
BH CV ECHO MEAS - SI(LVOT): 30.5 ML/M^2
BH CV ECHO MEAS - SI(MOD-SP4): 28.4 ML/M^2
BH CV ECHO MEAS - SI(TEICH): 16.2 ML/M^2
BH CV ECHO MEAS - SV(AO): 173.6 ML
BH CV ECHO MEAS - SV(CUBED): 33.7 ML
BH CV ECHO MEAS - SV(LVOT): 62 ML
BH CV ECHO MEAS - SV(MOD-SP4): 57.7 ML
BH CV ECHO MEAS - SV(TEICH): 32.9 ML
BH CV ECHO MEAS - TR MAX VEL: 86.2 CM/SEC
BH CV ECHO MEASUREMENTS AVERAGE E/E' RATIO: 5.72
BILIRUB CONJ SERPL-MCNC: 0 MG/DL (ref 0–0.3)
BILIRUB CONJ SERPL-MCNC: 0 MG/DL (ref 0–0.3)
BILIRUB INDIRECT SERPL-MCNC: 0 MG/DL (ref 0–1.1)
BILIRUB INDIRECT SERPL-MCNC: 0 MG/DL (ref 0–1.1)
BILIRUB SERPL-MCNC: 0.2 MG/DL (ref 0.1–1)
BILIRUB SERPL-MCNC: 0.3 MG/DL (ref 0.1–1)
BILIRUB SERPL-MCNC: 0.4 MG/DL (ref 0.1–1)
BILIRUB SERPL-MCNC: 0.5 MG/DL (ref 0.1–1)
BILIRUB SERPL-MCNC: 0.6 MG/DL (ref 0.1–1)
BILIRUB SERPL-MCNC: 0.6 MG/DL (ref 0.1–1)
BILIRUB UR QL STRIP: NEGATIVE
BLD GP AB SCN SERPL QL: POSITIVE
BUN BLD-MCNC: 11 MG/DL (ref 5–21)
BUN BLD-MCNC: 15 MG/DL (ref 5–21)
BUN BLD-MCNC: 16 MG/DL (ref 5–21)
BUN BLD-MCNC: 18 MG/DL (ref 5–21)
BUN BLD-MCNC: 18 MG/DL (ref 5–21)
BUN BLD-MCNC: 19 MG/DL (ref 5–21)
BUN BLD-MCNC: 20 MG/DL (ref 5–21)
BUN BLD-MCNC: 21 MG/DL (ref 5–21)
BUN BLD-MCNC: 22 MG/DL (ref 5–21)
BUN BLD-MCNC: 22 MG/DL (ref 5–21)
BUN BLD-MCNC: 24 MG/DL (ref 5–21)
BUN BLD-MCNC: 26 MG/DL (ref 5–21)
BUN BLD-MCNC: 28 MG/DL (ref 5–21)
BUN BLD-MCNC: 30 MG/DL (ref 5–21)
BUN BLD-MCNC: 30 MG/DL (ref 5–21)
BUN BLD-MCNC: 33 MG/DL (ref 5–21)
BUN BLD-MCNC: 36 MG/DL (ref 5–21)
BUN BLD-MCNC: 40 MG/DL (ref 5–21)
BUN BLD-MCNC: 61 MG/DL (ref 5–21)
BUN BLD-MCNC: 66 MG/DL (ref 5–21)
BUN/CREAT SERPL: 10.3 (ref 7–25)
BUN/CREAT SERPL: 12.2 (ref 7–25)
BUN/CREAT SERPL: 13 (ref 7–25)
BUN/CREAT SERPL: 13.1 (ref 7–25)
BUN/CREAT SERPL: 14.2 (ref 7–25)
BUN/CREAT SERPL: 14.2 (ref 7–25)
BUN/CREAT SERPL: 15.4 (ref 7–25)
BUN/CREAT SERPL: 16.7 (ref 7–25)
BUN/CREAT SERPL: 16.8 (ref 7–25)
BUN/CREAT SERPL: 18 (ref 7–25)
BUN/CREAT SERPL: 19 (ref 7–25)
BUN/CREAT SERPL: 19 (ref 7–25)
BUN/CREAT SERPL: 19.4 (ref 7–25)
BUN/CREAT SERPL: 19.4 (ref 7–25)
BUN/CREAT SERPL: 19.5 (ref 7–25)
BUN/CREAT SERPL: 20 (ref 7–25)
BUN/CREAT SERPL: 20.2 (ref 7–25)
BUN/CREAT SERPL: 20.7 (ref 7–25)
BUN/CREAT SERPL: 22.1 (ref 7–25)
BUN/CREAT SERPL: 22.7 (ref 7–25)
BUN/CREAT SERPL: 24.3 (ref 7–25)
BUN/CREAT SERPL: 25 (ref 7–25)
BUN/CREAT SERPL: 31.4 (ref 7–25)
BUN/CREAT SERPL: 33.3 (ref 7–25)
BURR CELLS BLD QL SMEAR: ABNORMAL
C DIFF TOX GENS STL QL NAA+PROBE: NEGATIVE
C DIFF TOX GENS STL QL NAA+PROBE: NEGATIVE
C PEPTIDE SERPL-MCNC: <0.1 NG/ML (ref 1.1–4.4)
C3 FRG RBC-MCNC: ABNORMAL
CA-I BLD-MCNC: 4.84 MG/DL (ref 4.6–5.4)
CALCIUM SPEC-SCNC: 7.7 MG/DL (ref 8.4–10.4)
CALCIUM SPEC-SCNC: 7.8 MG/DL (ref 8.4–10.4)
CALCIUM SPEC-SCNC: 7.9 MG/DL (ref 8.4–10.4)
CALCIUM SPEC-SCNC: 7.9 MG/DL (ref 8.4–10.4)
CALCIUM SPEC-SCNC: 8 MG/DL (ref 8.4–10.4)
CALCIUM SPEC-SCNC: 8.1 MG/DL (ref 8.4–10.4)
CALCIUM SPEC-SCNC: 8.2 MG/DL (ref 8.4–10.4)
CALCIUM SPEC-SCNC: 8.3 MG/DL (ref 8.4–10.4)
CALCIUM SPEC-SCNC: 8.3 MG/DL (ref 8.4–10.4)
CALCIUM SPEC-SCNC: 8.5 MG/DL (ref 8.4–10.4)
CALCIUM SPEC-SCNC: 8.6 MG/DL (ref 8.4–10.4)
CALCIUM SPEC-SCNC: 8.7 MG/DL (ref 8.4–10.4)
CALCIUM SPEC-SCNC: 8.7 MG/DL (ref 8.4–10.4)
CALCIUM SPEC-SCNC: 8.9 MG/DL (ref 8.4–10.4)
CALCIUM SPEC-SCNC: 8.9 MG/DL (ref 8.4–10.4)
CALCIUM SPEC-SCNC: 9 MG/DL (ref 8.4–10.4)
CALCIUM SPEC-SCNC: 9.6 MG/DL (ref 8.4–10.4)
CALCIUM SPEC-SCNC: 9.7 MG/DL (ref 8.4–10.4)
CHLORIDE SERPL-SCNC: 100 MMOL/L (ref 98–110)
CHLORIDE SERPL-SCNC: 100 MMOL/L (ref 98–110)
CHLORIDE SERPL-SCNC: 103 MMOL/L (ref 98–110)
CHLORIDE SERPL-SCNC: 103 MMOL/L (ref 98–110)
CHLORIDE SERPL-SCNC: 104 MMOL/L (ref 98–110)
CHLORIDE SERPL-SCNC: 105 MMOL/L (ref 98–110)
CHLORIDE SERPL-SCNC: 105 MMOL/L (ref 98–110)
CHLORIDE SERPL-SCNC: 107 MMOL/L (ref 98–110)
CHLORIDE SERPL-SCNC: 108 MMOL/L (ref 98–110)
CHLORIDE SERPL-SCNC: 109 MMOL/L (ref 98–110)
CHLORIDE SERPL-SCNC: 109 MMOL/L (ref 98–110)
CHLORIDE SERPL-SCNC: 110 MMOL/L (ref 98–110)
CHLORIDE SERPL-SCNC: 111 MMOL/L (ref 98–110)
CHLORIDE SERPL-SCNC: 111 MMOL/L (ref 98–110)
CHLORIDE SERPL-SCNC: 113 MMOL/L (ref 98–110)
CHLORIDE SERPL-SCNC: 99 MMOL/L (ref 98–110)
CHOLEST SERPL-MCNC: 169 MG/DL (ref 130–200)
CHOLEST SERPL-MCNC: <50 MG/DL (ref 130–200)
CHOLEST SERPL-MCNC: <50 MG/DL (ref 130–200)
CLARITY UR: CLEAR
CLUMPED PLATELETS: PRESENT
CO2 SERPL-SCNC: 18 MMOL/L (ref 24–31)
CO2 SERPL-SCNC: 19 MMOL/L (ref 24–31)
CO2 SERPL-SCNC: 22 MMOL/L (ref 24–31)
CO2 SERPL-SCNC: 23 MMOL/L (ref 24–31)
CO2 SERPL-SCNC: 23 MMOL/L (ref 24–31)
CO2 SERPL-SCNC: 24 MMOL/L (ref 24–31)
CO2 SERPL-SCNC: 25 MMOL/L (ref 24–31)
CO2 SERPL-SCNC: 25 MMOL/L (ref 24–31)
CO2 SERPL-SCNC: 26 MMOL/L (ref 24–31)
CO2 SERPL-SCNC: 27 MMOL/L (ref 24–31)
CO2 SERPL-SCNC: 27 MMOL/L (ref 24–31)
CO2 SERPL-SCNC: 28 MMOL/L (ref 24–31)
CO2 SERPL-SCNC: 29 MMOL/L (ref 24–31)
COLOR UR: YELLOW
CREAT BLD-MCNC: 1.03 MG/DL (ref 0.5–1.4)
CREAT BLD-MCNC: 1.05 MG/DL (ref 0.5–1.4)
CREAT BLD-MCNC: 1.05 MG/DL (ref 0.5–1.4)
CREAT BLD-MCNC: 1.07 MG/DL (ref 0.5–1.4)
CREAT BLD-MCNC: 1.08 MG/DL (ref 0.5–1.4)
CREAT BLD-MCNC: 1.12 MG/DL (ref 0.5–1.4)
CREAT BLD-MCNC: 1.17 MG/DL (ref 0.5–1.4)
CREAT BLD-MCNC: 1.19 MG/DL (ref 0.5–1.4)
CREAT BLD-MCNC: 1.2 MG/DL (ref 0.5–1.4)
CREAT BLD-MCNC: 1.22 MG/DL (ref 0.5–1.4)
CREAT BLD-MCNC: 1.22 MG/DL (ref 0.5–1.4)
CREAT BLD-MCNC: 1.23 MG/DL (ref 0.5–1.4)
CREAT BLD-MCNC: 1.27 MG/DL (ref 0.5–1.4)
CREAT BLD-MCNC: 1.29 MG/DL (ref 0.5–1.4)
CREAT BLD-MCNC: 1.32 MG/DL (ref 0.5–1.4)
CREAT BLD-MCNC: 1.45 MG/DL (ref 0.5–1.4)
CREAT BLD-MCNC: 1.46 MG/DL (ref 0.5–1.4)
CREAT BLD-MCNC: 1.48 MG/DL (ref 0.5–1.4)
CREAT BLD-MCNC: 1.49 MG/DL (ref 0.5–1.4)
CREAT BLD-MCNC: 1.55 MG/DL (ref 0.5–1.4)
CREAT BLD-MCNC: 1.94 MG/DL (ref 0.5–1.4)
CREAT BLD-MCNC: 3.39 MG/DL (ref 0.5–1.4)
CREAT UR-MCNC: 287 MG/DL
CROSSMATCH INTERPRETATION: NORMAL
CROSSMATCH INTERPRETATION: NORMAL
D-LACTATE SERPL-SCNC: 0.9 MMOL/L (ref 0.5–2)
D-LACTATE SERPL-SCNC: 1 MMOL/L (ref 0.5–2)
D-LACTATE SERPL-SCNC: 2.4 MMOL/L (ref 0.5–2)
DEPRECATED RDW RBC AUTO: 41.4 FL (ref 40–54)
DEPRECATED RDW RBC AUTO: 41.6 FL (ref 40–54)
DEPRECATED RDW RBC AUTO: 42.5 FL (ref 40–54)
DEPRECATED RDW RBC AUTO: 43.1 FL (ref 40–54)
DEPRECATED RDW RBC AUTO: 43.1 FL (ref 40–54)
DEPRECATED RDW RBC AUTO: 43.4 FL (ref 40–54)
DEPRECATED RDW RBC AUTO: 43.7 FL (ref 40–54)
DEPRECATED RDW RBC AUTO: 43.7 FL (ref 40–54)
DEPRECATED RDW RBC AUTO: 43.8 FL (ref 40–54)
DEPRECATED RDW RBC AUTO: 43.8 FL (ref 40–54)
DEPRECATED RDW RBC AUTO: 43.9 FL (ref 40–54)
DEPRECATED RDW RBC AUTO: 43.9 FL (ref 40–54)
DEPRECATED RDW RBC AUTO: 44 FL (ref 40–54)
DEPRECATED RDW RBC AUTO: 44.1 FL (ref 40–54)
DEPRECATED RDW RBC AUTO: 44.2 FL (ref 40–54)
DEPRECATED RDW RBC AUTO: 44.4 FL (ref 40–54)
DEPRECATED RDW RBC AUTO: 44.5 FL (ref 40–54)
DEPRECATED RDW RBC AUTO: 44.6 FL (ref 40–54)
DEPRECATED RDW RBC AUTO: 44.7 FL (ref 40–54)
DEPRECATED RDW RBC AUTO: 44.9 FL (ref 40–54)
DEPRECATED RDW RBC AUTO: 45 FL (ref 40–54)
DEPRECATED RDW RBC AUTO: 45.1 FL (ref 40–54)
DEPRECATED RDW RBC AUTO: 45.2 FL (ref 40–54)
E AG RBC QL: NEGATIVE
E COLI SXT1 STL QL IA: NEGATIVE
E COLI SXT2 STL QL IA: NEGATIVE
EOSINOPHIL # BLD AUTO: 0 10*3/MM3 (ref 0–0.7)
EOSINOPHIL # BLD AUTO: 0.03 10*3/MM3 (ref 0–0.7)
EOSINOPHIL # BLD AUTO: 0.06 10*3/MM3 (ref 0–0.7)
EOSINOPHIL # BLD AUTO: 0.06 10*3/MM3 (ref 0–0.7)
EOSINOPHIL # BLD AUTO: 0.12 10*3/MM3 (ref 0–0.7)
EOSINOPHIL # BLD AUTO: 0.14 10*3/MM3 (ref 0–0.7)
EOSINOPHIL # BLD AUTO: 0.14 10*3/MM3 (ref 0–0.7)
EOSINOPHIL # BLD AUTO: 0.15 10*3/MM3 (ref 0–0.7)
EOSINOPHIL # BLD AUTO: 0.2 10*3/MM3 (ref 0–0.7)
EOSINOPHIL # BLD AUTO: 0.22 10*3/MM3 (ref 0–0.7)
EOSINOPHIL # BLD AUTO: 0.25 10*3/MM3 (ref 0–0.7)
EOSINOPHIL # BLD AUTO: 0.25 10*3/MM3 (ref 0–0.7)
EOSINOPHIL # BLD AUTO: 0.27 10*3/MM3 (ref 0–0.7)
EOSINOPHIL # BLD AUTO: 0.29 10*3/MM3 (ref 0–0.7)
EOSINOPHIL # BLD AUTO: 0.32 10*3/MM3 (ref 0–0.7)
EOSINOPHIL # BLD AUTO: 0.32 10*3/MM3 (ref 0–0.7)
EOSINOPHIL # BLD AUTO: 0.4 10*3/MM3 (ref 0–0.7)
EOSINOPHIL # BLD AUTO: 0.43 10*3/MM3 (ref 0–0.7)
EOSINOPHIL # BLD AUTO: 0.46 10*3/MM3 (ref 0–0.7)
EOSINOPHIL # BLD MANUAL: 0.17 10*3/MM3 (ref 0–0.7)
EOSINOPHIL # BLD MANUAL: 0.22 10*3/MM3 (ref 0–0.7)
EOSINOPHIL # BLD MANUAL: 0.22 10*3/MM3 (ref 0–0.7)
EOSINOPHIL NFR BLD AUTO: 0 % (ref 0–4)
EOSINOPHIL NFR BLD AUTO: 0.2 % (ref 0–4)
EOSINOPHIL NFR BLD AUTO: 0.3 % (ref 0–4)
EOSINOPHIL NFR BLD AUTO: 0.4 % (ref 0–4)
EOSINOPHIL NFR BLD AUTO: 0.8 % (ref 0–4)
EOSINOPHIL NFR BLD AUTO: 0.9 % (ref 0–4)
EOSINOPHIL NFR BLD AUTO: 1 % (ref 0–4)
EOSINOPHIL NFR BLD AUTO: 1.3 % (ref 0–4)
EOSINOPHIL NFR BLD AUTO: 1.4 % (ref 0–4)
EOSINOPHIL NFR BLD AUTO: 1.4 % (ref 0–4)
EOSINOPHIL NFR BLD AUTO: 1.5 % (ref 0–4)
EOSINOPHIL NFR BLD AUTO: 1.7 % (ref 0–4)
EOSINOPHIL NFR BLD AUTO: 1.9 % (ref 0–4)
EOSINOPHIL NFR BLD AUTO: 2 % (ref 0–4)
EOSINOPHIL NFR BLD AUTO: 2.1 % (ref 0–4)
EOSINOPHIL NFR BLD AUTO: 2.2 % (ref 0–4)
EOSINOPHIL NFR BLD AUTO: 2.2 % (ref 0–4)
EOSINOPHIL NFR BLD AUTO: 2.6 % (ref 0–4)
EOSINOPHIL NFR BLD AUTO: 2.7 % (ref 0–4)
EOSINOPHIL NFR BLD MANUAL: 1 % (ref 0–4)
EOSINOPHIL SPEC QL WRIGHT STN: NORMAL %
ERYTHROCYTE [DISTWIDTH] IN BLOOD BY AUTOMATED COUNT: 13.3 % (ref 12–15)
ERYTHROCYTE [DISTWIDTH] IN BLOOD BY AUTOMATED COUNT: 13.5 % (ref 12–15)
ERYTHROCYTE [DISTWIDTH] IN BLOOD BY AUTOMATED COUNT: 13.6 % (ref 12–15)
ERYTHROCYTE [DISTWIDTH] IN BLOOD BY AUTOMATED COUNT: 13.7 % (ref 12–15)
ERYTHROCYTE [DISTWIDTH] IN BLOOD BY AUTOMATED COUNT: 13.9 % (ref 12–15)
ERYTHROCYTE [DISTWIDTH] IN BLOOD BY AUTOMATED COUNT: 14 % (ref 12–15)
ERYTHROCYTE [DISTWIDTH] IN BLOOD BY AUTOMATED COUNT: 14.1 % (ref 12–15)
ERYTHROCYTE [DISTWIDTH] IN BLOOD BY AUTOMATED COUNT: 14.2 % (ref 12–15)
ERYTHROCYTE [DISTWIDTH] IN BLOOD BY AUTOMATED COUNT: 14.2 % (ref 12–15)
ERYTHROCYTE [DISTWIDTH] IN BLOOD BY AUTOMATED COUNT: 14.3 % (ref 12–15)
ERYTHROCYTE [DISTWIDTH] IN BLOOD BY AUTOMATED COUNT: 14.3 % (ref 12–15)
FERRITIN SERPL-MCNC: 203 NG/ML (ref 17.9–464)
FOLATE SERPL-MCNC: 8.36 NG/ML
GASTROCULT GAST QL: POSITIVE
GFR SERPL CREATININE-BSD FRML MDRD: 18 ML/MIN/1.73
GFR SERPL CREATININE-BSD FRML MDRD: 34 ML/MIN/1.73
GFR SERPL CREATININE-BSD FRML MDRD: 44 ML/MIN/1.73
GFR SERPL CREATININE-BSD FRML MDRD: 46 ML/MIN/1.73
GFR SERPL CREATININE-BSD FRML MDRD: 46 ML/MIN/1.73
GFR SERPL CREATININE-BSD FRML MDRD: 47 ML/MIN/1.73
GFR SERPL CREATININE-BSD FRML MDRD: 47 ML/MIN/1.73
GFR SERPL CREATININE-BSD FRML MDRD: 53 ML/MIN/1.73
GFR SERPL CREATININE-BSD FRML MDRD: 54 ML/MIN/1.73
GFR SERPL CREATININE-BSD FRML MDRD: 55 ML/MIN/1.73
GFR SERPL CREATININE-BSD FRML MDRD: 57 ML/MIN/1.73
GFR SERPL CREATININE-BSD FRML MDRD: 58 ML/MIN/1.73
GFR SERPL CREATININE-BSD FRML MDRD: 58 ML/MIN/1.73
GFR SERPL CREATININE-BSD FRML MDRD: 59 ML/MIN/1.73
GFR SERPL CREATININE-BSD FRML MDRD: 60 ML/MIN/1.73
GFR SERPL CREATININE-BSD FRML MDRD: 64 ML/MIN/1.73
GFR SERPL CREATININE-BSD FRML MDRD: 66 ML/MIN/1.73
GFR SERPL CREATININE-BSD FRML MDRD: 67 ML/MIN/1.73
GFR SERPL CREATININE-BSD FRML MDRD: 68 ML/MIN/1.73
GFR SERPL CREATININE-BSD FRML MDRD: 69 ML/MIN/1.73
GFR SERPL CREATININE-BSD FRML MDRD: 70 ML/MIN/1.73
GIANT PLATELETS: ABNORMAL
GIANT PLATELETS: ABNORMAL
GLOBULIN UR ELPH-MCNC: 2.1 GM/DL
GLOBULIN UR ELPH-MCNC: 2.2 GM/DL
GLOBULIN UR ELPH-MCNC: 2.3 GM/DL
GLOBULIN UR ELPH-MCNC: 2.4 GM/DL
GLOBULIN UR ELPH-MCNC: 2.4 GM/DL
GLOBULIN UR ELPH-MCNC: 2.5 GM/DL
GLOBULIN UR ELPH-MCNC: 2.6 GM/DL
GLOBULIN UR ELPH-MCNC: 2.7 GM/DL
GLOBULIN UR ELPH-MCNC: 2.8 GM/DL
GLOBULIN UR ELPH-MCNC: 2.8 GM/DL
GLOBULIN UR ELPH-MCNC: 2.9 GM/DL
GLOBULIN UR ELPH-MCNC: 3 GM/DL
GLOBULIN UR ELPH-MCNC: 3.2 GM/DL
GLUCOSE BLD-MCNC: 111 MG/DL (ref 70–100)
GLUCOSE BLD-MCNC: 120 MG/DL (ref 70–100)
GLUCOSE BLD-MCNC: 124 MG/DL (ref 70–100)
GLUCOSE BLD-MCNC: 126 MG/DL (ref 70–100)
GLUCOSE BLD-MCNC: 146 MG/DL (ref 70–100)
GLUCOSE BLD-MCNC: 148 MG/DL (ref 70–100)
GLUCOSE BLD-MCNC: 150 MG/DL (ref 70–100)
GLUCOSE BLD-MCNC: 180 MG/DL (ref 70–100)
GLUCOSE BLD-MCNC: 194 MG/DL (ref 70–100)
GLUCOSE BLD-MCNC: 205 MG/DL (ref 70–100)
GLUCOSE BLD-MCNC: 219 MG/DL (ref 70–100)
GLUCOSE BLD-MCNC: 228 MG/DL (ref 70–100)
GLUCOSE BLD-MCNC: 282 MG/DL (ref 70–100)
GLUCOSE BLD-MCNC: 289 MG/DL (ref 70–100)
GLUCOSE BLD-MCNC: 363 MG/DL (ref 70–100)
GLUCOSE BLD-MCNC: 37 MG/DL (ref 70–100)
GLUCOSE BLD-MCNC: 40 MG/DL (ref 70–100)
GLUCOSE BLD-MCNC: 426 MG/DL (ref 70–100)
GLUCOSE BLD-MCNC: 47 MG/DL (ref 70–100)
GLUCOSE BLD-MCNC: 48 MG/DL (ref 70–100)
GLUCOSE BLD-MCNC: 534 MG/DL (ref 70–100)
GLUCOSE BLD-MCNC: 59 MG/DL (ref 70–100)
GLUCOSE BLD-MCNC: 623 MG/DL (ref 70–100)
GLUCOSE BLD-MCNC: 87 MG/DL (ref 70–100)
GLUCOSE BLD-MCNC: 93 MG/DL (ref 70–100)
GLUCOSE BLDC GLUCOMTR-MCNC: 103 MG/DL (ref 70–130)
GLUCOSE BLDC GLUCOMTR-MCNC: 104 MG/DL (ref 70–130)
GLUCOSE BLDC GLUCOMTR-MCNC: 106 MG/DL (ref 70–130)
GLUCOSE BLDC GLUCOMTR-MCNC: 110 MG/DL (ref 70–130)
GLUCOSE BLDC GLUCOMTR-MCNC: 112 MG/DL (ref 70–130)
GLUCOSE BLDC GLUCOMTR-MCNC: 125 MG/DL (ref 70–130)
GLUCOSE BLDC GLUCOMTR-MCNC: 127 MG/DL (ref 70–130)
GLUCOSE BLDC GLUCOMTR-MCNC: 129 MG/DL (ref 70–130)
GLUCOSE BLDC GLUCOMTR-MCNC: 132 MG/DL (ref 70–130)
GLUCOSE BLDC GLUCOMTR-MCNC: 134 MG/DL (ref 70–130)
GLUCOSE BLDC GLUCOMTR-MCNC: 135 MG/DL (ref 70–130)
GLUCOSE BLDC GLUCOMTR-MCNC: 137 MG/DL (ref 70–130)
GLUCOSE BLDC GLUCOMTR-MCNC: 138 MG/DL (ref 70–130)
GLUCOSE BLDC GLUCOMTR-MCNC: 138 MG/DL (ref 70–130)
GLUCOSE BLDC GLUCOMTR-MCNC: 141 MG/DL (ref 70–130)
GLUCOSE BLDC GLUCOMTR-MCNC: 148 MG/DL (ref 70–130)
GLUCOSE BLDC GLUCOMTR-MCNC: 151 MG/DL (ref 70–130)
GLUCOSE BLDC GLUCOMTR-MCNC: 151 MG/DL (ref 70–130)
GLUCOSE BLDC GLUCOMTR-MCNC: 157 MG/DL (ref 70–130)
GLUCOSE BLDC GLUCOMTR-MCNC: 158 MG/DL (ref 70–130)
GLUCOSE BLDC GLUCOMTR-MCNC: 163 MG/DL (ref 70–130)
GLUCOSE BLDC GLUCOMTR-MCNC: 171 MG/DL (ref 70–130)
GLUCOSE BLDC GLUCOMTR-MCNC: 173 MG/DL (ref 70–130)
GLUCOSE BLDC GLUCOMTR-MCNC: 174 MG/DL (ref 70–130)
GLUCOSE BLDC GLUCOMTR-MCNC: 175 MG/DL (ref 70–130)
GLUCOSE BLDC GLUCOMTR-MCNC: 175 MG/DL (ref 70–130)
GLUCOSE BLDC GLUCOMTR-MCNC: 182 MG/DL (ref 70–130)
GLUCOSE BLDC GLUCOMTR-MCNC: 183 MG/DL (ref 70–130)
GLUCOSE BLDC GLUCOMTR-MCNC: 192 MG/DL (ref 70–130)
GLUCOSE BLDC GLUCOMTR-MCNC: 193 MG/DL (ref 70–130)
GLUCOSE BLDC GLUCOMTR-MCNC: 196 MG/DL (ref 70–130)
GLUCOSE BLDC GLUCOMTR-MCNC: 198 MG/DL (ref 70–130)
GLUCOSE BLDC GLUCOMTR-MCNC: 202 MG/DL (ref 70–130)
GLUCOSE BLDC GLUCOMTR-MCNC: 206 MG/DL (ref 70–130)
GLUCOSE BLDC GLUCOMTR-MCNC: 208 MG/DL (ref 70–130)
GLUCOSE BLDC GLUCOMTR-MCNC: 209 MG/DL (ref 70–130)
GLUCOSE BLDC GLUCOMTR-MCNC: 222 MG/DL (ref 70–130)
GLUCOSE BLDC GLUCOMTR-MCNC: 226 MG/DL (ref 70–130)
GLUCOSE BLDC GLUCOMTR-MCNC: 232 MG/DL (ref 70–130)
GLUCOSE BLDC GLUCOMTR-MCNC: 236 MG/DL (ref 70–130)
GLUCOSE BLDC GLUCOMTR-MCNC: 238 MG/DL (ref 70–130)
GLUCOSE BLDC GLUCOMTR-MCNC: 243 MG/DL (ref 70–130)
GLUCOSE BLDC GLUCOMTR-MCNC: 245 MG/DL (ref 70–130)
GLUCOSE BLDC GLUCOMTR-MCNC: 248 MG/DL (ref 70–130)
GLUCOSE BLDC GLUCOMTR-MCNC: 249 MG/DL (ref 70–130)
GLUCOSE BLDC GLUCOMTR-MCNC: 252 MG/DL (ref 70–130)
GLUCOSE BLDC GLUCOMTR-MCNC: 253 MG/DL (ref 70–130)
GLUCOSE BLDC GLUCOMTR-MCNC: 259 MG/DL (ref 70–130)
GLUCOSE BLDC GLUCOMTR-MCNC: 260 MG/DL (ref 70–130)
GLUCOSE BLDC GLUCOMTR-MCNC: 264 MG/DL (ref 70–130)
GLUCOSE BLDC GLUCOMTR-MCNC: 266 MG/DL (ref 70–130)
GLUCOSE BLDC GLUCOMTR-MCNC: 287 MG/DL (ref 70–130)
GLUCOSE BLDC GLUCOMTR-MCNC: 290 MG/DL (ref 70–130)
GLUCOSE BLDC GLUCOMTR-MCNC: 307 MG/DL (ref 70–130)
GLUCOSE BLDC GLUCOMTR-MCNC: 310 MG/DL (ref 70–130)
GLUCOSE BLDC GLUCOMTR-MCNC: 311 MG/DL (ref 70–130)
GLUCOSE BLDC GLUCOMTR-MCNC: 327 MG/DL (ref 70–130)
GLUCOSE BLDC GLUCOMTR-MCNC: 331 MG/DL (ref 70–130)
GLUCOSE BLDC GLUCOMTR-MCNC: 332 MG/DL (ref 70–130)
GLUCOSE BLDC GLUCOMTR-MCNC: 348 MG/DL (ref 70–130)
GLUCOSE BLDC GLUCOMTR-MCNC: 362 MG/DL (ref 70–130)
GLUCOSE BLDC GLUCOMTR-MCNC: 367 MG/DL (ref 70–130)
GLUCOSE BLDC GLUCOMTR-MCNC: 383 MG/DL (ref 70–130)
GLUCOSE BLDC GLUCOMTR-MCNC: 385 MG/DL (ref 70–130)
GLUCOSE BLDC GLUCOMTR-MCNC: 388 MG/DL (ref 70–130)
GLUCOSE BLDC GLUCOMTR-MCNC: 392 MG/DL (ref 70–130)
GLUCOSE BLDC GLUCOMTR-MCNC: 397 MG/DL (ref 70–130)
GLUCOSE BLDC GLUCOMTR-MCNC: 398 MG/DL (ref 70–130)
GLUCOSE BLDC GLUCOMTR-MCNC: 402 MG/DL (ref 70–130)
GLUCOSE BLDC GLUCOMTR-MCNC: 414 MG/DL (ref 70–130)
GLUCOSE BLDC GLUCOMTR-MCNC: 420 MG/DL (ref 70–130)
GLUCOSE BLDC GLUCOMTR-MCNC: 424 MG/DL (ref 70–130)
GLUCOSE BLDC GLUCOMTR-MCNC: 431 MG/DL (ref 70–130)
GLUCOSE BLDC GLUCOMTR-MCNC: 439 MG/DL (ref 70–130)
GLUCOSE BLDC GLUCOMTR-MCNC: 468 MG/DL (ref 70–130)
GLUCOSE BLDC GLUCOMTR-MCNC: 477 MG/DL (ref 70–130)
GLUCOSE BLDC GLUCOMTR-MCNC: 489 MG/DL (ref 70–130)
GLUCOSE BLDC GLUCOMTR-MCNC: 503 MG/DL (ref 70–130)
GLUCOSE BLDC GLUCOMTR-MCNC: 81 MG/DL (ref 70–130)
GLUCOSE BLDC GLUCOMTR-MCNC: 93 MG/DL (ref 70–130)
GLUCOSE UR STRIP-MCNC: ABNORMAL MG/DL
GRAM STN SPEC: NORMAL
GRAN CASTS URNS QL MICRO: ABNORMAL /LPF
HBA1C MFR BLD: 9.2 %
HBA1C MFR BLD: 9.7 %
HBA1C MFR BLD: 9.8 %
HCT VFR BLD AUTO: 29.1 % (ref 40–52)
HCT VFR BLD AUTO: 29.4 % (ref 40–52)
HCT VFR BLD AUTO: 29.5 % (ref 40–52)
HCT VFR BLD AUTO: 29.8 % (ref 40–52)
HCT VFR BLD AUTO: 30.1 % (ref 40–52)
HCT VFR BLD AUTO: 30.5 % (ref 40–52)
HCT VFR BLD AUTO: 30.5 % (ref 40–52)
HCT VFR BLD AUTO: 30.8 % (ref 40–52)
HCT VFR BLD AUTO: 31.2 % (ref 40–52)
HCT VFR BLD AUTO: 31.7 % (ref 40–52)
HCT VFR BLD AUTO: 31.9 % (ref 40–52)
HCT VFR BLD AUTO: 32.5 % (ref 40–52)
HCT VFR BLD AUTO: 32.9 % (ref 40–52)
HCT VFR BLD AUTO: 35.5 % (ref 40–52)
HCT VFR BLD AUTO: 36.4 % (ref 40–52)
HCT VFR BLD AUTO: 36.9 % (ref 40–52)
HCT VFR BLD AUTO: 37 % (ref 40–52)
HCT VFR BLD AUTO: 37.3 % (ref 40–52)
HCT VFR BLD AUTO: 39.1 % (ref 40–52)
HCT VFR BLD AUTO: 39.6 % (ref 40–52)
HCT VFR BLD AUTO: 42.8 % (ref 40–52)
HCT VFR BLD AUTO: 43.5 % (ref 40–52)
HCT VFR BLD AUTO: 44.2 % (ref 40–52)
HCT VFR BLD AUTO: 44.3 % (ref 40–52)
HCT VFR BLD AUTO: 44.9 % (ref 40–52)
HDLC SERPL-MCNC: 27 MG/DL
HDLC SERPL-MCNC: 28 MG/DL
HDLC SERPL-MCNC: 57 MG/DL
HEMOCCULT STL QL: NEGATIVE
HGB BLD-MCNC: 10.1 G/DL (ref 14–18)
HGB BLD-MCNC: 10.2 G/DL (ref 14–18)
HGB BLD-MCNC: 10.3 G/DL (ref 14–18)
HGB BLD-MCNC: 10.4 G/DL (ref 14–18)
HGB BLD-MCNC: 10.7 G/DL (ref 14–18)
HGB BLD-MCNC: 11 G/DL (ref 14–18)
HGB BLD-MCNC: 11.7 G/DL (ref 14–18)
HGB BLD-MCNC: 11.9 G/DL (ref 14–18)
HGB BLD-MCNC: 12.2 G/DL (ref 14–18)
HGB BLD-MCNC: 12.3 G/DL (ref 14–18)
HGB BLD-MCNC: 12.5 G/DL (ref 14–18)
HGB BLD-MCNC: 12.6 G/DL (ref 14–18)
HGB BLD-MCNC: 13.2 G/DL (ref 14–18)
HGB BLD-MCNC: 14.1 G/DL (ref 14–18)
HGB BLD-MCNC: 14.2 G/DL (ref 14–18)
HGB BLD-MCNC: 14.5 G/DL (ref 14–18)
HGB BLD-MCNC: 14.5 G/DL (ref 14–18)
HGB BLD-MCNC: 14.6 G/DL (ref 14–18)
HGB BLD-MCNC: 9.5 G/DL (ref 14–18)
HGB BLD-MCNC: 9.7 G/DL (ref 14–18)
HGB BLD-MCNC: 9.7 G/DL (ref 14–18)
HGB BLD-MCNC: 9.8 G/DL (ref 14–18)
HGB BLD-MCNC: 9.9 G/DL (ref 14–18)
HGB BLD-MCNC: 9.9 G/DL (ref 14–18)
HGB UR QL STRIP.AUTO: NEGATIVE
HOLD SPECIMEN: NORMAL
HYALINE CASTS UR QL AUTO: ABNORMAL /LPF
HYALINE CASTS UR QL AUTO: ABNORMAL /LPF
IMM GRANULOCYTES # BLD: 0.04 10*3/MM3 (ref 0–0.03)
IMM GRANULOCYTES # BLD: 0.05 10*3/MM3 (ref 0–0.03)
IMM GRANULOCYTES # BLD: 0.07 10*3/MM3 (ref 0–0.03)
IMM GRANULOCYTES # BLD: 0.1 10*3/MM3 (ref 0–0.03)
IMM GRANULOCYTES # BLD: 0.12 10*3/MM3 (ref 0–0.03)
IMM GRANULOCYTES # BLD: 0.34 10*3/MM3 (ref 0–0.03)
IMM GRANULOCYTES # BLD: 0.37 10*3/MM3 (ref 0–0.03)
IMM GRANULOCYTES # BLD: 0.38 10*3/MM3 (ref 0–0.03)
IMM GRANULOCYTES # BLD: 0.39 10*3/MM3 (ref 0–0.03)
IMM GRANULOCYTES # BLD: 0.45 10*3/MM3 (ref 0–0.03)
IMM GRANULOCYTES # BLD: 0.49 10*3/MM3 (ref 0–0.03)
IMM GRANULOCYTES # BLD: 0.49 10*3/MM3 (ref 0–0.03)
IMM GRANULOCYTES # BLD: 0.5 10*3/MM3 (ref 0–0.03)
IMM GRANULOCYTES # BLD: 0.51 10*3/MM3 (ref 0–0.03)
IMM GRANULOCYTES # BLD: 0.55 10*3/MM3 (ref 0–0.03)
IMM GRANULOCYTES # BLD: 0.55 10*3/MM3 (ref 0–0.03)
IMM GRANULOCYTES # BLD: 0.56 10*3/MM3 (ref 0–0.03)
IMM GRANULOCYTES # BLD: 0.64 10*3/MM3 (ref 0–0.03)
IMM GRANULOCYTES # BLD: 0.84 10*3/MM3 (ref 0–0.03)
IMM GRANULOCYTES NFR BLD: 0.5 % (ref 0–5)
IMM GRANULOCYTES NFR BLD: 0.5 % (ref 0–5)
IMM GRANULOCYTES NFR BLD: 0.6 % (ref 0–5)
IMM GRANULOCYTES NFR BLD: 0.7 % (ref 0–5)
IMM GRANULOCYTES NFR BLD: 0.7 % (ref 0–5)
IMM GRANULOCYTES NFR BLD: 1.9 % (ref 0–5)
IMM GRANULOCYTES NFR BLD: 2.1 % (ref 0–5)
IMM GRANULOCYTES NFR BLD: 2.4 % (ref 0–5)
IMM GRANULOCYTES NFR BLD: 2.5 % (ref 0–5)
IMM GRANULOCYTES NFR BLD: 2.5 % (ref 0–5)
IMM GRANULOCYTES NFR BLD: 2.6 % (ref 0–5)
IMM GRANULOCYTES NFR BLD: 2.8 % (ref 0–5)
IMM GRANULOCYTES NFR BLD: 3.1 % (ref 0–5)
IMM GRANULOCYTES NFR BLD: 3.3 % (ref 0–5)
IMM GRANULOCYTES NFR BLD: 3.8 % (ref 0–5)
IMM GRANULOCYTES NFR BLD: 3.8 % (ref 0–5)
IMM GRANULOCYTES NFR BLD: 4.1 % (ref 0–5)
INR PPP: 0.95 (ref 0.91–1.09)
INR PPP: 0.98 (ref 0.91–1.09)
INR PPP: 1.02 (ref 0.91–1.09)
IRON 24H UR-MRATE: 21 MCG/DL (ref 42–180)
IRON SATN MFR SERPL: 12 % (ref 20–45)
KETONES UR QL STRIP: ABNORMAL
KETONES UR QL STRIP: NEGATIVE
LDLC/HDLC SERPL: 1.55 {RATIO}
LDLC/HDLC SERPL: ABNORMAL {RATIO}
LDLC/HDLC SERPL: ABNORMAL {RATIO}
LEFT ATRIUM VOLUME INDEX: 14.4 ML/M2
LEFT ATRIUM VOLUME: 29.3 CM3
LEUKOCYTE ESTERASE UR QL STRIP.AUTO: NEGATIVE
LEVETIRACETAM SERPL-MCNC: 30 UG/ML (ref 10–40)
LYMPHOCYTES # BLD AUTO: 0.88 10*3/MM3 (ref 0.72–4.86)
LYMPHOCYTES # BLD AUTO: 0.99 10*3/MM3 (ref 0.72–4.86)
LYMPHOCYTES # BLD AUTO: 1 10*3/MM3 (ref 0.72–4.86)
LYMPHOCYTES # BLD AUTO: 1 10*3/MM3 (ref 0.72–4.86)
LYMPHOCYTES # BLD AUTO: 1.04 10*3/MM3 (ref 0.72–4.86)
LYMPHOCYTES # BLD AUTO: 1.05 10*3/MM3 (ref 0.72–4.86)
LYMPHOCYTES # BLD AUTO: 1.05 10*3/MM3 (ref 0.72–4.86)
LYMPHOCYTES # BLD AUTO: 1.09 10*3/MM3 (ref 0.72–4.86)
LYMPHOCYTES # BLD AUTO: 1.13 10*3/MM3 (ref 0.72–4.86)
LYMPHOCYTES # BLD AUTO: 1.15 10*3/MM3 (ref 0.72–4.86)
LYMPHOCYTES # BLD AUTO: 1.25 10*3/MM3 (ref 0.72–4.86)
LYMPHOCYTES # BLD AUTO: 1.27 10*3/MM3 (ref 0.72–4.86)
LYMPHOCYTES # BLD AUTO: 1.29 10*3/MM3 (ref 0.72–4.86)
LYMPHOCYTES # BLD AUTO: 1.36 10*3/MM3 (ref 0.72–4.86)
LYMPHOCYTES # BLD AUTO: 1.37 10*3/MM3 (ref 0.72–4.86)
LYMPHOCYTES # BLD AUTO: 1.42 10*3/MM3 (ref 0.72–4.86)
LYMPHOCYTES # BLD AUTO: 1.44 10*3/MM3 (ref 0.72–4.86)
LYMPHOCYTES # BLD AUTO: 1.52 10*3/MM3 (ref 0.72–4.86)
LYMPHOCYTES # BLD AUTO: 1.54 10*3/MM3 (ref 0.72–4.86)
LYMPHOCYTES # BLD MANUAL: 0.67 10*3/MM3 (ref 0.72–4.86)
LYMPHOCYTES # BLD MANUAL: 0.69 10*3/MM3 (ref 0.72–4.86)
LYMPHOCYTES # BLD MANUAL: 0.89 10*3/MM3 (ref 0.72–4.86)
LYMPHOCYTES # BLD MANUAL: 1 10*3/MM3 (ref 0.72–4.86)
LYMPHOCYTES # BLD MANUAL: 1.07 10*3/MM3 (ref 0.72–4.86)
LYMPHOCYTES # BLD MANUAL: 1.13 10*3/MM3 (ref 0.72–4.86)
LYMPHOCYTES # BLD MANUAL: 1.37 10*3/MM3 (ref 0.72–4.86)
LYMPHOCYTES NFR BLD AUTO: 10 % (ref 15–45)
LYMPHOCYTES NFR BLD AUTO: 10.2 % (ref 15–45)
LYMPHOCYTES NFR BLD AUTO: 10.6 % (ref 15–45)
LYMPHOCYTES NFR BLD AUTO: 12.1 % (ref 15–45)
LYMPHOCYTES NFR BLD AUTO: 13.1 % (ref 15–45)
LYMPHOCYTES NFR BLD AUTO: 4.2 % (ref 15–45)
LYMPHOCYTES NFR BLD AUTO: 5 % (ref 15–45)
LYMPHOCYTES NFR BLD AUTO: 5.7 % (ref 15–45)
LYMPHOCYTES NFR BLD AUTO: 6.2 % (ref 15–45)
LYMPHOCYTES NFR BLD AUTO: 6.3 % (ref 15–45)
LYMPHOCYTES NFR BLD AUTO: 6.3 % (ref 15–45)
LYMPHOCYTES NFR BLD AUTO: 6.4 % (ref 15–45)
LYMPHOCYTES NFR BLD AUTO: 6.5 % (ref 15–45)
LYMPHOCYTES NFR BLD AUTO: 7.7 % (ref 15–45)
LYMPHOCYTES NFR BLD AUTO: 7.7 % (ref 15–45)
LYMPHOCYTES NFR BLD AUTO: 8.2 % (ref 15–45)
LYMPHOCYTES NFR BLD AUTO: 8.3 % (ref 15–45)
LYMPHOCYTES NFR BLD AUTO: 8.3 % (ref 15–45)
LYMPHOCYTES NFR BLD AUTO: 9.6 % (ref 15–45)
LYMPHOCYTES NFR BLD MANUAL: 13.1 % (ref 4–12)
LYMPHOCYTES NFR BLD MANUAL: 3 % (ref 4–12)
LYMPHOCYTES NFR BLD MANUAL: 4 % (ref 15–45)
LYMPHOCYTES NFR BLD MANUAL: 5.1 % (ref 15–45)
LYMPHOCYTES NFR BLD MANUAL: 6 % (ref 4–12)
LYMPHOCYTES NFR BLD MANUAL: 6.1 % (ref 15–45)
LYMPHOCYTES NFR BLD MANUAL: 7 % (ref 15–45)
LYMPHOCYTES NFR BLD MANUAL: 7 % (ref 4–12)
LYMPHOCYTES NFR BLD MANUAL: 8.2 % (ref 15–45)
LYMPHOCYTES NFR BLD MANUAL: 9 % (ref 4–12)
LYMPHOCYTES NFR BLD MANUAL: 9.2 % (ref 4–12)
LYMPHOCYTES NFR BLD MANUAL: 9.3 % (ref 4–12)
Lab: NORMAL
MAGNESIUM SERPL-MCNC: 1.9 MG/DL (ref 1.4–2.2)
MAGNESIUM SERPL-MCNC: 1.9 MG/DL (ref 1.4–2.2)
MAGNESIUM SERPL-MCNC: 2 MG/DL (ref 1.4–2.2)
MAXIMAL PREDICTED HEART RATE: 143 BPM
MCH RBC QN AUTO: 27.6 PG (ref 28–32)
MCH RBC QN AUTO: 27.9 PG (ref 28–32)
MCH RBC QN AUTO: 28 PG (ref 28–32)
MCH RBC QN AUTO: 28 PG (ref 28–32)
MCH RBC QN AUTO: 28.1 PG (ref 28–32)
MCH RBC QN AUTO: 28.2 PG (ref 28–32)
MCH RBC QN AUTO: 28.2 PG (ref 28–32)
MCH RBC QN AUTO: 28.3 PG (ref 28–32)
MCH RBC QN AUTO: 28.4 PG (ref 28–32)
MCH RBC QN AUTO: 28.5 PG (ref 28–32)
MCH RBC QN AUTO: 28.6 PG (ref 28–32)
MCH RBC QN AUTO: 28.6 PG (ref 28–32)
MCH RBC QN AUTO: 28.7 PG (ref 28–32)
MCH RBC QN AUTO: 28.7 PG (ref 28–32)
MCH RBC QN AUTO: 28.9 PG (ref 28–32)
MCH RBC QN AUTO: 29 PG (ref 28–32)
MCH RBC QN AUTO: 29 PG (ref 28–32)
MCH RBC QN AUTO: 29.1 PG (ref 28–32)
MCH RBC QN AUTO: 29.2 PG (ref 28–32)
MCHC RBC AUTO-ENTMCNC: 31.9 G/DL (ref 33–36)
MCHC RBC AUTO-ENTMCNC: 32.2 G/DL (ref 33–36)
MCHC RBC AUTO-ENTMCNC: 32.3 G/DL (ref 33–36)
MCHC RBC AUTO-ENTMCNC: 32.4 G/DL (ref 33–36)
MCHC RBC AUTO-ENTMCNC: 32.4 G/DL (ref 33–36)
MCHC RBC AUTO-ENTMCNC: 32.5 G/DL (ref 33–36)
MCHC RBC AUTO-ENTMCNC: 32.5 G/DL (ref 33–36)
MCHC RBC AUTO-ENTMCNC: 32.6 G/DL (ref 33–36)
MCHC RBC AUTO-ENTMCNC: 32.7 G/DL (ref 33–36)
MCHC RBC AUTO-ENTMCNC: 32.7 G/DL (ref 33–36)
MCHC RBC AUTO-ENTMCNC: 32.8 G/DL (ref 33–36)
MCHC RBC AUTO-ENTMCNC: 32.8 G/DL (ref 33–36)
MCHC RBC AUTO-ENTMCNC: 32.9 G/DL (ref 33–36)
MCHC RBC AUTO-ENTMCNC: 33 G/DL (ref 33–36)
MCHC RBC AUTO-ENTMCNC: 33.1 G/DL (ref 33–36)
MCHC RBC AUTO-ENTMCNC: 33.2 G/DL (ref 33–36)
MCHC RBC AUTO-ENTMCNC: 33.2 G/DL (ref 33–36)
MCHC RBC AUTO-ENTMCNC: 33.3 G/DL (ref 33–36)
MCHC RBC AUTO-ENTMCNC: 33.5 G/DL (ref 33–36)
MCHC RBC AUTO-ENTMCNC: 33.8 G/DL (ref 33–36)
MCV RBC AUTO: 84.8 FL (ref 82–95)
MCV RBC AUTO: 84.9 FL (ref 82–95)
MCV RBC AUTO: 84.9 FL (ref 82–95)
MCV RBC AUTO: 85.1 FL (ref 82–95)
MCV RBC AUTO: 85.5 FL (ref 82–95)
MCV RBC AUTO: 85.5 FL (ref 82–95)
MCV RBC AUTO: 85.6 FL (ref 82–95)
MCV RBC AUTO: 85.7 FL (ref 82–95)
MCV RBC AUTO: 85.8 FL (ref 82–95)
MCV RBC AUTO: 86 FL (ref 82–95)
MCV RBC AUTO: 86.1 FL (ref 82–95)
MCV RBC AUTO: 86.2 FL (ref 82–95)
MCV RBC AUTO: 86.4 FL (ref 82–95)
MCV RBC AUTO: 86.6 FL (ref 82–95)
MCV RBC AUTO: 86.7 FL (ref 82–95)
MCV RBC AUTO: 86.9 FL (ref 82–95)
MCV RBC AUTO: 87 FL (ref 82–95)
MCV RBC AUTO: 87.2 FL (ref 82–95)
MCV RBC AUTO: 87.5 FL (ref 82–95)
MCV RBC AUTO: 87.6 FL (ref 82–95)
MCV RBC AUTO: 87.6 FL (ref 82–95)
MCV RBC AUTO: 87.9 FL (ref 82–95)
MCV RBC AUTO: 87.9 FL (ref 82–95)
MCV RBC AUTO: 88.1 FL (ref 82–95)
MCV RBC AUTO: 88.1 FL (ref 82–95)
MCV RBC AUTO: 88.4 FL (ref 82–95)
MCV RBC AUTO: 90.3 FL (ref 82–95)
METAMYELOCYTES NFR BLD MANUAL: 5 % (ref 0–0)
MONOCYTES # BLD AUTO: 0.66 10*3/MM3 (ref 0.19–1.3)
MONOCYTES # BLD AUTO: 0.72 10*3/MM3 (ref 0.19–1.3)
MONOCYTES # BLD AUTO: 0.75 10*3/MM3 (ref 0.19–1.3)
MONOCYTES # BLD AUTO: 0.8 10*3/MM3 (ref 0.19–1.3)
MONOCYTES # BLD AUTO: 0.83 10*3/MM3 (ref 0.19–1.3)
MONOCYTES # BLD AUTO: 0.9 10*3/MM3 (ref 0.19–1.3)
MONOCYTES # BLD AUTO: 0.97 10*3/MM3 (ref 0.19–1.3)
MONOCYTES # BLD AUTO: 1.1 10*3/MM3 (ref 0.19–1.3)
MONOCYTES # BLD AUTO: 1.14 10*3/MM3 (ref 0.19–1.3)
MONOCYTES # BLD AUTO: 1.25 10*3/MM3 (ref 0.19–1.3)
MONOCYTES # BLD AUTO: 1.48 10*3/MM3 (ref 0.19–1.3)
MONOCYTES # BLD AUTO: 1.49 10*3/MM3 (ref 0.19–1.3)
MONOCYTES # BLD AUTO: 1.49 10*3/MM3 (ref 0.19–1.3)
MONOCYTES # BLD AUTO: 1.51 10*3/MM3 (ref 0.19–1.3)
MONOCYTES # BLD AUTO: 1.55 10*3/MM3 (ref 0.19–1.3)
MONOCYTES # BLD AUTO: 1.57 10*3/MM3 (ref 0.19–1.3)
MONOCYTES # BLD AUTO: 1.58 10*3/MM3 (ref 0.19–1.3)
MONOCYTES # BLD AUTO: 1.59 10*3/MM3 (ref 0.19–1.3)
MONOCYTES # BLD AUTO: 1.62 10*3/MM3 (ref 0.19–1.3)
MONOCYTES # BLD AUTO: 1.66 10*3/MM3 (ref 0.19–1.3)
MONOCYTES # BLD AUTO: 1.71 10*3/MM3 (ref 0.19–1.3)
MONOCYTES # BLD AUTO: 1.76 10*3/MM3 (ref 0.19–1.3)
MONOCYTES # BLD AUTO: 1.8 10*3/MM3 (ref 0.19–1.3)
MONOCYTES # BLD AUTO: 2.01 10*3/MM3 (ref 0.19–1.3)
MONOCYTES # BLD AUTO: 2.66 10*3/MM3 (ref 0.19–1.3)
MONOCYTES # BLD AUTO: 2.94 10*3/MM3 (ref 0.19–1.3)
MONOCYTES NFR BLD AUTO: 10 % (ref 4–12)
MONOCYTES NFR BLD AUTO: 10.2 % (ref 4–12)
MONOCYTES NFR BLD AUTO: 10.4 % (ref 4–12)
MONOCYTES NFR BLD AUTO: 10.6 % (ref 4–12)
MONOCYTES NFR BLD AUTO: 10.8 % (ref 4–12)
MONOCYTES NFR BLD AUTO: 10.8 % (ref 4–12)
MONOCYTES NFR BLD AUTO: 11.5 % (ref 4–12)
MONOCYTES NFR BLD AUTO: 12.6 % (ref 4–12)
MONOCYTES NFR BLD AUTO: 4.6 % (ref 4–12)
MONOCYTES NFR BLD AUTO: 4.9 % (ref 4–12)
MONOCYTES NFR BLD AUTO: 7.1 % (ref 4–12)
MONOCYTES NFR BLD AUTO: 7.6 % (ref 4–12)
MONOCYTES NFR BLD AUTO: 7.9 % (ref 4–12)
MONOCYTES NFR BLD AUTO: 8 % (ref 4–12)
MONOCYTES NFR BLD AUTO: 8.1 % (ref 4–12)
MONOCYTES NFR BLD AUTO: 8.7 % (ref 4–12)
MONOCYTES NFR BLD AUTO: 8.7 % (ref 4–12)
MONOCYTES NFR BLD AUTO: 8.8 % (ref 4–12)
MONOCYTES NFR BLD AUTO: 9.6 % (ref 4–12)
MYELOCYTES NFR BLD MANUAL: 1 % (ref 0–0)
MYELOCYTES NFR BLD MANUAL: 5.1 % (ref 0–0)
NEUTROPHILS # BLD AUTO: 10.08 10*3/MM3 (ref 1.87–8.4)
NEUTROPHILS # BLD AUTO: 10.86 10*3/MM3 (ref 1.87–8.4)
NEUTROPHILS # BLD AUTO: 10.95 10*3/MM3 (ref 1.87–8.4)
NEUTROPHILS # BLD AUTO: 11.23 10*3/MM3 (ref 1.87–8.4)
NEUTROPHILS # BLD AUTO: 11.36 10*3/MM3 (ref 1.87–8.4)
NEUTROPHILS # BLD AUTO: 11.37 10*3/MM3 (ref 1.87–8.4)
NEUTROPHILS # BLD AUTO: 11.61 10*3/MM3 (ref 1.87–8.4)
NEUTROPHILS # BLD AUTO: 12.09 10*3/MM3 (ref 1.87–8.4)
NEUTROPHILS # BLD AUTO: 12.62 10*3/MM3 (ref 1.87–8.4)
NEUTROPHILS # BLD AUTO: 13.04 10*3/MM3 (ref 1.87–8.4)
NEUTROPHILS # BLD AUTO: 13.25 10*3/MM3 (ref 1.87–8.4)
NEUTROPHILS # BLD AUTO: 13.29 10*3/MM3 (ref 1.87–8.4)
NEUTROPHILS # BLD AUTO: 13.63 10*3/MM3 (ref 1.87–8.4)
NEUTROPHILS # BLD AUTO: 13.94 10*3/MM3 (ref 1.87–8.4)
NEUTROPHILS # BLD AUTO: 13.96 10*3/MM3 (ref 1.87–8.4)
NEUTROPHILS # BLD AUTO: 14 10*3/MM3 (ref 1.87–8.4)
NEUTROPHILS # BLD AUTO: 15.57 10*3/MM3 (ref 1.87–8.4)
NEUTROPHILS # BLD AUTO: 16.45 10*3/MM3 (ref 1.87–8.4)
NEUTROPHILS # BLD AUTO: 16.77 10*3/MM3 (ref 1.87–8.4)
NEUTROPHILS # BLD AUTO: 17.41 10*3/MM3 (ref 1.87–8.4)
NEUTROPHILS # BLD AUTO: 17.96 10*3/MM3 (ref 1.87–8.4)
NEUTROPHILS # BLD AUTO: 19.49 10*3/MM3 (ref 1.87–8.4)
NEUTROPHILS # BLD AUTO: 24.78 10*3/MM3 (ref 1.87–8.4)
NEUTROPHILS # BLD AUTO: 6.54 10*3/MM3 (ref 1.87–8.4)
NEUTROPHILS # BLD AUTO: 7.06 10*3/MM3 (ref 1.87–8.4)
NEUTROPHILS # BLD AUTO: 8.34 10*3/MM3 (ref 1.87–8.4)
NEUTROPHILS NFR BLD AUTO: 73.7 % (ref 39–78)
NEUTROPHILS NFR BLD AUTO: 74.1 % (ref 39–78)
NEUTROPHILS NFR BLD AUTO: 74.3 % (ref 39–78)
NEUTROPHILS NFR BLD AUTO: 74.7 % (ref 39–78)
NEUTROPHILS NFR BLD AUTO: 74.9 % (ref 39–78)
NEUTROPHILS NFR BLD AUTO: 75.5 % (ref 39–78)
NEUTROPHILS NFR BLD AUTO: 75.8 % (ref 39–78)
NEUTROPHILS NFR BLD AUTO: 78 % (ref 39–78)
NEUTROPHILS NFR BLD AUTO: 78.6 % (ref 39–78)
NEUTROPHILS NFR BLD AUTO: 79 % (ref 39–78)
NEUTROPHILS NFR BLD AUTO: 79.2 % (ref 39–78)
NEUTROPHILS NFR BLD AUTO: 80.1 % (ref 39–78)
NEUTROPHILS NFR BLD AUTO: 80.3 % (ref 39–78)
NEUTROPHILS NFR BLD AUTO: 80.4 % (ref 39–78)
NEUTROPHILS NFR BLD AUTO: 81 % (ref 39–78)
NEUTROPHILS NFR BLD AUTO: 81.6 % (ref 39–78)
NEUTROPHILS NFR BLD AUTO: 82.4 % (ref 39–78)
NEUTROPHILS NFR BLD AUTO: 83.7 % (ref 39–78)
NEUTROPHILS NFR BLD AUTO: 85.8 % (ref 39–78)
NEUTROPHILS NFR BLD MANUAL: 59.8 % (ref 39–78)
NEUTROPHILS NFR BLD MANUAL: 71 % (ref 39–78)
NEUTROPHILS NFR BLD MANUAL: 73.7 % (ref 39–78)
NEUTROPHILS NFR BLD MANUAL: 80.6 % (ref 39–78)
NEUTROPHILS NFR BLD MANUAL: 81 % (ref 39–78)
NEUTROPHILS NFR BLD MANUAL: 87 % (ref 39–78)
NEUTROPHILS NFR BLD MANUAL: 92.9 % (ref 39–78)
NEUTS BAND NFR BLD MANUAL: 1 % (ref 0–10)
NEUTS BAND NFR BLD MANUAL: 1 % (ref 0–10)
NEUTS BAND NFR BLD MANUAL: 10 % (ref 0–10)
NEUTS BAND NFR BLD MANUAL: 2 % (ref 0–10)
NEUTS BAND NFR BLD MANUAL: 22.7 % (ref 0–10)
NEUTS BAND NFR BLD MANUAL: 3.1 % (ref 0–10)
NEUTS VAC BLD QL SMEAR: ABNORMAL
NITRITE UR QL STRIP: NEGATIVE
NRBC BLD MANUAL-RTO: 0 /100 WBC (ref 0–0)
O+P SPEC MICRO: NORMAL
OVA + PARASITE RESULT 1: NORMAL
PH UR STRIP.AUTO: 5.5 [PH] (ref 5–8)
PH UR STRIP.AUTO: 5.5 [PH] (ref 5–8)
PH UR STRIP.AUTO: <=5 [PH] (ref 5–8)
PH UR STRIP.AUTO: <=5 [PH] (ref 5–8)
PLAT MORPH BLD: NORMAL
PLATELET # BLD AUTO: 274 10*3/MM3 (ref 130–400)
PLATELET # BLD AUTO: 276 10*3/MM3 (ref 130–400)
PLATELET # BLD AUTO: 280 10*3/MM3 (ref 130–400)
PLATELET # BLD AUTO: 280 10*3/MM3 (ref 130–400)
PLATELET # BLD AUTO: 283 10*3/MM3 (ref 130–400)
PLATELET # BLD AUTO: 296 10*3/MM3 (ref 130–400)
PLATELET # BLD AUTO: 305 10*3/MM3 (ref 130–400)
PLATELET # BLD AUTO: 314 10*3/MM3 (ref 130–400)
PLATELET # BLD AUTO: 317 10*3/MM3 (ref 130–400)
PLATELET # BLD AUTO: 319 10*3/MM3 (ref 130–400)
PLATELET # BLD AUTO: 319 10*3/MM3 (ref 130–400)
PLATELET # BLD AUTO: 332 10*3/MM3 (ref 130–400)
PLATELET # BLD AUTO: 341 10*3/MM3 (ref 130–400)
PLATELET # BLD AUTO: 344 10*3/MM3 (ref 130–400)
PLATELET # BLD AUTO: 400 10*3/MM3 (ref 130–400)
PLATELET # BLD AUTO: 442 10*3/MM3 (ref 130–400)
PLATELET # BLD AUTO: 466 10*3/MM3 (ref 130–400)
PLATELET # BLD AUTO: 477 10*3/MM3 (ref 130–400)
PLATELET # BLD AUTO: 478 10*3/MM3 (ref 130–400)
PLATELET # BLD AUTO: 491 10*3/MM3 (ref 130–400)
PLATELET # BLD AUTO: 532 10*3/MM3 (ref 130–400)
PLATELET # BLD AUTO: 555 10*3/MM3 (ref 130–400)
PLATELET # BLD AUTO: 569 10*3/MM3 (ref 130–400)
PLATELET # BLD AUTO: 579 10*3/MM3 (ref 130–400)
PLATELET # BLD AUTO: 580 10*3/MM3 (ref 130–400)
PLATELET # BLD AUTO: 584 10*3/MM3 (ref 130–400)
PLATELET # BLD AUTO: 638 10*3/MM3 (ref 130–400)
PMV BLD AUTO: 10 FL (ref 6–12)
PMV BLD AUTO: 10 FL (ref 6–12)
PMV BLD AUTO: 10.2 FL (ref 6–12)
PMV BLD AUTO: 10.3 FL (ref 6–12)
PMV BLD AUTO: 10.3 FL (ref 6–12)
PMV BLD AUTO: 10.4 FL (ref 6–12)
PMV BLD AUTO: 10.5 FL (ref 6–12)
PMV BLD AUTO: 10.7 FL (ref 6–12)
PMV BLD AUTO: 10.7 FL (ref 6–12)
PMV BLD AUTO: 10.9 FL (ref 6–12)
PMV BLD AUTO: 10.9 FL (ref 6–12)
PMV BLD AUTO: 11 FL (ref 6–12)
PMV BLD AUTO: 11 FL (ref 6–12)
PMV BLD AUTO: 11.1 FL (ref 6–12)
PMV BLD AUTO: 11.1 FL (ref 6–12)
PMV BLD AUTO: 11.3 FL (ref 6–12)
PMV BLD AUTO: 11.3 FL (ref 6–12)
PMV BLD AUTO: 11.4 FL (ref 6–12)
PMV BLD AUTO: 11.4 FL (ref 6–12)
PMV BLD AUTO: 11.5 FL (ref 6–12)
PMV BLD AUTO: 11.8 FL (ref 6–12)
PMV BLD AUTO: 11.9 FL (ref 6–12)
PMV BLD AUTO: 12 FL (ref 6–12)
PMV BLD AUTO: 9.9 FL (ref 6–12)
PMV BLD AUTO: 9.9 FL (ref 6–12)
POIKILOCYTOSIS BLD QL SMEAR: ABNORMAL
POTASSIUM BLD-SCNC: 3 MMOL/L (ref 3.5–5.3)
POTASSIUM BLD-SCNC: 3.3 MMOL/L (ref 3.5–5.3)
POTASSIUM BLD-SCNC: 3.3 MMOL/L (ref 3.5–5.3)
POTASSIUM BLD-SCNC: 3.5 MMOL/L (ref 3.5–5.3)
POTASSIUM BLD-SCNC: 3.6 MMOL/L (ref 3.5–5.3)
POTASSIUM BLD-SCNC: 3.6 MMOL/L (ref 3.5–5.3)
POTASSIUM BLD-SCNC: 3.7 MMOL/L (ref 3.5–5.3)
POTASSIUM BLD-SCNC: 3.7 MMOL/L (ref 3.5–5.3)
POTASSIUM BLD-SCNC: 3.8 MMOL/L (ref 3.5–5.3)
POTASSIUM BLD-SCNC: 3.8 MMOL/L (ref 3.5–5.3)
POTASSIUM BLD-SCNC: 3.9 MMOL/L (ref 3.5–5.3)
POTASSIUM BLD-SCNC: 4 MMOL/L (ref 3.5–5.3)
POTASSIUM BLD-SCNC: 4 MMOL/L (ref 3.5–5.3)
POTASSIUM BLD-SCNC: 4.1 MMOL/L (ref 3.5–5.3)
POTASSIUM BLD-SCNC: 4.2 MMOL/L (ref 3.5–5.3)
POTASSIUM BLD-SCNC: 4.2 MMOL/L (ref 3.5–5.3)
POTASSIUM BLD-SCNC: 4.4 MMOL/L (ref 3.5–5.3)
POTASSIUM BLD-SCNC: 4.4 MMOL/L (ref 3.5–5.3)
POTASSIUM BLD-SCNC: 4.5 MMOL/L (ref 3.5–5.3)
POTASSIUM BLD-SCNC: 4.8 MMOL/L (ref 3.5–5.3)
PREALB SERPL-MCNC: 7.6 MG/DL (ref 18–36)
PREALB SERPL-MCNC: 7.8 MG/DL (ref 18–36)
PROT SERPL-MCNC: 4 G/DL (ref 6.3–8.7)
PROT SERPL-MCNC: 4.3 G/DL (ref 6.3–8.7)
PROT SERPL-MCNC: 4.4 G/DL (ref 6.3–8.7)
PROT SERPL-MCNC: 4.5 G/DL (ref 6.3–8.7)
PROT SERPL-MCNC: 4.6 G/DL (ref 6.3–8.7)
PROT SERPL-MCNC: 4.6 G/DL (ref 6.3–8.7)
PROT SERPL-MCNC: 4.7 G/DL (ref 6.3–8.7)
PROT SERPL-MCNC: 4.9 G/DL (ref 6.3–8.7)
PROT SERPL-MCNC: 5.1 G/DL (ref 6.3–8.7)
PROT SERPL-MCNC: 5.4 G/DL (ref 6.3–8.7)
PROT SERPL-MCNC: 5.5 G/DL (ref 6.3–8.7)
PROT SERPL-MCNC: 5.7 G/DL (ref 6.3–8.7)
PROT SERPL-MCNC: 6.3 G/DL (ref 6.3–8.7)
PROT SERPL-MCNC: 6.4 G/DL (ref 6.3–8.7)
PROT SERPL-MCNC: 6.6 G/DL (ref 6.3–8.7)
PROT SERPL-MCNC: 6.8 G/DL (ref 6.3–8.7)
PROT SERPL-MCNC: 7.2 G/DL (ref 6.3–8.7)
PROT UR QL STRIP: ABNORMAL
PROT UR QL STRIP: NEGATIVE
PROTHROMBIN TIME: 13 SECONDS (ref 11.9–14.6)
PROTHROMBIN TIME: 13.3 SECONDS (ref 11.9–14.6)
PROTHROMBIN TIME: 13.7 SECONDS (ref 11.9–14.6)
RBC # BLD AUTO: 3.35 10*6/MM3 (ref 4.8–5.9)
RBC # BLD AUTO: 3.37 10*6/MM3 (ref 4.8–5.9)
RBC # BLD AUTO: 3.42 10*6/MM3 (ref 4.8–5.9)
RBC # BLD AUTO: 3.46 10*6/MM3 (ref 4.8–5.9)
RBC # BLD AUTO: 3.46 10*6/MM3 (ref 4.8–5.9)
RBC # BLD AUTO: 3.53 10*6/MM3 (ref 4.8–5.9)
RBC # BLD AUTO: 3.55 10*6/MM3 (ref 4.8–5.9)
RBC # BLD AUTO: 3.55 10*6/MM3 (ref 4.8–5.9)
RBC # BLD AUTO: 3.56 10*6/MM3 (ref 4.8–5.9)
RBC # BLD AUTO: 3.59 10*6/MM3 (ref 4.8–5.9)
RBC # BLD AUTO: 3.62 10*6/MM3 (ref 4.8–5.9)
RBC # BLD AUTO: 3.64 10*6/MM3 (ref 4.8–5.9)
RBC # BLD AUTO: 3.65 10*6/MM3 (ref 4.8–5.9)
RBC # BLD AUTO: 3.83 10*6/MM3 (ref 4.8–5.9)
RBC # BLD AUTO: 3.88 10*6/MM3 (ref 4.8–5.9)
RBC # BLD AUTO: 4.13 10*6/MM3 (ref 4.8–5.9)
RBC # BLD AUTO: 4.25 10*6/MM3 (ref 4.8–5.9)
RBC # BLD AUTO: 4.26 10*6/MM3 (ref 4.8–5.9)
RBC # BLD AUTO: 4.29 10*6/MM3 (ref 4.8–5.9)
RBC # BLD AUTO: 4.29 10*6/MM3 (ref 4.8–5.9)
RBC # BLD AUTO: 4.44 10*6/MM3 (ref 4.8–5.9)
RBC # BLD AUTO: 4.63 10*6/MM3 (ref 4.8–5.9)
RBC # BLD AUTO: 4.97 10*6/MM3 (ref 4.8–5.9)
RBC # BLD AUTO: 4.97 10*6/MM3 (ref 4.8–5.9)
RBC # BLD AUTO: 5 10*6/MM3 (ref 4.8–5.9)
RBC # BLD AUTO: 5.04 10*6/MM3 (ref 4.8–5.9)
RBC # BLD AUTO: 5.11 10*6/MM3 (ref 4.8–5.9)
RBC # UR: ABNORMAL /HPF
RBC # UR: ABNORMAL /HPF
RBC MORPH BLD: NORMAL
RBC MORPH BLD: NORMAL
REF LAB TEST METHOD: ABNORMAL
REF LAB TEST METHOD: ABNORMAL
RH BLD: POSITIVE
SODIUM BLD-SCNC: 133 MMOL/L (ref 135–145)
SODIUM BLD-SCNC: 137 MMOL/L (ref 135–145)
SODIUM BLD-SCNC: 138 MMOL/L (ref 135–145)
SODIUM BLD-SCNC: 139 MMOL/L (ref 135–145)
SODIUM BLD-SCNC: 139 MMOL/L (ref 135–145)
SODIUM BLD-SCNC: 140 MMOL/L (ref 135–145)
SODIUM BLD-SCNC: 141 MMOL/L (ref 135–145)
SODIUM BLD-SCNC: 141 MMOL/L (ref 135–145)
SODIUM BLD-SCNC: 142 MMOL/L (ref 135–145)
SODIUM BLD-SCNC: 142 MMOL/L (ref 135–145)
SODIUM BLD-SCNC: 143 MMOL/L (ref 135–145)
SODIUM BLD-SCNC: 143 MMOL/L (ref 135–145)
SODIUM BLD-SCNC: 145 MMOL/L (ref 135–145)
SODIUM BLD-SCNC: 146 MMOL/L (ref 135–145)
SODIUM BLD-SCNC: 146 MMOL/L (ref 135–145)
SODIUM BLD-SCNC: 147 MMOL/L (ref 135–145)
SODIUM BLD-SCNC: 147 MMOL/L (ref 135–145)
SODIUM BLD-SCNC: 148 MMOL/L (ref 135–145)
SODIUM UR-SCNC: 11 MMOL/L (ref 30–90)
SP GR UR STRIP: 1.02 (ref 1–1.03)
SP GR UR STRIP: >1.03 (ref 1–1.03)
SQUAMOUS #/AREA URNS HPF: ABNORMAL /HPF
SQUAMOUS #/AREA URNS HPF: ABNORMAL /HPF
STRESS TARGET HR: 122 BPM
T&S EXPIRATION DATE: NORMAL
TIBC SERPL-MCNC: 178 MCG/DL (ref 225–420)
TRIGL SERPL-MCNC: 117 MG/DL (ref 0–149)
TRIGL SERPL-MCNC: 56 MG/DL (ref 0–149)
TRIGL SERPL-MCNC: 92 MG/DL (ref 0–149)
TROPONIN I SERPL-MCNC: <0.012 NG/ML (ref 0–0.03)
TSH SERPL DL<=0.05 MIU/L-ACNC: 1.4 MIU/ML (ref 0.47–4.68)
TSH SERPL DL<=0.05 MIU/L-ACNC: 1.51 MIU/ML (ref 0.47–4.68)
UNIT  ABO: NORMAL
UNIT  ABO: NORMAL
UNIT  RH: NORMAL
UNIT  RH: NORMAL
UROBILINOGEN UR QL STRIP: ABNORMAL
VANCOMYCIN TROUGH SERPL-MCNC: 12.04 MCG/ML (ref 10–20)
VANCOMYCIN TROUGH SERPL-MCNC: 18.84 MCG/ML (ref 10–20)
VARIANT LYMPHS NFR BLD MANUAL: 1 % (ref 0–5)
VARIANT LYMPHS NFR BLD MANUAL: 2 % (ref 0–5)
VARIANT LYMPHS NFR BLD MANUAL: 2 % (ref 0–5)
VIT B12 BLD-MCNC: 680 PG/ML (ref 239–931)
VIT B12 BLD-MCNC: 692 PG/ML (ref 239–931)
WBC MORPH BLD: NORMAL
WBC NRBC COR # BLD: 10.52 10*3/MM3 (ref 4.8–10.8)
WBC NRBC COR # BLD: 12.22 10*3/MM3 (ref 4.8–10.8)
WBC NRBC COR # BLD: 13.53 10*3/MM3 (ref 4.8–10.8)
WBC NRBC COR # BLD: 13.59 10*3/MM3 (ref 4.8–10.8)
WBC NRBC COR # BLD: 14.53 10*3/MM3 (ref 4.8–10.8)
WBC NRBC COR # BLD: 14.86 10*3/MM3 (ref 4.8–10.8)
WBC NRBC COR # BLD: 14.99 10*3/MM3 (ref 4.8–10.8)
WBC NRBC COR # BLD: 15.3 10*3/MM3 (ref 4.8–10.8)
WBC NRBC COR # BLD: 15.32 10*3/MM3 (ref 4.8–10.8)
WBC NRBC COR # BLD: 15.8 10*3/MM3 (ref 4.8–10.8)
WBC NRBC COR # BLD: 16.1 10*3/MM3 (ref 4.8–10.8)
WBC NRBC COR # BLD: 16.63 10*3/MM3 (ref 4.8–10.8)
WBC NRBC COR # BLD: 16.8 10*3/MM3 (ref 4.8–10.8)
WBC NRBC COR # BLD: 16.96 10*3/MM3 (ref 4.8–10.8)
WBC NRBC COR # BLD: 17.05 10*3/MM3 (ref 4.8–10.8)
WBC NRBC COR # BLD: 17.46 10*3/MM3 (ref 4.8–10.8)
WBC NRBC COR # BLD: 17.77 10*3/MM3 (ref 4.8–10.8)
WBC NRBC COR # BLD: 19.46 10*3/MM3 (ref 4.8–10.8)
WBC NRBC COR # BLD: 20.34 10*3/MM3 (ref 4.8–10.8)
WBC NRBC COR # BLD: 21.12 10*3/MM3 (ref 4.8–10.8)
WBC NRBC COR # BLD: 22.01 10*3/MM3 (ref 4.8–10.8)
WBC NRBC COR # BLD: 22.15 10*3/MM3 (ref 4.8–10.8)
WBC NRBC COR # BLD: 22.44 10*3/MM3 (ref 4.8–10.8)
WBC NRBC COR # BLD: 24.05 10*3/MM3 (ref 4.8–10.8)
WBC NRBC COR # BLD: 26.67 10*3/MM3 (ref 4.8–10.8)
WBC NRBC COR # BLD: 8.81 10*3/MM3 (ref 4.8–10.8)
WBC NRBC COR # BLD: 9.35 10*3/MM3 (ref 4.8–10.8)
WBC UR QL AUTO: ABNORMAL /HPF
WBC UR QL AUTO: ABNORMAL /HPF
WHOLE BLOOD HOLD SPECIMEN: NORMAL
YEAST URNS QL MICRO: ABNORMAL /HPF

## 2018-01-01 PROCEDURE — G8996 SWALLOW CURRENT STATUS: HCPCS | Performed by: SPEECH-LANGUAGE PATHOLOGIST

## 2018-01-01 PROCEDURE — 80076 HEPATIC FUNCTION PANEL: CPT

## 2018-01-01 PROCEDURE — G8978 MOBILITY CURRENT STATUS: HCPCS | Performed by: PHYSICAL THERAPIST

## 2018-01-01 PROCEDURE — 94799 UNLISTED PULMONARY SVC/PX: CPT

## 2018-01-01 PROCEDURE — 80061 LIPID PANEL: CPT | Performed by: PSYCHIATRY & NEUROLOGY

## 2018-01-01 PROCEDURE — 87040 BLOOD CULTURE FOR BACTERIA: CPT | Performed by: FAMILY MEDICINE

## 2018-01-01 PROCEDURE — 87493 C DIFF AMPLIFIED PROBE: CPT | Performed by: INTERNAL MEDICINE

## 2018-01-01 PROCEDURE — 85025 COMPLETE CBC W/AUTO DIFF WBC: CPT | Performed by: EMERGENCY MEDICINE

## 2018-01-01 PROCEDURE — 92610 EVALUATE SWALLOWING FUNCTION: CPT | Performed by: SPEECH-LANGUAGE PATHOLOGIST

## 2018-01-01 PROCEDURE — 80053 COMPREHEN METABOLIC PANEL: CPT | Performed by: PSYCHIATRY & NEUROLOGY

## 2018-01-01 PROCEDURE — 74018 RADEX ABDOMEN 1 VIEW: CPT

## 2018-01-01 PROCEDURE — 25010000002 PERFLUTREN 6.52 MG/ML SUSPENSION: Performed by: PSYCHIATRY & NEUROLOGY

## 2018-01-01 PROCEDURE — 97168 OT RE-EVAL EST PLAN CARE: CPT

## 2018-01-01 PROCEDURE — 63710000001 INSULIN LISPRO (HUMAN) PER 5 UNITS: Performed by: FAMILY MEDICINE

## 2018-01-01 PROCEDURE — 83735 ASSAY OF MAGNESIUM: CPT | Performed by: INTERNAL MEDICINE

## 2018-01-01 PROCEDURE — 94760 N-INVAS EAR/PLS OXIMETRY 1: CPT

## 2018-01-01 PROCEDURE — 36415 COLL VENOUS BLD VENIPUNCTURE: CPT | Performed by: FAMILY MEDICINE

## 2018-01-01 PROCEDURE — 99285 EMERGENCY DEPT VISIT HI MDM: CPT

## 2018-01-01 PROCEDURE — 25010000002 DIPHENHYDRAMINE PER 50 MG

## 2018-01-01 PROCEDURE — 85025 COMPLETE CBC W/AUTO DIFF WBC: CPT | Performed by: PSYCHIATRY & NEUROLOGY

## 2018-01-01 PROCEDURE — 84300 ASSAY OF URINE SODIUM: CPT | Performed by: FAMILY MEDICINE

## 2018-01-01 PROCEDURE — 82962 GLUCOSE BLOOD TEST: CPT

## 2018-01-01 PROCEDURE — 92526 ORAL FUNCTION THERAPY: CPT

## 2018-01-01 PROCEDURE — 85007 BL SMEAR W/DIFF WBC COUNT: CPT | Performed by: PSYCHIATRY & NEUROLOGY

## 2018-01-01 PROCEDURE — 70450 CT HEAD/BRAIN W/O DYE: CPT

## 2018-01-01 PROCEDURE — 87493 C DIFF AMPLIFIED PROBE: CPT | Performed by: NURSE PRACTITIONER

## 2018-01-01 PROCEDURE — 25010000002 MEROPENEM: Performed by: INTERNAL MEDICINE

## 2018-01-01 PROCEDURE — 85025 COMPLETE CBC W/AUTO DIFF WBC: CPT | Performed by: NURSE PRACTITIONER

## 2018-01-01 PROCEDURE — 99291 CRITICAL CARE FIRST HOUR: CPT | Performed by: PSYCHIATRY & NEUROLOGY

## 2018-01-01 PROCEDURE — 63710000001 INSULIN DETEMIR PER 5 UNITS: Performed by: FAMILY MEDICINE

## 2018-01-01 PROCEDURE — 85610 PROTHROMBIN TIME: CPT | Performed by: EMERGENCY MEDICINE

## 2018-01-01 PROCEDURE — 82271 OCCULT BLOOD OTHER SOURCES: CPT | Performed by: PSYCHIATRY & NEUROLOGY

## 2018-01-01 PROCEDURE — 63710000001 INSULIN DETEMIR PER 5 UNITS: Performed by: INTERNAL MEDICINE

## 2018-01-01 PROCEDURE — 85025 COMPLETE CBC W/AUTO DIFF WBC: CPT

## 2018-01-01 PROCEDURE — 83605 ASSAY OF LACTIC ACID: CPT | Performed by: INTERNAL MEDICINE

## 2018-01-01 PROCEDURE — 97530 THERAPEUTIC ACTIVITIES: CPT

## 2018-01-01 PROCEDURE — 87077 CULTURE AEROBIC IDENTIFY: CPT | Performed by: NURSE PRACTITIONER

## 2018-01-01 PROCEDURE — 81001 URINALYSIS AUTO W/SCOPE: CPT | Performed by: PSYCHIATRY & NEUROLOGY

## 2018-01-01 PROCEDURE — 80053 COMPREHEN METABOLIC PANEL: CPT | Performed by: EMERGENCY MEDICINE

## 2018-01-01 PROCEDURE — 97535 SELF CARE MNGMENT TRAINING: CPT

## 2018-01-01 PROCEDURE — 84443 ASSAY THYROID STIM HORMONE: CPT

## 2018-01-01 PROCEDURE — 25010000002 LEVETIRACETAM IN NACL 0.82% 500 MG/100ML SOLUTION: Performed by: PSYCHIATRY & NEUROLOGY

## 2018-01-01 PROCEDURE — 99232 SBSQ HOSP IP/OBS MODERATE 35: CPT | Performed by: CLINICAL NURSE SPECIALIST

## 2018-01-01 PROCEDURE — 80061 LIPID PANEL: CPT

## 2018-01-01 PROCEDURE — 93010 ELECTROCARDIOGRAM REPORT: CPT | Performed by: INTERNAL MEDICINE

## 2018-01-01 PROCEDURE — 82570 ASSAY OF URINE CREATININE: CPT | Performed by: FAMILY MEDICINE

## 2018-01-01 PROCEDURE — 93880 EXTRACRANIAL BILAT STUDY: CPT | Performed by: SURGERY

## 2018-01-01 PROCEDURE — 86900 BLOOD TYPING SEROLOGIC ABO: CPT | Performed by: PSYCHIATRY & NEUROLOGY

## 2018-01-01 PROCEDURE — 87086 URINE CULTURE/COLONY COUNT: CPT | Performed by: FAMILY MEDICINE

## 2018-01-01 PROCEDURE — 97110 THERAPEUTIC EXERCISES: CPT

## 2018-01-01 PROCEDURE — 25010000002 VANCOMYCIN 10 G RECONSTITUTED SOLUTION: Performed by: FAMILY MEDICINE

## 2018-01-01 PROCEDURE — 99232 SBSQ HOSP IP/OBS MODERATE 35: CPT | Performed by: INTERNAL MEDICINE

## 2018-01-01 PROCEDURE — 85730 THROMBOPLASTIN TIME PARTIAL: CPT

## 2018-01-01 PROCEDURE — 85027 COMPLETE CBC AUTOMATED: CPT | Performed by: PSYCHIATRY & NEUROLOGY

## 2018-01-01 PROCEDURE — 36415 COLL VENOUS BLD VENIPUNCTURE: CPT | Performed by: NURSE PRACTITIONER

## 2018-01-01 PROCEDURE — 99233 SBSQ HOSP IP/OBS HIGH 50: CPT | Performed by: PSYCHIATRY & NEUROLOGY

## 2018-01-01 PROCEDURE — 80053 COMPREHEN METABOLIC PANEL: CPT | Performed by: NURSE PRACTITIONER

## 2018-01-01 PROCEDURE — 25010000003 LEVETIRACETAM IN NACL 0.75% 1000 MG/100ML SOLUTION: Performed by: PSYCHIATRY & NEUROLOGY

## 2018-01-01 PROCEDURE — 25010000002 MEROPENEM PER 100 MG: Performed by: FAMILY MEDICINE

## 2018-01-01 PROCEDURE — 99284 EMERGENCY DEPT VISIT MOD MDM: CPT

## 2018-01-01 PROCEDURE — 82272 OCCULT BLD FECES 1-3 TESTS: CPT | Performed by: NURSE PRACTITIONER

## 2018-01-01 PROCEDURE — 99232 SBSQ HOSP IP/OBS MODERATE 35: CPT | Performed by: PSYCHIATRY & NEUROLOGY

## 2018-01-01 PROCEDURE — G8997 SWALLOW GOAL STATUS: HCPCS | Performed by: SPEECH-LANGUAGE PATHOLOGIST

## 2018-01-01 PROCEDURE — 25010000002 ONDANSETRON PER 1 MG: Performed by: FAMILY MEDICINE

## 2018-01-01 PROCEDURE — 99292 CRITICAL CARE ADDL 30 MIN: CPT | Performed by: PSYCHIATRY & NEUROLOGY

## 2018-01-01 PROCEDURE — G8996 SWALLOW CURRENT STATUS: HCPCS

## 2018-01-01 PROCEDURE — 80048 BASIC METABOLIC PNL TOTAL CA: CPT | Performed by: NURSE PRACTITIONER

## 2018-01-01 PROCEDURE — 25010000002 MEROPENEM PER 100 MG: Performed by: INTERNAL MEDICINE

## 2018-01-01 PROCEDURE — 85610 PROTHROMBIN TIME: CPT | Performed by: NURSE PRACTITIONER

## 2018-01-01 PROCEDURE — 93005 ELECTROCARDIOGRAM TRACING: CPT

## 2018-01-01 PROCEDURE — 80048 BASIC METABOLIC PNL TOTAL CA: CPT | Performed by: FAMILY MEDICINE

## 2018-01-01 PROCEDURE — G8988 SELF CARE GOAL STATUS: HCPCS

## 2018-01-01 PROCEDURE — 86902 BLOOD TYPE ANTIGEN DONOR EA: CPT

## 2018-01-01 PROCEDURE — 97116 GAIT TRAINING THERAPY: CPT

## 2018-01-01 PROCEDURE — 25010000002 IRON SUCROSE PER 1 MG: Performed by: PSYCHIATRY & NEUROLOGY

## 2018-01-01 PROCEDURE — 80048 BASIC METABOLIC PNL TOTAL CA: CPT

## 2018-01-01 PROCEDURE — 93880 EXTRACRANIAL BILAT STUDY: CPT

## 2018-01-01 PROCEDURE — 87040 BLOOD CULTURE FOR BACTERIA: CPT | Performed by: INTERNAL MEDICINE

## 2018-01-01 PROCEDURE — 80202 ASSAY OF VANCOMYCIN: CPT | Performed by: INTERNAL MEDICINE

## 2018-01-01 PROCEDURE — 71045 X-RAY EXAM CHEST 1 VIEW: CPT

## 2018-01-01 PROCEDURE — 80053 COMPREHEN METABOLIC PANEL: CPT

## 2018-01-01 PROCEDURE — 25010000002 POTASSIUM CHLORIDE PER 2 MEQ OF POTASSIUM: Performed by: NURSE PRACTITIONER

## 2018-01-01 PROCEDURE — 25010000002 ALTEPLASE PER 1 MG: Performed by: PSYCHIATRY & NEUROLOGY

## 2018-01-01 PROCEDURE — 84134 ASSAY OF PREALBUMIN: CPT

## 2018-01-01 PROCEDURE — 82607 VITAMIN B-12: CPT

## 2018-01-01 PROCEDURE — 87046 STOOL CULTR AEROBIC BACT EA: CPT | Performed by: NURSE PRACTITIONER

## 2018-01-01 PROCEDURE — G8988 SELF CARE GOAL STATUS: HCPCS | Performed by: OCCUPATIONAL THERAPIST

## 2018-01-01 PROCEDURE — 92526 ORAL FUNCTION THERAPY: CPT | Performed by: SPEECH-LANGUAGE PATHOLOGIST

## 2018-01-01 PROCEDURE — 99231 SBSQ HOSP IP/OBS SF/LOW 25: CPT | Performed by: INTERNAL MEDICINE

## 2018-01-01 PROCEDURE — 83036 HEMOGLOBIN GLYCOSYLATED A1C: CPT | Performed by: PSYCHIATRY & NEUROLOGY

## 2018-01-01 PROCEDURE — 73562 X-RAY EXAM OF KNEE 3: CPT

## 2018-01-01 PROCEDURE — G8978 MOBILITY CURRENT STATUS: HCPCS

## 2018-01-01 PROCEDURE — G8979 MOBILITY GOAL STATUS: HCPCS | Performed by: PHYSICAL THERAPIST

## 2018-01-01 PROCEDURE — 87177 OVA AND PARASITES SMEARS: CPT | Performed by: NURSE PRACTITIONER

## 2018-01-01 PROCEDURE — 83036 HEMOGLOBIN GLYCOSYLATED A1C: CPT

## 2018-01-01 PROCEDURE — G8987 SELF CARE CURRENT STATUS: HCPCS

## 2018-01-01 PROCEDURE — 82746 ASSAY OF FOLIC ACID SERUM: CPT | Performed by: PSYCHIATRY & NEUROLOGY

## 2018-01-01 PROCEDURE — 82607 VITAMIN B-12: CPT | Performed by: FAMILY MEDICINE

## 2018-01-01 PROCEDURE — 81003 URINALYSIS AUTO W/O SCOPE: CPT | Performed by: FAMILY MEDICINE

## 2018-01-01 PROCEDURE — 84484 ASSAY OF TROPONIN QUANT: CPT

## 2018-01-01 PROCEDURE — G8979 MOBILITY GOAL STATUS: HCPCS

## 2018-01-01 PROCEDURE — 85730 THROMBOPLASTIN TIME PARTIAL: CPT | Performed by: EMERGENCY MEDICINE

## 2018-01-01 PROCEDURE — 85730 THROMBOPLASTIN TIME PARTIAL: CPT | Performed by: NURSE PRACTITIONER

## 2018-01-01 PROCEDURE — 80061 LIPID PANEL: CPT | Performed by: FAMILY MEDICINE

## 2018-01-01 PROCEDURE — 74230 X-RAY XM SWLNG FUNCJ C+: CPT

## 2018-01-01 PROCEDURE — 85025 COMPLETE CBC W/AUTO DIFF WBC: CPT | Performed by: FAMILY MEDICINE

## 2018-01-01 PROCEDURE — 86920 COMPATIBILITY TEST SPIN: CPT

## 2018-01-01 PROCEDURE — 87209 SMEAR COMPLEX STAIN: CPT | Performed by: NURSE PRACTITIONER

## 2018-01-01 PROCEDURE — 84443 ASSAY THYROID STIM HORMONE: CPT | Performed by: FAMILY MEDICINE

## 2018-01-01 PROCEDURE — 81001 URINALYSIS AUTO W/SCOPE: CPT | Performed by: INTERNAL MEDICINE

## 2018-01-01 PROCEDURE — 86850 RBC ANTIBODY SCREEN: CPT | Performed by: PSYCHIATRY & NEUROLOGY

## 2018-01-01 PROCEDURE — 80177 DRUG SCRN QUAN LEVETIRACETAM: CPT | Performed by: FAMILY MEDICINE

## 2018-01-01 PROCEDURE — 83550 IRON BINDING TEST: CPT | Performed by: PSYCHIATRY & NEUROLOGY

## 2018-01-01 PROCEDURE — 92610 EVALUATE SWALLOWING FUNCTION: CPT

## 2018-01-01 PROCEDURE — 99497 ADVNCD CARE PLAN 30 MIN: CPT | Performed by: CLINICAL NURSE SPECIALIST

## 2018-01-01 PROCEDURE — 25010000002 POTASSIUM CHLORIDE PER 2 MEQ OF POTASSIUM: Performed by: INTERNAL MEDICINE

## 2018-01-01 PROCEDURE — P9612 CATHETERIZE FOR URINE SPEC: HCPCS

## 2018-01-01 PROCEDURE — 25010000002 PROMETHAZINE PER 50 MG: Performed by: FAMILY MEDICINE

## 2018-01-01 PROCEDURE — G8987 SELF CARE CURRENT STATUS: HCPCS | Performed by: OCCUPATIONAL THERAPIST

## 2018-01-01 PROCEDURE — 93005 ELECTROCARDIOGRAM TRACING: CPT | Performed by: PSYCHIATRY & NEUROLOGY

## 2018-01-01 PROCEDURE — 76775 US EXAM ABDO BACK WALL LIM: CPT

## 2018-01-01 PROCEDURE — 87899 AGENT NOS ASSAY W/OPTIC: CPT | Performed by: NURSE PRACTITIONER

## 2018-01-01 PROCEDURE — 97530 THERAPEUTIC ACTIVITIES: CPT | Performed by: OCCUPATIONAL THERAPIST

## 2018-01-01 PROCEDURE — 86870 RBC ANTIBODY IDENTIFICATION: CPT | Performed by: PSYCHIATRY & NEUROLOGY

## 2018-01-01 PROCEDURE — 93306 TTE W/DOPPLER COMPLETE: CPT | Performed by: INTERNAL MEDICINE

## 2018-01-01 PROCEDURE — G9159 LANG COMP CURRENT STATUS: HCPCS

## 2018-01-01 PROCEDURE — 84134 ASSAY OF PREALBUMIN: CPT | Performed by: FAMILY MEDICINE

## 2018-01-01 PROCEDURE — 86901 BLOOD TYPING SEROLOGIC RH(D): CPT | Performed by: PSYCHIATRY & NEUROLOGY

## 2018-01-01 PROCEDURE — 92611 MOTION FLUOROSCOPY/SWALLOW: CPT | Performed by: SPEECH-LANGUAGE PATHOLOGIST

## 2018-01-01 PROCEDURE — 87205 SMEAR GRAM STAIN: CPT | Performed by: INTERNAL MEDICINE

## 2018-01-01 PROCEDURE — 82947 ASSAY GLUCOSE BLOOD QUANT: CPT | Performed by: INTERNAL MEDICINE

## 2018-01-01 PROCEDURE — 25010000002 DEXAMETHASONE PER 1 MG: Performed by: PSYCHIATRY & NEUROLOGY

## 2018-01-01 PROCEDURE — 83036 HEMOGLOBIN GLYCOSYLATED A1C: CPT | Performed by: FAMILY MEDICINE

## 2018-01-01 PROCEDURE — 80076 HEPATIC FUNCTION PANEL: CPT | Performed by: FAMILY MEDICINE

## 2018-01-01 PROCEDURE — 70551 MRI BRAIN STEM W/O DYE: CPT

## 2018-01-01 PROCEDURE — 83605 ASSAY OF LACTIC ACID: CPT | Performed by: FAMILY MEDICINE

## 2018-01-01 PROCEDURE — 25010000002 LORAZEPAM PER 2 MG: Performed by: PSYCHIATRY & NEUROLOGY

## 2018-01-01 PROCEDURE — 99221 1ST HOSP IP/OBS SF/LOW 40: CPT | Performed by: INTERNAL MEDICINE

## 2018-01-01 PROCEDURE — 86905 BLOOD TYPING RBC ANTIGENS: CPT | Performed by: PSYCHIATRY & NEUROLOGY

## 2018-01-01 PROCEDURE — 25010000002 LEVOFLOXACIN PER 250 MG: Performed by: INTERNAL MEDICINE

## 2018-01-01 PROCEDURE — 84681 ASSAY OF C-PEPTIDE: CPT | Performed by: NURSE PRACTITIONER

## 2018-01-01 PROCEDURE — 97162 PT EVAL MOD COMPLEX 30 MIN: CPT

## 2018-01-01 PROCEDURE — 25010000002 ONDANSETRON PER 1 MG: Performed by: PSYCHIATRY & NEUROLOGY

## 2018-01-01 PROCEDURE — 25010000002 IOPAMIDOL 61 % SOLUTION: Performed by: PSYCHIATRY & NEUROLOGY

## 2018-01-01 PROCEDURE — 87205 SMEAR GRAM STAIN: CPT | Performed by: FAMILY MEDICINE

## 2018-01-01 PROCEDURE — 82728 ASSAY OF FERRITIN: CPT | Performed by: PSYCHIATRY & NEUROLOGY

## 2018-01-01 PROCEDURE — 86922 COMPATIBILITY TEST ANTIGLOB: CPT

## 2018-01-01 PROCEDURE — 92523 SPEECH SOUND LANG COMPREHEN: CPT

## 2018-01-01 PROCEDURE — 99498 ADVNCD CARE PLAN ADDL 30 MIN: CPT | Performed by: CLINICAL NURSE SPECIALIST

## 2018-01-01 PROCEDURE — 3E03317 INTRODUCTION OF OTHER THROMBOLYTIC INTO PERIPHERAL VEIN, PERCUTANEOUS APPROACH: ICD-10-PCS | Performed by: EMERGENCY MEDICINE

## 2018-01-01 PROCEDURE — 80053 COMPREHEN METABOLIC PANEL: CPT | Performed by: FAMILY MEDICINE

## 2018-01-01 PROCEDURE — G9160 LANG COMP GOAL STATUS: HCPCS

## 2018-01-01 PROCEDURE — 87045 FECES CULTURE AEROBIC BACT: CPT | Performed by: NURSE PRACTITIONER

## 2018-01-01 PROCEDURE — 85610 PROTHROMBIN TIME: CPT

## 2018-01-01 PROCEDURE — 97164 PT RE-EVAL EST PLAN CARE: CPT

## 2018-01-01 PROCEDURE — 93306 TTE W/DOPPLER COMPLETE: CPT

## 2018-01-01 PROCEDURE — 71260 CT THORAX DX C+: CPT

## 2018-01-01 PROCEDURE — 99222 1ST HOSP IP/OBS MODERATE 55: CPT | Performed by: INTERNAL MEDICINE

## 2018-01-01 PROCEDURE — 83605 ASSAY OF LACTIC ACID: CPT | Performed by: EMERGENCY MEDICINE

## 2018-01-01 PROCEDURE — 92611 MOTION FLUOROSCOPY/SWALLOW: CPT

## 2018-01-01 PROCEDURE — G8997 SWALLOW GOAL STATUS: HCPCS

## 2018-01-01 PROCEDURE — 97166 OT EVAL MOD COMPLEX 45 MIN: CPT

## 2018-01-01 PROCEDURE — 83540 ASSAY OF IRON: CPT | Performed by: PSYCHIATRY & NEUROLOGY

## 2018-01-01 PROCEDURE — 99239 HOSP IP/OBS DSCHRG MGMT >30: CPT | Performed by: PSYCHIATRY & NEUROLOGY

## 2018-01-01 PROCEDURE — 87070 CULTURE OTHR SPECIMN AEROBIC: CPT | Performed by: INTERNAL MEDICINE

## 2018-01-01 PROCEDURE — 83735 ASSAY OF MAGNESIUM: CPT | Performed by: PSYCHIATRY & NEUROLOGY

## 2018-01-01 PROCEDURE — G9161 LANG COMP D/C STATUS: HCPCS

## 2018-01-01 PROCEDURE — 82330 ASSAY OF CALCIUM: CPT

## 2018-01-01 RX ORDER — SODIUM CHLORIDE 0.9 % (FLUSH) 0.9 %
10 SYRINGE (ML) INJECTION AS NEEDED
Status: DISCONTINUED | OUTPATIENT
Start: 2018-01-01 | End: 2018-01-01 | Stop reason: HOSPADM

## 2018-01-01 RX ORDER — MAGNESIUM HYDROXIDE/ALUMINUM HYDROXICE/SIMETHICONE 120; 1200; 1200 MG/30ML; MG/30ML; MG/30ML
SUSPENSION ORAL EVERY 6 HOURS PRN
COMMUNITY

## 2018-01-01 RX ORDER — MAGNESIUM HYDROXIDE/ALUMINUM HYDROXICE/SIMETHICONE 120; 1200; 1200 MG/30ML; MG/30ML; MG/30ML
10 SUSPENSION ORAL EVERY 6 HOURS PRN
Status: DISCONTINUED | OUTPATIENT
Start: 2018-01-01 | End: 2018-01-01

## 2018-01-01 RX ORDER — FOLIC ACID 1 MG/1
5 TABLET ORAL ONCE
Status: COMPLETED | OUTPATIENT
Start: 2018-01-01 | End: 2018-01-01

## 2018-01-01 RX ORDER — ASPIRIN 325 MG
325 TABLET ORAL DAILY
Status: DISCONTINUED | OUTPATIENT
Start: 2018-01-01 | End: 2018-01-01

## 2018-01-01 RX ORDER — FERROUS SULFATE 300 MG/5ML
300 LIQUID (ML) ORAL DAILY
Status: DISCONTINUED | OUTPATIENT
Start: 2018-01-01 | End: 2018-01-01

## 2018-01-01 RX ORDER — NICOTINE POLACRILEX 4 MG
15 LOZENGE BUCCAL
Status: DISCONTINUED | OUTPATIENT
Start: 2018-01-01 | End: 2018-01-01 | Stop reason: HOSPADM

## 2018-01-01 RX ORDER — QUETIAPINE FUMARATE 25 MG/1
25 TABLET, FILM COATED ORAL NIGHTLY
Status: DISCONTINUED | OUTPATIENT
Start: 2018-01-01 | End: 2018-01-01

## 2018-01-01 RX ORDER — LABETALOL HYDROCHLORIDE 5 MG/ML
10 INJECTION, SOLUTION INTRAVENOUS ONCE
Status: DISCONTINUED | OUTPATIENT
Start: 2018-01-01 | End: 2018-01-01

## 2018-01-01 RX ORDER — LEVOFLOXACIN 500 MG/1
500 TABLET, FILM COATED ORAL EVERY 24 HOURS
Qty: 7 TABLET | Refills: 0 | Status: SHIPPED | OUTPATIENT
Start: 2018-01-01 | End: 2018-01-01

## 2018-01-01 RX ORDER — DEXTROSE MONOHYDRATE 25 G/50ML
25 INJECTION, SOLUTION INTRAVENOUS
Status: DISCONTINUED | OUTPATIENT
Start: 2018-01-01 | End: 2018-01-01 | Stop reason: SDUPTHER

## 2018-01-01 RX ORDER — CHOLESTYRAMINE LIGHT 4 G/5.7G
1 POWDER, FOR SUSPENSION ORAL
Status: DISCONTINUED | OUTPATIENT
Start: 2018-01-01 | End: 2018-01-01 | Stop reason: HOSPADM

## 2018-01-01 RX ORDER — METOPROLOL TARTRATE 5 MG/5ML
2.5 INJECTION INTRAVENOUS EVERY 8 HOURS SCHEDULED
Status: DISCONTINUED | OUTPATIENT
Start: 2018-01-01 | End: 2018-01-01

## 2018-01-01 RX ORDER — POTASSIUM CHLORIDE 20MEQ/15ML
40 LIQUID (ML) ORAL DAILY
Status: DISCONTINUED | OUTPATIENT
Start: 2018-01-01 | End: 2018-01-01 | Stop reason: HOSPADM

## 2018-01-01 RX ORDER — LEVETIRACETAM 500 MG/1
500 TABLET ORAL EVERY 12 HOURS SCHEDULED
Qty: 60 TABLET | Refills: 5
Start: 2018-01-01

## 2018-01-01 RX ORDER — ASPIRIN 300 MG/1
300 SUPPOSITORY RECTAL DAILY
Status: DISCONTINUED | OUTPATIENT
Start: 2018-01-01 | End: 2018-01-01

## 2018-01-01 RX ORDER — TEMAZEPAM 7.5 MG/1
7.5 CAPSULE ORAL NIGHTLY PRN
Status: DISCONTINUED | OUTPATIENT
Start: 2018-01-01 | End: 2018-01-01

## 2018-01-01 RX ORDER — ONDANSETRON 2 MG/ML
4 INJECTION INTRAMUSCULAR; INTRAVENOUS EVERY 6 HOURS PRN
Status: DISCONTINUED | OUTPATIENT
Start: 2018-01-01 | End: 2018-01-01

## 2018-01-01 RX ORDER — PANTOPRAZOLE SODIUM 40 MG/10ML
40 INJECTION, POWDER, LYOPHILIZED, FOR SOLUTION INTRAVENOUS NIGHTLY
Status: DISCONTINUED | OUTPATIENT
Start: 2018-01-01 | End: 2018-01-01 | Stop reason: ALTCHOICE

## 2018-01-01 RX ORDER — ONDANSETRON 2 MG/ML
8 INJECTION INTRAMUSCULAR; INTRAVENOUS EVERY 6 HOURS PRN
Status: DISCONTINUED | OUTPATIENT
Start: 2018-01-01 | End: 2018-01-01 | Stop reason: HOSPADM

## 2018-01-01 RX ORDER — DIPHENHYDRAMINE HYDROCHLORIDE 50 MG/ML
25 INJECTION INTRAMUSCULAR; INTRAVENOUS ONCE
Status: COMPLETED | OUTPATIENT
Start: 2018-01-01 | End: 2018-01-01

## 2018-01-01 RX ORDER — SODIUM CHLORIDE 9 MG/ML
100 INJECTION, SOLUTION INTRAVENOUS CONTINUOUS
Status: DISCONTINUED | OUTPATIENT
Start: 2018-01-01 | End: 2018-01-01

## 2018-01-01 RX ORDER — TEMAZEPAM 30 MG/1
30 CAPSULE ORAL NIGHTLY PRN
COMMUNITY

## 2018-01-01 RX ORDER — ASPIRIN 300 MG/1
300 SUPPOSITORY RECTAL
Status: DISCONTINUED | OUTPATIENT
Start: 2018-01-01 | End: 2018-01-01

## 2018-01-01 RX ORDER — SODIUM CHLORIDE 9 MG/ML
100 INJECTION, SOLUTION INTRAVENOUS ONCE
Status: DISCONTINUED | OUTPATIENT
Start: 2018-01-01 | End: 2018-01-01 | Stop reason: HOSPADM

## 2018-01-01 RX ORDER — ASPIRIN 81 MG/1
81 TABLET, CHEWABLE ORAL DAILY
Status: DISCONTINUED | OUTPATIENT
Start: 2018-01-01 | End: 2018-01-01

## 2018-01-01 RX ORDER — METRONIDAZOLE 500 MG/1
500 TABLET ORAL EVERY 8 HOURS SCHEDULED
Status: DISCONTINUED | OUTPATIENT
Start: 2018-01-01 | End: 2018-01-01 | Stop reason: HOSPADM

## 2018-01-01 RX ORDER — SUCRALFATE 1 G/1
1 TABLET ORAL 4 TIMES DAILY
COMMUNITY

## 2018-01-01 RX ORDER — QUETIAPINE FUMARATE 25 MG/1
37.5 TABLET, FILM COATED ORAL NIGHTLY
Status: DISCONTINUED | OUTPATIENT
Start: 2018-01-01 | End: 2018-01-01

## 2018-01-01 RX ORDER — LEVETIRACETAM 100 MG/ML
500 SOLUTION ORAL EVERY 12 HOURS SCHEDULED
Status: DISCONTINUED | OUTPATIENT
Start: 2018-01-01 | End: 2018-01-01

## 2018-01-01 RX ORDER — GUAR GUM
1 PACKET (EA) ORAL 2 TIMES DAILY
Status: DISCONTINUED | OUTPATIENT
Start: 2018-01-01 | End: 2018-01-01

## 2018-01-01 RX ORDER — LEVOFLOXACIN 750 MG/1
750 TABLET ORAL EVERY OTHER DAY
Status: COMPLETED | OUTPATIENT
Start: 2018-01-01 | End: 2018-01-01

## 2018-01-01 RX ORDER — DEXAMETHASONE SODIUM PHOSPHATE 4 MG/ML
4 INJECTION, SOLUTION INTRA-ARTICULAR; INTRALESIONAL; INTRAMUSCULAR; INTRAVENOUS; SOFT TISSUE ONCE
Status: COMPLETED | OUTPATIENT
Start: 2018-01-01 | End: 2018-01-01

## 2018-01-01 RX ORDER — INSULIN GLARGINE 100 [IU]/ML
INJECTION, SOLUTION SUBCUTANEOUS DAILY
COMMUNITY

## 2018-01-01 RX ORDER — NICOTINE POLACRILEX 4 MG
15 LOZENGE BUCCAL
Start: 2018-01-01 | End: 2018-01-01 | Stop reason: HOSPADM

## 2018-01-01 RX ORDER — NICOTINE POLACRILEX 4 MG
15 LOZENGE BUCCAL
Status: DISCONTINUED | OUTPATIENT
Start: 2018-01-01 | End: 2018-01-01 | Stop reason: SDUPTHER

## 2018-01-01 RX ORDER — BISACODYL 10 MG
10 SUPPOSITORY, RECTAL RECTAL ONCE
Status: COMPLETED | OUTPATIENT
Start: 2018-01-01 | End: 2018-01-01

## 2018-01-01 RX ORDER — LISINOPRIL 10 MG/1
10 TABLET ORAL
Status: DISCONTINUED | OUTPATIENT
Start: 2018-01-01 | End: 2018-01-01

## 2018-01-01 RX ORDER — INSULIN GLARGINE 100 [IU]/ML
25 INJECTION, SOLUTION SUBCUTANEOUS DAILY
COMMUNITY
End: 2018-01-01 | Stop reason: HOSPADM

## 2018-01-01 RX ORDER — LABETALOL HYDROCHLORIDE 5 MG/ML
10 INJECTION, SOLUTION INTRAVENOUS EVERY 4 HOURS PRN
Status: DISCONTINUED | OUTPATIENT
Start: 2018-01-01 | End: 2018-01-01 | Stop reason: HOSPADM

## 2018-01-01 RX ORDER — LEVOFLOXACIN 500 MG/1
500 TABLET, FILM COATED ORAL EVERY 24 HOURS
Status: DISCONTINUED | OUTPATIENT
Start: 2018-01-01 | End: 2018-01-01 | Stop reason: HOSPADM

## 2018-01-01 RX ORDER — ATORVASTATIN CALCIUM 40 MG/1
40 TABLET, FILM COATED ORAL NIGHTLY
Start: 2018-01-01 | End: 2018-01-01 | Stop reason: HOSPADM

## 2018-01-01 RX ORDER — ATORVASTATIN CALCIUM 40 MG/1
40 TABLET, FILM COATED ORAL NIGHTLY
Status: DISCONTINUED | OUTPATIENT
Start: 2018-01-01 | End: 2018-01-01 | Stop reason: HOSPADM

## 2018-01-01 RX ORDER — LEVETIRACETAM 5 MG/ML
500 INJECTION INTRAVASCULAR EVERY 12 HOURS SCHEDULED
Status: DISCONTINUED | OUTPATIENT
Start: 2018-01-01 | End: 2018-01-01

## 2018-01-01 RX ORDER — LORAZEPAM 2 MG/ML
2 INJECTION INTRAMUSCULAR ONCE
Status: COMPLETED | OUTPATIENT
Start: 2018-01-01 | End: 2018-01-01

## 2018-01-01 RX ORDER — ATORVASTATIN CALCIUM 40 MG/1
80 TABLET, FILM COATED ORAL NIGHTLY
Status: DISCONTINUED | OUTPATIENT
Start: 2018-01-01 | End: 2018-01-01

## 2018-01-01 RX ORDER — POLYETHYLENE GLYCOL 3350 17 G/17G
17 POWDER, FOR SOLUTION ORAL DAILY PRN
Qty: 1 EACH | Refills: 0 | Status: SHIPPED | OUTPATIENT
Start: 2018-01-01 | End: 2018-01-01

## 2018-01-01 RX ORDER — LEVETIRACETAM 500 MG/1
500 TABLET ORAL EVERY 12 HOURS SCHEDULED
Status: DISCONTINUED | OUTPATIENT
Start: 2018-01-01 | End: 2018-01-01 | Stop reason: HOSPADM

## 2018-01-01 RX ORDER — DIPHENHYDRAMINE HYDROCHLORIDE 50 MG/ML
INJECTION INTRAMUSCULAR; INTRAVENOUS
Status: COMPLETED
Start: 2018-01-01 | End: 2018-01-01

## 2018-01-01 RX ORDER — QUETIAPINE FUMARATE 25 MG/1
25 TABLET, FILM COATED ORAL NIGHTLY
Start: 2018-01-01 | End: 2018-01-01 | Stop reason: HOSPADM

## 2018-01-01 RX ORDER — DULOXETIN HYDROCHLORIDE 30 MG/1
30 CAPSULE, DELAYED RELEASE ORAL DAILY
COMMUNITY

## 2018-01-01 RX ORDER — LISINOPRIL 20 MG/1
20 TABLET ORAL
Status: DISCONTINUED | OUTPATIENT
Start: 2018-01-01 | End: 2018-01-01

## 2018-01-01 RX ORDER — QUETIAPINE FUMARATE 25 MG/1
25 TABLET, FILM COATED ORAL NIGHTLY
Status: DISCONTINUED | OUTPATIENT
Start: 2018-01-01 | End: 2018-01-01 | Stop reason: HOSPADM

## 2018-01-01 RX ORDER — ACETAMINOPHEN 325 MG/1
650 TABLET ORAL EVERY 4 HOURS PRN
Status: DISCONTINUED | OUTPATIENT
Start: 2018-01-01 | End: 2018-01-01 | Stop reason: HOSPADM

## 2018-01-01 RX ORDER — ACETAMINOPHEN 325 MG/1
650 TABLET ORAL EVERY 4 HOURS PRN
Status: DISCONTINUED | OUTPATIENT
Start: 2018-01-01 | End: 2018-01-01 | Stop reason: SDUPTHER

## 2018-01-01 RX ORDER — METRONIDAZOLE 500 MG/1
500 TABLET ORAL EVERY 8 HOURS SCHEDULED
Status: COMPLETED | OUTPATIENT
Start: 2018-01-01 | End: 2018-01-01

## 2018-01-01 RX ORDER — PANTOPRAZOLE SODIUM 40 MG/1
40 TABLET, DELAYED RELEASE ORAL NIGHTLY
Status: DISCONTINUED | OUTPATIENT
Start: 2018-01-01 | End: 2018-01-01 | Stop reason: ALTCHOICE

## 2018-01-01 RX ORDER — ACETAMINOPHEN 650 MG/1
650 SUPPOSITORY RECTAL EVERY 4 HOURS PRN
Status: DISCONTINUED | OUTPATIENT
Start: 2018-01-01 | End: 2018-01-01 | Stop reason: HOSPADM

## 2018-01-01 RX ORDER — SODIUM CHLORIDE 9 MG/ML
125 INJECTION, SOLUTION INTRAVENOUS CONTINUOUS
Status: DISCONTINUED | OUTPATIENT
Start: 2018-01-01 | End: 2018-01-01 | Stop reason: HOSPADM

## 2018-01-01 RX ORDER — METRONIDAZOLE 500 MG/1
500 TABLET ORAL EVERY 8 HOURS SCHEDULED
Qty: 21 TABLET | Refills: 0 | Status: SHIPPED | OUTPATIENT
Start: 2018-01-01 | End: 2018-01-01

## 2018-01-01 RX ORDER — LORAZEPAM 0.5 MG/1
0.5 TABLET ORAL EVERY 8 HOURS PRN
COMMUNITY

## 2018-01-01 RX ORDER — POTASSIUM CHLORIDE 20MEQ/15ML
20 LIQUID (ML) ORAL ONCE
Status: DISCONTINUED | OUTPATIENT
Start: 2018-01-01 | End: 2018-01-01 | Stop reason: DRUGHIGH

## 2018-01-01 RX ORDER — LISINOPRIL 5 MG/1
5 TABLET ORAL
Status: DISCONTINUED | OUTPATIENT
Start: 2018-01-01 | End: 2018-01-01

## 2018-01-01 RX ORDER — VANCOMYCIN HYDROCHLORIDE 1 G/200ML
1000 INJECTION, SOLUTION INTRAVENOUS EVERY 24 HOURS
Status: DISCONTINUED | OUTPATIENT
Start: 2018-01-01 | End: 2018-01-01

## 2018-01-01 RX ORDER — LEVOFLOXACIN 5 MG/ML
750 INJECTION, SOLUTION INTRAVENOUS EVERY 24 HOURS
Status: DISCONTINUED | OUTPATIENT
Start: 2018-01-01 | End: 2018-01-01

## 2018-01-01 RX ORDER — SODIUM CHLORIDE 0.9 % (FLUSH) 0.9 %
1-10 SYRINGE (ML) INJECTION AS NEEDED
Status: DISCONTINUED | OUTPATIENT
Start: 2018-01-01 | End: 2018-01-01 | Stop reason: HOSPADM

## 2018-01-01 RX ORDER — PROMETHAZINE HYDROCHLORIDE 25 MG/ML
12.5 INJECTION, SOLUTION INTRAMUSCULAR; INTRAVENOUS EVERY 6 HOURS PRN
Status: DISCONTINUED | OUTPATIENT
Start: 2018-01-01 | End: 2018-01-01 | Stop reason: HOSPADM

## 2018-01-01 RX ORDER — POTASSIUM CHLORIDE 20MEQ/15ML
20 LIQUID (ML) ORAL ONCE
Status: COMPLETED | OUTPATIENT
Start: 2018-01-01 | End: 2018-01-01

## 2018-01-01 RX ORDER — LABETALOL HYDROCHLORIDE 5 MG/ML
10 INJECTION, SOLUTION INTRAVENOUS
Status: DISCONTINUED | OUTPATIENT
Start: 2018-01-01 | End: 2018-01-01

## 2018-01-01 RX ORDER — POTASSIUM CHLORIDE 14.9 MG/ML
10 INJECTION INTRAVENOUS ONCE
Status: COMPLETED | OUTPATIENT
Start: 2018-01-01 | End: 2018-01-01

## 2018-01-01 RX ORDER — ALUMINA, MAGNESIA, AND SIMETHICONE 2400; 2400; 240 MG/30ML; MG/30ML; MG/30ML
5 SUSPENSION ORAL EVERY 6 HOURS PRN
Status: DISCONTINUED | OUTPATIENT
Start: 2018-01-01 | End: 2018-01-01 | Stop reason: HOSPADM

## 2018-01-01 RX ORDER — LEVETIRACETAM 10 MG/ML
1000 INJECTION INTRAVASCULAR ONCE
Status: COMPLETED | OUTPATIENT
Start: 2018-01-01 | End: 2018-01-01

## 2018-01-01 RX ORDER — DEXTROSE MONOHYDRATE 25 G/50ML
25 INJECTION, SOLUTION INTRAVENOUS
Status: DISCONTINUED | OUTPATIENT
Start: 2018-01-01 | End: 2018-01-01 | Stop reason: HOSPADM

## 2018-01-01 RX ADMIN — INSULIN DETEMIR 28 UNITS: 100 INJECTION, SOLUTION SUBCUTANEOUS at 08:24

## 2018-01-01 RX ADMIN — SODIUM CHLORIDE 8 MG/HR: 900 INJECTION INTRAVENOUS at 00:28

## 2018-01-01 RX ADMIN — QUETIAPINE FUMARATE 25 MG: 25 TABLET, FILM COATED ORAL at 20:52

## 2018-01-01 RX ADMIN — POTASSIUM CHLORIDE: 149 INJECTION, SOLUTION, CONCENTRATE INTRAVENOUS at 04:14

## 2018-01-01 RX ADMIN — ATORVASTATIN CALCIUM 80 MG: 40 TABLET, FILM COATED ORAL at 21:25

## 2018-01-01 RX ADMIN — QUETIAPINE FUMARATE 25 MG: 25 TABLET, FILM COATED ORAL at 20:35

## 2018-01-01 RX ADMIN — LEVETIRACETAM 500 MG: 500 SOLUTION ORAL at 17:11

## 2018-01-01 RX ADMIN — INSULIN LISPRO 6 UNITS: 100 INJECTION, SOLUTION INTRAVENOUS; SUBCUTANEOUS at 12:31

## 2018-01-01 RX ADMIN — INSULIN LISPRO 2 UNITS: 100 INJECTION, SOLUTION INTRAVENOUS; SUBCUTANEOUS at 08:24

## 2018-01-01 RX ADMIN — LISINOPRIL 20 MG: 20 TABLET ORAL at 08:46

## 2018-01-01 RX ADMIN — POTASSIUM CHLORIDE 40 MEQ: 20 SOLUTION ORAL at 09:02

## 2018-01-01 RX ADMIN — LEVETIRACETAM 500 MG: 500 SOLUTION ORAL at 05:42

## 2018-01-01 RX ADMIN — ACETAMINOPHEN 650 MG: 325 TABLET, FILM COATED ORAL at 00:15

## 2018-01-01 RX ADMIN — INSULIN LISPRO 7 UNITS: 100 INJECTION, SOLUTION INTRAVENOUS; SUBCUTANEOUS at 12:15

## 2018-01-01 RX ADMIN — SODIUM CHLORIDE 250 ML: 9 INJECTION, SOLUTION INTRAVENOUS at 05:08

## 2018-01-01 RX ADMIN — LISINOPRIL 20 MG: 20 TABLET ORAL at 09:17

## 2018-01-01 RX ADMIN — VANCOMYCIN HYDROCHLORIDE 750 MG: 10 INJECTION, POWDER, LYOPHILIZED, FOR SOLUTION INTRAVENOUS at 03:05

## 2018-01-01 RX ADMIN — INSULIN LISPRO 6 UNITS: 100 INJECTION, SOLUTION INTRAVENOUS; SUBCUTANEOUS at 10:46

## 2018-01-01 RX ADMIN — METOPROLOL TARTRATE 25 MG: 25 TABLET, FILM COATED ORAL at 09:02

## 2018-01-01 RX ADMIN — LEVETIRACETAM 500 MG: 500 SOLUTION ORAL at 06:42

## 2018-01-01 RX ADMIN — LISINOPRIL 20 MG: 20 TABLET ORAL at 09:43

## 2018-01-01 RX ADMIN — INSULIN LISPRO 8 UNITS: 100 INJECTION, SOLUTION INTRAVENOUS; SUBCUTANEOUS at 12:08

## 2018-01-01 RX ADMIN — METOPROLOL TARTRATE 25 MG: 25 TABLET, FILM COATED ORAL at 20:28

## 2018-01-01 RX ADMIN — METOPROLOL TARTRATE 2.5 MG: 5 INJECTION, SOLUTION INTRAVENOUS at 15:04

## 2018-01-01 RX ADMIN — INSULIN LISPRO 4 UNITS: 100 INJECTION, SOLUTION INTRAVENOUS; SUBCUTANEOUS at 14:09

## 2018-01-01 RX ADMIN — INSULIN LISPRO 4 UNITS: 100 INJECTION, SOLUTION INTRAVENOUS; SUBCUTANEOUS at 17:33

## 2018-01-01 RX ADMIN — MEROPENEM 1 G: 1 INJECTION, POWDER, FOR SOLUTION INTRAVENOUS at 05:57

## 2018-01-01 RX ADMIN — Medication 1 PACKET: at 08:31

## 2018-01-01 RX ADMIN — LEVOFLOXACIN 750 MG: 5 INJECTION, SOLUTION INTRAVENOUS at 11:06

## 2018-01-01 RX ADMIN — ACETAMINOPHEN 650 MG: 325 TABLET, FILM COATED ORAL at 20:36

## 2018-01-01 RX ADMIN — METRONIDAZOLE 500 MG: 500 TABLET ORAL at 17:45

## 2018-01-01 RX ADMIN — METRONIDAZOLE 500 MG: 500 TABLET ORAL at 20:38

## 2018-01-01 RX ADMIN — INSULIN LISPRO 4 UNITS: 100 INJECTION, SOLUTION INTRAVENOUS; SUBCUTANEOUS at 17:29

## 2018-01-01 RX ADMIN — INSULIN LISPRO 6 UNITS: 100 INJECTION, SOLUTION INTRAVENOUS; SUBCUTANEOUS at 12:17

## 2018-01-01 RX ADMIN — LEVETIRACETAM 500 MG: 500 SOLUTION ORAL at 17:34

## 2018-01-01 RX ADMIN — QUETIAPINE FUMARATE 25 MG: 25 TABLET, FILM COATED ORAL at 20:56

## 2018-01-01 RX ADMIN — METOPROLOL TARTRATE 2.5 MG: 5 INJECTION, SOLUTION INTRAVENOUS at 06:10

## 2018-01-01 RX ADMIN — INSULIN LISPRO 9 UNITS: 100 INJECTION, SOLUTION INTRAVENOUS; SUBCUTANEOUS at 08:28

## 2018-01-01 RX ADMIN — INSULIN DETEMIR 28 UNITS: 100 INJECTION, SOLUTION SUBCUTANEOUS at 09:00

## 2018-01-01 RX ADMIN — SODIUM CHLORIDE 8 MG/HR: 900 INJECTION INTRAVENOUS at 09:35

## 2018-01-01 RX ADMIN — MEROPENEM 1 G: 1 INJECTION, POWDER, FOR SOLUTION INTRAVENOUS at 09:29

## 2018-01-01 RX ADMIN — SODIUM CHLORIDE: 450 INJECTION, SOLUTION INTRAVENOUS at 16:54

## 2018-01-01 RX ADMIN — LEVETIRACETAM 500 MG: 500 SOLUTION ORAL at 06:10

## 2018-01-01 RX ADMIN — ALTEPLASE 70 MG: KIT at 10:30

## 2018-01-01 RX ADMIN — MEROPENEM 1 G: 1 INJECTION, POWDER, FOR SOLUTION INTRAVENOUS at 01:16

## 2018-01-01 RX ADMIN — VANCOMYCIN HYDROCHLORIDE 750 MG: 10 INJECTION, POWDER, LYOPHILIZED, FOR SOLUTION INTRAVENOUS at 15:08

## 2018-01-01 RX ADMIN — LEVETIRACETAM 500 MG: 500 SOLUTION ORAL at 17:36

## 2018-01-01 RX ADMIN — ATORVASTATIN CALCIUM 80 MG: 40 TABLET, FILM COATED ORAL at 20:56

## 2018-01-01 RX ADMIN — INSULIN LISPRO 9 UNITS: 100 INJECTION, SOLUTION INTRAVENOUS; SUBCUTANEOUS at 12:08

## 2018-01-01 RX ADMIN — INSULIN LISPRO 2 UNITS: 100 INJECTION, SOLUTION INTRAVENOUS; SUBCUTANEOUS at 13:31

## 2018-01-01 RX ADMIN — METRONIDAZOLE 500 MG: 500 TABLET ORAL at 20:20

## 2018-01-01 RX ADMIN — INSULIN LISPRO 5 UNITS: 100 INJECTION, SOLUTION INTRAVENOUS; SUBCUTANEOUS at 17:07

## 2018-01-01 RX ADMIN — METOPROLOL TARTRATE 25 MG: 25 TABLET, FILM COATED ORAL at 08:53

## 2018-01-01 RX ADMIN — MEROPENEM 1 G: 1 INJECTION, POWDER, FOR SOLUTION INTRAVENOUS at 15:46

## 2018-01-01 RX ADMIN — INSULIN LISPRO 6 UNITS: 100 INJECTION, SOLUTION INTRAVENOUS; SUBCUTANEOUS at 08:40

## 2018-01-01 RX ADMIN — MEROPENEM 1 G: 1 INJECTION, POWDER, FOR SOLUTION INTRAVENOUS at 17:50

## 2018-01-01 RX ADMIN — METOPROLOL TARTRATE 25 MG: 25 TABLET, FILM COATED ORAL at 08:29

## 2018-01-01 RX ADMIN — METOPROLOL TARTRATE 25 MG: 25 TABLET, FILM COATED ORAL at 20:20

## 2018-01-01 RX ADMIN — LEVETIRACETAM 500 MG: 500 SOLUTION ORAL at 17:29

## 2018-01-01 RX ADMIN — Medication 1 PACKET: at 20:58

## 2018-01-01 RX ADMIN — BARIUM SULFATE 20 ML: 400 SUSPENSION ORAL at 12:58

## 2018-01-01 RX ADMIN — CHOLESTYRAMINE 4 G: 4 POWDER, FOR SUSPENSION ORAL at 18:48

## 2018-01-01 RX ADMIN — ATORVASTATIN CALCIUM 40 MG: 40 TABLET, FILM COATED ORAL at 17:45

## 2018-01-01 RX ADMIN — INSULIN LISPRO 8 UNITS: 100 INJECTION, SOLUTION INTRAVENOUS; SUBCUTANEOUS at 08:43

## 2018-01-01 RX ADMIN — POTASSIUM CHLORIDE: 149 INJECTION, SOLUTION, CONCENTRATE INTRAVENOUS at 21:58

## 2018-01-01 RX ADMIN — VANCOMYCIN HYDROCHLORIDE 750 MG: 10 INJECTION, POWDER, LYOPHILIZED, FOR SOLUTION INTRAVENOUS at 16:31

## 2018-01-01 RX ADMIN — MEROPENEM 1 G: 1 INJECTION, POWDER, FOR SOLUTION INTRAVENOUS at 14:33

## 2018-01-01 RX ADMIN — POTASSIUM CHLORIDE 40 MEQ: 20 SOLUTION ORAL at 08:52

## 2018-01-01 RX ADMIN — TEMAZEPAM 7.5 MG: 7.5 CAPSULE ORAL at 21:27

## 2018-01-01 RX ADMIN — INSULIN LISPRO 4 UNITS: 100 INJECTION, SOLUTION INTRAVENOUS; SUBCUTANEOUS at 20:42

## 2018-01-01 RX ADMIN — Medication 1 PACKET: at 21:47

## 2018-01-01 RX ADMIN — QUETIAPINE FUMARATE 25 MG: 25 TABLET, FILM COATED ORAL at 21:45

## 2018-01-01 RX ADMIN — SODIUM CHLORIDE 500 ML: 9 INJECTION, SOLUTION INTRAVENOUS at 14:51

## 2018-01-01 RX ADMIN — INSULIN DETEMIR 15 UNITS: 100 INJECTION, SOLUTION SUBCUTANEOUS at 12:26

## 2018-01-01 RX ADMIN — INSULIN LISPRO 7 UNITS: 100 INJECTION, SOLUTION INTRAVENOUS; SUBCUTANEOUS at 09:33

## 2018-01-01 RX ADMIN — LEVETIRACETAM 500 MG: 500 SOLUTION ORAL at 06:32

## 2018-01-01 RX ADMIN — DIPHENHYDRAMINE HYDROCHLORIDE 25 MG: 50 INJECTION, SOLUTION INTRAMUSCULAR; INTRAVENOUS at 11:36

## 2018-01-01 RX ADMIN — INSULIN LISPRO 2 UNITS: 100 INJECTION, SOLUTION INTRAVENOUS; SUBCUTANEOUS at 09:53

## 2018-01-01 RX ADMIN — LISINOPRIL 20 MG: 20 TABLET ORAL at 08:53

## 2018-01-01 RX ADMIN — SODIUM CHLORIDE 8 MG/HR: 900 INJECTION INTRAVENOUS at 20:08

## 2018-01-01 RX ADMIN — METOPROLOL TARTRATE 2.5 MG: 5 INJECTION, SOLUTION INTRAVENOUS at 18:31

## 2018-01-01 RX ADMIN — METRONIDAZOLE 500 MG: 500 TABLET ORAL at 06:45

## 2018-01-01 RX ADMIN — INSULIN LISPRO 4 UNITS: 100 INJECTION, SOLUTION INTRAVENOUS; SUBCUTANEOUS at 17:36

## 2018-01-01 RX ADMIN — METRONIDAZOLE 500 MG: 500 TABLET ORAL at 14:10

## 2018-01-01 RX ADMIN — ATORVASTATIN CALCIUM 80 MG: 40 TABLET, FILM COATED ORAL at 20:20

## 2018-01-01 RX ADMIN — MEROPENEM 1 G: 1 INJECTION, POWDER, FOR SOLUTION INTRAVENOUS at 11:49

## 2018-01-01 RX ADMIN — ATORVASTATIN CALCIUM 80 MG: 40 TABLET, FILM COATED ORAL at 20:28

## 2018-01-01 RX ADMIN — INSULIN LISPRO 7 UNITS: 100 INJECTION, SOLUTION INTRAVENOUS; SUBCUTANEOUS at 12:50

## 2018-01-01 RX ADMIN — VANCOMYCIN HYDROCHLORIDE 750 MG: 10 INJECTION, POWDER, LYOPHILIZED, FOR SOLUTION INTRAVENOUS at 01:16

## 2018-01-01 RX ADMIN — SODIUM CHLORIDE 8 MG/HR: 900 INJECTION INTRAVENOUS at 19:05

## 2018-01-01 RX ADMIN — LEVETIRACETAM 500 MG: 500 SOLUTION ORAL at 05:27

## 2018-01-01 RX ADMIN — METOPROLOL TARTRATE 2.5 MG: 5 INJECTION, SOLUTION INTRAVENOUS at 21:39

## 2018-01-01 RX ADMIN — MEROPENEM 1 G: 1 INJECTION, POWDER, FOR SOLUTION INTRAVENOUS at 10:00

## 2018-01-01 RX ADMIN — SODIUM CHLORIDE: 450 INJECTION, SOLUTION INTRAVENOUS at 06:44

## 2018-01-01 RX ADMIN — MEROPENEM 1 G: 1 INJECTION, POWDER, FOR SOLUTION INTRAVENOUS at 19:30

## 2018-01-01 RX ADMIN — SODIUM CHLORIDE 8 MG/HR: 900 INJECTION INTRAVENOUS at 05:22

## 2018-01-01 RX ADMIN — SODIUM CHLORIDE 100 ML/HR: 9 INJECTION, SOLUTION INTRAVENOUS at 05:09

## 2018-01-01 RX ADMIN — METRONIDAZOLE 500 MG: 500 TABLET ORAL at 13:38

## 2018-01-01 RX ADMIN — ATORVASTATIN CALCIUM 80 MG: 40 TABLET, FILM COATED ORAL at 21:26

## 2018-01-01 RX ADMIN — ATORVASTATIN CALCIUM 80 MG: 40 TABLET, FILM COATED ORAL at 20:50

## 2018-01-01 RX ADMIN — INSULIN LISPRO 9 UNITS: 100 INJECTION, SOLUTION INTRAVENOUS; SUBCUTANEOUS at 12:27

## 2018-01-01 RX ADMIN — METOPROLOL TARTRATE 25 MG: 25 TABLET, FILM COATED ORAL at 20:35

## 2018-01-01 RX ADMIN — INSULIN LISPRO 4 UNITS: 100 INJECTION, SOLUTION INTRAVENOUS; SUBCUTANEOUS at 18:40

## 2018-01-01 RX ADMIN — SODIUM CHLORIDE 250 ML: 9 INJECTION, SOLUTION INTRAVENOUS at 06:12

## 2018-01-01 RX ADMIN — LISINOPRIL 20 MG: 20 TABLET ORAL at 09:45

## 2018-01-01 RX ADMIN — ASPIRIN 300 MG: 300 SUPPOSITORY RECTAL at 17:33

## 2018-01-01 RX ADMIN — MINERAL SUPPLEMENT IRON 300 MG / 5 ML STRENGTH LIQUID 100 PER BOX UNFLAVORED 300 MG: at 17:29

## 2018-01-01 RX ADMIN — POTASSIUM CHLORIDE 10 MEQ: 200 INJECTION, SOLUTION INTRAVENOUS at 09:43

## 2018-01-01 RX ADMIN — INSULIN LISPRO 2 UNITS: 100 INJECTION, SOLUTION INTRAVENOUS; SUBCUTANEOUS at 20:36

## 2018-01-01 RX ADMIN — INSULIN LISPRO 7 UNITS: 100 INJECTION, SOLUTION INTRAVENOUS; SUBCUTANEOUS at 05:42

## 2018-01-01 RX ADMIN — METOPROLOL TARTRATE 2.5 MG: 5 INJECTION, SOLUTION INTRAVENOUS at 13:52

## 2018-01-01 RX ADMIN — VANCOMYCIN HYDROCHLORIDE 750 MG: 10 INJECTION, POWDER, LYOPHILIZED, FOR SOLUTION INTRAVENOUS at 01:41

## 2018-01-01 RX ADMIN — DIPHENHYDRAMINE HYDROCHLORIDE 25 MG: 50 INJECTION INTRAMUSCULAR; INTRAVENOUS at 11:36

## 2018-01-01 RX ADMIN — DEXAMETHASONE SODIUM PHOSPHATE 4 MG: 4 INJECTION, SOLUTION INTRA-ARTICULAR; INTRALESIONAL; INTRAMUSCULAR; INTRAVENOUS; SOFT TISSUE at 11:29

## 2018-01-01 RX ADMIN — POTASSIUM CHLORIDE 40 MEQ: 20 SOLUTION ORAL at 08:29

## 2018-01-01 RX ADMIN — METOPROLOL TARTRATE 2.5 MG: 5 INJECTION, SOLUTION INTRAVENOUS at 21:46

## 2018-01-01 RX ADMIN — METOPROLOL TARTRATE 2.5 MG: 5 INJECTION, SOLUTION INTRAVENOUS at 21:42

## 2018-01-01 RX ADMIN — INSULIN LISPRO 6 UNITS: 100 INJECTION, SOLUTION INTRAVENOUS; SUBCUTANEOUS at 12:19

## 2018-01-01 RX ADMIN — LISINOPRIL 20 MG: 20 TABLET ORAL at 08:43

## 2018-01-01 RX ADMIN — ASPIRIN 300 MG: 300 SUPPOSITORY RECTAL at 18:36

## 2018-01-01 RX ADMIN — METOPROLOL TARTRATE 2.5 MG: 5 INJECTION, SOLUTION INTRAVENOUS at 21:05

## 2018-01-01 RX ADMIN — INSULIN LISPRO 2 UNITS: 100 INJECTION, SOLUTION INTRAVENOUS; SUBCUTANEOUS at 21:19

## 2018-01-01 RX ADMIN — METOPROLOL TARTRATE 2.5 MG: 5 INJECTION, SOLUTION INTRAVENOUS at 06:03

## 2018-01-01 RX ADMIN — LEVETIRACETAM 500 MG: 5 INJECTION INTRAVENOUS at 17:47

## 2018-01-01 RX ADMIN — METOPROLOL TARTRATE 25 MG: 25 TABLET, FILM COATED ORAL at 09:06

## 2018-01-01 RX ADMIN — METOPROLOL TARTRATE 2.5 MG: 5 INJECTION, SOLUTION INTRAVENOUS at 15:36

## 2018-01-01 RX ADMIN — CHOLESTYRAMINE 4 G: 4 POWDER, FOR SUSPENSION ORAL at 09:02

## 2018-01-01 RX ADMIN — INSULIN DETEMIR 30 UNITS: 100 INJECTION, SOLUTION SUBCUTANEOUS at 08:39

## 2018-01-01 RX ADMIN — SODIUM CHLORIDE 8 MG/HR: 900 INJECTION INTRAVENOUS at 14:37

## 2018-01-01 RX ADMIN — Medication 1 PACKET: at 11:05

## 2018-01-01 RX ADMIN — LEVETIRACETAM 500 MG: 500 SOLUTION ORAL at 18:06

## 2018-01-01 RX ADMIN — SODIUM CHLORIDE: 450 INJECTION, SOLUTION INTRAVENOUS at 14:28

## 2018-01-01 RX ADMIN — LEVETIRACETAM 500 MG: 500 SOLUTION ORAL at 07:02

## 2018-01-01 RX ADMIN — SODIUM CHLORIDE: 450 INJECTION, SOLUTION INTRAVENOUS at 00:00

## 2018-01-01 RX ADMIN — POTASSIUM CHLORIDE: 149 INJECTION, SOLUTION, CONCENTRATE INTRAVENOUS at 17:01

## 2018-01-01 RX ADMIN — INSULIN LISPRO 7 UNITS: 100 INJECTION, SOLUTION INTRAVENOUS; SUBCUTANEOUS at 18:06

## 2018-01-01 RX ADMIN — INSULIN DETEMIR 20 UNITS: 100 INJECTION, SOLUTION SUBCUTANEOUS at 08:43

## 2018-01-01 RX ADMIN — TEMAZEPAM 7.5 MG: 7.5 CAPSULE ORAL at 20:56

## 2018-01-01 RX ADMIN — LEVETIRACETAM 500 MG: 500 SOLUTION ORAL at 18:28

## 2018-01-01 RX ADMIN — INSULIN LISPRO 4 UNITS: 100 INJECTION, SOLUTION INTRAVENOUS; SUBCUTANEOUS at 21:25

## 2018-01-01 RX ADMIN — METRONIDAZOLE 500 MG: 500 TABLET ORAL at 14:28

## 2018-01-01 RX ADMIN — VANCOMYCIN HYDROCHLORIDE 750 MG: 10 INJECTION, POWDER, LYOPHILIZED, FOR SOLUTION INTRAVENOUS at 15:35

## 2018-01-01 RX ADMIN — IRON SUCROSE 200 MG: 20 INJECTION, SOLUTION INTRAVENOUS at 13:00

## 2018-01-01 RX ADMIN — METRONIDAZOLE 500 MG: 500 TABLET ORAL at 06:02

## 2018-01-01 RX ADMIN — METOPROLOL TARTRATE 2.5 MG: 5 INJECTION, SOLUTION INTRAVENOUS at 21:13

## 2018-01-01 RX ADMIN — BISACODYL 10 MG: 10 SUPPOSITORY RECTAL at 11:52

## 2018-01-01 RX ADMIN — MEROPENEM 1 G: 1 INJECTION, POWDER, FOR SOLUTION INTRAVENOUS at 02:17

## 2018-01-01 RX ADMIN — INSULIN LISPRO 8 UNITS: 100 INJECTION, SOLUTION INTRAVENOUS; SUBCUTANEOUS at 11:52

## 2018-01-01 RX ADMIN — BARIUM SULFATE 20 ML: 400 PASTE ORAL at 14:20

## 2018-01-01 RX ADMIN — INSULIN LISPRO 2 UNITS: 100 INJECTION, SOLUTION INTRAVENOUS; SUBCUTANEOUS at 09:01

## 2018-01-01 RX ADMIN — LEVETIRACETAM 500 MG: 500 SOLUTION ORAL at 06:02

## 2018-01-01 RX ADMIN — MEROPENEM 1 G: 1 INJECTION, POWDER, FOR SOLUTION INTRAVENOUS at 05:31

## 2018-01-01 RX ADMIN — Medication 1 PACKET: at 21:32

## 2018-01-01 RX ADMIN — METOPROLOL TARTRATE 2.5 MG: 5 INJECTION, SOLUTION INTRAVENOUS at 16:32

## 2018-01-01 RX ADMIN — INSULIN DETEMIR 15 UNITS: 100 INJECTION, SOLUTION SUBCUTANEOUS at 09:43

## 2018-01-01 RX ADMIN — MEROPENEM 1 G: 1 INJECTION, POWDER, FOR SOLUTION INTRAVENOUS at 21:10

## 2018-01-01 RX ADMIN — IRON SUCROSE 200 MG: 20 INJECTION, SOLUTION INTRAVENOUS at 11:49

## 2018-01-01 RX ADMIN — LISINOPRIL 20 MG: 20 TABLET ORAL at 08:30

## 2018-01-01 RX ADMIN — CHOLESTYRAMINE 4 G: 4 POWDER, FOR SUSPENSION ORAL at 17:43

## 2018-01-01 RX ADMIN — INSULIN LISPRO 2 UNITS: 100 INJECTION, SOLUTION INTRAVENOUS; SUBCUTANEOUS at 17:11

## 2018-01-01 RX ADMIN — POTASSIUM CHLORIDE: 149 INJECTION, SOLUTION, CONCENTRATE INTRAVENOUS at 18:54

## 2018-01-01 RX ADMIN — METRONIDAZOLE 500 MG: 500 TABLET ORAL at 14:26

## 2018-01-01 RX ADMIN — LEVETIRACETAM 1000 MG: 10 INJECTION INTRAVENOUS at 16:07

## 2018-01-01 RX ADMIN — SODIUM CHLORIDE 8 MG/HR: 900 INJECTION INTRAVENOUS at 10:12

## 2018-01-01 RX ADMIN — LEVOFLOXACIN 750 MG: 750 TABLET, FILM COATED ORAL at 17:38

## 2018-01-01 RX ADMIN — MEROPENEM 1 G: 1 INJECTION, POWDER, FOR SOLUTION INTRAVENOUS at 01:41

## 2018-01-01 RX ADMIN — INSULIN DETEMIR 15 UNITS: 100 INJECTION, SOLUTION SUBCUTANEOUS at 08:49

## 2018-01-01 RX ADMIN — VANCOMYCIN HYDROCHLORIDE 750 MG: 10 INJECTION, POWDER, LYOPHILIZED, FOR SOLUTION INTRAVENOUS at 02:17

## 2018-01-01 RX ADMIN — ATORVASTATIN CALCIUM 80 MG: 40 TABLET, FILM COATED ORAL at 20:52

## 2018-01-01 RX ADMIN — LEVETIRACETAM 500 MG: 5 INJECTION INTRAVENOUS at 05:57

## 2018-01-01 RX ADMIN — METOPROLOL TARTRATE 2.5 MG: 5 INJECTION, SOLUTION INTRAVENOUS at 05:46

## 2018-01-01 RX ADMIN — BARIUM SULFATE 20 ML: 400 SUSPENSION ORAL at 14:20

## 2018-01-01 RX ADMIN — METOPROLOL TARTRATE 25 MG: 25 TABLET, FILM COATED ORAL at 20:38

## 2018-01-01 RX ADMIN — SODIUM CHLORIDE 8 MG/HR: 900 INJECTION INTRAVENOUS at 10:20

## 2018-01-01 RX ADMIN — POTASSIUM CHLORIDE 40 MEQ: 20 SOLUTION ORAL at 09:05

## 2018-01-01 RX ADMIN — QUETIAPINE FUMARATE 25 MG: 25 TABLET, FILM COATED ORAL at 17:45

## 2018-01-01 RX ADMIN — METOPROLOL TARTRATE 2.5 MG: 5 INJECTION, SOLUTION INTRAVENOUS at 21:18

## 2018-01-01 RX ADMIN — CHOLESTYRAMINE 4 G: 4 POWDER, FOR SUSPENSION ORAL at 08:52

## 2018-01-01 RX ADMIN — INSULIN LISPRO 4 UNITS: 100 INJECTION, SOLUTION INTRAVENOUS; SUBCUTANEOUS at 12:36

## 2018-01-01 RX ADMIN — Medication 1 PACKET: at 11:46

## 2018-01-01 RX ADMIN — INSULIN LISPRO 8 UNITS: 100 INJECTION, SOLUTION INTRAVENOUS; SUBCUTANEOUS at 20:14

## 2018-01-01 RX ADMIN — INSULIN DETEMIR 30 UNITS: 100 INJECTION, SOLUTION SUBCUTANEOUS at 12:16

## 2018-01-01 RX ADMIN — POTASSIUM CHLORIDE: 149 INJECTION, SOLUTION, CONCENTRATE INTRAVENOUS at 01:13

## 2018-01-01 RX ADMIN — METOPROLOL TARTRATE 2.5 MG: 5 INJECTION, SOLUTION INTRAVENOUS at 14:43

## 2018-01-01 RX ADMIN — LEVETIRACETAM 500 MG: 5 INJECTION INTRAVENOUS at 17:33

## 2018-01-01 RX ADMIN — ACETAMINOPHEN 650 MG: 325 TABLET, FILM COATED ORAL at 05:27

## 2018-01-01 RX ADMIN — LEVOFLOXACIN 750 MG: 5 INJECTION, SOLUTION INTRAVENOUS at 13:17

## 2018-01-01 RX ADMIN — METOPROLOL TARTRATE 2.5 MG: 5 INJECTION, SOLUTION INTRAVENOUS at 10:25

## 2018-01-01 RX ADMIN — ASPIRIN 300 MG: 300 SUPPOSITORY RECTAL at 18:04

## 2018-01-01 RX ADMIN — METOPROLOL TARTRATE 25 MG: 25 TABLET, FILM COATED ORAL at 20:07

## 2018-01-01 RX ADMIN — ATORVASTATIN CALCIUM 80 MG: 40 TABLET, FILM COATED ORAL at 20:35

## 2018-01-01 RX ADMIN — INSULIN LISPRO 4 UNITS: 100 INJECTION, SOLUTION INTRAVENOUS; SUBCUTANEOUS at 17:50

## 2018-01-01 RX ADMIN — METRONIDAZOLE 500 MG: 500 TABLET ORAL at 20:52

## 2018-01-01 RX ADMIN — Medication 1 PACKET: at 09:17

## 2018-01-01 RX ADMIN — VANCOMYCIN HYDROCHLORIDE 750 MG: 10 INJECTION, POWDER, LYOPHILIZED, FOR SOLUTION INTRAVENOUS at 15:19

## 2018-01-01 RX ADMIN — SODIUM CHLORIDE 8 MG/HR: 900 INJECTION INTRAVENOUS at 00:33

## 2018-01-01 RX ADMIN — Medication 1 PACKET: at 20:56

## 2018-01-01 RX ADMIN — ASPIRIN 300 MG: 300 SUPPOSITORY RECTAL at 17:47

## 2018-01-01 RX ADMIN — LEVETIRACETAM 500 MG: 500 SOLUTION ORAL at 17:50

## 2018-01-01 RX ADMIN — ONDANSETRON HYDROCHLORIDE 4 MG: 2 INJECTION, SOLUTION INTRAMUSCULAR; INTRAVENOUS at 08:28

## 2018-01-01 RX ADMIN — LEVETIRACETAM 500 MG: 500 SOLUTION ORAL at 05:15

## 2018-01-01 RX ADMIN — ASPIRIN 81 MG 81 MG: 81 TABLET ORAL at 18:03

## 2018-01-01 RX ADMIN — METOPROLOL TARTRATE 2.5 MG: 5 INJECTION, SOLUTION INTRAVENOUS at 05:42

## 2018-01-01 RX ADMIN — INSULIN LISPRO 4 UNITS: 100 INJECTION, SOLUTION INTRAVENOUS; SUBCUTANEOUS at 20:58

## 2018-01-01 RX ADMIN — ONDANSETRON HYDROCHLORIDE 4 MG: 2 INJECTION, SOLUTION INTRAMUSCULAR; INTRAVENOUS at 14:42

## 2018-01-01 RX ADMIN — METOPROLOL TARTRATE 2.5 MG: 5 INJECTION, SOLUTION INTRAVENOUS at 05:30

## 2018-01-01 RX ADMIN — CHOLESTYRAMINE 4 G: 4 POWDER, FOR SUSPENSION ORAL at 18:06

## 2018-01-01 RX ADMIN — SODIUM CHLORIDE 8 MG/HR: 900 INJECTION INTRAVENOUS at 06:11

## 2018-01-01 RX ADMIN — PERFLUTREN: 6.52 INJECTION, SUSPENSION INTRAVENOUS at 13:11

## 2018-01-01 RX ADMIN — ASPIRIN 300 MG: 300 SUPPOSITORY RECTAL at 17:59

## 2018-01-01 RX ADMIN — LEVOFLOXACIN 500 MG: 500 TABLET, FILM COATED ORAL at 14:26

## 2018-01-01 RX ADMIN — METRONIDAZOLE 500 MG: 500 TABLET ORAL at 20:30

## 2018-01-01 RX ADMIN — INSULIN LISPRO 6 UNITS: 100 INJECTION, SOLUTION INTRAVENOUS; SUBCUTANEOUS at 12:13

## 2018-01-01 RX ADMIN — QUETIAPINE FUMARATE 37.5 MG: 25 TABLET, FILM COATED ORAL at 20:28

## 2018-01-01 RX ADMIN — SODIUM CHLORIDE 8 MG/HR: 900 INJECTION INTRAVENOUS at 04:49

## 2018-01-01 RX ADMIN — CHOLESTYRAMINE 4 G: 4 POWDER, FOR SUSPENSION ORAL at 08:26

## 2018-01-01 RX ADMIN — ACETAMINOPHEN 650 MG: 325 TABLET, FILM COATED ORAL at 15:03

## 2018-01-01 RX ADMIN — METRONIDAZOLE 500 MG: 500 TABLET ORAL at 20:35

## 2018-01-01 RX ADMIN — MEROPENEM 1 G: 1 INJECTION, POWDER, FOR SOLUTION INTRAVENOUS at 18:25

## 2018-01-01 RX ADMIN — FOLIC ACID 5 MG: 1 TABLET ORAL at 14:26

## 2018-01-01 RX ADMIN — INSULIN LISPRO 9 UNITS: 100 INJECTION, SOLUTION INTRAVENOUS; SUBCUTANEOUS at 08:10

## 2018-01-01 RX ADMIN — LISINOPRIL 20 MG: 20 TABLET ORAL at 08:31

## 2018-01-01 RX ADMIN — LEVETIRACETAM 500 MG: 5 INJECTION INTRAVENOUS at 05:31

## 2018-01-01 RX ADMIN — SODIUM CHLORIDE 8 MG/HR: 900 INJECTION INTRAVENOUS at 00:00

## 2018-01-01 RX ADMIN — SODIUM CHLORIDE 8 MG/HR: 900 INJECTION INTRAVENOUS at 05:02

## 2018-01-01 RX ADMIN — ONDANSETRON HYDROCHLORIDE 4 MG: 2 INJECTION, SOLUTION INTRAMUSCULAR; INTRAVENOUS at 20:56

## 2018-01-01 RX ADMIN — INSULIN LISPRO 6 UNITS: 100 INJECTION, SOLUTION INTRAVENOUS; SUBCUTANEOUS at 08:48

## 2018-01-01 RX ADMIN — LORAZEPAM 2 MG: 2 INJECTION INTRAMUSCULAR; INTRAVENOUS at 16:06

## 2018-01-01 RX ADMIN — LEVETIRACETAM 500 MG: 5 INJECTION INTRAVENOUS at 06:10

## 2018-01-01 RX ADMIN — POTASSIUM CHLORIDE 20 MEQ: 20 SOLUTION ORAL at 12:19

## 2018-01-01 RX ADMIN — INSULIN DETEMIR 30 UNITS: 100 INJECTION, SOLUTION SUBCUTANEOUS at 08:55

## 2018-01-01 RX ADMIN — INSULIN LISPRO 6 UNITS: 100 INJECTION, SOLUTION INTRAVENOUS; SUBCUTANEOUS at 23:01

## 2018-01-01 RX ADMIN — INSULIN LISPRO 5 UNITS: 100 INJECTION, SOLUTION INTRAVENOUS; SUBCUTANEOUS at 17:16

## 2018-01-01 RX ADMIN — VANCOMYCIN HYDROCHLORIDE 750 MG: 10 INJECTION, POWDER, LYOPHILIZED, FOR SOLUTION INTRAVENOUS at 13:52

## 2018-01-01 RX ADMIN — SODIUM CHLORIDE 8 MG/HR: 900 INJECTION INTRAVENOUS at 01:03

## 2018-01-01 RX ADMIN — METRONIDAZOLE 500 MG: 500 TABLET ORAL at 05:27

## 2018-01-01 RX ADMIN — IOPAMIDOL 100 ML: 612 INJECTION, SOLUTION INTRAVENOUS at 14:22

## 2018-01-01 RX ADMIN — METOPROLOL TARTRATE 2.5 MG: 5 INJECTION, SOLUTION INTRAVENOUS at 05:55

## 2018-01-01 RX ADMIN — INSULIN LISPRO 2 UNITS: 100 INJECTION, SOLUTION INTRAVENOUS; SUBCUTANEOUS at 21:13

## 2018-01-01 RX ADMIN — LEVOFLOXACIN 750 MG: 750 TABLET, FILM COATED ORAL at 08:30

## 2018-01-01 RX ADMIN — LEVETIRACETAM 500 MG: 500 TABLET, FILM COATED ORAL at 17:44

## 2018-01-01 RX ADMIN — METRONIDAZOLE 500 MG: 500 TABLET ORAL at 17:36

## 2018-01-01 RX ADMIN — INSULIN LISPRO 6 UNITS: 100 INJECTION, SOLUTION INTRAVENOUS; SUBCUTANEOUS at 12:03

## 2018-01-01 RX ADMIN — VANCOMYCIN HYDROCHLORIDE 750 MG: 10 INJECTION, POWDER, LYOPHILIZED, FOR SOLUTION INTRAVENOUS at 16:02

## 2018-01-01 RX ADMIN — LEVETIRACETAM 500 MG: 500 SOLUTION ORAL at 06:45

## 2018-01-01 RX ADMIN — MEROPENEM 1 G: 1 INJECTION, POWDER, FOR SOLUTION INTRAVENOUS at 21:05

## 2018-01-01 RX ADMIN — INSULIN DETEMIR 20 UNITS: 100 INJECTION, SOLUTION SUBCUTANEOUS at 08:27

## 2018-01-01 RX ADMIN — LEVETIRACETAM 500 MG: 500 SOLUTION ORAL at 17:38

## 2018-01-01 RX ADMIN — LEVETIRACETAM 500 MG: 500 SOLUTION ORAL at 21:46

## 2018-01-01 RX ADMIN — INSULIN DETEMIR 15 UNITS: 100 INJECTION, SOLUTION SUBCUTANEOUS at 08:40

## 2018-01-01 RX ADMIN — SODIUM CHLORIDE 8 MG/HR: 900 INJECTION INTRAVENOUS at 14:46

## 2018-01-01 RX ADMIN — ATORVASTATIN CALCIUM 80 MG: 40 TABLET, FILM COATED ORAL at 21:45

## 2018-01-01 RX ADMIN — BARIUM SULFATE 20 ML: 400 PASTE ORAL at 12:58

## 2018-01-01 RX ADMIN — METRONIDAZOLE 500 MG: 500 TABLET ORAL at 07:02

## 2018-01-01 RX ADMIN — MINERAL SUPPLEMENT IRON 300 MG / 5 ML STRENGTH LIQUID 100 PER BOX UNFLAVORED 300 MG: at 17:34

## 2018-01-01 RX ADMIN — INSULIN LISPRO 8 UNITS: 100 INJECTION, SOLUTION INTRAVENOUS; SUBCUTANEOUS at 12:16

## 2018-01-01 RX ADMIN — METOPROLOL TARTRATE 2.5 MG: 5 INJECTION, SOLUTION INTRAVENOUS at 22:16

## 2018-01-01 RX ADMIN — QUETIAPINE FUMARATE 25 MG: 25 TABLET, FILM COATED ORAL at 20:38

## 2018-01-01 RX ADMIN — POTASSIUM CHLORIDE 40 MEQ: 20 SOLUTION ORAL at 14:28

## 2018-01-01 RX ADMIN — SODIUM CHLORIDE 8 MG/HR: 900 INJECTION INTRAVENOUS at 19:11

## 2018-01-01 RX ADMIN — INSULIN LISPRO 2 UNITS: 100 INJECTION, SOLUTION INTRAVENOUS; SUBCUTANEOUS at 20:53

## 2018-01-01 RX ADMIN — METRONIDAZOLE 500 MG: 500 TABLET ORAL at 06:32

## 2018-01-01 RX ADMIN — LEVETIRACETAM 500 MG: 5 INJECTION INTRAVENOUS at 06:06

## 2018-01-01 RX ADMIN — INSULIN LISPRO 15 UNITS: 100 INJECTION, SOLUTION INTRAVENOUS; SUBCUTANEOUS at 17:59

## 2018-01-01 RX ADMIN — SODIUM CHLORIDE 8 MG/HR: 900 INJECTION INTRAVENOUS at 14:20

## 2018-01-01 RX ADMIN — METOPROLOL TARTRATE 25 MG: 25 TABLET, FILM COATED ORAL at 09:18

## 2018-01-01 RX ADMIN — QUETIAPINE FUMARATE 37.5 MG: 25 TABLET, FILM COATED ORAL at 20:07

## 2018-01-01 RX ADMIN — ATORVASTATIN CALCIUM 80 MG: 40 TABLET, FILM COATED ORAL at 20:38

## 2018-01-01 RX ADMIN — INSULIN DETEMIR 20 UNITS: 100 INJECTION, SOLUTION SUBCUTANEOUS at 12:15

## 2018-01-01 RX ADMIN — QUETIAPINE FUMARATE 25 MG: 25 TABLET, FILM COATED ORAL at 20:20

## 2018-01-01 RX ADMIN — QUETIAPINE FUMARATE 25 MG: 25 TABLET, FILM COATED ORAL at 20:58

## 2018-01-01 RX ADMIN — SODIUM CHLORIDE 8 MG/HR: 900 INJECTION INTRAVENOUS at 20:03

## 2018-01-01 RX ADMIN — INSULIN LISPRO 2 UNITS: 100 INJECTION, SOLUTION INTRAVENOUS; SUBCUTANEOUS at 08:39

## 2018-01-01 RX ADMIN — DEXTROSE 15 G: 15 GEL ORAL at 06:00

## 2018-01-01 RX ADMIN — INSULIN LISPRO 2 UNITS: 100 INJECTION, SOLUTION INTRAVENOUS; SUBCUTANEOUS at 21:25

## 2018-01-01 RX ADMIN — ONDANSETRON HYDROCHLORIDE 8 MG: 2 INJECTION, SOLUTION INTRAMUSCULAR; INTRAVENOUS at 15:12

## 2018-01-01 RX ADMIN — CHOLESTYRAMINE 4 G: 4 POWDER, FOR SUSPENSION ORAL at 17:33

## 2018-01-01 RX ADMIN — INSULIN DETEMIR 28 UNITS: 100 INJECTION, SOLUTION SUBCUTANEOUS at 09:18

## 2018-01-01 RX ADMIN — INSULIN DETEMIR 15 UNITS: 100 INJECTION, SOLUTION SUBCUTANEOUS at 10:49

## 2018-01-01 RX ADMIN — POTASSIUM CHLORIDE: 149 INJECTION, SOLUTION, CONCENTRATE INTRAVENOUS at 15:43

## 2018-01-01 RX ADMIN — BARIUM SULFATE 20 ML: 0.81 POWDER, FOR SUSPENSION ORAL at 14:20

## 2018-01-01 RX ADMIN — LEVETIRACETAM 500 MG: 5 INJECTION INTRAVENOUS at 18:36

## 2018-01-01 RX ADMIN — MINERAL SUPPLEMENT IRON 300 MG / 5 ML STRENGTH LIQUID 100 PER BOX UNFLAVORED 300 MG: at 17:36

## 2018-01-01 RX ADMIN — INSULIN DETEMIR 15 UNITS: 100 INJECTION, SOLUTION SUBCUTANEOUS at 09:13

## 2018-01-01 RX ADMIN — PROMETHAZINE HYDROCHLORIDE 12.5 MG: 25 INJECTION, SOLUTION INTRAMUSCULAR; INTRAVENOUS at 10:02

## 2018-01-01 RX ADMIN — INSULIN LISPRO 4 UNITS: 100 INJECTION, SOLUTION INTRAVENOUS; SUBCUTANEOUS at 20:38

## 2018-01-01 RX ADMIN — CHOLESTYRAMINE 4 G: 4 POWDER, FOR SUSPENSION ORAL at 17:36

## 2018-01-01 RX ADMIN — POTASSIUM CHLORIDE: 149 INJECTION, SOLUTION, CONCENTRATE INTRAVENOUS at 11:25

## 2018-01-01 RX ADMIN — MEROPENEM 1 G: 1 INJECTION, POWDER, FOR SOLUTION INTRAVENOUS at 21:42

## 2018-01-01 RX ADMIN — INSULIN DETEMIR 15 UNITS: 100 INJECTION, SOLUTION SUBCUTANEOUS at 10:40

## 2018-01-01 RX ADMIN — ATORVASTATIN CALCIUM 80 MG: 40 TABLET, FILM COATED ORAL at 21:15

## 2018-01-01 RX ADMIN — ATORVASTATIN CALCIUM 40 MG: 40 TABLET, FILM COATED ORAL at 20:07

## 2018-01-01 RX ADMIN — METOPROLOL TARTRATE 25 MG: 25 TABLET, FILM COATED ORAL at 17:45

## 2018-01-01 RX ADMIN — CHOLESTYRAMINE 4 G: 4 POWDER, FOR SUSPENSION ORAL at 09:05

## 2018-01-01 RX ADMIN — INSULIN LISPRO 7 UNITS: 100 INJECTION, SOLUTION INTRAVENOUS; SUBCUTANEOUS at 08:46

## 2018-01-01 RX ADMIN — IRON SUCROSE 200 MG: 20 INJECTION, SOLUTION INTRAVENOUS at 15:27

## 2018-01-01 RX ADMIN — METOPROLOL TARTRATE 25 MG: 25 TABLET, FILM COATED ORAL at 20:56

## 2018-01-09 LAB
FUNGUS (MYCOLOGY) CULTURE: NORMAL
FUNGUS (MYCOLOGY) CULTURE: NORMAL
KOH PREP: NORMAL
KOH PREP: NORMAL

## 2018-01-23 LAB
BACTERIA: ABNORMAL /HPF
BASOPHILS ABSOLUTE: 0.1 K/UL (ref 0–0.2)
BASOPHILS RELATIVE PERCENT: 1.4 % (ref 0–1)
BILIRUBIN URINE: NEGATIVE
BLOOD, URINE: ABNORMAL
CHOLESTEROL, TOTAL: 133 MG/DL (ref 160–199)
CLARITY: ABNORMAL
COLOR: YELLOW
CREATININE URINE: 58 MG/DL (ref 4.2–622)
EOSINOPHILS ABSOLUTE: 0.4 K/UL (ref 0–0.6)
EOSINOPHILS RELATIVE PERCENT: 4.5 % (ref 0–5)
GLUCOSE URINE: 100 MG/DL
HBA1C MFR BLD: 7.4 %
HCT VFR BLD CALC: 34.5 % (ref 42–52)
HDLC SERPL-MCNC: 55 MG/DL (ref 55–121)
HEMOGLOBIN: 10.7 G/DL (ref 14–18)
KETONES, URINE: NEGATIVE MG/DL
LDL CHOLESTEROL CALCULATED: 60 MG/DL
LEUKOCYTE ESTERASE, URINE: ABNORMAL
LYMPHOCYTES ABSOLUTE: 1.6 K/UL (ref 1.1–4.5)
LYMPHOCYTES RELATIVE PERCENT: 17.7 % (ref 20–40)
MCH RBC QN AUTO: 29 PG (ref 27–31)
MCHC RBC AUTO-ENTMCNC: 31 G/DL (ref 33–37)
MCV RBC AUTO: 93.5 FL (ref 80–94)
MICROALBUMIN UR-MCNC: 8.6 MG/DL (ref 0–19)
MICROALBUMIN/CREAT UR-RTO: 148.3 MG/G
MONOCYTES ABSOLUTE: 0.9 K/UL (ref 0–0.9)
MONOCYTES RELATIVE PERCENT: 10.1 % (ref 0–10)
NEUTROPHILS ABSOLUTE: 5.8 K/UL (ref 1.5–7.5)
NEUTROPHILS RELATIVE PERCENT: 65.7 % (ref 50–65)
NITRITE, URINE: NEGATIVE
PDW BLD-RTO: 16.3 % (ref 11.5–14.5)
PH UA: 6
PLATELET # BLD: 303 K/UL (ref 130–400)
PMV BLD AUTO: 10.9 FL (ref 9.4–12.4)
PROSTATE SPECIFIC ANTIGEN: 1.21 NG/ML (ref 0–4)
PROTEIN UA: ABNORMAL MG/DL
RBC # BLD: 3.69 M/UL (ref 4.7–6.1)
SPECIFIC GRAVITY UA: 1.01
T3 FREE: 2.4 PG/ML (ref 2–4.4)
T4 FREE: 1 NG/DL (ref 0.9–1.7)
TRIGL SERPL-MCNC: 90 MG/DL (ref 0–149)
TSH SERPL DL<=0.05 MIU/L-ACNC: 0.91 UIU/ML (ref 0.27–4.2)
URIC ACID, SERUM: 6.7 MG/DL (ref 3.4–7)
URINE REFLEX TO CULTURE: YES
UROBILINOGEN, URINE: 0.2 E.U./DL
WBC # BLD: 8.8 K/UL (ref 4.8–10.8)
WBC UA: ABNORMAL /HPF (ref 0–5)
YEAST: ABNORMAL /HPF

## 2018-02-01 LAB
ORGANISM: ABNORMAL
URINE CULTURE, ROUTINE: ABNORMAL
URINE CULTURE, ROUTINE: ABNORMAL

## 2018-02-14 LAB
BASOPHILS ABSOLUTE: 0.1 K/UL (ref 0–0.2)
BASOPHILS RELATIVE PERCENT: 1.6 % (ref 0–1)
EOSINOPHILS ABSOLUTE: 0.3 K/UL (ref 0–0.6)
EOSINOPHILS RELATIVE PERCENT: 4.4 % (ref 0–5)
FERRITIN: 169.9 NG/ML (ref 30–400)
FOLATE: 17.2 NG/ML (ref 4.5–32.2)
HCT VFR BLD CALC: 36.2 % (ref 42–52)
HEMOGLOBIN: 11.3 G/DL (ref 14–18)
IRON: 52 UG/DL (ref 59–158)
LYMPHOCYTES ABSOLUTE: 1.4 K/UL (ref 1.1–4.5)
LYMPHOCYTES RELATIVE PERCENT: 21 % (ref 20–40)
MCH RBC QN AUTO: 28.8 PG (ref 27–31)
MCHC RBC AUTO-ENTMCNC: 31.2 G/DL (ref 33–37)
MCV RBC AUTO: 92.1 FL (ref 80–94)
MONOCYTES ABSOLUTE: 0.6 K/UL (ref 0–0.9)
MONOCYTES RELATIVE PERCENT: 9.3 % (ref 0–10)
NEUTROPHILS ABSOLUTE: 4.3 K/UL (ref 1.5–7.5)
NEUTROPHILS RELATIVE PERCENT: 63.4 % (ref 50–65)
PDW BLD-RTO: 15.8 % (ref 11.5–14.5)
PLATELET # BLD: 285 K/UL (ref 130–400)
PMV BLD AUTO: 11.2 FL (ref 9.4–12.4)
RBC # BLD: 3.93 M/UL (ref 4.7–6.1)
RETICULOCYTE ABSOLUTE COUNT: 0.05 M/UL (ref 0.03–0.12)
RETICULOCYTE COUNT PCT: 1.26 % (ref 0.5–1.5)
TOTAL IRON BINDING CAPACITY: 255 UG/DL (ref 250–400)
VITAMIN B-12: 823 PG/ML (ref 211–946)
WBC # BLD: 6.8 K/UL (ref 4.8–10.8)

## 2018-03-15 NOTE — ED PROVIDER NOTES
Subjective   Upright patient is brought to emergency room by ambulance with the family in attendance.  His wife says that at lunch and 50 lunch for him and gave him his insulin based on that lunch but then he did not eat it.  She thought that the insulin would not act they quickly so she thought it would be okay and let him sit in his chair.  She fell asleep also.  This was around 1 to 1:30 PM.  When she awakened at shortly after 3 the patient was still slumped over in his chair and when she went to move his hits of the would not get a neck pain she noticed that he was poorly responsive and drooling.  She checked his glucose at that time and it was down to around 60 so she started giving him some honey to try to raise her sugar.  However he seemed to become less responsive so she called EMS and when they got there his glucose was 36 and gave him an amp of D50.  He is home since January after a stroke and then a broken hip.  He require some assistance with a walker to walk around.  He does have some cognitive issues with mostly is alert and oriented normally.  He did fall earlier today but he just struck his knees against some steps he has not hit his head        History provided by:  Spouse and relative  History limited by:  Mental status change   used: No    Altered Mental Status   Presenting symptoms: behavior changes and partial responsiveness    Severity:  Moderate  Most recent episode:  Today  Episode history:  Single  Duration:  1 hour  Timing:  Constant  Progression:  Worsening  Chronicity:  New  Context: not alcohol use, not dementia, not drug use, not head injury, not homeless, taking medications as prescribed, not nursing home resident, not recent change in medication, not recent illness and not recent infection    Associated symptoms: decreased appetite and weakness    Associated symptoms: no abdominal pain, normal movement, no agitation, no bladder incontinence, no depression, no  difficulty breathing, no eye deviation, no fever, no hallucinations, no headaches, no light-headedness, no nausea, no palpitations, no rash, no seizures, no slurred speech, no suicidal behavior, no visual change and no vomiting        Review of Systems   Constitutional: Positive for decreased appetite. Negative for fever.   HENT: Negative.    Respiratory: Negative.    Cardiovascular: Negative.  Negative for palpitations.   Gastrointestinal: Negative.  Negative for abdominal pain, nausea and vomiting.   Genitourinary: Negative.  Negative for bladder incontinence.   Musculoskeletal: Negative.    Skin: Negative.  Negative for rash.   Neurological: Positive for weakness. Negative for seizures, light-headedness and headaches.   Hematological: Negative.    Psychiatric/Behavioral: Negative for agitation and hallucinations.   All other systems reviewed and are negative.      Past Medical History:   Diagnosis Date   • Arthritis    • Diabetes mellitus    • Hypertension    • Injury of back    • Stroke        Allergies   Allergen Reactions   • Ambien [Zolpidem] Hallucinations   • Penicillins      Unknown - childhood allergy       Past Surgical History:   Procedure Laterality Date   • VENA CAVA FILTER INSERTION         History reviewed. No pertinent family history.    Social History     Social History   • Marital status:      Social History Main Topics   • Smoking status: Former Smoker     Types: Cigarettes   • Smokeless tobacco: Former User     Quit date: 9/1/2017   • Alcohol use No   • Drug use: No   • Sexual activity: Defer     Other Topics Concern   • Not on file       Prior to Admission medications    Medication Sig Start Date End Date Taking? Authorizing Provider   acetaminophen (TYLENOL) 325 MG tablet Take 2 tablets by mouth Every 4 (Four) Hours As Needed for Mild Pain . 9/6/17   CHELSIE Ignacio   clopidogrel (PLAVIX) 75 MG tablet Take 1 tablet by mouth Daily. 9/6/17   CHELSIE Ignacio    insulin detemir (LEVEMIR) 100 UNIT/ML injection Inject 30 Units under the skin Daily. 9/6/17   CHELSIE Ignacio   insulin lispro (humaLOG) 100 UNIT/ML injection Inject  under the skin 3 (Three) Times a Day Before Meals. Sliding Scale:  0-149 = 0 units.  150-200 = 5 units.  201-250 = 7 units.  251-300 = 9 units.  301-350 = 11 units.  351-400 = 13 units.  >400 = 15 units, call MD.    Historical Provider, MD   lisinopril (PRINIVIL,ZESTRIL) 2.5 MG tablet Take 2.5 mg by mouth Daily As Needed (SBP >170).    Historical Provider, MD   nystatin (MYCOSTATIN) 122184 UNIT/GM powder Apply  topically Every 12 (Twelve) Hours. 9/6/17   CHELSIE Ignacio   oxyCODONE-acetaminophen (PERCOCET) 7.5-325 MG per tablet Take 1 tablet by mouth Every 6 (Six) Hours As Needed for Moderate Pain  (hold for sedation). 9/6/17   CHELSIE Ignacio   temazepam (RESTORIL) 30 MG capsule Take 30 mg by mouth Every Night.    Historical Provider, MD   venlafaxine (EFFEXOR) 37.5 MG tablet Take 37.5 mg by mouth Daily.    Historical Provider, MD   Zinc 50 MG tablet Take 1 tablet by mouth Daily.    Historical Provider, MD       Medications   sodium chloride 0.9 % flush 10 mL (not administered)   sodium chloride 0.9 % infusion (not administered)       Vitals:    03/15/18 1828   BP:    Pulse:    Resp: 15   Temp:    SpO2:          Objective   Physical Exam   Constitutional: He appears well-developed and well-nourished.   HENT:   Head: Normocephalic and atraumatic.   Neck: Normal range of motion. Neck supple.   Cardiovascular: Normal rate.    Pulmonary/Chest: Effort normal.   Abdominal: Soft. Bowel sounds are normal.   Musculoskeletal: Normal range of motion. He exhibits tenderness.   He is tender to palpation in his right knee particularly.   Neurological:   Sleeping in no distress.  He has no significant asymmetry noted in his  strength of his face.   Skin: Skin is warm and dry.   Psychiatric:   Sleeping.   Nursing note and  vitals reviewed.      Procedures         Lab Results (last 24 hours)     Procedure Component Value Units Date/Time    CBC & Differential [[id number]] Collected:  03/15/18 1654    Specimen:  Blood Updated:  03/15/18 1708    Narrative:       The following orders were created for panel order CBC & Differential.  Procedure                               Abnormality         Status                     ---------                               -----------         ------                     CBC Auto Differential[[id number]]        Abnormal            Final result                 Please view results for these tests on the individual orders.    Comprehensive Metabolic Panel [[id number]]  (Abnormal) Collected:  03/15/18 1654    Specimen:  Blood Updated:  03/15/18 1716     Glucose 146 (H) mg/dL      BUN 20 mg/dL      Creatinine 1.03 mg/dL      Sodium 139 mmol/L      Potassium 3.8 mmol/L      Chloride 103 mmol/L      CO2 28.0 mmol/L      Calcium 8.9 mg/dL      Total Protein 6.6 g/dL      Albumin 3.40 (L) g/dL      ALT (SGPT) 21 U/L      AST (SGOT) 33 U/L      Alkaline Phosphatase 91 U/L      Total Bilirubin 0.2 mg/dL      eGFR Non African Amer 70 mL/min/1.73      Globulin 3.2 gm/dL      A/G Ratio 1.1 g/dL      BUN/Creatinine Ratio 19.4     Anion Gap 8.0 mmol/L     Narrative:       The MDRD GFR formula is only valid for adults with stable renal function between ages 18 and 70.    Lactic Acid, Plasma [[id number]]  (Abnormal) Collected:  03/15/18 1654    Specimen:  Blood Updated:  03/15/18 1718     Lactate 2.4 (C) mmol/L     CBC Auto Differential [[id number]]  (Abnormal) Collected:  03/15/18 1654    Specimen:  Blood Updated:  03/15/18 1708     WBC 10.52 10*3/mm3      RBC 4.44 (L) 10*6/mm3      Hemoglobin 12.6 (L) g/dL      Hematocrit 39.1 (L) %      MCV 88.1 fL      MCH 28.4 pg      MCHC 32.2 (L) g/dL      RDW 14.0 %      RDW-SD 44.9 fl      MPV 11.0 fL      Platelets 274 10*3/mm3      Neutrophil % 79.2 (H) %      Lymphocyte % 10.0 (L) %       Monocyte % 7.9 %      Eosinophil % 1.4 %      Basophil % 1.0 %      Immature Grans % 0.5 %      Neutrophils, Absolute 8.34 10*3/mm3      Lymphocytes, Absolute 1.05 10*3/mm3      Monocytes, Absolute 0.83 10*3/mm3      Eosinophils, Absolute 0.15 10*3/mm3      Basophils, Absolute 0.10 10*3/mm3      Immature Grans, Absolute 0.05 (H) 10*3/mm3      nRBC 0.0 /100 WBC     aPTT [[id number]]  (Normal) Collected:  03/15/18 1654    Specimen:  Blood Updated:  03/15/18 1715     PTT 32.3 seconds     Protime-INR [[id number]]  (Normal) Collected:  03/15/18 1654    Specimen:  Blood Updated:  03/15/18 1715     Protime 13.3 Seconds      INR 0.98    Lactic Acid, Reflex Timer (This will reflex a repeat order 3-3:15 hours after ordered.) [[id number]] Collected:  03/15/18 1654    Specimen:  Blood Updated:  03/15/18 1718          CT Head Without Contrast   Final Result      XR Knee 3 View Right   Final Result      XR Chest 1 View   Final Result          ED Course  ED Course   Value Comment By Time   Temp: 96 °F (35.6 °C) (Reviewed) Romain Richards,  03/15 1901    Syed E7 10 PM I interviewed the patient.  The patient and the patient's wife denies any symptoms.  The patient denies cough, chest pain.  States the patient is at his baseline mental status. Romain Richards DO 03/15 1917    The patient is to be discharged home.  I informed them that they are welcome to return to the emergency room at any point if they have any further issues. Romain Richards DO 03/15 1919          MDM    Final diagnoses:   Hypoglycemia          Romain Richards,   03/15/18 1920

## 2018-03-16 NOTE — DISCHARGE INSTRUCTIONS
Blood Glucose Monitoring, Adult  Monitoring your blood sugar (glucose) helps you manage your diabetes. It also helps you and your health care provider determine how well your diabetes management plan is working. Blood glucose monitoring involves checking your blood glucose as often as directed, and keeping a record (log) of your results over time.  Why should I monitor my blood glucose?  Checking your blood glucose regularly can:  · Help you understand how food, exercise, illnesses, and medicines affect your blood glucose.  · Let you know what your blood glucose is at any time. You can quickly tell if you are having low blood glucose (hypoglycemia) or high blood glucose (hyperglycemia).  · Help you and your health care provider adjust your medicines as needed.  When should I check my blood glucose?  Follow instructions from your health care provider about how often to check your blood glucose. This may depend on:  · The type of diabetes you have.  · How well-controlled your diabetes is.  · Medicines you are taking.  If you have type 1 diabetes:   · Check your blood glucose at least 2 times a day.  · Also check your blood glucose:  ¨ Before every insulin injection.  ¨ Before and after exercise.  ¨ Between meals.  ¨ 2 hours after a meal.  ¨ Occasionally between 2:00 a.m. and 3:00 a.m., as directed.  ¨ Before potentially dangerous tasks, like driving or using heavy machinery.  ¨ At bedtime.  · You may need to check your blood glucose more often, up to 6-10 times a day:  ¨ If you use an insulin pump.  ¨ If you need multiple daily injections (MDI).  ¨ If your diabetes is not well-controlled.  ¨ If you are ill.  ¨ If you have a history of severe hypoglycemia.  ¨ If you have a history of not knowing when your blood glucose is getting low (hypoglycemia unawareness).  If you have type 2 diabetes:   · If you take insulin or other diabetes medicines, check your blood glucose at least 2 times a day.  · If you are on intensive  insulin therapy, check your blood glucose at least 4 times a day. Occasionally, you may also need to check between 2:00 a.m. and 3:00 a.m., as directed.  · Also check your blood glucose:  ¨ Before and after exercise.  ¨ Before potentially dangerous tasks, like driving or using heavy machinery.  · You may need to check your blood glucose more often if:  ¨ Your medicine is being adjusted.  ¨ Your diabetes is not well-controlled.  ¨ You are ill.  What is a blood glucose log?  · A blood glucose log is a record of your blood glucose readings. It helps you and your health care provider:  ¨ Look for patterns in your blood glucose over time.  ¨ Adjust your diabetes management plan as needed.  · Every time you check your blood glucose, write down your result and notes about things that may be affecting your blood glucose, such as your diet and exercise for the day.  · Most glucose meters store a record of glucose readings in the meter. Some meters allow you to download your records to a computer.  How do I check my blood glucose?  Follow these steps to get accurate readings of your blood glucose:  Supplies needed     · Blood glucose meter.  · Test strips for your meter. Each meter has its own strips. You must use the strips that come with your meter.  · A needle to prick your finger (lancet). Do not use lancets more than once.  · A device that holds the lancet (lancing device).  · A journal or log book to write down your results.  Procedure   · Wash your hands with soap and water.  · Prick the side of your finger (not the tip) with the lancet. Use a different finger each time.  · Gently rub the finger until a small drop of blood appears.  · Follow instructions that come with your meter for inserting the test strip, applying blood to the strip, and using your blood glucose meter.  · Write down your result and any notes.  Alternative testing sites   · Some meters allow you to use areas of your body other than your finger  (alternative sites) to test your blood.  · If you think you may have hypoglycemia, or if you have hypoglycemia unawareness, do not use alternative sites. Use your finger instead.  · Alternative sites may not be as accurate as the fingers, because blood flow is slower in these areas. This means that the result you get may be delayed, and it may be different from the result that you would get from your finger.  · The most common alternative sites are:  ¨ Forearm.  ¨ Thigh.  ¨ Palm of the hand.  Additional tips   · Always keep your supplies with you.  · If you have questions or need help, all blood glucose meters have a 24-hour “hotline” number that you can call. You may also contact your health care provider.  · After you use a few boxes of test strips, adjust (calibrate) your blood glucose meter by following instructions that came with your meter.  This information is not intended to replace advice given to you by your health care provider. Make sure you discuss any questions you have with your health care provider.  Document Released: 12/20/2004 Document Revised: 07/07/2017 Document Reviewed: 05/29/2017  [employer] Interactive Patient Education © 2017 [employer]      Hypoglycemia   Hypoglycemia is when the sugar (glucose) level in the blood is too low. Symptoms of low blood sugar may include:  · Feeling:  ¨ Hungry.  ¨ Worried or nervous (anxious).  ¨ Sweaty and clammy.  ¨ Confused.  ¨ Dizzy.  ¨ Sleepy.  ¨ Sick to your stomach (nauseous).  · Having:  ¨ A fast heartbeat.  ¨ A headache.  ¨ A change in your vision.  ¨ Jerky movements that you cannot control (seizure).  ¨ Nightmares.  ¨ Tingling or no feeling (numbness) around the mouth, lips, or tongue.  · Having trouble with:  ¨ Talking.  ¨ Paying attention (concentrating).  ¨ Moving (coordination).  ¨ Sleeping.  · Shaking.  · Passing out (fainting).  · Getting upset easily (irritability).  Low blood sugar can happen to people who have diabetes and people who do not  have diabetes. Low blood sugar can happen quickly, and it can be an emergency.  Treating Low Blood Sugar   Low blood sugar is often treated by eating or drinking something sugary right away. If you can think clearly and swallow safely, follow the 15:15 rule:  · Take 15 grams of a fast-acting carb (carbohydrate). Some fast-acting carbs are:  ¨ 1 tube of glucose gel.  ¨ 3 sugar tablets (glucose pills).  ¨ 6-8 pieces of hard candy.  ¨ 4 oz (120 mL) of fruit juice.  ¨ 4 oz (120 mL) of regular (not diet) soda.  · Check your blood sugar 15 minutes after you take the carb.  · If your blood sugar is still at or below 70 mg/dL (3.9 mmol/L), take 15 grams of a carb again.  · If your blood sugar does not go above 70 mg/dL (3.9 mmol/L) after 3 tries, get help right away.  · After your blood sugar goes back to normal, eat a meal or a snack within 1 hour.  Treating Very Low Blood Sugar   If your blood sugar is at or below 54 mg/dL (3 mmol/L), you have very low blood sugar (severe hypoglycemia). This is an emergency. Do not wait to see if the symptoms will go away. Get medical help right away. Call your local emergency services (911 in the [place] Do not drive yourself to the hospital.  If you have very low blood sugar and you cannot eat or drink, you may need a glucagon shot (injection). A family member or friend should learn how to check your blood sugar and how to give you a glucagon shot. Ask your doctor if you need to have a glucagon shot kit at home.  Follow these instructions at home:  General instructions   · Avoid any diets that cause you to not eat enough food. Talk with your doctor before you start any new diet.  · Take over-the-counter and prescription medicines only as told by your doctor.  · Limit alcohol to no more than 1 drink per day for nonpregnant women and 2 drinks per day for men. One drink equals 12 oz of beer, 5 oz of wine, or 1½ oz of hard liquor.  · Keep all follow-up visits as told by your doctor. This is  important.  If You Have Diabetes:     · Make sure you know the symptoms of low blood sugar.  · Always keep a source of sugar with you, such as:  ¨ Sugar.  ¨ Sugar tablets.  ¨ Glucose gel.  ¨ Fruit juice.  ¨ Regular soda (not diet soda).  ¨ Milk.  ¨ Hard candy.  ¨ Honey.  · Take your medicines as told.  · Follow your exercise and meal plan.  ¨ Eat on time. Do not skip meals.  ¨ Follow your sick day plan when you cannot eat or drink normally. Make this plan ahead of time with your doctor.  · Check your blood sugar as often as told by your doctor. Always check before and after exercise.  · Share your diabetes care plan with:  ¨ Your work or school.  ¨ People you live with.  · Check your pee (urine) for ketones:  ¨ When you are sick.  ¨ As told by your doctor.  · Carry a card or wear jewelry that says you have diabetes.  If You Have Low Blood Sugar From Other Causes:     · Check your blood sugar as often as told by your doctor.  · Follow instructions from your doctor about what you cannot eat or drink.  Contact a doctor if:  · You have trouble keeping your blood sugar in your target range.  · You have low blood sugar often.  Get help right away if:  · You still have symptoms after you eat or drink something sugary.  · Your blood sugar is at or below 54 mg/dL (3 mmol/L).  · You have jerky movements that you cannot control.  · You pass out.  These symptoms may be an emergency. Do not wait to see if the symptoms will go away. Get medical help right away. Call your local emergency services (911 in the [place] Do not drive yourself to the hospital.  This information is not intended to replace advice given to you by your health care provider. Make sure you discuss any questions you have with your health care provider.  Document Released: 03/14/2011 Document Revised: 05/25/2017 Document Reviewed: 01/20/2017  [employer] Interactive Patient Education © 2017 [employer]

## 2018-05-04 NOTE — ED PROVIDER NOTES
"Subjective   Patient is a 77-year-old  male that presents to the ER today with complaint of abdominal pain and constipation.  Most of history is obtained from the patient's wife as the patient has had a stroke previously.  Patient's wife states the patient has a history of constipation.  He normally takes stool softener every morning.  She states that for about the past 1 week patient has been unable to have a bowel movement.  She states that he is only having small amount of liquid stools.  She states the patient has reported abdominal cramping.  She states that she did try to do a rectal exam on the patient yesterday and noticed that he had some hard stool in the rectum.  She states that she took the patient to his primary care doctor's office today and he had an x-ray done there that showed \"impaction\".  She states that she was unsure what to do so she decided to bring the patient to the ER for further evaluation.  At this time the patient has no complaints.  He is resting comfortably in the room. He denies any complaints at this time.         History provided by:  Patient   used: No    Abdominal Pain   Pain location:  Generalized  Pain quality: aching and cramping    Pain radiates to:  Does not radiate  Pain severity:  No pain  Onset quality:  Sudden  Duration:  1 week  Timing:  Intermittent  Progression:  Unchanged  Chronicity:  New  Context: not alcohol use, not awakening from sleep, not diet changes, not eating, not laxative use, not medication withdrawal, not previous surgeries, not recent illness, not recent sexual activity, not recent travel, not retching, not sick contacts, not suspicious food intake and not trauma    Relieved by:  Nothing  Worsened by:  Nothing  Ineffective treatments:  None tried  Associated symptoms: constipation    Associated symptoms: no anorexia, no belching, no chest pain, no chills, no cough, no diarrhea, no dysuria, no fatigue, no fever, no flatus, no " hematemesis, no hematochezia, no hematuria, no melena, no nausea, no shortness of breath, no sore throat, no vaginal bleeding, no vaginal discharge and no vomiting    Risk factors: being elderly    Risk factors: no alcohol abuse, no aspirin use, has not had multiple surgeries, no NSAID use, not obese, not pregnant and no recent hospitalization        Review of Systems   Constitutional: Negative for chills, fatigue and fever.   HENT: Negative for sore throat.    Respiratory: Negative for cough and shortness of breath.    Cardiovascular: Negative for chest pain.   Gastrointestinal: Positive for abdominal pain and constipation. Negative for anorexia, diarrhea, flatus, hematemesis, hematochezia, melena, nausea and vomiting.   Genitourinary: Negative for dysuria, hematuria, vaginal bleeding and vaginal discharge.   All other systems reviewed and are negative.      Past Medical History:   Diagnosis Date   • Arthritis    • Diabetes mellitus    • Hypertension    • Injury of back    • Stroke        Allergies   Allergen Reactions   • Ambien [Zolpidem] Hallucinations   • Penicillins      Unknown - childhood allergy       Past Surgical History:   Procedure Laterality Date   • VENA CAVA FILTER INSERTION         History reviewed. No pertinent family history.    Social History     Social History   • Marital status:      Social History Main Topics   • Smoking status: Former Smoker     Types: Cigarettes   • Smokeless tobacco: Former User     Quit date: 9/1/2017   • Alcohol use No   • Drug use: No   • Sexual activity: Defer     Other Topics Concern   • Not on file           Objective   Physical Exam   Constitutional: He is oriented to person, place, and time. He appears well-developed and well-nourished.   HENT:   Head: Normocephalic and atraumatic.   Cardiovascular: Normal rate, regular rhythm and normal heart sounds.    Pulmonary/Chest: Effort normal and breath sounds normal.   Abdominal: Soft. Bowel sounds are normal.    Neurological: He is alert and oriented to person, place, and time.   Skin: Skin is warm. Capillary refill takes less than 2 seconds.   Psychiatric: He has a normal mood and affect.   Nursing note and vitals reviewed.      Procedures           ED Course  ED Course   Comment By Time   Is reviewed.  This showed moderate stool.  Patient's labs show no acute findings. At this time I have reviewed the case with Dr. Ivey; enema ordered.  Valery Burrell, APRN 05/04 1946   Patient labs are reviewed.  CBC showed a white blood count 8.1, hemoglobin and hematocrit are stable.  CMP was unremarkable.  The patient's KUB showed a moderate amount of stool in the colon.  Patient was given a soapsuds enema.  He did not have the child of stool with this.  I did do a digital disimpaction as the pt felt that there was a large amt of hard stool that he was unable to defecate.  A small-to-moderate amount of soft stool was removed.  Patient reports feeling much better..  Patient denies any abdominal pain.  Denies any chest pain or shortness of breath.  Patient states he feels much better. Valery Burrell, APRN 05/04 2314   This time patient will be discharged home in stable condition.  I will give the patient for MiraLAX.  I advised the patient to follow-up with his primary care provider on Monday.  Patient is advised to return to the ER if any new or worsening symptoms.  Patient will be discharged home in stable condition with his wife at this time.  Patient is eating and drinking at this time.  He denies any abdominal pain, chest pain, shortness of breath or other symptoms. Valery Burrell, APRN 05/04 2316      XR Abdomen KUB   Final Result   1. Nonspecific bowel gas pattern with moderate amount stool noted in the   colon..            This report was finalized on 05/04/2018 19:16 by Dr. Caden Xiao MD.        Lab Results (last 24 hours)     Procedure Component Value Units Date/Time    CBC & Differential [[id number]] Collected:   05/04/18 1915    Specimen:  Blood Updated:  05/04/18 1926    Narrative:       The following orders were created for panel order CBC & Differential.  Procedure                               Abnormality         Status                     ---------                               -----------         ------                     CBC Auto Differential[[id number]]        Abnormal            Final result                 Please view results for these tests on the individual orders.    Comprehensive Metabolic Panel [[id number]]  (Abnormal) Collected:  05/04/18 1915    Specimen:  Blood Updated:  05/04/18 1936     Glucose 124 (H) mg/dL      BUN 20 mg/dL      Creatinine 1.05 mg/dL      Sodium 138 mmol/L      Potassium 4.0 mmol/L      Chloride 104 mmol/L      CO2 26.0 mmol/L      Calcium 8.9 mg/dL      Total Protein 6.4 g/dL      Albumin 3.50 g/dL      ALT (SGPT) 23 U/L      AST (SGOT) 24 U/L      Alkaline Phosphatase 89 U/L      Total Bilirubin 0.3 mg/dL      eGFR Non African Amer 69 mL/min/1.73      Globulin 2.9 gm/dL      A/G Ratio 1.2 g/dL      BUN/Creatinine Ratio 19.0     Anion Gap 8.0 mmol/L     Narrative:       The MDRD GFR formula is only valid for adults with stable renal function between ages 18 and 70.    Protime-INR [[id number]]  (Normal) Collected:  05/04/18 1915    Specimen:  Blood Updated:  05/04/18 1951     Protime 13.7 Seconds      INR 1.02    aPTT [[id number]]  (Abnormal) Collected:  05/04/18 1915    Specimen:  Blood Updated:  05/04/18 1951     PTT 35.8 (H) seconds     CBC Auto Differential [[id number]]  (Abnormal) Collected:  05/04/18 1915    Specimen:  Blood Updated:  05/04/18 1926     WBC 8.81 10*3/mm3      RBC 4.29 (L) 10*6/mm3      Hemoglobin 12.2 (L) g/dL      Hematocrit 37.0 (L) %      MCV 86.2 fL      MCH 28.4 pg      MCHC 33.0 g/dL      RDW 13.3 %      RDW-SD 41.4 fl      MPV 11.0 fL      Platelets 319 10*3/mm3      Neutrophil % 74.1 %      Lymphocyte % 13.1 (L) %      Monocyte % 10.2 %      Eosinophil % 1.4  %      Basophil % 0.7 %      Immature Grans % 0.5 %      Neutrophils, Absolute 6.54 10*3/mm3      Lymphocytes, Absolute 1.15 10*3/mm3      Monocytes, Absolute 0.90 10*3/mm3      Eosinophils, Absolute 0.12 10*3/mm3      Basophils, Absolute 0.06 10*3/mm3      Immature Grans, Absolute 0.04 (H) 10*3/mm3      nRBC 0.0 /100 WBC                     MDM      Final diagnoses:   Constipation, unspecified constipation type            Valery Burrell, APRN  05/04/18 3259

## 2018-05-05 NOTE — DISCHARGE INSTRUCTIONS
Please follow up with your PCP in 1-2 days for a recheck  Return to the ER as needed      Constipation, Adult  Constipation is when a person:  · Poops (has a bowel movement) fewer times in a week than normal.  · Has a hard time pooping.  · Has poop that is dry, hard, or bigger than normal.  Follow these instructions at home:  Eating and drinking     · Eat foods that have a lot of fiber, such as:  ¨ Fresh fruits and vegetables.  ¨ Whole grains.  ¨ Beans.  · Eat less of foods that are high in fat, low in fiber, or overly processed, such as:  ¨ French fries.  ¨ Hamburgers.  ¨ Cookies.  ¨ Candy.  ¨ Soda.  · Drink enough fluid to keep your pee (urine) clear or pale yellow.  General instructions   · Exercise regularly or as told by your doctor.  · Go to the restroom when you feel like you need to poop. Do not hold it in.  · Take over-the-counter and prescription medicines only as told by your doctor. These include any fiber supplements.  · Do pelvic floor retraining exercises, such as:  ¨ Doing deep breathing while relaxing your lower belly (abdomen).  ¨ Relaxing your pelvic floor while pooping.  · Watch your condition for any changes.  · Keep all follow-up visits as told by your doctor. This is important.  Contact a doctor if:  · You have pain that gets worse.  · You have a fever.  · You have not pooped for 4 days.  · You throw up (vomit).  · You are not hungry.  · You lose weight.  · You are bleeding from the anus.  · You have thin, pencil-like poop (stool).  Get help right away if:  · You have a fever, and your symptoms suddenly get worse.  · You leak poop or have blood in your poop.  · Your belly feels hard or bigger than normal (is bloated).  · You have very bad belly pain.  · You feel dizzy or you faint.  This information is not intended to replace advice given to you by your health care provider. Make sure you discuss any questions you have with your health care provider.  Document Released: 06/05/2009 Document  Revised: 07/07/2017 Document Reviewed: 06/07/2017  [employer] Interactive Patient Education © 2017 [employer]

## 2018-09-11 PROBLEM — I63.9 ACUTE CVA (CEREBROVASCULAR ACCIDENT) (HCC): Status: ACTIVE | Noted: 2018-01-01

## 2018-09-11 PROBLEM — I63.9 CEREBROVASCULAR ACCIDENT (CVA) (HCC): Status: ACTIVE | Noted: 2018-01-01

## 2018-09-27 PROBLEM — D50.9 IRON DEFICIENCY ANEMIA: Status: ACTIVE | Noted: 2018-01-01

## 2018-09-27 PROBLEM — J69.0 ASPIRATION PNEUMONIA (HCC): Status: ACTIVE | Noted: 2018-01-01

## 2018-09-27 PROBLEM — K92.0 GASTROINTESTINAL HEMORRHAGE WITH HEMATEMESIS: Status: ACTIVE | Noted: 2018-01-01

## 2018-09-28 PROBLEM — R56.9 SEIZURE (HCC): Status: ACTIVE | Noted: 2018-01-01

## 2018-12-29 PROBLEM — I69.315 COGNITIVE SOCIAL OR EMOTIONAL DEFICIT FOLLOWING CEREBRAL INFARCTION: Status: ACTIVE | Noted: 2018-12-29

## 2018-12-30 PROBLEM — R41.0 DELIRIUM: Status: ACTIVE | Noted: 2018-12-30

## 2018-12-30 PROBLEM — F01.518 VASCULAR DEMENTIA WITH BEHAVIOR DISTURBANCE: Status: ACTIVE | Noted: 2018-12-30

## 2018-12-30 PROBLEM — F51.04 INSOMNIA, PSYCHOPHYSIOLOGICAL: Status: ACTIVE | Noted: 2018-12-30

## 2019-01-02 ENCOUNTER — APPOINTMENT (OUTPATIENT)
Dept: CT IMAGING | Age: 79
End: 2019-01-02
Payer: MEDICARE

## 2019-01-02 ENCOUNTER — HOSPITAL ENCOUNTER (EMERGENCY)
Age: 79
Discharge: HOME | End: 2019-01-02
Payer: MEDICARE

## 2019-01-02 VITALS
BODY MASS INDEX: 23.06 KG/M2 | OXYGEN SATURATION: 95 % | DIASTOLIC BLOOD PRESSURE: 82 MMHG | SYSTOLIC BLOOD PRESSURE: 130 MMHG | TEMPERATURE: 98.8 F | HEART RATE: 74 BPM | RESPIRATION RATE: 20 BRPM | WEIGHT: 170 LBS

## 2019-01-02 DIAGNOSIS — S01.81XA FACIAL LACERATION, INITIAL ENCOUNTER: ICD-10-CM

## 2019-01-02 DIAGNOSIS — S09.90XA INJURY OF HEAD, INITIAL ENCOUNTER: Primary | ICD-10-CM

## 2019-01-02 LAB
GLUCOSE BLD-MCNC: 200 MG/DL
GLUCOSE BLD-MCNC: 200 MG/DL (ref 70–99)
PERFORMED ON: ABNORMAL

## 2019-01-02 PROCEDURE — 70450 CT HEAD/BRAIN W/O DYE: CPT

## 2019-01-02 PROCEDURE — 99284 EMERGENCY DEPT VISIT MOD MDM: CPT

## 2019-01-02 PROCEDURE — 82948 REAGENT STRIP/BLOOD GLUCOSE: CPT

## 2019-01-02 PROCEDURE — 72125 CT NECK SPINE W/O DYE: CPT

## 2019-01-02 PROCEDURE — G0168 WOUND CLOSURE BY ADHESIVE: HCPCS | Performed by: PHYSICIAN ASSISTANT

## 2019-01-02 PROCEDURE — 99283 EMERGENCY DEPT VISIT LOW MDM: CPT | Performed by: PHYSICIAN ASSISTANT

## 2019-01-03 ASSESSMENT — ENCOUNTER SYMPTOMS
APNEA: 0
NAUSEA: 0
PHOTOPHOBIA: 0
EYE ITCHING: 0
WHEEZING: 0
VOMITING: 0
EYE DISCHARGE: 0
COUGH: 0
ABDOMINAL PAIN: 0
SHORTNESS OF BREATH: 0
COLOR CHANGE: 0
BACK PAIN: 0

## (undated) DEVICE — Z INACTIVE USE 2660664 SOLUTION IRRIG 3000ML 0.9% SOD CHL USP UROMATIC PLAS CONT

## (undated) DEVICE — SUTURE VCRL SZ 2-0 L36IN ABSRB UD L36MM CT-1 1/2 CIR J945H

## (undated) DEVICE — TIBURON SPLIT SHEET: Brand: CONVERTORS

## (undated) DEVICE — SUTURE VCRL SZ 0 L18IN ABSRB UD L36MM CT-1 1/2 CIR J840D

## (undated) DEVICE — SUTURE ABSRB BRAID COAT UD CP NO 2 27IN VCRL J195H

## (undated) DEVICE — BIPOLAR SEALER 23-112-1 AQM 6.0: Brand: AQUAMANTYS ®

## (undated) DEVICE — SEALANT HEMSTAT 5ML HUM FIBRIN THROM 2 VI APPL DEV EVICEL

## (undated) DEVICE — U-DRAPE: Brand: CONVERTORS

## (undated) DEVICE — SPONGE LAP W18XL18IN WHT COT 4 PLY FLD STRUNG RADPQ DISP ST

## (undated) DEVICE — SUTURE VCRL SZ 3-0 L27IN ABSRB UD L26MM SH 1/2 CIR J416H

## (undated) DEVICE — SOLUTION IV 250ML 0.9% SOD CHL PH 5 INJ USP VIAFLX PLAS

## (undated) DEVICE — SYSTEM SKIN CLSR 22CM DERMBND PRINEO

## (undated) DEVICE — SOLUTION IV IRRIG POUR BRL 0.9% SODIUM CHL 2F7124

## (undated) DEVICE — FAN SPRAY KIT: Brand: PULSAVAC®

## (undated) DEVICE — SUTURE VCRL SZ 0 L27IN ABSRB UD L36MM CT-1 1/2 CIR J260H

## (undated) DEVICE — GLOVE SURG SZ 75 L12IN FNGR THK87MIL DK GRN LTX FREE ISOLEX

## (undated) DEVICE — JP 3-SPRING RES W/10FR PVC DRAIN/TR: Brand: CARDINAL HEALTH

## (undated) DEVICE — KIT CARE PATIENT HANA PROFX

## (undated) DEVICE — NON-WOVEN ADHESIVE WOUND DRESSING: Brand: PRIMAPORE ADHESIVE DRESSING 30*10CM

## (undated) DEVICE — Device: Brand: NCB®

## (undated) DEVICE — GLOVE SURG SZ 8 L12IN FNGR THK13MIL BRN LTX SYN POLYMER W

## (undated) DEVICE — STERILE POLYISOPRENE POWDER-FREE SURGICAL GLOVES: Brand: PROTEXIS

## (undated) DEVICE — GOWN,PRECEPT,XLNG/XXLARGE,STRL: Brand: MEDLINE

## (undated) DEVICE — TIP CTRL SEALANT EVICEL

## (undated) DEVICE — SUTURE ETHBND EXCEL SZ 0 L18IN NONABSORBABLE GRN L36MM CT-1 CX21D

## (undated) DEVICE — SUTURE PROL SZ 1 L30IN NONABSORBABLE BLU L40MM CT 1/2 CIR 8435H

## (undated) DEVICE — LIGHT SUCT UNTETHERED SCINTILLANT

## (undated) DEVICE — 3M™ STERI-DRAPE™ U-DRAPE 1015: Brand: STERI-DRAPE™

## (undated) DEVICE — CHLORAPREP 26ML ORANGE

## (undated) DEVICE — BIT DRL L205MM DIA2.7MM STD QUIK CONN W/O STP N RADLUC

## (undated) DEVICE — PACK ANT HIP CDS

## (undated) DEVICE — DRAPE,ORTHOMAX ,HIP,W/POUCHES: Brand: MEDLINE

## (undated) DEVICE — SUTURE VCRL SZ 1 L18IN ABSRB UD L36MM CT-1 1/2 CIR J841D

## (undated) DEVICE — SURGICAL PROCEDURE PACK LOWER EXTREMITY LOURDES HOSP